# Patient Record
Sex: FEMALE | Race: WHITE | Employment: OTHER | URBAN - METROPOLITAN AREA
[De-identification: names, ages, dates, MRNs, and addresses within clinical notes are randomized per-mention and may not be internally consistent; named-entity substitution may affect disease eponyms.]

---

## 2017-09-30 PROBLEM — A41.9 SEPSIS (HCC): Status: ACTIVE | Noted: 2017-09-30

## 2018-05-04 ENCOUNTER — APPOINTMENT (OUTPATIENT)
Dept: GENERAL RADIOLOGY | Age: 68
End: 2018-05-04
Payer: MEDICARE

## 2018-05-04 ENCOUNTER — HOSPITAL ENCOUNTER (EMERGENCY)
Age: 68
Discharge: HOME | End: 2018-05-04
Attending: EMERGENCY MEDICINE
Payer: MEDICARE

## 2018-05-04 VITALS
OXYGEN SATURATION: 96 % | HEIGHT: 65 IN | BODY MASS INDEX: 24.99 KG/M2 | SYSTOLIC BLOOD PRESSURE: 133 MMHG | WEIGHT: 150 LBS | RESPIRATION RATE: 18 BRPM | HEART RATE: 70 BPM | TEMPERATURE: 99.5 F | DIASTOLIC BLOOD PRESSURE: 67 MMHG

## 2018-05-04 DIAGNOSIS — R40.4 TRANSIENT ALTERATION OF AWARENESS: ICD-10-CM

## 2018-05-04 DIAGNOSIS — N30.00 ACUTE CYSTITIS WITHOUT HEMATURIA: Primary | ICD-10-CM

## 2018-05-04 LAB
ALBUMIN SERPL-MCNC: 4.1 G/DL (ref 3.5–5.2)
ALP BLD-CCNC: 91 U/L (ref 35–104)
ALT SERPL-CCNC: 18 U/L (ref 0–32)
ANION GAP SERPL CALCULATED.3IONS-SCNC: 14 MMOL/L (ref 7–16)
AST SERPL-CCNC: 28 U/L (ref 0–31)
BACTERIA: ABNORMAL /HPF
BASOPHILS ABSOLUTE: 0.01 E9/L (ref 0–0.2)
BASOPHILS RELATIVE PERCENT: 0.2 % (ref 0–2)
BILIRUB SERPL-MCNC: 0.7 MG/DL (ref 0–1.2)
BILIRUBIN URINE: NEGATIVE
BLOOD, URINE: ABNORMAL
BUN BLDV-MCNC: 21 MG/DL (ref 8–23)
CALCIUM SERPL-MCNC: 9.3 MG/DL (ref 8.6–10.2)
CHLORIDE BLD-SCNC: 99 MMOL/L (ref 98–107)
CLARITY: CLEAR
CO2: 24 MMOL/L (ref 22–29)
COLOR: YELLOW
CREAT SERPL-MCNC: 0.9 MG/DL (ref 0.5–1)
EKG ATRIAL RATE: 87 BPM
EKG P AXIS: 33 DEGREES
EKG P-R INTERVAL: 132 MS
EKG Q-T INTERVAL: 368 MS
EKG QRS DURATION: 96 MS
EKG QTC CALCULATION (BAZETT): 442 MS
EKG R AXIS: 88 DEGREES
EKG T AXIS: -5 DEGREES
EKG VENTRICULAR RATE: 87 BPM
EOSINOPHILS ABSOLUTE: 0.01 E9/L (ref 0.05–0.5)
EOSINOPHILS RELATIVE PERCENT: 0.2 % (ref 0–6)
EPITHELIAL CELLS, UA: ABNORMAL /HPF
GFR AFRICAN AMERICAN: >60
GFR NON-AFRICAN AMERICAN: >60 ML/MIN/1.73
GLUCOSE BLD-MCNC: 144 MG/DL (ref 74–109)
GLUCOSE BLD-MCNC: 147 MG/DL
GLUCOSE URINE: NEGATIVE MG/DL
HCT VFR BLD CALC: 34.7 % (ref 34–48)
HEMOGLOBIN: 11.6 G/DL (ref 11.5–15.5)
IMMATURE GRANULOCYTES #: 0.01 E9/L
IMMATURE GRANULOCYTES %: 0.2 % (ref 0–5)
KETONES, URINE: 15 MG/DL
LACTIC ACID: 1.2 MMOL/L (ref 0.5–2.2)
LEUKOCYTE ESTERASE, URINE: ABNORMAL
LYMPHOCYTES ABSOLUTE: 0.86 E9/L (ref 1.5–4)
LYMPHOCYTES RELATIVE PERCENT: 13.8 % (ref 20–42)
MCH RBC QN AUTO: 29.4 PG (ref 26–35)
MCHC RBC AUTO-ENTMCNC: 33.4 % (ref 32–34.5)
MCV RBC AUTO: 88.1 FL (ref 80–99.9)
METER GLUCOSE: 147 MG/DL (ref 70–110)
MONOCYTES ABSOLUTE: 0.67 E9/L (ref 0.1–0.95)
MONOCYTES RELATIVE PERCENT: 10.8 % (ref 2–12)
NEUTROPHILS ABSOLUTE: 4.66 E9/L (ref 1.8–7.3)
NEUTROPHILS RELATIVE PERCENT: 74.8 % (ref 43–80)
NITRITE, URINE: POSITIVE
PDW BLD-RTO: 12.5 FL (ref 11.5–15)
PH UA: 5.5 (ref 5–9)
PLATELET # BLD: 155 E9/L (ref 130–450)
PMV BLD AUTO: 10.5 FL (ref 7–12)
POTASSIUM SERPL-SCNC: 4.2 MMOL/L (ref 3.5–5)
PROTEIN UA: 30 MG/DL
RBC # BLD: 3.94 E12/L (ref 3.5–5.5)
RBC UA: ABNORMAL /HPF (ref 0–2)
SODIUM BLD-SCNC: 137 MMOL/L (ref 132–146)
SPECIFIC GRAVITY UA: 1.02 (ref 1–1.03)
TOTAL PROTEIN: 7.2 G/DL (ref 6.4–8.3)
TROPONIN: <0.01 NG/ML (ref 0–0.03)
UROBILINOGEN, URINE: 0.2 E.U./DL
WBC # BLD: 6.2 E9/L (ref 4.5–11.5)
WBC UA: >20 /HPF (ref 0–5)

## 2018-05-04 PROCEDURE — 87186 SC STD MICRODIL/AGAR DIL: CPT

## 2018-05-04 PROCEDURE — 80053 COMPREHEN METABOLIC PANEL: CPT

## 2018-05-04 PROCEDURE — 82962 GLUCOSE BLOOD TEST: CPT

## 2018-05-04 PROCEDURE — 99284 EMERGENCY DEPT VISIT MOD MDM: CPT

## 2018-05-04 PROCEDURE — 71045 X-RAY EXAM CHEST 1 VIEW: CPT

## 2018-05-04 PROCEDURE — 87040 BLOOD CULTURE FOR BACTERIA: CPT

## 2018-05-04 PROCEDURE — 36415 COLL VENOUS BLD VENIPUNCTURE: CPT

## 2018-05-04 PROCEDURE — 93005 ELECTROCARDIOGRAM TRACING: CPT | Performed by: PHYSICIAN ASSISTANT

## 2018-05-04 PROCEDURE — 2580000003 HC RX 258: Performed by: PHYSICIAN ASSISTANT

## 2018-05-04 PROCEDURE — 84484 ASSAY OF TROPONIN QUANT: CPT

## 2018-05-04 PROCEDURE — 94760 N-INVAS EAR/PLS OXIMETRY 1: CPT

## 2018-05-04 PROCEDURE — 85025 COMPLETE CBC W/AUTO DIFF WBC: CPT

## 2018-05-04 PROCEDURE — 87088 URINE BACTERIA CULTURE: CPT

## 2018-05-04 PROCEDURE — 83605 ASSAY OF LACTIC ACID: CPT

## 2018-05-04 PROCEDURE — 81001 URINALYSIS AUTO W/SCOPE: CPT

## 2018-05-04 PROCEDURE — 6370000000 HC RX 637 (ALT 250 FOR IP): Performed by: PHYSICIAN ASSISTANT

## 2018-05-04 RX ORDER — OMEPRAZOLE 20 MG/1
20 CAPSULE, DELAYED RELEASE ORAL DAILY
COMMUNITY
End: 2018-11-07

## 2018-05-04 RX ORDER — DONEPEZIL HYDROCHLORIDE 10 MG/1
10 TABLET, FILM COATED ORAL NIGHTLY
COMMUNITY
End: 2018-11-07

## 2018-05-04 RX ORDER — ACETAMINOPHEN 500 MG
1000 TABLET ORAL ONCE
Status: COMPLETED | OUTPATIENT
Start: 2018-05-04 | End: 2018-05-04

## 2018-05-04 RX ORDER — MEMANTINE HYDROCHLORIDE 10 MG/1
10 TABLET ORAL 2 TIMES DAILY
COMMUNITY
End: 2018-11-07

## 2018-05-04 RX ORDER — LAMOTRIGINE 200 MG/1
200 TABLET ORAL DAILY
COMMUNITY
End: 2018-11-07

## 2018-05-04 RX ORDER — PAROXETINE HYDROCHLORIDE 40 MG/1
40 TABLET, FILM COATED ORAL EVERY MORNING
COMMUNITY
End: 2018-11-07

## 2018-05-04 RX ORDER — CEFDINIR 300 MG/1
300 CAPSULE ORAL 2 TIMES DAILY
Qty: 20 CAPSULE | Refills: 0 | Status: SHIPPED | OUTPATIENT
Start: 2018-05-04 | End: 2018-05-14

## 2018-05-04 RX ORDER — 0.9 % SODIUM CHLORIDE 0.9 %
1000 INTRAVENOUS SOLUTION INTRAVENOUS ONCE
Status: COMPLETED | OUTPATIENT
Start: 2018-05-04 | End: 2018-05-04

## 2018-05-04 RX ADMIN — ACETAMINOPHEN 1000 MG: 500 TABLET ORAL at 15:40

## 2018-05-04 RX ADMIN — SODIUM CHLORIDE 1000 ML: 9 INJECTION, SOLUTION INTRAVENOUS at 15:40

## 2018-05-04 ASSESSMENT — PAIN DESCRIPTION - PAIN TYPE: TYPE: ACUTE PAIN

## 2018-05-04 ASSESSMENT — PAIN DESCRIPTION - FREQUENCY: FREQUENCY: CONTINUOUS

## 2018-05-04 ASSESSMENT — PAIN DESCRIPTION - LOCATION: LOCATION: ABDOMEN

## 2018-05-06 LAB
ORGANISM: ABNORMAL
URINE CULTURE, ROUTINE: ABNORMAL
URINE CULTURE, ROUTINE: ABNORMAL

## 2018-05-09 LAB
BLOOD CULTURE, ROUTINE: NORMAL
CULTURE, BLOOD 2: NORMAL

## 2018-09-14 ENCOUNTER — APPOINTMENT (OUTPATIENT)
Dept: GENERAL RADIOLOGY | Age: 68
End: 2018-09-14
Payer: MEDICARE

## 2018-09-14 ENCOUNTER — HOSPITAL ENCOUNTER (EMERGENCY)
Age: 68
Discharge: HOME | End: 2018-09-14
Attending: EMERGENCY MEDICINE
Payer: MEDICARE

## 2018-09-14 VITALS
SYSTOLIC BLOOD PRESSURE: 224 MMHG | DIASTOLIC BLOOD PRESSURE: 88 MMHG | WEIGHT: 140 LBS | RESPIRATION RATE: 18 BRPM | BODY MASS INDEX: 23.32 KG/M2 | HEART RATE: 61 BPM | TEMPERATURE: 98.6 F | HEIGHT: 65 IN | OXYGEN SATURATION: 98 %

## 2018-09-14 DIAGNOSIS — M79.641 HAND PAIN, RIGHT: Primary | ICD-10-CM

## 2018-09-14 PROCEDURE — 73130 X-RAY EXAM OF HAND: CPT

## 2018-09-14 PROCEDURE — 99283 EMERGENCY DEPT VISIT LOW MDM: CPT

## 2018-09-14 ASSESSMENT — ENCOUNTER SYMPTOMS
SHORTNESS OF BREATH: 0
DIARRHEA: 0
BACK PAIN: 0
SINUS PRESSURE: 0
VOMITING: 0
COUGH: 0
NAUSEA: 0
ABDOMINAL DISTENTION: 0
EYE DISCHARGE: 0
EYE REDNESS: 0
EYE PAIN: 0
WHEEZING: 0
SORE THROAT: 0

## 2018-09-14 ASSESSMENT — PAIN DESCRIPTION - LOCATION: LOCATION: HAND

## 2018-09-14 ASSESSMENT — PAIN SCALES - GENERAL: PAINLEVEL_OUTOF10: 8

## 2018-09-14 ASSESSMENT — PAIN DESCRIPTION - ORIENTATION: ORIENTATION: RIGHT

## 2018-09-14 ASSESSMENT — PAIN DESCRIPTION - PAIN TYPE: TYPE: ACUTE PAIN

## 2018-09-14 ASSESSMENT — PAIN DESCRIPTION - FREQUENCY: FREQUENCY: CONTINUOUS

## 2018-09-14 ASSESSMENT — PAIN DESCRIPTION - DESCRIPTORS: DESCRIPTORS: STABBING

## 2018-09-15 NOTE — ED PROVIDER NOTES
She is alert and oriented to person, place, and time. No cranial nerve deficit. Coordination normal.   Skin: Skin is warm and dry. Nursing note and vitals reviewed. Procedures    MDM    Patient presents to the ED c/o hand pain. Physical exam extremely unimpressive. Xrays unremarkable except for arthritis. Will discharge patient home. Explained to patient that she needs to f/u with orthopedic surgery and that they may want to repeat xrays in 5-7 days if her symptoms do not improve.           --------------------------------------------- PAST HISTORY ---------------------------------------------  Past Medical History:  has a past medical history of Cancer ([facility] [address] Diabetes mellitus ([facility] Hypertension; Radiation cystitis; Urinary incontinence; and Urinary retention. Past Surgical History:  has a past surgical history that includes fracture surgery and Hysterectomy (1985). Social History:  reports that she has quit smoking. She has never used smokeless tobacco. She reports that she drinks alcohol. She reports that she does not use drugs. Family History: family history includes Heart Disease in her mother; No Known Problems in her father. The patients home medications have been reviewed. Allergies: Tetracyclines & related    -------------------------------------------------- RESULTS -------------------------------------------------  Labs:  No results found for this visit on 09/14/18. Radiology:  XR HAND RIGHT (MIN 3 VIEWS)   Final Result   Mild osteoarthritis DIP and PIP joints.          ------------------------- NURSING NOTES AND VITALS REVIEWED ---------------------------  Date / Time Roomed:  9/14/2018  8:13 PM  ED Bed Assignment:  [id number]    The nursing notes within the ED encounter and vital signs as below have been reviewed.    BP (!) 224/88   Pulse 61   Temp 98.6 °F (37 °C) (Oral)   Resp 18   Ht 5' 5\" (1.651 m)   Wt 140 lb (63.5 kg)   SpO2 98%   BMI 23.30 kg/m²   Oxygen Saturation Interpretation: Normal      ------------------------------------------ PROGRESS NOTES ------------------------------------------  9:30 PM  I have spoken with the patient and discussed todays results, in addition to providing specific details for the plan of care and counseling regarding the diagnosis and prognosis. Their questions are answered at this time and they are agreeable with the plan. I discussed at length with them reasons for immediate return here for re evaluation. They will followup with their primary care physician and orthopedic physician by calling their office on Monday.      --------------------------------- ADDITIONAL PROVIDER NOTES ---------------------------------  At this time the patient is without objective evidence of an acute process requiring hospitalization or inpatient management. They have remained hemodynamically stable throughout their entire ED visit and are stable for discharge with outpatient follow-up. The plan has been discussed in detail and they are aware of the specific conditions for emergent return, as well as the importance of follow-up. New Prescriptions    No medications on file       Diagnosis:  1. Hand pain, right        Disposition:  Patient's disposition: Discharge to home  Patient's condition is stable.        Genna Benitez DO  Resident  09/14/18 4437

## 2018-09-15 NOTE — ED NOTES
An ace wrap was applied to the right hand and an ice pack was given to ponce Fernández RN  09/14/18 [id number]

## 2018-10-05 ENCOUNTER — APPOINTMENT (OUTPATIENT)
Dept: ULTRASOUND IMAGING | Age: 68
End: 2018-10-05
Payer: MEDICARE

## 2018-10-05 ENCOUNTER — HOSPITAL ENCOUNTER (OUTPATIENT)
Dept: ULTRASOUND IMAGING | Age: 68
Discharge: HOME | End: 2018-10-05
Payer: MEDICARE

## 2018-10-05 DIAGNOSIS — N30.90 CYSTITIS: ICD-10-CM

## 2018-10-05 PROCEDURE — 76775 US EXAM ABDO BACK WALL LIM: CPT

## 2018-11-06 RX ORDER — SODIUM CHLORIDE 9 MG/ML
INJECTION, SOLUTION INTRAVENOUS CONTINUOUS
Status: CANCELLED | OUTPATIENT
Start: 2018-11-09

## 2018-11-07 ENCOUNTER — HOSPITAL ENCOUNTER (OUTPATIENT)
Dept: PREADMISSION TESTING | Age: 68
Discharge: HOME | End: 2018-11-07
Payer: MEDICARE

## 2018-11-07 VITALS
SYSTOLIC BLOOD PRESSURE: 130 MMHG | DIASTOLIC BLOOD PRESSURE: 90 MMHG | TEMPERATURE: 97.6 F | HEART RATE: 60 BPM | BODY MASS INDEX: 24.28 KG/M2 | WEIGHT: 142.2 LBS | HEIGHT: 64 IN | RESPIRATION RATE: 16 BRPM | OXYGEN SATURATION: 98 %

## 2018-11-07 DIAGNOSIS — R32 URINARY INCONTINENCE, UNSPECIFIED TYPE: Primary | ICD-10-CM

## 2018-11-07 LAB
ANION GAP SERPL CALCULATED.3IONS-SCNC: 12 MMOL/L (ref 7–16)
BUN BLDV-MCNC: 25 MG/DL (ref 8–23)
CALCIUM SERPL-MCNC: 9.9 MG/DL (ref 8.6–10.2)
CHLORIDE BLD-SCNC: 104 MMOL/L (ref 98–107)
CO2: 24 MMOL/L (ref 22–29)
CREAT SERPL-MCNC: 0.8 MG/DL (ref 0.5–1)
EKG ATRIAL RATE: 55 BPM
EKG P AXIS: 8 DEGREES
EKG P-R INTERVAL: 138 MS
EKG Q-T INTERVAL: 490 MS
EKG QRS DURATION: 102 MS
EKG QTC CALCULATION (BAZETT): 468 MS
EKG R AXIS: 64 DEGREES
EKG T AXIS: -16 DEGREES
EKG VENTRICULAR RATE: 55 BPM
GFR AFRICAN AMERICAN: >60
GFR NON-AFRICAN AMERICAN: >60 ML/MIN/1.73
GLUCOSE BLD-MCNC: 129 MG/DL (ref 74–99)
HCT VFR BLD CALC: 40.1 % (ref 34–48)
HEMOGLOBIN: 13 G/DL (ref 11.5–15.5)
MCH RBC QN AUTO: 29 PG (ref 26–35)
MCHC RBC AUTO-ENTMCNC: 32.4 % (ref 32–34.5)
MCV RBC AUTO: 89.3 FL (ref 80–99.9)
PDW BLD-RTO: 12.7 FL (ref 11.5–15)
PLATELET # BLD: 174 E9/L (ref 130–450)
PMV BLD AUTO: 10.7 FL (ref 7–12)
POTASSIUM SERPL-SCNC: 5.4 MMOL/L (ref 3.5–5)
RBC # BLD: 4.49 E12/L (ref 3.5–5.5)
SODIUM BLD-SCNC: 140 MMOL/L (ref 132–146)
WBC # BLD: 6.1 E9/L (ref 4.5–11.5)

## 2018-11-07 PROCEDURE — 93010 ELECTROCARDIOGRAM REPORT: CPT | Performed by: INTERNAL MEDICINE

## 2018-11-07 PROCEDURE — 36415 COLL VENOUS BLD VENIPUNCTURE: CPT

## 2018-11-07 PROCEDURE — 85027 COMPLETE CBC AUTOMATED: CPT

## 2018-11-07 PROCEDURE — 93005 ELECTROCARDIOGRAM TRACING: CPT | Performed by: ANESTHESIOLOGY

## 2018-11-07 PROCEDURE — 80048 BASIC METABOLIC PNL TOTAL CA: CPT

## 2018-11-07 RX ORDER — ATORVASTATIN CALCIUM 10 MG/1
10 TABLET, FILM COATED ORAL DAILY
COMMUNITY
End: 2019-12-02 | Stop reason: SDUPTHER

## 2018-11-07 RX ORDER — RAMIPRIL 10 MG/1
10 CAPSULE ORAL DAILY
COMMUNITY
End: 2019-07-15 | Stop reason: ALTCHOICE

## 2018-11-07 RX ORDER — LISINOPRIL 10 MG/1
30 TABLET ORAL DAILY
COMMUNITY
End: 2019-06-07 | Stop reason: DRUGHIGH

## 2018-11-09 ENCOUNTER — HOSPITAL ENCOUNTER (OUTPATIENT)
Age: 68
Setting detail: OUTPATIENT SURGERY
Discharge: HOME | End: 2018-11-09
Attending: UROLOGY | Admitting: UROLOGY
Payer: MEDICARE

## 2018-11-09 ENCOUNTER — ANESTHESIA EVENT (OUTPATIENT)
Dept: OPERATING ROOM | Age: 68
End: 2018-11-09
Payer: MEDICARE

## 2018-11-09 ENCOUNTER — ANESTHESIA (OUTPATIENT)
Dept: OPERATING ROOM | Age: 68
End: 2018-11-09
Payer: MEDICARE

## 2018-11-09 VITALS
OXYGEN SATURATION: 100 % | RESPIRATION RATE: 9 BRPM | DIASTOLIC BLOOD PRESSURE: 80 MMHG | SYSTOLIC BLOOD PRESSURE: 188 MMHG

## 2018-11-09 VITALS
OXYGEN SATURATION: 96 % | DIASTOLIC BLOOD PRESSURE: 80 MMHG | WEIGHT: 130 LBS | TEMPERATURE: 97 F | BODY MASS INDEX: 22.2 KG/M2 | SYSTOLIC BLOOD PRESSURE: 170 MMHG | HEART RATE: 70 BPM | HEIGHT: 64 IN | RESPIRATION RATE: 20 BRPM

## 2018-11-09 DIAGNOSIS — G89.18 POST-OPERATIVE PAIN: Primary | ICD-10-CM

## 2018-11-09 LAB — METER GLUCOSE: 143 MG/DL (ref 74–99)

## 2018-11-09 PROCEDURE — 82962 GLUCOSE BLOOD TEST: CPT

## 2018-11-09 PROCEDURE — 2580000003 HC RX 258: Performed by: NURSE ANESTHETIST, CERTIFIED REGISTERED

## 2018-11-09 PROCEDURE — 3700000001 HC ADD 15 MINUTES (ANESTHESIA): Performed by: UROLOGY

## 2018-11-09 PROCEDURE — 6360000002 HC RX W HCPCS: Performed by: UROLOGY

## 2018-11-09 PROCEDURE — 3600000002 HC SURGERY LEVEL 2 BASE: Performed by: UROLOGY

## 2018-11-09 PROCEDURE — 3600000012 HC SURGERY LEVEL 2 ADDTL 15MIN: Performed by: UROLOGY

## 2018-11-09 PROCEDURE — 2709999900 HC NON-CHARGEABLE SUPPLY: Performed by: UROLOGY

## 2018-11-09 PROCEDURE — 2580000003 HC RX 258: Performed by: UROLOGY

## 2018-11-09 PROCEDURE — 7100000010 HC PHASE II RECOVERY - FIRST 15 MIN: Performed by: UROLOGY

## 2018-11-09 PROCEDURE — 6370000000 HC RX 637 (ALT 250 FOR IP): Performed by: ANESTHESIOLOGY

## 2018-11-09 PROCEDURE — 3700000000 HC ANESTHESIA ATTENDED CARE: Performed by: UROLOGY

## 2018-11-09 PROCEDURE — 6360000002 HC RX W HCPCS: Performed by: NURSE ANESTHETIST, CERTIFIED REGISTERED

## 2018-11-09 PROCEDURE — 2500000003 HC RX 250 WO HCPCS: Performed by: NURSE ANESTHETIST, CERTIFIED REGISTERED

## 2018-11-09 RX ORDER — PROPOFOL 10 MG/ML
INJECTION, EMULSION INTRAVENOUS PRN
Status: DISCONTINUED | OUTPATIENT
Start: 2018-11-09 | End: 2018-11-09 | Stop reason: SDUPTHER

## 2018-11-09 RX ORDER — SODIUM CHLORIDE 9 MG/ML
INJECTION, SOLUTION INTRAVENOUS CONTINUOUS
Status: DISCONTINUED | OUTPATIENT
Start: 2018-11-09 | End: 2018-11-09 | Stop reason: HOSPADM

## 2018-11-09 RX ORDER — TRAMADOL HYDROCHLORIDE 50 MG/1
50 TABLET ORAL ONCE
Status: COMPLETED | OUTPATIENT
Start: 2018-11-09 | End: 2018-11-09

## 2018-11-09 RX ORDER — TRAMADOL HYDROCHLORIDE 50 MG/1
50 TABLET ORAL EVERY 6 HOURS PRN
Qty: 20 TABLET | Refills: 0 | Status: SHIPPED | OUTPATIENT
Start: 2018-11-09 | End: 2018-11-14

## 2018-11-09 RX ORDER — PROPOFOL 10 MG/ML
INJECTION, EMULSION INTRAVENOUS CONTINUOUS PRN
Status: DISCONTINUED | OUTPATIENT
Start: 2018-11-09 | End: 2018-11-09 | Stop reason: SDUPTHER

## 2018-11-09 RX ORDER — CEFDINIR 300 MG/1
300 CAPSULE ORAL 2 TIMES DAILY
Qty: 14 CAPSULE | Refills: 0 | Status: SHIPPED | OUTPATIENT
Start: 2018-11-09 | End: 2018-11-16

## 2018-11-09 RX ORDER — SODIUM CHLORIDE 0.9 % (FLUSH) 0.9 %
10 SYRINGE (ML) INJECTION PRN
Status: DISCONTINUED | OUTPATIENT
Start: 2018-11-09 | End: 2018-11-09 | Stop reason: HOSPADM

## 2018-11-09 RX ORDER — SODIUM CHLORIDE 0.9 % (FLUSH) 0.9 %
10 SYRINGE (ML) INJECTION EVERY 12 HOURS SCHEDULED
Status: DISCONTINUED | OUTPATIENT
Start: 2018-11-09 | End: 2018-11-09 | Stop reason: HOSPADM

## 2018-11-09 RX ORDER — SODIUM CHLORIDE 9 MG/ML
INJECTION, SOLUTION INTRAVENOUS CONTINUOUS
Status: DISCONTINUED | OUTPATIENT
Start: 2018-11-09 | End: 2018-11-09 | Stop reason: SDUPTHER

## 2018-11-09 RX ORDER — FENTANYL CITRATE 50 UG/ML
INJECTION, SOLUTION INTRAMUSCULAR; INTRAVENOUS PRN
Status: DISCONTINUED | OUTPATIENT
Start: 2018-11-09 | End: 2018-11-09 | Stop reason: SDUPTHER

## 2018-11-09 RX ORDER — MIDAZOLAM HYDROCHLORIDE 1 MG/ML
INJECTION INTRAMUSCULAR; INTRAVENOUS PRN
Status: DISCONTINUED | OUTPATIENT
Start: 2018-11-09 | End: 2018-11-09 | Stop reason: SDUPTHER

## 2018-11-09 RX ORDER — MEPERIDINE HYDROCHLORIDE 25 MG/ML
12.5 INJECTION INTRAMUSCULAR; INTRAVENOUS; SUBCUTANEOUS EVERY 5 MIN PRN
Status: DISCONTINUED | OUTPATIENT
Start: 2018-11-09 | End: 2018-11-09 | Stop reason: HOSPADM

## 2018-11-09 RX ORDER — SODIUM CHLORIDE 9 MG/ML
INJECTION, SOLUTION INTRAVENOUS CONTINUOUS PRN
Status: DISCONTINUED | OUTPATIENT
Start: 2018-11-09 | End: 2018-11-09 | Stop reason: SDUPTHER

## 2018-11-09 RX ORDER — LABETALOL HYDROCHLORIDE 5 MG/ML
INJECTION, SOLUTION INTRAVENOUS PRN
Status: DISCONTINUED | OUTPATIENT
Start: 2018-11-09 | End: 2018-11-09 | Stop reason: SDUPTHER

## 2018-11-09 RX ADMIN — SODIUM CHLORIDE: 9 INJECTION, SOLUTION INTRAVENOUS at 10:17

## 2018-11-09 RX ADMIN — FENTANYL CITRATE 50 MCG: 50 INJECTION, SOLUTION INTRAMUSCULAR; INTRAVENOUS at 10:22

## 2018-11-09 RX ADMIN — MIDAZOLAM 2 MG: 1 INJECTION INTRAMUSCULAR; INTRAVENOUS at 10:17

## 2018-11-09 RX ADMIN — LABETALOL 20 MG/4 ML (5 MG/ML) INTRAVENOUS SYRINGE 5 MG: at 10:42

## 2018-11-09 RX ADMIN — MEROPENEM 1 G: 1 INJECTION, POWDER, FOR SOLUTION INTRAVENOUS at 10:17

## 2018-11-09 RX ADMIN — TRAMADOL HYDROCHLORIDE 50 MG: 50 TABLET, FILM COATED ORAL at 11:24

## 2018-11-09 RX ADMIN — SODIUM CHLORIDE: 9 INJECTION, SOLUTION INTRAVENOUS at 09:16

## 2018-11-09 RX ADMIN — PROPOFOL 60 MG: 10 INJECTION, EMULSION INTRAVENOUS at 10:22

## 2018-11-09 RX ADMIN — PROPOFOL 100 MCG/KG/MIN: 10 INJECTION, EMULSION INTRAVENOUS at 10:22

## 2018-11-09 ASSESSMENT — PULMONARY FUNCTION TESTS
PIF_VALUE: 1
PIF_VALUE: 0
PIF_VALUE: 1
PIF_VALUE: 0
PIF_VALUE: 1
PIF_VALUE: 0
PIF_VALUE: 1
PIF_VALUE: 0
PIF_VALUE: 0

## 2018-11-09 ASSESSMENT — PAIN - FUNCTIONAL ASSESSMENT: PAIN_FUNCTIONAL_ASSESSMENT: 0-10

## 2018-11-09 ASSESSMENT — PAIN DESCRIPTION - LOCATION: LOCATION: VAGINA

## 2018-11-09 ASSESSMENT — PAIN SCALES - GENERAL
PAINLEVEL_OUTOF10: 8
PAINLEVEL_OUTOF10: 0
PAINLEVEL_OUTOF10: 7

## 2018-11-09 ASSESSMENT — PAIN DESCRIPTION - DESCRIPTORS: DESCRIPTORS: ACHING;BURNING

## 2018-11-09 ASSESSMENT — PAIN DESCRIPTION - FREQUENCY: FREQUENCY: INTERMITTENT

## 2018-11-09 ASSESSMENT — PAIN DESCRIPTION - ONSET: ONSET: AWAKENED FROM SLEEP

## 2018-11-09 ASSESSMENT — PAIN DESCRIPTION - PAIN TYPE: TYPE: ACUTE PAIN

## 2018-11-09 ASSESSMENT — PAIN DESCRIPTION - PROGRESSION: CLINICAL_PROGRESSION: NOT CHANGED

## 2018-11-09 NOTE — PROGRESS NOTES
CLINICAL PHARMACY NOTE: MEDS TO [address] [place] [address] Drive Select Patient?: No  Total # of Prescriptions Filled: 1   The following medications were delivered to the patient:  · Tramadol 50mg  Total # of Interventions Completed: 3  Time Spent (min): 30    Additional Documentation:

## 2018-11-09 NOTE — ANESTHESIA PRE PROCEDURE
Department of Anesthesiology  Preprocedure Note       Name:  Liz Diaz   Age:  70 y.o.  :  1950                                          MRN:  [id number]         Date:  2018      Surgeon: Yolanda Louise):  Elijah Almodoavr MD    Procedure: CYSTOSCOPY  BOTOX INJECTION (200 UNITS) (N/A )    Medications prior to admission:   Prior to Admission medications    Medication Sig Start Date End Date Taking? Authorizing Provider   ramipril (ALTACE) 10 MG capsule Take 10 mg by mouth daily    Historical Provider, MD   lisinopril (PRINIVIL;ZESTRIL) 10 MG tablet Take 30 mg by mouth daily    Historical Provider, MD   atorvastatin (LIPITOR) 10 MG tablet Take 10 mg by mouth daily    Historical Provider, MD   METFORMIN HCL PO Take by mouth    Historical Provider, MD   metFORMIN (GLUCOPHAGE) 1000 MG tablet Take 1,000 mg by mouth 2 times daily (with meals)    Historical Provider, MD       Current medications:    Current Facility-Administered Medications   Medication Dose Route Frequency Provider Last Rate Last Dose    0.9 % sodium chloride infusion   Intravenous Continuous Elijah Almodovar MD        sodium chloride flush 0.9 % injection 10 mL  10 mL Intravenous 2 times per day Elijah Almodovar MD        sodium chloride flush 0.9 % injection 10 mL  10 mL Intravenous PRN Elijah Almodovar MD        meropenem [facility]) 1 g in sodium chloride 0.9 % 100 mL IVPB (mini-bag)  1 g Intravenous Once Elijah Almodovar MD           Allergies:     Allergies   Allergen Reactions    Tetracyclines & Related        Problem List:    Patient Active Problem List   Diagnosis Code    Sepsis secondary to UTI ([facility] [address] A41.9       Past Medical History:        Diagnosis Date    Arthritis     right hand    Cancer ([address]     uterus and cervix had radiation and implant    Diabetes mellitus ([address]     Hyperlipidemia     Hypertension     Radiation cystitis     Urinary incontinence     complication due to uterine cancer    Urinary retention     caused Arnulfo Oliver MD   11/9/2018

## 2018-11-09 NOTE — OP NOTE
[address]                                OPERATIVE REPORT    PATIENT NAME: Gayla Britt                       :        1950  MED REC NO:   [id number]                            ROOM:  ACCOUNT NO:   [de-identified]                           ADMIT DATE: 2018  PROVIDER:     Pawan Saab MD    DATE OF PROCEDURE:  2018    PREOPERATIVE DIAGNOSES:  1. Refractory urge urinary incontinence. 2.  History of radiation cystitis. POSTOPERATIVE DIAGNOSES:  1. Refractory urge urinary incontinence. 2.  History of radiation cystitis. 3.  Several small bladder calculi. PROCEDURE PERFORMED:  Cystoscopy with evacuation of bladder stones and  Botox 200 units. SURGEON:  Pawan Saab MD    ANESTHESIA:  LMAC. ESTIMATED BLOOD LOSS:  Less than 5 mL. INDICATIONS FOR PROCEDURE:  The patient is a 79-year-old female with a  history of previous pelvic radiation. She subsequently developed  refractory urge urinary incontinence. The patient was seen, options  were discussed, and she wished to proceed with attempted Botox 200  units. She understood the risks of infection along with the risk of  potential non-efficacy. Risks and benefits were again reviewed with the  patient and her  in the preoperative holding area and they wish  to proceed. PROCEDURES AND FINDINGS:  After informed consent was obtained and  preoperative antibiotics were given, the patient was taken to the  operative suite. Time-out was performed to identify correct patient,  procedure, and surgical site. She had IV sedation induced without  complication. She was placed into the dorsal lithotomy position where  her perineum was prepped and draped in sterile fashion. I passed a  22-[place]est[place] rigid cystoscope per urethra. Upon entering the bladder, she  was noted to have a verified _____ appearing bladder.   In the dependent  portion

## 2018-12-29 ENCOUNTER — HOSPITAL ENCOUNTER (OUTPATIENT)
Age: 68
Discharge: HOME | End: 2018-12-29
Payer: MEDICARE

## 2018-12-29 LAB
ALBUMIN SERPL-MCNC: 4.5 G/DL (ref 3.5–5.2)
ALP BLD-CCNC: 78 U/L (ref 35–104)
ALT SERPL-CCNC: 17 U/L (ref 0–32)
ANION GAP SERPL CALCULATED.3IONS-SCNC: 12 MMOL/L (ref 7–16)
AST SERPL-CCNC: 20 U/L (ref 0–31)
BASOPHILS ABSOLUTE: 0.03 E9/L (ref 0–0.2)
BASOPHILS RELATIVE PERCENT: 0.6 % (ref 0–2)
BILIRUB SERPL-MCNC: 0.5 MG/DL (ref 0–1.2)
BUN BLDV-MCNC: 19 MG/DL (ref 8–23)
CALCIUM SERPL-MCNC: 9.9 MG/DL (ref 8.6–10.2)
CHLORIDE BLD-SCNC: 107 MMOL/L (ref 98–107)
CHOLESTEROL, FASTING: 240 MG/DL (ref 0–199)
CO2: 24 MMOL/L (ref 22–29)
CREAT SERPL-MCNC: 0.8 MG/DL (ref 0.5–1)
EOSINOPHILS ABSOLUTE: 0.08 E9/L (ref 0.05–0.5)
EOSINOPHILS RELATIVE PERCENT: 1.6 % (ref 0–6)
GFR AFRICAN AMERICAN: >60
GFR NON-AFRICAN AMERICAN: >60 ML/MIN/1.73
GLUCOSE FASTING: 94 MG/DL (ref 74–99)
HBA1C MFR BLD: 7.7 % (ref 4–5.6)
HCT VFR BLD CALC: 40.7 % (ref 34–48)
HDLC SERPL-MCNC: 59 MG/DL
HEMOGLOBIN: 13.2 G/DL (ref 11.5–15.5)
IMMATURE GRANULOCYTES #: 0.01 E9/L
IMMATURE GRANULOCYTES %: 0.2 % (ref 0–5)
LDL CHOLESTEROL CALCULATED: 151 MG/DL (ref 0–99)
LYMPHOCYTES ABSOLUTE: 1.6 E9/L (ref 1.5–4)
LYMPHOCYTES RELATIVE PERCENT: 33 % (ref 20–42)
MCH RBC QN AUTO: 29.1 PG (ref 26–35)
MCHC RBC AUTO-ENTMCNC: 32.4 % (ref 32–34.5)
MCV RBC AUTO: 89.8 FL (ref 80–99.9)
MONOCYTES ABSOLUTE: 0.46 E9/L (ref 0.1–0.95)
MONOCYTES RELATIVE PERCENT: 9.5 % (ref 2–12)
NEUTROPHILS ABSOLUTE: 2.67 E9/L (ref 1.8–7.3)
NEUTROPHILS RELATIVE PERCENT: 55.1 % (ref 43–80)
PDW BLD-RTO: 12.8 FL (ref 11.5–15)
PLATELET # BLD: 177 E9/L (ref 130–450)
PMV BLD AUTO: 10.1 FL (ref 7–12)
POTASSIUM SERPL-SCNC: 5 MMOL/L (ref 3.5–5)
RBC # BLD: 4.53 E12/L (ref 3.5–5.5)
SODIUM BLD-SCNC: 143 MMOL/L (ref 132–146)
TOTAL PROTEIN: 7.2 G/DL (ref 6.4–8.3)
TRIGLYCERIDE, FASTING: 148 MG/DL (ref 0–149)
TSH SERPL DL<=0.05 MIU/L-ACNC: 1.14 UIU/ML (ref 0.27–4.2)
VLDLC SERPL CALC-MCNC: 30 MG/DL
WBC # BLD: 4.9 E9/L (ref 4.5–11.5)

## 2018-12-29 PROCEDURE — 80053 COMPREHEN METABOLIC PANEL: CPT

## 2018-12-29 PROCEDURE — 36415 COLL VENOUS BLD VENIPUNCTURE: CPT

## 2018-12-29 PROCEDURE — 80061 LIPID PANEL: CPT

## 2018-12-29 PROCEDURE — 85025 COMPLETE CBC W/AUTO DIFF WBC: CPT

## 2018-12-29 PROCEDURE — 83036 HEMOGLOBIN GLYCOSYLATED A1C: CPT

## 2018-12-29 PROCEDURE — 84443 ASSAY THYROID STIM HORMONE: CPT

## 2019-04-24 ENCOUNTER — HOSPITAL ENCOUNTER (OUTPATIENT)
Age: 69
Discharge: HOME | End: 2019-04-26
Payer: MEDICARE

## 2019-04-24 PROCEDURE — 87077 CULTURE AEROBIC IDENTIFY: CPT

## 2019-04-24 PROCEDURE — 87186 SC STD MICRODIL/AGAR DIL: CPT

## 2019-04-24 PROCEDURE — 87088 URINE BACTERIA CULTURE: CPT

## 2019-04-26 LAB
ORGANISM: ABNORMAL
URINE CULTURE, ROUTINE: ABNORMAL
URINE CULTURE, ROUTINE: ABNORMAL

## 2019-05-04 ENCOUNTER — HOSPITAL ENCOUNTER (EMERGENCY)
Age: 69
Discharge: HOME | End: 2019-05-05
Payer: MEDICARE

## 2019-05-04 DIAGNOSIS — R33.9 URINARY RETENTION: ICD-10-CM

## 2019-05-04 DIAGNOSIS — R31.9 URINARY TRACT INFECTION WITH HEMATURIA, SITE UNSPECIFIED: Primary | ICD-10-CM

## 2019-05-04 DIAGNOSIS — N39.0 URINARY TRACT INFECTION WITH HEMATURIA, SITE UNSPECIFIED: Primary | ICD-10-CM

## 2019-05-04 LAB
AMORPHOUS: NORMAL
ANION GAP SERPL CALCULATED.3IONS-SCNC: 16 MMOL/L (ref 7–16)
BACTERIA: NORMAL /HPF
BASOPHILS ABSOLUTE: 0.04 E9/L (ref 0–0.2)
BASOPHILS RELATIVE PERCENT: 0.5 % (ref 0–2)
BILIRUBIN URINE: ABNORMAL
BLOOD, URINE: ABNORMAL
BUN BLDV-MCNC: 24 MG/DL (ref 8–23)
CALCIUM SERPL-MCNC: 9.9 MG/DL (ref 8.6–10.2)
CHLORIDE BLD-SCNC: 98 MMOL/L (ref 98–107)
CLARITY: ABNORMAL
CO2: 21 MMOL/L (ref 22–29)
COLOR: ABNORMAL
CREAT SERPL-MCNC: 0.8 MG/DL (ref 0.5–1)
EOSINOPHILS ABSOLUTE: 0.12 E9/L (ref 0.05–0.5)
EOSINOPHILS RELATIVE PERCENT: 1.5 % (ref 0–6)
GFR AFRICAN AMERICAN: >60
GFR NON-AFRICAN AMERICAN: >60 ML/MIN/1.73
GLUCOSE BLD-MCNC: 248 MG/DL (ref 74–99)
GLUCOSE URINE: 250 MG/DL
HCT VFR BLD CALC: 43.7 % (ref 34–48)
HEMOGLOBIN: 14.4 G/DL (ref 11.5–15.5)
IMMATURE GRANULOCYTES #: 0.03 E9/L
IMMATURE GRANULOCYTES %: 0.4 % (ref 0–5)
KETONES, URINE: NEGATIVE MG/DL
LACTIC ACID: 2.4 MMOL/L (ref 0.5–2.2)
LEUKOCYTE ESTERASE, URINE: ABNORMAL
LYMPHOCYTES ABSOLUTE: 2.25 E9/L (ref 1.5–4)
LYMPHOCYTES RELATIVE PERCENT: 28.2 % (ref 20–42)
MCH RBC QN AUTO: 29.6 PG (ref 26–35)
MCHC RBC AUTO-ENTMCNC: 33 % (ref 32–34.5)
MCV RBC AUTO: 89.9 FL (ref 80–99.9)
MONOCYTES ABSOLUTE: 0.6 E9/L (ref 0.1–0.95)
MONOCYTES RELATIVE PERCENT: 7.5 % (ref 2–12)
NEUTROPHILS ABSOLUTE: 4.95 E9/L (ref 1.8–7.3)
NEUTROPHILS RELATIVE PERCENT: 61.9 % (ref 43–80)
NITRITE, URINE: NEGATIVE
PDW BLD-RTO: 12.6 FL (ref 11.5–15)
PH UA: 6 (ref 5–9)
PLATELET # BLD: 228 E9/L (ref 130–450)
PMV BLD AUTO: 10.6 FL (ref 7–12)
POTASSIUM SERPL-SCNC: 5.1 MMOL/L (ref 3.5–5)
PROTEIN UA: 30 MG/DL
RBC # BLD: 4.86 E12/L (ref 3.5–5.5)
RBC UA: >20 /HPF (ref 0–2)
SODIUM BLD-SCNC: 135 MMOL/L (ref 132–146)
SPECIFIC GRAVITY UA: 1.01 (ref 1–1.03)
UROBILINOGEN, URINE: 0.2 E.U./DL
WBC # BLD: 8 E9/L (ref 4.5–11.5)
WBC UA: NORMAL /HPF (ref 0–5)

## 2019-05-04 PROCEDURE — 80048 BASIC METABOLIC PNL TOTAL CA: CPT

## 2019-05-04 PROCEDURE — 2580000003 HC RX 258: Performed by: PHYSICIAN ASSISTANT

## 2019-05-04 PROCEDURE — 85025 COMPLETE CBC W/AUTO DIFF WBC: CPT

## 2019-05-04 PROCEDURE — 51702 INSERT TEMP BLADDER CATH: CPT

## 2019-05-04 PROCEDURE — 96376 TX/PRO/DX INJ SAME DRUG ADON: CPT

## 2019-05-04 PROCEDURE — 81001 URINALYSIS AUTO W/SCOPE: CPT

## 2019-05-04 PROCEDURE — 6360000002 HC RX W HCPCS: Performed by: PHYSICIAN ASSISTANT

## 2019-05-04 PROCEDURE — 51798 US URINE CAPACITY MEASURE: CPT

## 2019-05-04 PROCEDURE — 96374 THER/PROPH/DIAG INJ IV PUSH: CPT

## 2019-05-04 PROCEDURE — 83605 ASSAY OF LACTIC ACID: CPT

## 2019-05-04 PROCEDURE — 6370000000 HC RX 637 (ALT 250 FOR IP)

## 2019-05-04 PROCEDURE — 36415 COLL VENOUS BLD VENIPUNCTURE: CPT

## 2019-05-04 PROCEDURE — 87088 URINE BACTERIA CULTURE: CPT

## 2019-05-04 PROCEDURE — 6370000000 HC RX 637 (ALT 250 FOR IP): Performed by: PHYSICIAN ASSISTANT

## 2019-05-04 PROCEDURE — 99284 EMERGENCY DEPT VISIT MOD MDM: CPT

## 2019-05-04 RX ORDER — FENTANYL CITRATE 50 UG/ML
25 INJECTION, SOLUTION INTRAMUSCULAR; INTRAVENOUS ONCE
Status: COMPLETED | OUTPATIENT
Start: 2019-05-04 | End: 2019-05-04

## 2019-05-04 RX ORDER — 0.9 % SODIUM CHLORIDE 0.9 %
500 INTRAVENOUS SOLUTION INTRAVENOUS ONCE
Status: COMPLETED | OUTPATIENT
Start: 2019-05-04 | End: 2019-05-04

## 2019-05-04 RX ORDER — TRAMADOL HYDROCHLORIDE 50 MG/1
50 TABLET ORAL EVERY 6 HOURS PRN
Qty: 10 TABLET | Refills: 0 | Status: SHIPPED | OUTPATIENT
Start: 2019-05-04 | End: 2019-05-09

## 2019-05-04 RX ORDER — CLONIDINE HYDROCHLORIDE 0.2 MG/1
0.2 TABLET ORAL ONCE
Status: COMPLETED | OUTPATIENT
Start: 2019-05-04 | End: 2019-05-05

## 2019-05-04 RX ORDER — CEFDINIR 300 MG/1
300 CAPSULE ORAL ONCE
Status: COMPLETED | OUTPATIENT
Start: 2019-05-04 | End: 2019-05-04

## 2019-05-04 RX ORDER — CEFDINIR 300 MG/1
300 CAPSULE ORAL 2 TIMES DAILY
Qty: 20 CAPSULE | Refills: 0 | Status: SHIPPED | OUTPATIENT
Start: 2019-05-04 | End: 2019-05-14

## 2019-05-04 RX ORDER — CLONIDINE HYDROCHLORIDE 0.2 MG/1
TABLET ORAL
Status: COMPLETED
Start: 2019-05-04 | End: 2019-05-04

## 2019-05-04 RX ORDER — LISINOPRIL 10 MG/1
30 TABLET ORAL ONCE
Status: COMPLETED | OUTPATIENT
Start: 2019-05-04 | End: 2019-05-04

## 2019-05-04 RX ORDER — SODIUM CHLORIDE 9 MG/ML
INJECTION, SOLUTION INTRAVENOUS
Status: DISCONTINUED
Start: 2019-05-04 | End: 2019-05-05 | Stop reason: HOSPADM

## 2019-05-04 RX ORDER — RAMIPRIL 5 MG/1
10 CAPSULE ORAL DAILY
Status: DISCONTINUED | OUTPATIENT
Start: 2019-05-04 | End: 2019-05-04

## 2019-05-04 RX ADMIN — CLONIDINE HYDROCHLORIDE 0.2 MG: 0.2 TABLET ORAL at 21:54

## 2019-05-04 RX ADMIN — SODIUM CHLORIDE 500 ML: 9 INJECTION, SOLUTION INTRAVENOUS at 18:05

## 2019-05-04 RX ADMIN — CEFDINIR 300 MG: 300 CAPSULE ORAL at 21:32

## 2019-05-04 RX ADMIN — LISINOPRIL 10 MG: 10 TABLET ORAL at 20:50

## 2019-05-04 RX ADMIN — FENTANYL CITRATE 25 MCG: 50 INJECTION INTRAMUSCULAR; INTRAVENOUS at 20:17

## 2019-05-04 RX ADMIN — FENTANYL CITRATE 25 MCG: 50 INJECTION INTRAMUSCULAR; INTRAVENOUS at 19:08

## 2019-05-04 ASSESSMENT — PAIN DESCRIPTION - ORIENTATION: ORIENTATION: LOWER

## 2019-05-04 ASSESSMENT — PAIN DESCRIPTION - PAIN TYPE: TYPE: ACUTE PAIN

## 2019-05-04 ASSESSMENT — PAIN DESCRIPTION - LOCATION: LOCATION: ABDOMEN

## 2019-05-04 ASSESSMENT — PAIN SCALES - GENERAL
PAINLEVEL_OUTOF10: 9
PAINLEVEL_OUTOF10: 5

## 2019-05-04 NOTE — ED NOTES
Hassan draining only about 50ml, hassan flushed with about 50ml ns causing pt pain/crying and bloody urine returned     Carol Fitzgerald RN  05/04/19 [id number]

## 2019-05-05 VITALS
RESPIRATION RATE: 22 BRPM | SYSTOLIC BLOOD PRESSURE: 130 MMHG | WEIGHT: 150 LBS | HEART RATE: 68 BPM | HEIGHT: 64 IN | DIASTOLIC BLOOD PRESSURE: 68 MMHG | OXYGEN SATURATION: 96 % | TEMPERATURE: 98 F | BODY MASS INDEX: 25.61 KG/M2

## 2019-05-05 PROCEDURE — 6370000000 HC RX 637 (ALT 250 FOR IP): Performed by: PHYSICIAN ASSISTANT

## 2019-05-05 RX ADMIN — CLONIDINE HYDROCHLORIDE 0.2 MG: 0.2 TABLET ORAL at 01:00

## 2019-05-05 NOTE — ED NOTES
Pa/reece in to update pt/, to wait another hour and then recheck blood pressure prior to discharge     Peg Parikh RN  05/04/19 0305

## 2019-05-05 NOTE — ED NOTES
Leg bag given to patient. Patient instructed on how to use leg bag. Patient voiced understanding and demonstrates use.      Manjeet Faustin RN  05/05/19 2646

## 2019-05-05 NOTE — ED NOTES
Discharge on hold for now, pt medicated with additional blood pressure med     Tami Doll, RIDGE  05/04/19 8102

## 2019-05-07 ENCOUNTER — HOSPITAL ENCOUNTER (EMERGENCY)
Age: 69
Discharge: HOME | End: 2019-05-07
Attending: EMERGENCY MEDICINE
Payer: MEDICARE

## 2019-05-07 VITALS
BODY MASS INDEX: 23.9 KG/M2 | WEIGHT: 140 LBS | HEIGHT: 64 IN | SYSTOLIC BLOOD PRESSURE: 119 MMHG | OXYGEN SATURATION: 98 % | TEMPERATURE: 98.5 F | RESPIRATION RATE: 16 BRPM | HEART RATE: 65 BPM | DIASTOLIC BLOOD PRESSURE: 53 MMHG

## 2019-05-07 DIAGNOSIS — Z97.8 FOLEY CATHETER IN PLACE: Primary | ICD-10-CM

## 2019-05-07 LAB — URINE CULTURE, ROUTINE: NORMAL

## 2019-05-07 PROCEDURE — 99283 EMERGENCY DEPT VISIT LOW MDM: CPT

## 2019-05-07 ASSESSMENT — ENCOUNTER SYMPTOMS
COUGH: 0
NAUSEA: 0
CONSTIPATION: 0
WHEEZING: 0
VOMITING: 0
ABDOMINAL PAIN: 0
COLOR CHANGE: 0
SHORTNESS OF BREATH: 0
DIARRHEA: 0

## 2019-05-07 NOTE — ED PROVIDER NOTES
Patient is a 43-year-old female with history of chronic UTIs, who presents to the ED for Kaplan check. Patient states that she was recently here on the 1th, was diagnosed with a urinary tract infection, and had a Kaplan in place at that time. She was placed on Omnicef, and discharged. She was instructed to follow-up with Dr. Earl Jarvis in a couple of days. She states that she tried to make a phone call to Dr. Earl Jarvis at that time, however he did not answer the phone. They state that they continue to use the antibiotics, but she continues to have some mild burning and lower abdominal pain. She denies any other symptoms at this time, no fevers or chills, no nausea or vomiting. The history is provided by the patient. No  was used. Review of Systems   Constitutional: Negative for chills and fever. Respiratory: Negative for cough, shortness of breath and wheezing. Cardiovascular: Negative for chest pain and palpitations. Gastrointestinal: Negative for abdominal pain, constipation, diarrhea, nausea and vomiting. Genitourinary: Positive for difficulty urinating. Negative for dysuria and hematuria. Musculoskeletal: Negative for neck pain and neck stiffness. Skin: Negative for color change, pallor, rash and wound. Neurological: Negative for dizziness, syncope, light-headedness, numbness and headaches. Psychiatric/Behavioral: Negative for confusion and decreased concentration. The patient is not nervous/anxious. Physical Exam   Constitutional: She is oriented to person, place, and time. She appears well-developed and well-nourished. No distress. HENT:   Head: Normocephalic and atraumatic. Right Ear: External ear normal.   Left Ear: External ear normal.   Mouth/Throat: No oropharyngeal exudate. Eyes: Pupils are equal, round, and reactive to light. EOM are normal.   Neck: Normal range of motion.    Cardiovascular: Normal rate, regular rhythm, normal heart sounds and intact distal pulses. Exam reveals no gallop and no friction rub. No murmur heard. Pulmonary/Chest: Effort normal and breath sounds normal. No stridor. No respiratory distress. She has no wheezes. She has no rales. She exhibits no tenderness. Abdominal: Soft. Bowel sounds are normal. She exhibits no distension and no mass. There is no tenderness. There is no rebound and no guarding. No hernia. Musculoskeletal: Normal range of motion. She exhibits no edema, tenderness or deformity. Lymphadenopathy:     She has no cervical adenopathy. Neurological: She is alert and oriented to person, place, and time. No cranial nerve deficit. Skin: Skin is warm and dry. Capillary refill takes less than 2 seconds. No rash noted. She is not diaphoretic. No erythema. No pallor. Psychiatric: She has a normal mood and affect. Her behavior is normal. Judgment and thought content normal.   Nursing note and vitals reviewed. Procedures  --------------------------------------------- PAST HISTORY ---------------------------------------------  Past Medical History:  has a past medical history of Arthritis, Cancer ([facility] [address] Diabetes mellitus ([facility] Hyperlipidemia, Hypertension, Radiation cystitis, Urinary incontinence, and Urinary retention. Past Surgical History:  has a past surgical history that includes fracture surgery; Hysterectomy (1985); and pr cystourethroscopy inj chemodenervation bladder (N/A, 11/9/2018). Social History:  reports that she has quit smoking. She has never used smokeless tobacco. She reports that she drinks alcohol. She reports that she does not use drugs. Family History: family history includes Heart Disease in her mother; No Known Problems in her father; Other in her brother, sister, and another family member. The patients home medications have been reviewed.     Allergies: Tetracyclines & related    -------------------------------------------------- RESULTS -------------------------------------------------  Labs:  No results found for this visit on 05/07/19. Radiology:  No orders to display       ------------------------- NURSING NOTES AND VITALS REVIEWED ---------------------------  Date / Time Roomed:  5/7/2019  2:15 PM  ED Bed Assignment:  14/14    The nursing notes within the ED encounter and vital signs as below have been reviewed. BP (!) 119/53   Pulse 65   Temp 98.5 °F (36.9 °C)   Resp 16   Ht 5' 4\" (1.626 m)   Wt 140 lb (63.5 kg)   SpO2 98%   BMI 24.03 kg/m²   Oxygen Saturation Interpretation: Normal      ------------------------------------------ PROGRESS NOTES ------------------------------------------  ED Course as of May 07 1649   Tue May 07, 2019   1504 Discussed with Dr. Rebeka Farmer who states that the patient will be schedule and called for a follow up appointment. [KS]      ED Course User Index  [KS] Dmitri Hylton DO         4:49 PM  I have spoken with the patient and discussed todays results, in addition to providing specific details for the plan of care and counseling regarding the diagnosis and prognosis. Their questions are answered at this time and they are agreeable with the plan. I discussed at length with them reasons for immediate return here for re evaluation. They will followup with their primary care physician by calling their office on Monday.      --------------------------------- ADDITIONAL PROVIDER NOTES ---------------------------------  At this time the patient is without objective evidence of an acute process requiring hospitalization or inpatient management. They have remained hemodynamically stable throughout their entire ED visit and are stable for discharge with outpatient follow-up. The plan has been discussed in detail and they are aware of the specific conditions for emergent return, as well as the importance of follow-up. Discharge Medication List as of 5/7/2019  3:05 PM          Diagnosis:  1.  Kaplan

## 2019-06-07 ENCOUNTER — OFFICE VISIT (OUTPATIENT)
Dept: FAMILY MEDICINE CLINIC | Age: 69
End: 2019-06-07
Payer: MEDICARE

## 2019-06-07 VITALS
HEIGHT: 64 IN | DIASTOLIC BLOOD PRESSURE: 72 MMHG | OXYGEN SATURATION: 98 % | BODY MASS INDEX: 25.61 KG/M2 | SYSTOLIC BLOOD PRESSURE: 138 MMHG | HEART RATE: 51 BPM | WEIGHT: 150 LBS

## 2019-06-07 DIAGNOSIS — Z12.31 SCREENING MAMMOGRAM, ENCOUNTER FOR: ICD-10-CM

## 2019-06-07 DIAGNOSIS — R32 URINARY INCONTINENCE, UNSPECIFIED TYPE: ICD-10-CM

## 2019-06-07 DIAGNOSIS — E78.49 OTHER HYPERLIPIDEMIA: ICD-10-CM

## 2019-06-07 DIAGNOSIS — Z12.11 SCREEN FOR COLON CANCER: ICD-10-CM

## 2019-06-07 DIAGNOSIS — E11.9 TYPE 2 DIABETES MELLITUS WITHOUT COMPLICATION, WITHOUT LONG-TERM CURRENT USE OF INSULIN (HCC): Primary | ICD-10-CM

## 2019-06-07 DIAGNOSIS — R53.83 FATIGUE, UNSPECIFIED TYPE: ICD-10-CM

## 2019-06-07 DIAGNOSIS — I10 ESSENTIAL HYPERTENSION: ICD-10-CM

## 2019-06-07 DIAGNOSIS — Z90.710 H/O TOTAL HYSTERECTOMY: ICD-10-CM

## 2019-06-07 PROCEDURE — G8427 DOCREV CUR MEDS BY ELIG CLIN: HCPCS | Performed by: FAMILY MEDICINE

## 2019-06-07 PROCEDURE — 2022F DILAT RTA XM EVC RTNOPTHY: CPT | Performed by: FAMILY MEDICINE

## 2019-06-07 PROCEDURE — 3017F COLORECTAL CA SCREEN DOC REV: CPT | Performed by: FAMILY MEDICINE

## 2019-06-07 PROCEDURE — 1090F PRES/ABSN URINE INCON ASSESS: CPT | Performed by: FAMILY MEDICINE

## 2019-06-07 PROCEDURE — 83036 HEMOGLOBIN GLYCOSYLATED A1C: CPT | Performed by: FAMILY MEDICINE

## 2019-06-07 PROCEDURE — 1123F ACP DISCUSS/DSCN MKR DOCD: CPT | Performed by: FAMILY MEDICINE

## 2019-06-07 PROCEDURE — 1036F TOBACCO NON-USER: CPT | Performed by: FAMILY MEDICINE

## 2019-06-07 PROCEDURE — G8419 CALC BMI OUT NRM PARAM NOF/U: HCPCS | Performed by: FAMILY MEDICINE

## 2019-06-07 PROCEDURE — 99204 OFFICE O/P NEW MOD 45 MIN: CPT | Performed by: FAMILY MEDICINE

## 2019-06-07 PROCEDURE — 0509F URINE INCON PLAN DOCD: CPT | Performed by: FAMILY MEDICINE

## 2019-06-07 PROCEDURE — 4040F PNEUMOC VAC/ADMIN/RCVD: CPT | Performed by: FAMILY MEDICINE

## 2019-06-07 PROCEDURE — 3046F HEMOGLOBIN A1C LEVEL >9.0%: CPT | Performed by: FAMILY MEDICINE

## 2019-06-07 PROCEDURE — G8400 PT W/DXA NO RESULTS DOC: HCPCS | Performed by: FAMILY MEDICINE

## 2019-06-07 RX ORDER — LIDOCAINE 50 MG/G
OINTMENT TOPICAL
Refills: 3 | COMMUNITY
Start: 2019-05-02 | End: 2021-03-05

## 2019-06-07 RX ORDER — OXYBUTYNIN CHLORIDE 5 MG/1
TABLET, EXTENDED RELEASE ORAL
Qty: 90 TABLET | Refills: 1 | Status: SHIPPED | OUTPATIENT
Start: 2019-06-07 | End: 2019-12-02 | Stop reason: SDUPTHER

## 2019-06-07 RX ORDER — OXYBUTYNIN CHLORIDE 5 MG/1
TABLET, EXTENDED RELEASE ORAL
Refills: 0 | COMMUNITY
Start: 2019-05-08 | End: 2019-06-07 | Stop reason: SDUPTHER

## 2019-06-07 ASSESSMENT — ENCOUNTER SYMPTOMS
VISUAL CHANGE: 0
EYE DISCHARGE: 0
BLURRED VISION: 0
NAUSEA: 0
TROUBLE SWALLOWING: 0
SINUS PRESSURE: 0
CHOKING: 0
EYE REDNESS: 0
ANAL BLEEDING: 0
ALLERGIC/IMMUNOLOGIC NEGATIVE: 1
EYE PAIN: 0
RESPIRATORY NEGATIVE: 1
APNEA: 0
FACIAL SWELLING: 0
EYE ITCHING: 0
COLOR CHANGE: 0
RHINORRHEA: 0
ABDOMINAL PAIN: 0
SORE THROAT: 0
SHORTNESS OF BREATH: 0
ABDOMINAL DISTENTION: 0
CHEST TIGHTNESS: 0
COUGH: 0
SINUS PAIN: 0
ORTHOPNEA: 0
VOMITING: 0
WHEEZING: 0
BLOOD IN STOOL: 0
VOICE CHANGE: 0
STRIDOR: 0
RECTAL PAIN: 0
BACK PAIN: 0
DIARRHEA: 0
GASTROINTESTINAL NEGATIVE: 1
CONSTIPATION: 0
PHOTOPHOBIA: 0

## 2019-06-07 ASSESSMENT — PATIENT HEALTH QUESTIONNAIRE - PHQ9
SUM OF ALL RESPONSES TO PHQ QUESTIONS 1-9: 0
SUM OF ALL RESPONSES TO PHQ QUESTIONS 1-9: 0
2. FEELING DOWN, DEPRESSED OR HOPELESS: 0
SUM OF ALL RESPONSES TO PHQ9 QUESTIONS 1 & 2: 0
1. LITTLE INTEREST OR PLEASURE IN DOING THINGS: 0

## 2019-06-07 NOTE — PATIENT INSTRUCTIONS
Patient Education        Learning About Diabetes Food Guidelines  Your Care Instructions    Meal planning is important to manage diabetes. It helps keep your blood sugar at a target level (which you set with your doctor). You don't have to eat special foods. You can eat what your family eats, including sweets once in a while. But you do have to pay attention to how often you eat and how much you eat of certain foods. You may want to work with a dietitian or a certified diabetes educator (CDE) to help you plan meals and snacks. A dietitian or CDE can also help you lose weight if that is one of your goals. What should you know about eating carbs? Managing the amount of carbohydrate (carbs) you eat is an important part of healthy meals when you have diabetes. Carbohydrate is found in many foods. · Learn which foods have carbs. And learn the amounts of carbs in different foods. ? Bread, cereal, pasta, and rice have about 15 grams of carbs in a serving. A serving is 1 slice of bread (1 ounce), ½ cup of cooked cereal, or 1/3 cup of cooked pasta or rice. ? Fruits have 15 grams of carbs in a serving. A serving is 1 small fresh fruit, such as an apple or orange; ½ of a banana; ½ cup of cooked or canned fruit; ½ cup of fruit juice; 1 cup of melon or raspberries; or 2 tablespoons of dried fruit. ? Milk and no-sugar-added yogurt have 15 grams of carbs in a serving. A serving is 1 cup of milk or 2/3 cup of no-sugar-added yogurt. ? Starchy vegetables have 15 grams of carbs in a serving. A serving is ½ cup of mashed potatoes or sweet potato; 1 cup winter squash; ½ of a small baked potato; ½ cup of cooked beans; or ½ cup cooked corn or green peas. · Learn how much carbs to eat each day and at each meal. A dietitian or CDE can teach you how to keep track of the amount of carbs you eat. This is called carbohydrate counting. · If you are not sure how to count carbohydrate grams, use the Plate Method to plan meals.  It is a blood sugar levels. A registered dietitian or diabetes educator can help you plan how much carbohydrate to include in each meal and snack. A guideline for your daily amount of carbohydrate is:  · 45 to 60 grams at each meal. That's about the same as 3 to 4 carbohydrate servings. · 15 to 20 grams at each snack. That's about the same as 1 carbohydrate serving. The Nutrition Facts label on packaged foods tells you how much carbohydrate is in a serving of the food. First, look at the serving size on the food label. Is that the amount you eat in a serving? All of the nutrition information on a food label is based on that serving size. So if you eat more or less than that, you'll need to adjust the other numbers. Total carbohydrate is the next thing you need to look for on the label. If you count carbohydrate servings, one serving of carbohydrate is 15 grams. For foods that don't come with labels, such as fresh fruits and vegetables, you'll need a guide that lists carbohydrate in these foods. Ask your doctor, dietitian, or diabetes educator about books or other nutrition guides you can use. If you take insulin, you need to know how many grams of carbohydrate are in a meal. This lets you know how much rapid-acting insulin to take before you eat. If you use an insulin pump, you get a constant rate of insulin during the day. So the pump must be programmed at meals to give you extra insulin to cover the rise in blood sugar after meals. When you know how much carbohydrate you will eat, you can take the right amount of insulin. Or, if you always use the same amount of insulin, you need to make sure that you eat the same amount of carbohydrate at meals. If you need more help to understand carbohydrate counting and food labels, ask your doctor, dietitian, or diabetes educator. How do you get started with meal planning? Here are some tips to get started:  · Plan your meals a week at a time.  Don't forget to include snacks too.  · Use cookbooks or online recipes to plan several main meals. Plan some quick meals for busy nights. You also can double some recipes that freeze well. Then you can save half for other busy nights when you don't have time to cook. · Make sure you have the ingredients you need for your recipes. If you're running low on basic items, put these items on your shopping list too. · List foods that you use to make breakfasts, lunches, and snacks. List plenty of fruits and vegetables. · Post this list on the refrigerator. Add to it as you think of more things you need. · Take the list to the store to do your weekly shopping. Follow-up care is a key part of your treatment and safety. Be sure to make and go to all appointments, and call your doctor if you are having problems. It's also a good idea to know your test results and keep a list of the medicines you take. Where can you learn more? Go to https://[employer]. org and sign in to your [employer] account. Enter T667 in the [employer] box to learn more about \"Learning About Meal Planning for Diabetes. \"     If you do not have an account, please click on the \"Sign Up Now\" link. Current as of: July 25, 2018  Content Version: 12.0  © 8465-5959 [employer]. Care instructions adapted under license by [facility] ([facility]). If you have questions about a medical condition or this instruction, always ask your healthcare professional. [place] [address] any warranty or liability for your use of this information.

## 2019-06-07 NOTE — PROGRESS NOTES
Shirley Delgado is a 78 y.o. female. HPI/Chief C/O:  Chief Complaint   Patient presents with    New Patient     patient here to establish care; patient sees Dr. Britni Vernon for incontinence from previous uterine cancer     Allergies   Allergen Reactions    Tetracyclines & Related    She is a new patient here to establish care in our office  The patient is here for a medication list and treatment planning review  We will go over our care planning goals as well as take care of all refills  We will set up labs as well     Hypertension   This is a chronic problem. The current episode started more than 1 year ago. The problem is controlled. Associated symptoms include malaise/fatigue. Pertinent negatives include no anxiety, blurred vision, chest pain, headaches, neck pain, orthopnea, palpitations, peripheral edema, PND, shortness of breath or sweats. Risk factors for coronary artery disease include post-menopausal state, diabetes mellitus and dyslipidemia. Past treatments include ACE inhibitors and lifestyle changes. The current treatment provides significant improvement. Compliance problems include exercise and diet. There is no history of angina, kidney disease, CAD/MI, CVA, heart failure, left ventricular hypertrophy, PVD or retinopathy. There is no history of chronic renal disease, coarctation of the aorta, hyperaldosteronism, hypercortisolism, hyperparathyroidism, a hypertension causing med, pheochromocytoma, renovascular disease, sleep apnea or a thyroid problem. Diabetes   She presents for her follow-up diabetic visit. She has type 2 diabetes mellitus. Pertinent negatives for hypoglycemia include no confusion, dizziness, headaches, nervousness/anxiousness, pallor, seizures, speech difficulty, sweats or tremors.  Pertinent negatives for diabetes include no blurred vision, no chest pain, no fatigue, no foot paresthesias, no foot ulcerations, no polydipsia, no polyphagia, no polyuria, no visual change, no weakness and no weight loss. There are no hypoglycemic complications. Pertinent negatives for diabetic complications include no autonomic neuropathy, CVA, heart disease, nephropathy, peripheral neuropathy, PVD or retinopathy. Risk factors for coronary artery disease include hypertension, dyslipidemia, diabetes mellitus and post-menopausal. Current diabetic treatment includes diet and oral agent (monotherapy). She is following a generally unhealthy diet. An ACE inhibitor/angiotensin II receptor blocker is being taken. ROS:  Review of Systems   Constitutional: Positive for malaise/fatigue. Negative for activity change, appetite change, chills, diaphoresis, fatigue, fever, unexpected weight change and weight loss. HENT: Negative. Negative for congestion, dental problem, drooling, ear discharge, ear pain, facial swelling, hearing loss, mouth sores, nosebleeds, postnasal drip, rhinorrhea, sinus pressure, sinus pain, sneezing, sore throat, tinnitus, trouble swallowing and voice change. Eyes: Negative for blurred vision, photophobia, pain, discharge, redness, itching and visual disturbance. Respiratory: Negative. Negative for apnea, cough, choking, chest tightness, shortness of breath, wheezing and stridor. Cardiovascular: Negative. Negative for chest pain, palpitations, orthopnea, leg swelling and PND. Gastrointestinal: Negative. Negative for abdominal distention, abdominal pain, anal bleeding, blood in stool, constipation, diarrhea, nausea, rectal pain and vomiting. Endocrine: Negative. Negative for cold intolerance, heat intolerance, polydipsia, polyphagia and polyuria. Genitourinary: Negative. Negative for decreased urine volume, difficulty urinating, dysuria, enuresis, flank pain, frequency, genital sores, hematuria and urgency. Musculoskeletal: Negative. Negative for arthralgias, back pain, gait problem, joint swelling, myalgias, neck pain and neck stiffness. Skin: Negative. Negative for color change, pallor, rash and wound. Allergic/Immunologic: Negative. Negative for environmental allergies, food allergies and immunocompromised state. Neurological: Negative. Negative for dizziness, tremors, seizures, syncope, facial asymmetry, speech difficulty, weakness, light-headedness, numbness and headaches. Hematological: Negative. Negative for adenopathy. Does not bruise/bleed easily. Psychiatric/Behavioral: Negative. Negative for agitation, behavioral problems, confusion, decreased concentration, dysphoric mood, hallucinations, self-injury, sleep disturbance and suicidal ideas. The patient is not nervous/anxious and is not hyperactive. Past Medical/Surgical Hx;  Reviewed with patient      Diagnosis Date    Arthritis     right hand    Cancer ([facility] [address] 1985    uterus and cervix had radiation and implant    Diabetes mellitus ([facility] [address]     Hyperlipidemia     Hypertension     Radiation cystitis     Urinary incontinence     complication due to uterine cancer    Urinary retention     caused by complications from uterine cancer     Past Surgical History:   Procedure Laterality Date    FRACTURE SURGERY      tailbone removed    HYSTERECTOMY  1985    [place] CYSTOURETHROSCOPY INJ CHEMODENERVATION BLADDER N/A 11/9/2018    CYSTOSCOPY  BOTOX INJECTION (200 UNITS) performed by Sunny Dove MD at [address]       Past Family Hx:  Reviewed with patient      Problem Relation Age of Onset    Heart Disease Mother     No Known Problems Father     Other Sister     Other Brother     Other Other        Social Hx:  Reviewed with patient  Social History     Tobacco Use    Smoking status: Former Smoker    Smokeless tobacco: Never Used   Substance Use Topics    Alcohol use: Yes     Comment: seldom       OBJECTIVE  /72   Pulse 51   Ht 5' 4\" (1.626 m)   Wt 150 lb (68 kg)   SpO2 98%   Breastfeeding?  No   BMI 25.75 kg/m²     Problem List:  Rashard Ng does not have any pertinent problems on file.    PHYS EX:  Physical Exam   Constitutional: She is oriented to person, place, and time. She appears well-developed and well-nourished. No distress. HENT:   Head: Normocephalic and atraumatic. Right Ear: External ear normal.   Left Ear: External ear normal.   Nose: Nose normal.   Mouth/Throat: Oropharynx is clear and moist. No oropharyngeal exudate. Eyes: Pupils are equal, round, and reactive to light. Conjunctivae and EOM are normal. Right eye exhibits no discharge. Left eye exhibits no discharge. No scleral icterus. Neck: Normal range of motion. Neck supple. No JVD present. No tracheal deviation present. No thyromegaly present. Cardiovascular: Normal rate and regular rhythm. No extrasystoles are present. PMI is not displaced. Exam reveals no gallop, no S3, no S4, no distant heart sounds and no friction rub. Murmur heard. Systolic murmur is present with a grade of 1/6. No diastolic murmur is present. Pulmonary/Chest: Effort normal and breath sounds normal. No stridor. No respiratory distress. She has no wheezes. She has no rales. She exhibits no tenderness. Abdominal: Soft. Bowel sounds are normal. She exhibits no distension and no mass. There is no tenderness. There is no rebound and no guarding. No hernia. Musculoskeletal: Normal range of motion. She exhibits no edema, tenderness or deformity. Lymphadenopathy:     She has no cervical adenopathy. Neurological: She is alert and oriented to person, place, and time. She has normal reflexes. She displays normal reflexes. No cranial nerve deficit or sensory deficit. She exhibits normal muscle tone. Coordination normal.   Skin: Skin is warm. No rash noted. She is not diaphoretic. No erythema. No pallor. Psychiatric: She has a normal mood and affect. Her behavior is normal. Judgment and thought content normal.   Nursing note and vitals reviewed. ASSESSMENT/PLAN  Harper Cadena was seen today for new patient.     Diagnoses and all orders for this visit:    Type 2 diabetes mellitus without complication, without long-term current use of insulin (HCC)  -     Comprehensive Metabolic Panel; Future  -     CBC Auto Differential; Future  -     Microalbumin, Ur; Future  -     POCT glycosylated hemoglobin (Hb A1C)  ---VASCULAR PANEL  A) asa, plavix, aggrenox  B) coumadin, pletal, tzd, STATIN  C) ACE, hctz, folic, ccb  D) cannikinumab, fish oils       H/O total hysterectomy  -     Comprehensive Metabolic Panel; Future  -     CBC Auto Differential; Future    Essential hypertension ---controlled   --patient is instructed on low to moderate sodium ( 2 to 2.5 grams ), daily    Also to increase potassium in the diet to about 3.5 grams daily    Literature is provided   ---CARDIAC---asa, ACE, beta, STATIN, hctz, ( ccb )  -     Comprehensive Metabolic Panel; Future  -     CBC Auto Differential; Future    Other hyperlipidemia  -     Comprehensive Metabolic Panel; Future  -     Lipid Panel; Future  -     CBC Auto Differential; Future  --diabetic diet     Urinary incontinence, unspecified type  -     oxybutynin (DITROPAN-XL) 5 MG extended release tablet; TAKE 1 TABLET BY MOUTH EVERY DAY  -     Comprehensive Metabolic Panel; Future  -     CBC Auto Differential; Future    Screen for colon cancer  -     POCT Fecal Immunochemical Test (FIT); Future  -     Comprehensive Metabolic Panel; Future  -     CBC Auto Differential; Future    Screening mammogram, encounter for  -     MAGUI DIGITAL SCREEN W CAD BILATERAL; Future    Fatigue, unspecified type  -     TSH without Reflex;  Future        Outpatient Encounter Medications as of 6/7/2019   Medication Sig Dispense Refill    lidocaine (XYLOCAINE) 5 % ointment APPLY 1 GRAM TO AFFECTED AREA(S) 3 TIMES DAILY  3    CLONIDINE HCL ER PO Take by mouth      oxybutynin (DITROPAN-XL) 5 MG extended release tablet TAKE 1 TABLET BY MOUTH EVERY DAY 90 tablet 1    ramipril (ALTACE) 10 MG capsule Take 10 mg by mouth daily      atorvastatin (LIPITOR) 10 MG tablet Take 10 mg by mouth daily      metFORMIN (GLUCOPHAGE) 1000 MG tablet Take 1,000 mg by mouth 2 times daily (with meals)      [DISCONTINUED] oxybutynin (DITROPAN-XL) 5 MG extended release tablet TAKE 1 TABLET BY MOUTH EVERY DAY  0    [DISCONTINUED] lisinopril (PRINIVIL;ZESTRIL) 10 MG tablet Take 30 mg by mouth daily      [DISCONTINUED] METFORMIN HCL PO Take by mouth       No facility-administered encounter medications on file as of 6/7/2019. Return in about 1 month (around 7/5/2019).         Reviewed recent labs related to Rashmi's current problems      Discussed importance of regular Health Maintenance follow up  Health Maintenance   Topic    Hepatitis C screen     Diabetic foot exam     Diabetic retinal exam     DTaP/Tdap/Td vaccine (1 - Tdap)    Shingles Vaccine (1 of 2)    Colon cancer screen colonoscopy     Diabetic microalbuminuria test     DEXA (modify frequency per FRAX score)     Pneumococcal 65+ years Vaccine (2 of 2 - PPSV23)    Breast cancer screen     Flu vaccine (Season Ended)    A1C test (Diabetic or Prediabetic)     Lipid screen     Potassium monitoring     Creatinine monitoring

## 2019-07-15 RX ORDER — CEPHALEXIN 500 MG/1
500 CAPSULE ORAL 2 TIMES DAILY
COMMUNITY
Start: 2019-07-15 | End: 2019-07-17

## 2019-07-15 NOTE — PROGRESS NOTES
[facility] PRE-ADMISSION TESTING GENERAL INSTRUCTIONS- [facility]-phone number:[phone]    GENERAL INSTRUCTIONS  [x] Antibacterial Soap shower Night before and/or AM of Surgery  [] See wipe instruction sheet and wipes given. [x] Nothing by mouth after midnight, including gum, candy, mints, or water. [x] You may brush your teeth, gargle, but do NOT swallow water. []Hibiclens shower  the night before and the morning of surgery. Do not use             Hibiclens on your face or head. []No smoking, chewing tobacco, illegal drugs, or alcohol within 24 hours of your surgery. [x] Jewelry, valuables or body piercing's should not be brought to the hospital. All body and/or tongue piercing's must be removed prior to arriving to hospital.  ALL hair pins must be removed. [x] Do not wear makeup, lotions, powders, deodorant. Nail polish as directed by the nurse. [x] Arrange transportation with a responsible adult  to and from the hospital. If you do not have a responsible adult  to transport you, you will need to make arrangements with a medical transportation company (i.e. [employer]. A Uber/taxi/bus is not appropriate unless you are accompanied by a responsible adult ). Arrange for someone to be with you for the remainder of the day and for 24 hours after your procedure due to having had anesthesia. Who will be your  for transportation?__KENNET ________________   Who will be staying with you for 24 hrs after your procedure?__KENNET________________  [x] Bring insurance card and photo ID.  [] Transfusion Bracelet: Please bring with you to hospital, day of surgery  [] Bring urine specimen day of surgery. Any small container is acceptable. [] Use inhalers the morning of surgery and bring with you to hospital.  [] Bring copy of living will or healthcare power of  papers to be placed in your electronic record.   [] CPAP/BI-PAP: Please bring your machine

## 2019-07-17 ENCOUNTER — ANESTHESIA EVENT (OUTPATIENT)
Dept: OPERATING ROOM | Age: 69
End: 2019-07-17
Payer: MEDICARE

## 2019-07-17 ENCOUNTER — HOSPITAL ENCOUNTER (OUTPATIENT)
Age: 69
Setting detail: OUTPATIENT SURGERY
Discharge: HOME | End: 2019-07-17
Attending: UROLOGY | Admitting: UROLOGY
Payer: MEDICARE

## 2019-07-17 ENCOUNTER — ANESTHESIA (OUTPATIENT)
Dept: OPERATING ROOM | Age: 69
End: 2019-07-17
Payer: MEDICARE

## 2019-07-17 VITALS
HEIGHT: 64 IN | WEIGHT: 150 LBS | OXYGEN SATURATION: 100 % | TEMPERATURE: 97 F | DIASTOLIC BLOOD PRESSURE: 66 MMHG | BODY MASS INDEX: 25.61 KG/M2 | HEART RATE: 72 BPM | SYSTOLIC BLOOD PRESSURE: 145 MMHG | RESPIRATION RATE: 18 BRPM

## 2019-07-17 VITALS — OXYGEN SATURATION: 99 % | DIASTOLIC BLOOD PRESSURE: 74 MMHG | SYSTOLIC BLOOD PRESSURE: 109 MMHG

## 2019-07-17 DIAGNOSIS — Z01.812 PRE-OPERATIVE LABORATORY EXAMINATION: Primary | ICD-10-CM

## 2019-07-17 LAB — METER GLUCOSE: 197 MG/DL (ref 74–99)

## 2019-07-17 PROCEDURE — 2580000003 HC RX 258: Performed by: REGISTERED NURSE

## 2019-07-17 PROCEDURE — 6360000002 HC RX W HCPCS: Performed by: UROLOGY

## 2019-07-17 PROCEDURE — 3700000000 HC ANESTHESIA ATTENDED CARE: Performed by: UROLOGY

## 2019-07-17 PROCEDURE — 7100000010 HC PHASE II RECOVERY - FIRST 15 MIN: Performed by: UROLOGY

## 2019-07-17 PROCEDURE — 7100000011 HC PHASE II RECOVERY - ADDTL 15 MIN: Performed by: UROLOGY

## 2019-07-17 PROCEDURE — 3600000002 HC SURGERY LEVEL 2 BASE: Performed by: UROLOGY

## 2019-07-17 PROCEDURE — 3600000012 HC SURGERY LEVEL 2 ADDTL 15MIN: Performed by: UROLOGY

## 2019-07-17 PROCEDURE — 2709999900 HC NON-CHARGEABLE SUPPLY: Performed by: UROLOGY

## 2019-07-17 PROCEDURE — 6360000002 HC RX W HCPCS: Performed by: REGISTERED NURSE

## 2019-07-17 PROCEDURE — 82962 GLUCOSE BLOOD TEST: CPT

## 2019-07-17 PROCEDURE — 3700000001 HC ADD 15 MINUTES (ANESTHESIA): Performed by: UROLOGY

## 2019-07-17 RX ORDER — CIPROFLOXACIN 500 MG/1
500 TABLET, FILM COATED ORAL 2 TIMES DAILY
Qty: 10 TABLET | Refills: 0 | Status: SHIPPED | OUTPATIENT
Start: 2019-07-17 | End: 2019-07-22

## 2019-07-17 RX ORDER — SODIUM CHLORIDE 0.9 % (FLUSH) 0.9 %
10 SYRINGE (ML) INJECTION EVERY 12 HOURS SCHEDULED
Status: DISCONTINUED | OUTPATIENT
Start: 2019-07-17 | End: 2019-07-17 | Stop reason: HOSPADM

## 2019-07-17 RX ORDER — SODIUM CHLORIDE 0.9 % (FLUSH) 0.9 %
10 SYRINGE (ML) INJECTION PRN
Status: DISCONTINUED | OUTPATIENT
Start: 2019-07-17 | End: 2019-07-17 | Stop reason: HOSPADM

## 2019-07-17 RX ORDER — SODIUM CHLORIDE 9 MG/ML
INJECTION, SOLUTION INTRAVENOUS CONTINUOUS PRN
Status: DISCONTINUED | OUTPATIENT
Start: 2019-07-17 | End: 2019-07-17 | Stop reason: SDUPTHER

## 2019-07-17 RX ORDER — LIDOCAINE HYDROCHLORIDE 20 MG/ML
INJECTION, SOLUTION INTRAVENOUS PRN
Status: DISCONTINUED | OUTPATIENT
Start: 2019-07-17 | End: 2019-07-17 | Stop reason: SDUPTHER

## 2019-07-17 RX ORDER — PROPOFOL 10 MG/ML
INJECTION, EMULSION INTRAVENOUS CONTINUOUS PRN
Status: DISCONTINUED | OUTPATIENT
Start: 2019-07-17 | End: 2019-07-17 | Stop reason: SDUPTHER

## 2019-07-17 RX ORDER — FENTANYL CITRATE 50 UG/ML
INJECTION, SOLUTION INTRAMUSCULAR; INTRAVENOUS PRN
Status: DISCONTINUED | OUTPATIENT
Start: 2019-07-17 | End: 2019-07-17 | Stop reason: SDUPTHER

## 2019-07-17 RX ORDER — CIPROFLOXACIN 2 MG/ML
400 INJECTION, SOLUTION INTRAVENOUS
Status: COMPLETED | OUTPATIENT
Start: 2019-07-17 | End: 2019-07-17

## 2019-07-17 RX ORDER — MEPERIDINE HYDROCHLORIDE 50 MG/ML
12.5 INJECTION INTRAMUSCULAR; INTRAVENOUS; SUBCUTANEOUS EVERY 5 MIN PRN
Status: DISCONTINUED | OUTPATIENT
Start: 2019-07-17 | End: 2019-07-17 | Stop reason: HOSPADM

## 2019-07-17 RX ADMIN — SODIUM CHLORIDE: 9 INJECTION, SOLUTION INTRAVENOUS at 09:22

## 2019-07-17 RX ADMIN — FENTANYL CITRATE 50 MCG: 50 INJECTION, SOLUTION INTRAMUSCULAR; INTRAVENOUS at 09:26

## 2019-07-17 RX ADMIN — CIPROFLOXACIN 400 MG: 2 INJECTION, SOLUTION INTRAVENOUS at 08:36

## 2019-07-17 RX ADMIN — CIPROFLOXACIN 400 MG: 2 INJECTION, SOLUTION INTRAVENOUS at 09:22

## 2019-07-17 RX ADMIN — LIDOCAINE HYDROCHLORIDE 40 MG: 20 INJECTION, SOLUTION INTRAVENOUS at 09:26

## 2019-07-17 RX ADMIN — PROPOFOL 75 MCG/KG/MIN: 10 INJECTION, EMULSION INTRAVENOUS at 09:27

## 2019-07-17 ASSESSMENT — PULMONARY FUNCTION TESTS
PIF_VALUE: 0
PIF_VALUE: 1

## 2019-07-17 ASSESSMENT — PAIN SCALES - GENERAL
PAINLEVEL_OUTOF10: 0

## 2019-07-17 ASSESSMENT — PAIN - FUNCTIONAL ASSESSMENT: PAIN_FUNCTIONAL_ASSESSMENT: 0-10

## 2019-07-25 ENCOUNTER — HOSPITAL ENCOUNTER (OUTPATIENT)
Age: 69
Discharge: HOME | End: 2019-07-27
Payer: MEDICARE

## 2019-07-25 PROCEDURE — 87088 URINE BACTERIA CULTURE: CPT

## 2019-07-25 PROCEDURE — 87186 SC STD MICRODIL/AGAR DIL: CPT

## 2019-07-27 LAB
ORGANISM: ABNORMAL
URINE CULTURE, ROUTINE: ABNORMAL
URINE CULTURE, ROUTINE: ABNORMAL

## 2019-08-02 ENCOUNTER — HOSPITAL ENCOUNTER (OUTPATIENT)
Age: 69
Discharge: HOME | End: 2019-08-04
Payer: MEDICARE

## 2019-08-02 PROCEDURE — 87088 URINE BACTERIA CULTURE: CPT

## 2019-08-02 PROCEDURE — 87186 SC STD MICRODIL/AGAR DIL: CPT

## 2019-08-02 PROCEDURE — 87077 CULTURE AEROBIC IDENTIFY: CPT

## 2019-08-03 ENCOUNTER — HOSPITAL ENCOUNTER (EMERGENCY)
Age: 69
Discharge: HOME | End: 2019-08-03
Payer: MEDICARE

## 2019-08-03 VITALS
HEIGHT: 64 IN | BODY MASS INDEX: 25.61 KG/M2 | SYSTOLIC BLOOD PRESSURE: 198 MMHG | WEIGHT: 150 LBS | OXYGEN SATURATION: 94 % | DIASTOLIC BLOOD PRESSURE: 98 MMHG | TEMPERATURE: 98 F | HEART RATE: 74 BPM | RESPIRATION RATE: 16 BRPM

## 2019-08-03 DIAGNOSIS — R03.0 ELEVATED BLOOD PRESSURE READING: ICD-10-CM

## 2019-08-03 DIAGNOSIS — N30.00 ACUTE CYSTITIS WITHOUT HEMATURIA: Primary | ICD-10-CM

## 2019-08-03 PROCEDURE — 6370000000 HC RX 637 (ALT 250 FOR IP): Performed by: NURSE PRACTITIONER

## 2019-08-03 PROCEDURE — 96372 THER/PROPH/DIAG INJ SC/IM: CPT

## 2019-08-03 PROCEDURE — 6360000002 HC RX W HCPCS: Performed by: NURSE PRACTITIONER

## 2019-08-03 PROCEDURE — 99283 EMERGENCY DEPT VISIT LOW MDM: CPT

## 2019-08-03 RX ORDER — SULFAMETHOXAZOLE AND TRIMETHOPRIM 800; 160 MG/1; MG/1
1 TABLET ORAL ONCE
Status: COMPLETED | OUTPATIENT
Start: 2019-08-03 | End: 2019-08-03

## 2019-08-03 RX ORDER — MORPHINE SULFATE 2 MG/ML
2 INJECTION, SOLUTION INTRAMUSCULAR; INTRAVENOUS ONCE
Status: COMPLETED | OUTPATIENT
Start: 2019-08-03 | End: 2019-08-03

## 2019-08-03 RX ORDER — SULFAMETHOXAZOLE AND TRIMETHOPRIM 800; 160 MG/1; MG/1
1 TABLET ORAL 2 TIMES DAILY
Qty: 20 TABLET | Refills: 0 | Status: SHIPPED | OUTPATIENT
Start: 2019-08-03 | End: 2019-08-13

## 2019-08-03 RX ADMIN — MORPHINE SULFATE 2 MG: 2 INJECTION, SOLUTION INTRAMUSCULAR; INTRAVENOUS at 13:06

## 2019-08-03 RX ADMIN — SULFAMETHOXAZOLE AND TRIMETHOPRIM 1 TABLET: 800; 160 TABLET ORAL at 14:22

## 2019-08-03 ASSESSMENT — PAIN DESCRIPTION - DESCRIPTORS
DESCRIPTORS: PRESSURE
DESCRIPTORS: PRESSURE

## 2019-08-03 ASSESSMENT — PAIN SCALES - GENERAL
PAINLEVEL_OUTOF10: 10
PAINLEVEL_OUTOF10: 7

## 2019-08-03 ASSESSMENT — PAIN DESCRIPTION - FREQUENCY
FREQUENCY: CONTINUOUS
FREQUENCY: CONTINUOUS

## 2019-08-03 ASSESSMENT — PAIN DESCRIPTION - PROGRESSION
CLINICAL_PROGRESSION: GRADUALLY IMPROVING
CLINICAL_PROGRESSION: GRADUALLY WORSENING

## 2019-08-03 ASSESSMENT — PAIN DESCRIPTION - ORIENTATION
ORIENTATION: RIGHT;LEFT;LOWER
ORIENTATION: RIGHT;LEFT

## 2019-08-03 ASSESSMENT — PAIN DESCRIPTION - PAIN TYPE
TYPE: ACUTE PAIN
TYPE: ACUTE PAIN

## 2019-08-03 ASSESSMENT — PAIN DESCRIPTION - LOCATION
LOCATION: ABDOMEN
LOCATION: ABDOMEN

## 2019-08-03 NOTE — ED PROVIDER NOTES
[facility]    HPI:  8/3/19,   Time: 12:39 PM         Theo Zarate is a 78 y.o. female presenting to the ED from home with family and complains of bladder pressure and scant amount of urinary output in the leg bag. She states it was placed by Dr Jeremiah Melton in [place] yesterday. The complaint has been persistent, mild in severity, and worsened by nothing. She states they did not start her on an antibiotic because they wanted to see the sensitivity. ROS:   Pertinent positives and negatives are stated within HPI, all other systems reviewed and are negative.  --------------------------------------------- PAST HISTORY ---------------------------------------------  Past Medical History:  has a past medical history of Arthritis, Cancer ([facility] [address] Diabetes mellitus ([facility] [address] Hyperlipidemia, Hypertension, Radiation cystitis, Urinary incontinence, and Urinary retention. Past Surgical History:  has a past surgical history that includes fracture surgery; Hysterectomy (1985); pr cystourethroscopy inj chemodenervation bladder (N/A, 11/9/2018); and Cystoscopy (N/A, 7/17/2019). Social History:  reports that she has quit smoking. She has never used smokeless tobacco. She reports that she drinks alcohol. She reports that she does not use drugs. Family History: family history includes Heart Disease in her mother; No Known Problems in her father; Other in her brother, sister, and another family member. The patients home medications have been reviewed. Allergies: Tetracyclines & related    -------------------------------------------------- RESULTS -------------------------------------------------  All laboratory and radiology results have been personally reviewed by myself   LABS:  No results found for this visit on 08/03/19.     RADIOLOGY:  Interpreted by Radiologist.  No orders to display       ------------------------- NURSING NOTES AND VITALS REVIEWED ---------------------------   The nursing notes within the ED ---------------------------------    IMPRESSION  1. Acute cystitis without hematuria    2.  Elevated blood pressure reading        DISPOSITION  Disposition: Discharge to home  Patient condition is good               ELIO Carlson - ALCIDES  08/03/19 [id number]

## 2019-08-03 NOTE — ED NOTES
Pt states she had a hassan catheter placed yesterday at her urologists office for urinary leakage. States they sent out a urine culture, states she \"has a bad infection and I believe there is a blockage in my catheter from all the particles in my urine. \"  Pt c/o lower ABD pressure, states she feels like she needs to urinate but states she always feels this way. Pt presents with a leg bag with approximately 25 cc's of cloudy light yellow urine. Pt states she emptied her leg bag a total of two times yesterday after having the catheter placed, states the last time she emptied it today was around 0900. Pt's catheter removed and 16 fr [address] way placed via sterile procedure. Manual irrigation initiated, able to irrigate and draw back sterile fluids but no obvious clots/sedimate removed.        Guerda Wood RN  08/03/19 [id number]

## 2019-08-04 LAB
ORGANISM: ABNORMAL
URINE CULTURE, ROUTINE: ABNORMAL
URINE CULTURE, ROUTINE: ABNORMAL

## 2019-12-02 ENCOUNTER — HOSPITAL ENCOUNTER (OUTPATIENT)
Age: 69
Discharge: HOME | End: 2019-12-04
Payer: MEDICARE

## 2019-12-02 ENCOUNTER — OFFICE VISIT (OUTPATIENT)
Dept: FAMILY MEDICINE CLINIC | Age: 69
End: 2019-12-02
Payer: MEDICARE

## 2019-12-02 VITALS
BODY MASS INDEX: 23.05 KG/M2 | OXYGEN SATURATION: 97 % | DIASTOLIC BLOOD PRESSURE: 70 MMHG | HEART RATE: 69 BPM | SYSTOLIC BLOOD PRESSURE: 132 MMHG | WEIGHT: 135 LBS | TEMPERATURE: 97.4 F | HEIGHT: 64 IN | RESPIRATION RATE: 18 BRPM

## 2019-12-02 DIAGNOSIS — E11.9 TYPE 2 DIABETES MELLITUS WITHOUT COMPLICATION, WITHOUT LONG-TERM CURRENT USE OF INSULIN (HCC): Primary | ICD-10-CM

## 2019-12-02 DIAGNOSIS — Z87.891 PERSONAL HISTORY OF TOBACCO USE: ICD-10-CM

## 2019-12-02 DIAGNOSIS — Z12.11 SCREEN FOR COLON CANCER: ICD-10-CM

## 2019-12-02 DIAGNOSIS — E78.49 OTHER HYPERLIPIDEMIA: ICD-10-CM

## 2019-12-02 DIAGNOSIS — E11.9 TYPE 2 DIABETES MELLITUS WITHOUT COMPLICATION, WITHOUT LONG-TERM CURRENT USE OF INSULIN (HCC): ICD-10-CM

## 2019-12-02 DIAGNOSIS — R32 URINARY INCONTINENCE, UNSPECIFIED TYPE: ICD-10-CM

## 2019-12-02 DIAGNOSIS — I10 ESSENTIAL HYPERTENSION: ICD-10-CM

## 2019-12-02 DIAGNOSIS — Z90.710 H/O TOTAL HYSTERECTOMY: ICD-10-CM

## 2019-12-02 DIAGNOSIS — E78.5 DYSLIPIDEMIA: ICD-10-CM

## 2019-12-02 DIAGNOSIS — R53.83 FATIGUE, UNSPECIFIED TYPE: ICD-10-CM

## 2019-12-02 DIAGNOSIS — Z23 IMMUNIZATION DUE: ICD-10-CM

## 2019-12-02 LAB
ALBUMIN SERPL-MCNC: 4.1 G/DL (ref 3.5–5.2)
ALP BLD-CCNC: 84 U/L (ref 35–104)
ALT SERPL-CCNC: 22 U/L (ref 0–32)
ANION GAP SERPL CALCULATED.3IONS-SCNC: 18 MMOL/L (ref 7–16)
AST SERPL-CCNC: 21 U/L (ref 0–31)
BASOPHILS ABSOLUTE: 0.03 E9/L (ref 0–0.2)
BASOPHILS RELATIVE PERCENT: 0.6 % (ref 0–2)
BILIRUB SERPL-MCNC: 0.6 MG/DL (ref 0–1.2)
BUN BLDV-MCNC: 19 MG/DL (ref 8–23)
CALCIUM SERPL-MCNC: 9.5 MG/DL (ref 8.6–10.2)
CHLORIDE BLD-SCNC: 103 MMOL/L (ref 98–107)
CHOLESTEROL, TOTAL: 241 MG/DL (ref 0–199)
CO2: 21 MMOL/L (ref 22–29)
CREAT SERPL-MCNC: 0.9 MG/DL (ref 0.5–1)
EOSINOPHILS ABSOLUTE: 0.11 E9/L (ref 0.05–0.5)
EOSINOPHILS RELATIVE PERCENT: 2.3 % (ref 0–6)
GFR AFRICAN AMERICAN: >60
GFR NON-AFRICAN AMERICAN: >60 ML/MIN/1.73
GLUCOSE BLD-MCNC: 223 MG/DL (ref 74–99)
HBA1C MFR BLD: 8.6 %
HCT VFR BLD CALC: 42.4 % (ref 34–48)
HDLC SERPL-MCNC: 51 MG/DL
HEMOGLOBIN: 13.4 G/DL (ref 11.5–15.5)
IMMATURE GRANULOCYTES #: 0.01 E9/L
IMMATURE GRANULOCYTES %: 0.2 % (ref 0–5)
LDL CHOLESTEROL CALCULATED: 158 MG/DL (ref 0–99)
LYMPHOCYTES ABSOLUTE: 1.5 E9/L (ref 1.5–4)
LYMPHOCYTES RELATIVE PERCENT: 31.6 % (ref 20–42)
MCH RBC QN AUTO: 28.5 PG (ref 26–35)
MCHC RBC AUTO-ENTMCNC: 31.6 % (ref 32–34.5)
MCV RBC AUTO: 90 FL (ref 80–99.9)
MONOCYTES ABSOLUTE: 0.4 E9/L (ref 0.1–0.95)
MONOCYTES RELATIVE PERCENT: 8.4 % (ref 2–12)
NEUTROPHILS ABSOLUTE: 2.7 E9/L (ref 1.8–7.3)
NEUTROPHILS RELATIVE PERCENT: 56.9 % (ref 43–80)
PDW BLD-RTO: 12.8 FL (ref 11.5–15)
PLATELET # BLD: 174 E9/L (ref 130–450)
PMV BLD AUTO: 11.7 FL (ref 7–12)
POTASSIUM SERPL-SCNC: 4.9 MMOL/L (ref 3.5–5)
RBC # BLD: 4.71 E12/L (ref 3.5–5.5)
SODIUM BLD-SCNC: 142 MMOL/L (ref 132–146)
TOTAL PROTEIN: 6.9 G/DL (ref 6.4–8.3)
TRIGL SERPL-MCNC: 160 MG/DL (ref 0–149)
TSH SERPL DL<=0.05 MIU/L-ACNC: 1.5 UIU/ML (ref 0.27–4.2)
VLDLC SERPL CALC-MCNC: 32 MG/DL
WBC # BLD: 4.8 E9/L (ref 4.5–11.5)

## 2019-12-02 PROCEDURE — 90732 PPSV23 VACC 2 YRS+ SUBQ/IM: CPT | Performed by: FAMILY MEDICINE

## 2019-12-02 PROCEDURE — 1036F TOBACCO NON-USER: CPT | Performed by: FAMILY MEDICINE

## 2019-12-02 PROCEDURE — 83036 HEMOGLOBIN GLYCOSYLATED A1C: CPT | Performed by: FAMILY MEDICINE

## 2019-12-02 PROCEDURE — 85025 COMPLETE CBC W/AUTO DIFF WBC: CPT

## 2019-12-02 PROCEDURE — G0296 VISIT TO DETERM LDCT ELIG: HCPCS | Performed by: FAMILY MEDICINE

## 2019-12-02 PROCEDURE — 2022F DILAT RTA XM EVC RTNOPTHY: CPT | Performed by: FAMILY MEDICINE

## 2019-12-02 PROCEDURE — G8400 PT W/DXA NO RESULTS DOC: HCPCS | Performed by: FAMILY MEDICINE

## 2019-12-02 PROCEDURE — 99214 OFFICE O/P EST MOD 30 MIN: CPT | Performed by: FAMILY MEDICINE

## 2019-12-02 PROCEDURE — G8420 CALC BMI NORM PARAMETERS: HCPCS | Performed by: FAMILY MEDICINE

## 2019-12-02 PROCEDURE — 0509F URINE INCON PLAN DOCD: CPT | Performed by: FAMILY MEDICINE

## 2019-12-02 PROCEDURE — 80061 LIPID PANEL: CPT

## 2019-12-02 PROCEDURE — 4040F PNEUMOC VAC/ADMIN/RCVD: CPT | Performed by: FAMILY MEDICINE

## 2019-12-02 PROCEDURE — 1090F PRES/ABSN URINE INCON ASSESS: CPT | Performed by: FAMILY MEDICINE

## 2019-12-02 PROCEDURE — G8484 FLU IMMUNIZE NO ADMIN: HCPCS | Performed by: FAMILY MEDICINE

## 2019-12-02 PROCEDURE — G8427 DOCREV CUR MEDS BY ELIG CLIN: HCPCS | Performed by: FAMILY MEDICINE

## 2019-12-02 PROCEDURE — 80053 COMPREHEN METABOLIC PANEL: CPT

## 2019-12-02 PROCEDURE — 1123F ACP DISCUSS/DSCN MKR DOCD: CPT | Performed by: FAMILY MEDICINE

## 2019-12-02 PROCEDURE — 3017F COLORECTAL CA SCREEN DOC REV: CPT | Performed by: FAMILY MEDICINE

## 2019-12-02 PROCEDURE — 84443 ASSAY THYROID STIM HORMONE: CPT

## 2019-12-02 PROCEDURE — 90471 IMMUNIZATION ADMIN: CPT | Performed by: FAMILY MEDICINE

## 2019-12-02 PROCEDURE — G0009 ADMIN PNEUMOCOCCAL VACCINE: HCPCS | Performed by: FAMILY MEDICINE

## 2019-12-02 RX ORDER — ATORVASTATIN CALCIUM 10 MG/1
10 TABLET, FILM COATED ORAL DAILY
Qty: 90 TABLET | Refills: 1 | Status: SHIPPED | OUTPATIENT
Start: 2019-12-02 | End: 2019-12-03 | Stop reason: SDUPTHER

## 2019-12-02 RX ORDER — OXYBUTYNIN CHLORIDE 5 MG/1
TABLET, EXTENDED RELEASE ORAL
Qty: 90 TABLET | Refills: 1 | Status: SHIPPED
Start: 2019-12-02 | End: 2020-03-23 | Stop reason: SDUPTHER

## 2019-12-02 ASSESSMENT — ENCOUNTER SYMPTOMS
EYE PAIN: 0
SORE THROAT: 0
APNEA: 0
SINUS PRESSURE: 0
CONSTIPATION: 0
SHORTNESS OF BREATH: 0
VISUAL CHANGE: 0
EYE REDNESS: 0
PHOTOPHOBIA: 0
DIARRHEA: 0
GASTROINTESTINAL NEGATIVE: 1
CHEST TIGHTNESS: 0
ALLERGIC/IMMUNOLOGIC NEGATIVE: 1
COLOR CHANGE: 0
SINUS PAIN: 0
ORTHOPNEA: 0
TROUBLE SWALLOWING: 0
EYE DISCHARGE: 0
VOICE CHANGE: 0
BACK PAIN: 0
RESPIRATORY NEGATIVE: 1
ABDOMINAL DISTENTION: 0
ANAL BLEEDING: 0
CHOKING: 0
EYE ITCHING: 0
VOMITING: 0
COUGH: 0
RHINORRHEA: 0
RECTAL PAIN: 0
NAUSEA: 0
BLURRED VISION: 0
ABDOMINAL PAIN: 0
FACIAL SWELLING: 0
BLOOD IN STOOL: 0
WHEEZING: 0
STRIDOR: 0

## 2019-12-03 DIAGNOSIS — E78.5 DYSLIPIDEMIA: ICD-10-CM

## 2019-12-03 RX ORDER — ATORVASTATIN CALCIUM 20 MG/1
20 TABLET, FILM COATED ORAL DAILY
Qty: 30 TABLET | Refills: 0
Start: 2019-12-03 | End: 2020-03-23 | Stop reason: SDUPTHER

## 2020-01-06 ENCOUNTER — HOSPITAL ENCOUNTER (OUTPATIENT)
Age: 70
Discharge: HOME | End: 2020-01-08
Payer: MEDICARE

## 2020-01-06 PROCEDURE — 87186 SC STD MICRODIL/AGAR DIL: CPT

## 2020-01-06 PROCEDURE — 87077 CULTURE AEROBIC IDENTIFY: CPT

## 2020-01-06 PROCEDURE — 87088 URINE BACTERIA CULTURE: CPT

## 2020-01-08 LAB
ORGANISM: ABNORMAL
URINE CULTURE, ROUTINE: ABNORMAL

## 2020-01-28 ENCOUNTER — HOSPITAL ENCOUNTER (OUTPATIENT)
Age: 70
Discharge: HOME | End: 2020-01-30
Payer: COMMERCIAL

## 2020-01-28 PROCEDURE — 87186 SC STD MICRODIL/AGAR DIL: CPT

## 2020-01-28 PROCEDURE — 87088 URINE BACTERIA CULTURE: CPT

## 2020-01-30 LAB
ORGANISM: ABNORMAL
URINE CULTURE, ROUTINE: ABNORMAL

## 2020-03-23 ENCOUNTER — OFFICE VISIT (OUTPATIENT)
Dept: FAMILY MEDICINE CLINIC | Age: 70
End: 2020-03-23
Payer: MEDICARE

## 2020-03-23 VITALS
HEART RATE: 52 BPM | TEMPERATURE: 97.8 F | SYSTOLIC BLOOD PRESSURE: 130 MMHG | RESPIRATION RATE: 18 BRPM | DIASTOLIC BLOOD PRESSURE: 78 MMHG | WEIGHT: 132.6 LBS | BODY MASS INDEX: 22.64 KG/M2 | HEIGHT: 64 IN | OXYGEN SATURATION: 99 %

## 2020-03-23 LAB — HBA1C MFR BLD: 10.9 %

## 2020-03-23 PROCEDURE — 1090F PRES/ABSN URINE INCON ASSESS: CPT | Performed by: FAMILY MEDICINE

## 2020-03-23 PROCEDURE — G8484 FLU IMMUNIZE NO ADMIN: HCPCS | Performed by: FAMILY MEDICINE

## 2020-03-23 PROCEDURE — G8420 CALC BMI NORM PARAMETERS: HCPCS | Performed by: FAMILY MEDICINE

## 2020-03-23 PROCEDURE — G8400 PT W/DXA NO RESULTS DOC: HCPCS | Performed by: FAMILY MEDICINE

## 2020-03-23 PROCEDURE — 4040F PNEUMOC VAC/ADMIN/RCVD: CPT | Performed by: FAMILY MEDICINE

## 2020-03-23 PROCEDURE — 1123F ACP DISCUSS/DSCN MKR DOCD: CPT | Performed by: FAMILY MEDICINE

## 2020-03-23 PROCEDURE — 2022F DILAT RTA XM EVC RTNOPTHY: CPT | Performed by: FAMILY MEDICINE

## 2020-03-23 PROCEDURE — 99214 OFFICE O/P EST MOD 30 MIN: CPT | Performed by: FAMILY MEDICINE

## 2020-03-23 PROCEDURE — 1036F TOBACCO NON-USER: CPT | Performed by: FAMILY MEDICINE

## 2020-03-23 PROCEDURE — 0509F URINE INCON PLAN DOCD: CPT | Performed by: FAMILY MEDICINE

## 2020-03-23 PROCEDURE — 83036 HEMOGLOBIN GLYCOSYLATED A1C: CPT | Performed by: FAMILY MEDICINE

## 2020-03-23 PROCEDURE — 3046F HEMOGLOBIN A1C LEVEL >9.0%: CPT | Performed by: FAMILY MEDICINE

## 2020-03-23 PROCEDURE — 3017F COLORECTAL CA SCREEN DOC REV: CPT | Performed by: FAMILY MEDICINE

## 2020-03-23 PROCEDURE — G8427 DOCREV CUR MEDS BY ELIG CLIN: HCPCS | Performed by: FAMILY MEDICINE

## 2020-03-23 RX ORDER — FESOTERODINE FUMARATE 4 MG/1
TABLET, FILM COATED, EXTENDED RELEASE ORAL
Status: ON HOLD | COMMUNITY
Start: 2020-01-30 | End: 2021-08-05 | Stop reason: HOSPADM

## 2020-03-23 RX ORDER — OXYBUTYNIN CHLORIDE 5 MG/1
TABLET, EXTENDED RELEASE ORAL
Qty: 90 TABLET | Refills: 1 | Status: SHIPPED
Start: 2020-03-23 | End: 2020-09-16

## 2020-03-23 RX ORDER — ATORVASTATIN CALCIUM 20 MG/1
20 TABLET, FILM COATED ORAL DAILY
Qty: 30 TABLET | Refills: 0 | Status: SHIPPED
Start: 2020-03-23 | End: 2020-04-14

## 2020-03-23 RX ORDER — EMPAGLIFLOZIN 25 MG/1
25 TABLET, FILM COATED ORAL DAILY
Qty: 90 TABLET | Refills: 1 | Status: SHIPPED
Start: 2020-03-23 | End: 2020-12-18

## 2020-03-23 ASSESSMENT — PATIENT HEALTH QUESTIONNAIRE - PHQ9
SUM OF ALL RESPONSES TO PHQ QUESTIONS 1-9: 0
SUM OF ALL RESPONSES TO PHQ QUESTIONS 1-9: 0
1. LITTLE INTEREST OR PLEASURE IN DOING THINGS: 0
2. FEELING DOWN, DEPRESSED OR HOPELESS: 0
SUM OF ALL RESPONSES TO PHQ9 QUESTIONS 1 & 2: 0

## 2020-03-23 ASSESSMENT — ENCOUNTER SYMPTOMS
TROUBLE SWALLOWING: 0
BLOOD IN STOOL: 0
EYE PAIN: 0
COLOR CHANGE: 0
EYE REDNESS: 0
RHINORRHEA: 0
DIARRHEA: 0
RESPIRATORY NEGATIVE: 1
COUGH: 0
SINUS PAIN: 0
ALLERGIC/IMMUNOLOGIC NEGATIVE: 1
FACIAL SWELLING: 0
ORTHOPNEA: 0
VOMITING: 0
CONSTIPATION: 0
ANAL BLEEDING: 0
EYE ITCHING: 0
VOICE CHANGE: 0
STRIDOR: 0
SHORTNESS OF BREATH: 0
GASTROINTESTINAL NEGATIVE: 1
SINUS PRESSURE: 0
WHEEZING: 0
CHEST TIGHTNESS: 0
ABDOMINAL PAIN: 0
BACK PAIN: 0
RECTAL PAIN: 0
VISUAL CHANGE: 0
EYE DISCHARGE: 0
SORE THROAT: 0
BLURRED VISION: 0
NAUSEA: 0
CHOKING: 0
APNEA: 0
ABDOMINAL DISTENTION: 0
PHOTOPHOBIA: 0

## 2020-03-23 NOTE — PATIENT INSTRUCTIONS
Patient Education        Learning About Diabetes Food Guidelines  Your Care Instructions    Meal planning is important to manage diabetes. It helps keep your blood sugar at a target level (which you set with your doctor). You don't have to eat special foods. You can eat what your family eats, including sweets once in a while. But you do have to pay attention to how often you eat and how much you eat of certain foods. You may want to work with a dietitian or a certified diabetes educator (CDE) to help you plan meals and snacks. A dietitian or CDE can also help you lose weight if that is one of your goals. What should you know about eating carbs? Managing the amount of carbohydrate (carbs) you eat is an important part of healthy meals when you have diabetes. Carbohydrate is found in many foods. · Learn which foods have carbs. And learn the amounts of carbs in different foods. ? Bread, cereal, pasta, and rice have about 15 grams of carbs in a serving. A serving is 1 slice of bread (1 ounce), ½ cup of cooked cereal, or 1/3 cup of cooked pasta or rice. ? Fruits have 15 grams of carbs in a serving. A serving is 1 small fresh fruit, such as an apple or orange; ½ of a banana; ½ cup of cooked or canned fruit; ½ cup of fruit juice; 1 cup of melon or raspberries; or 2 tablespoons of dried fruit. ? Milk and no-sugar-added yogurt have 15 grams of carbs in a serving. A serving is 1 cup of milk or 2/3 cup of no-sugar-added yogurt. ? Starchy vegetables have 15 grams of carbs in a serving. A serving is ½ cup of mashed potatoes or sweet potato; 1 cup winter squash; ½ of a small baked potato; ½ cup of cooked beans; or ½ cup cooked corn or green peas. · Learn how much carbs to eat each day and at each meal. A dietitian or CDE can teach you how to keep track of the amount of carbs you eat. This is called carbohydrate counting. · If you are not sure how to count carbohydrate grams, use the Plate Method to plan meals.  It is a good, quick way to make sure that you have a balanced meal. It also helps you spread carbs throughout the day. ? Divide your plate by types of foods. Put non-starchy vegetables on half the plate, meat or other protein food on one-quarter of the plate, and a grain or starchy vegetable in the final quarter of the plate. To this you can add a small piece of fruit and 1 cup of milk or yogurt, depending on how many carbs you are supposed to eat at a meal.  · Try to eat about the same amount of carbs at each meal. Do not \"save up\" your daily allowance of carbs to eat at one meal.  · Proteins have very little or no carbs per serving. Examples of proteins are beef, chicken, turkey, fish, eggs, tofu, cheese, cottage cheese, and peanut butter. A serving size of meat is 3 ounces, which is about the size of a deck of cards. Examples of meat substitute serving sizes (equal to 1 ounce of meat) are 1/4 cup of cottage cheese, 1 egg, 1 tablespoon of peanut butter, and ½ cup of tofu. How can you eat out and still eat healthy? · Learn to estimate the serving sizes of foods that have carbohydrate. If you measure food at home, it will be easier to estimate the amount in a serving of restaurant food. · If the meal you order has too much carbohydrate (such as potatoes, corn, or baked beans), ask to have a low-carbohydrate food instead. Ask for a salad or green vegetables. · If you use insulin, check your blood sugar before and after eating out to help you plan how much to eat in the future. · If you eat more carbohydrate at a meal than you had planned, take a walk or do other exercise. This will help lower your blood sugar. What else should you know? · Limit saturated fat, such as the fat from meat and dairy products. This is a healthy choice because people who have diabetes are at higher risk of heart disease. So choose lean cuts of meat and nonfat or low-fat dairy products.  Use olive or canola oil instead of butter or shortening when cooking. · Don't skip meals. Your blood sugar may drop too low if you skip meals and take insulin or certain medicines for diabetes. · Check with your doctor before you drink alcohol. Alcohol can cause your blood sugar to drop too low. Alcohol can also cause a bad reaction if you take certain diabetes medicines. Follow-up care is a key part of your treatment and safety. Be sure to make and go to all appointments, and call your doctor if you are having problems. It's also a good idea to know your test results and keep a list of the medicines you take. Where can you learn more? Go to https://[employer]. org and sign in to your [employer] account. Enter J109 in the [employer] box to learn more about \"Learning About Diabetes Food Guidelines. \"     If you do not have an account, please click on the \"Sign Up Now\" link. Current as of: December 19, 2019Content Version: 12.4  © 4510-1078 [employer], [employer]. Care instructions adapted under license by [facility] ([facility]). If you have questions about a medical condition or this instruction, always ask your healthcare professional. [place] [address] any warranty or liability for your use of this information.

## 2020-03-23 NOTE — PROGRESS NOTES
are no hypoglycemic complications. Pertinent negatives for diabetic complications include no autonomic neuropathy, CVA, heart disease, nephropathy, peripheral neuropathy, PVD or retinopathy. Risk factors for coronary artery disease include hypertension, dyslipidemia, diabetes mellitus and post-menopausal. Current diabetic treatment includes diet and oral agent (monotherapy). She is compliant with treatment some of the time. She is following a generally unhealthy diet. An ACE inhibitor/angiotensin II receptor blocker is not being taken. ROS:  Review of Systems   Constitutional: Positive for fatigue and malaise/fatigue. Negative for activity change, appetite change, chills, diaphoresis, fever, unexpected weight change and weight loss. HENT: Negative. Negative for congestion, dental problem, drooling, ear discharge, ear pain, facial swelling, hearing loss, mouth sores, nosebleeds, postnasal drip, rhinorrhea, sinus pressure, sinus pain, sneezing, sore throat, tinnitus, trouble swallowing and voice change. Eyes: Negative for blurred vision, photophobia, pain, discharge, redness, itching and visual disturbance. Respiratory: Negative. Negative for apnea, cough, choking, chest tightness, shortness of breath, wheezing and stridor. Cardiovascular: Negative. Negative for chest pain, palpitations, orthopnea, leg swelling and PND. Gastrointestinal: Negative. Negative for abdominal distention, abdominal pain, anal bleeding, blood in stool, constipation, diarrhea, nausea, rectal pain and vomiting. Endocrine: Negative. Negative for cold intolerance, heat intolerance, polydipsia, polyphagia and polyuria. Genitourinary: Negative. Negative for decreased urine volume, difficulty urinating, dysuria, enuresis, flank pain, frequency, genital sores, hematuria and urgency. Musculoskeletal: Negative. Negative for arthralgias, back pain, gait problem, joint swelling, myalgias, neck pain and neck stiffness. Skin: Negative. Negative for color change, pallor, rash and wound. Allergic/Immunologic: Negative. Negative for environmental allergies, food allergies and immunocompromised state. Neurological: Positive for weakness. Negative for dizziness, tremors, seizures, syncope, facial asymmetry, speech difficulty, light-headedness, numbness and headaches. Hematological: Negative. Negative for adenopathy. Does not bruise/bleed easily. Psychiatric/Behavioral: Negative. Negative for agitation, behavioral problems, confusion, decreased concentration, dysphoric mood, hallucinations, self-injury, sleep disturbance and suicidal ideas. The patient is not nervous/anxious and is not hyperactive.          Past Medical/Surgical Hx;  Reviewed with patient      Diagnosis Date    Arthritis     right hand    Cancer ([facility] [address] 1985    uterus and cervix had radiation and implant    Diabetes mellitus ([facility] [address]     Hyperlipidemia     Hypertension     Radiation cystitis     Urinary incontinence     complication due to uterine cancer    Urinary retention     caused by complications from uterine cancer     Past Surgical History:   Procedure Laterality Date    CYSTOSCOPY N/A 7/17/2019    CYSTOSCOPY BOTOX  INJECTION 200 UNITS performed by Sajan CALLOWAY Memo, DO at [address] [place] [address] CYSTOURETHROSCOPY INJ CHEMODENERVATION BLADDER N/A 11/9/2018    CYSTOSCOPY  BOTOX INJECTION (200 UNITS) performed by Araceli Tomlinson MD at [address]       Past Family Hx:  Reviewed with patient      Problem Relation Age of Onset    Heart Disease Mother     No Known Problems Father     Other Sister     Other Brother     Other Other        Social Hx:  Reviewed with patient  Social History     Tobacco Use    Smoking status: Former Smoker     Packs/day: 1.00     Years: 44.00     Pack years: 44.00     Types: Cigarettes     Start date: 1/1/1965     Last attempt to quit: 12/2/2009     Years since quitting: 10.3    Smokeless tobacco: Never Used   Substance Use Topics    Alcohol use: Yes     Comment: seldom       OBJECTIVE  /78   Pulse 52   Temp 97.8 °F (36.6 °C)   Resp 18   Ht 5' 4.02\" (1.626 m)   Wt 132 lb 9.6 oz (60.1 kg)   SpO2 99%   Breastfeeding No   BMI 22.75 kg/m²     Problem List:  Patria Echevarria does not have any pertinent problems on file. PHYS EX:  Physical Exam  Vitals signs and nursing note reviewed. Constitutional:       General: She is not in acute distress. Appearance: Normal appearance. She is well-developed and normal weight. She is not ill-appearing, toxic-appearing or diaphoretic. HENT:      Head: Normocephalic and atraumatic. Right Ear: Tympanic membrane, ear canal and external ear normal. There is no impacted cerumen. Left Ear: Tympanic membrane, ear canal and external ear normal. There is no impacted cerumen. Nose: Nose normal. No congestion or rhinorrhea. Mouth/Throat:      Mouth: Mucous membranes are moist.      Pharynx: No oropharyngeal exudate or posterior oropharyngeal erythema. Eyes:      General: No scleral icterus. Right eye: No discharge. Left eye: No discharge. Extraocular Movements: Extraocular movements intact. Conjunctiva/sclera: Conjunctivae normal.      Pupils: Pupils are equal, round, and reactive to light. Neck:      Musculoskeletal: Normal range of motion and neck supple. No neck rigidity or muscular tenderness. Thyroid: No thyromegaly. Vascular: No carotid bruit or JVD. Trachea: No tracheal deviation. Cardiovascular:      Rate and Rhythm: Normal rate and regular rhythm. No extrasystoles are present. Chest Wall: PMI is not displaced. Pulses: Normal pulses. Heart sounds: Heart sounds not distant. Murmur present. Systolic murmur present with a grade of 1/6. No diastolic murmur. No friction rub. No gallop. No S3 or S4 sounds.     Pulmonary:      Effort: Pulmonary effort is normal. No respiratory distress. Breath sounds: Normal breath sounds. No stridor. No wheezing, rhonchi or rales. Chest:      Chest wall: No tenderness. Abdominal:      General: Bowel sounds are normal. There is no distension. Palpations: Abdomen is soft. There is no mass. Tenderness: There is no abdominal tenderness. There is no right CVA tenderness, left CVA tenderness, guarding or rebound. Hernia: No hernia is present. Genitourinary:     Vagina: Normal.   Musculoskeletal: Normal range of motion. General: Tenderness (multiple joints ) present. No swelling, deformity or signs of injury. Right lower leg: No edema. Left lower leg: No edema. Lymphadenopathy:      Cervical: No cervical adenopathy. Skin:     General: Skin is warm. Coloration: Skin is not jaundiced or pale. Findings: No bruising, erythema, lesion or rash. Neurological:      General: No focal deficit present. Mental Status: She is alert and oriented to person, place, and time. Cranial Nerves: No cranial nerve deficit. Sensory: No sensory deficit. Motor: No weakness or abnormal muscle tone. Coordination: Coordination normal.      Gait: Gait abnormal.      Deep Tendon Reflexes: Reflexes are normal and symmetric. Reflexes normal.         ASSESSMENT/PLAN  Raquel Howe was seen today for medication refill and sore. Diagnoses and all orders for this visit:    Type 2 diabetes mellitus without complication, without long-term current use of insulin (HCC)  -     metFORMIN (GLUCOPHAGE) 1000 MG tablet; Take 1 tablet by mouth 2 times daily (with meals)  -     POCT glycosylated hemoglobin (Hb A1C)  -     Comprehensive Metabolic Panel;  Future  -     CBC Auto Differential; Future  -     Hemoglobin A1C; Future  -     Microalbumin, Ur; Future    ---VASCULAR PANEL  A) asa, plavix, aggrenox  B) coumadin, pletal, tzd, STATIN  C) ace, hctz, folic, ccb  D) cannikinumab, fish oils     ---CARDIAC---asa, ace, beta, STATIN, hctz, ( ccb )    Dyslipidemia  -     atorvastatin (LIPITOR) 20 MG tablet; Take 1 tablet by mouth daily  -     Comprehensive Metabolic Panel; Future  -     Lipid Panel; Future  -     CBC Auto Differential; Future  --diabetic diet     Urinary incontinence, unspecified type  -     oxybutynin (DITROPAN-XL) 5 MG extended release tablet; TAKE 1 TABLET BY MOUTH EVERY DAY  -     Comprehensive Metabolic Panel; Future  -     CBC Auto Differential; Future    Essential hypertension  -     Comprehensive Metabolic Panel; Future  -     CBC Auto Differential; Future    Fatigue, unspecified type  -     TSH without Reflex; Future  -     Comprehensive Metabolic Panel; Future  -     CBC Auto Differential; Future    Screen for colon cancer  -     Cologuard (For External Results Only); Future  -     Comprehensive Metabolic Panel;  Future  -     CBC Auto Differential; Future        Outpatient Encounter Medications as of 3/23/2020   Medication Sig Dispense Refill    TOVIAZ 4 MG TB24 ER tablet       atorvastatin (LIPITOR) 20 MG tablet Take 1 tablet by mouth daily 30 tablet 0    oxybutynin (DITROPAN-XL) 5 MG extended release tablet TAKE 1 TABLET BY MOUTH EVERY DAY 90 tablet 1    metFORMIN (GLUCOPHAGE) 1000 MG tablet Take 1 tablet by mouth 2 times daily (with meals) 180 tablet 1    lidocaine (XYLOCAINE) 5 % ointment APPLY 1 GRAM TO AFFECTED AREA(S) 3 TIMES DAILY  3    CLONIDINE HCL ER PO Take 0.1 mg by mouth daily as needed TAKE IF SBP>160      [DISCONTINUED] atorvastatin (LIPITOR) 20 MG tablet Take 1 tablet by mouth daily 30 tablet 0    [DISCONTINUED] empagliflozin (JARDIANCE) 10 MG tablet Take 1 tablet by mouth daily 90 tablet 1    [DISCONTINUED] oxybutynin (DITROPAN-XL) 5 MG extended release tablet TAKE 1 TABLET BY MOUTH EVERY DAY 90 tablet 1    [DISCONTINUED] metFORMIN (GLUCOPHAGE) 1000 MG tablet Take 1 tablet by mouth 2 times daily (with meals) 180 tablet 1     No facility-administered encounter medications on file

## 2020-06-02 ENCOUNTER — HOSPITAL ENCOUNTER (OUTPATIENT)
Age: 70
Discharge: HOME | End: 2020-06-04
Payer: MEDICARE

## 2020-06-02 ENCOUNTER — OFFICE VISIT (OUTPATIENT)
Dept: FAMILY MEDICINE CLINIC | Age: 70
End: 2020-06-02
Payer: COMMERCIAL

## 2020-06-02 VITALS
RESPIRATION RATE: 18 BRPM | HEART RATE: 84 BPM | TEMPERATURE: 98 F | WEIGHT: 129 LBS | DIASTOLIC BLOOD PRESSURE: 82 MMHG | HEIGHT: 64 IN | SYSTOLIC BLOOD PRESSURE: 124 MMHG | OXYGEN SATURATION: 99 % | BODY MASS INDEX: 22.02 KG/M2

## 2020-06-02 PROBLEM — E11.65 TYPE 2 DIABETES MELLITUS WITH HYPERGLYCEMIA, WITHOUT LONG-TERM CURRENT USE OF INSULIN (HCC): Status: ACTIVE | Noted: 2019-06-07

## 2020-06-02 PROCEDURE — 83036 HEMOGLOBIN GLYCOSYLATED A1C: CPT

## 2020-06-02 PROCEDURE — 80061 LIPID PANEL: CPT

## 2020-06-02 PROCEDURE — 84443 ASSAY THYROID STIM HORMONE: CPT

## 2020-06-02 PROCEDURE — 99214 OFFICE O/P EST MOD 30 MIN: CPT | Performed by: FAMILY MEDICINE

## 2020-06-02 PROCEDURE — 85025 COMPLETE CBC W/AUTO DIFF WBC: CPT

## 2020-06-02 PROCEDURE — 80053 COMPREHEN METABOLIC PANEL: CPT

## 2020-06-02 RX ORDER — LIDOCAINE 50 MG/G
OINTMENT TOPICAL
Qty: 50 G | Refills: 3 | Status: CANCELLED | OUTPATIENT
Start: 2020-06-02

## 2020-06-02 ASSESSMENT — ENCOUNTER SYMPTOMS
COUGH: 0
FACIAL SWELLING: 0
BLOOD IN STOOL: 0
NAUSEA: 0
WHEEZING: 0
GASTROINTESTINAL NEGATIVE: 1
SINUS PRESSURE: 0
SHORTNESS OF BREATH: 0
TROUBLE SWALLOWING: 0
SORE THROAT: 0
RECTAL PAIN: 0
ANAL BLEEDING: 0
ABDOMINAL PAIN: 0
ABDOMINAL DISTENTION: 0
ALLERGIC/IMMUNOLOGIC NEGATIVE: 1
BLURRED VISION: 0
CONSTIPATION: 0
ORTHOPNEA: 0
PHOTOPHOBIA: 0
EYE REDNESS: 0
APNEA: 0
EYE DISCHARGE: 0
COLOR CHANGE: 0
SINUS PAIN: 0
VISUAL CHANGE: 0
EYE PAIN: 0
VOICE CHANGE: 0
CHOKING: 0
VOMITING: 0
EYE ITCHING: 0
CHEST TIGHTNESS: 0
RHINORRHEA: 0
RESPIRATORY NEGATIVE: 1
STRIDOR: 0
BACK PAIN: 0
DIARRHEA: 0

## 2020-06-02 NOTE — PATIENT INSTRUCTIONS
Patient Education        Learning About Diabetes Food Guidelines  Your Care Instructions     Meal planning is important to manage diabetes. It helps keep your blood sugar at a target level (which you set with your doctor). You don't have to eat special foods. You can eat what your family eats, including sweets once in a while. But you do have to pay attention to how often you eat and how much you eat of certain foods. You may want to work with a dietitian or a certified diabetes educator (CDE) to help you plan meals and snacks. A dietitian or CDE can also help you lose weight if that is one of your goals. What should you know about eating carbs? Managing the amount of carbohydrate (carbs) you eat is an important part of healthy meals when you have diabetes. Carbohydrate is found in many foods. · Learn which foods have carbs. And learn the amounts of carbs in different foods. ? Bread, cereal, pasta, and rice have about 15 grams of carbs in a serving. A serving is 1 slice of bread (1 ounce), ½ cup of cooked cereal, or 1/3 cup of cooked pasta or rice. ? Fruits have 15 grams of carbs in a serving. A serving is 1 small fresh fruit, such as an apple or orange; ½ of a banana; ½ cup of cooked or canned fruit; ½ cup of fruit juice; 1 cup of melon or raspberries; or 2 tablespoons of dried fruit. ? Milk and no-sugar-added yogurt have 15 grams of carbs in a serving. A serving is 1 cup of milk or 2/3 cup of no-sugar-added yogurt. ? Starchy vegetables have 15 grams of carbs in a serving. A serving is ½ cup of mashed potatoes or sweet potato; 1 cup winter squash; ½ of a small baked potato; ½ cup of cooked beans; or ½ cup cooked corn or green peas. · Learn how much carbs to eat each day and at each meal. A dietitian or CDE can teach you how to keep track of the amount of carbs you eat. This is called carbohydrate counting. · If you are not sure how to count carbohydrate grams, use the Plate Method to plan meals.  It is a good, quick way to make sure that you have a balanced meal. It also helps you spread carbs throughout the day. ? Divide your plate by types of foods. Put non-starchy vegetables on half the plate, meat or other protein food on one-quarter of the plate, and a grain or starchy vegetable in the final quarter of the plate. To this you can add a small piece of fruit and 1 cup of milk or yogurt, depending on how many carbs you are supposed to eat at a meal.  · Try to eat about the same amount of carbs at each meal. Do not \"save up\" your daily allowance of carbs to eat at one meal.  · Proteins have very little or no carbs per serving. Examples of proteins are beef, chicken, turkey, fish, eggs, tofu, cheese, cottage cheese, and peanut butter. A serving size of meat is 3 ounces, which is about the size of a deck of cards. Examples of meat substitute serving sizes (equal to 1 ounce of meat) are 1/4 cup of cottage cheese, 1 egg, 1 tablespoon of peanut butter, and ½ cup of tofu. How can you eat out and still eat healthy? · Learn to estimate the serving sizes of foods that have carbohydrate. If you measure food at home, it will be easier to estimate the amount in a serving of restaurant food. · If the meal you order has too much carbohydrate (such as potatoes, corn, or baked beans), ask to have a low-carbohydrate food instead. Ask for a salad or green vegetables. · If you use insulin, check your blood sugar before and after eating out to help you plan how much to eat in the future. · If you eat more carbohydrate at a meal than you had planned, take a walk or do other exercise. This will help lower your blood sugar. What else should you know? · Limit saturated fat, such as the fat from meat and dairy products. This is a healthy choice because people who have diabetes are at higher risk of heart disease. So choose lean cuts of meat and nonfat or low-fat dairy products.  Use olive or canola oil instead of butter or shortening

## 2020-06-02 NOTE — PROGRESS NOTES
screen colonoscopy     DEXA (modify frequency per FRAX score)     Low dose CT lung screening     Diabetic microalbuminuria test     Breast cancer screen     A1C test (Diabetic or Prediabetic)     Lipid screen     Potassium monitoring     Creatinine monitoring     Flu vaccine     Pneumococcal 65+ years Vaccine     Hepatitis A vaccine     Hib vaccine     Meningococcal (ACWY) vaccine

## 2020-06-03 LAB
ALBUMIN SERPL-MCNC: 4.3 G/DL (ref 3.5–5.2)
ALP BLD-CCNC: 134 U/L (ref 35–104)
ALT SERPL-CCNC: 33 U/L (ref 0–32)
ANION GAP SERPL CALCULATED.3IONS-SCNC: 16 MMOL/L (ref 7–16)
AST SERPL-CCNC: 34 U/L (ref 0–31)
BASOPHILS ABSOLUTE: 0.04 E9/L (ref 0–0.2)
BASOPHILS RELATIVE PERCENT: 0.7 % (ref 0–2)
BILIRUB SERPL-MCNC: 0.6 MG/DL (ref 0–1.2)
BUN BLDV-MCNC: 21 MG/DL (ref 8–23)
CALCIUM SERPL-MCNC: 10 MG/DL (ref 8.6–10.2)
CHLORIDE BLD-SCNC: 100 MMOL/L (ref 98–107)
CHOLESTEROL, TOTAL: 241 MG/DL (ref 0–199)
CO2: 23 MMOL/L (ref 22–29)
CREAT SERPL-MCNC: 0.9 MG/DL (ref 0.5–1)
EOSINOPHILS ABSOLUTE: 0.12 E9/L (ref 0.05–0.5)
EOSINOPHILS RELATIVE PERCENT: 2 % (ref 0–6)
GFR AFRICAN AMERICAN: >60
GFR NON-AFRICAN AMERICAN: >60 ML/MIN/1.73
GLUCOSE BLD-MCNC: 380 MG/DL (ref 74–99)
HBA1C MFR BLD: 9.7 % (ref 4–5.6)
HCT VFR BLD CALC: 46.3 % (ref 34–48)
HDLC SERPL-MCNC: 51 MG/DL
HEMOGLOBIN: 14.3 G/DL (ref 11.5–15.5)
IMMATURE GRANULOCYTES #: 0.02 E9/L
IMMATURE GRANULOCYTES %: 0.3 % (ref 0–5)
LDL CHOLESTEROL CALCULATED: 155 MG/DL (ref 0–99)
LYMPHOCYTES ABSOLUTE: 1.32 E9/L (ref 1.5–4)
LYMPHOCYTES RELATIVE PERCENT: 22.1 % (ref 20–42)
MCH RBC QN AUTO: 28.8 PG (ref 26–35)
MCHC RBC AUTO-ENTMCNC: 30.9 % (ref 32–34.5)
MCV RBC AUTO: 93.3 FL (ref 80–99.9)
MONOCYTES ABSOLUTE: 0.6 E9/L (ref 0.1–0.95)
MONOCYTES RELATIVE PERCENT: 10.1 % (ref 2–12)
NEUTROPHILS ABSOLUTE: 3.86 E9/L (ref 1.8–7.3)
NEUTROPHILS RELATIVE PERCENT: 64.8 % (ref 43–80)
PDW BLD-RTO: 13.2 FL (ref 11.5–15)
PLATELET # BLD: 182 E9/L (ref 130–450)
PMV BLD AUTO: 11.8 FL (ref 7–12)
POTASSIUM SERPL-SCNC: 5.3 MMOL/L (ref 3.5–5)
RBC # BLD: 4.96 E12/L (ref 3.5–5.5)
SODIUM BLD-SCNC: 139 MMOL/L (ref 132–146)
TOTAL PROTEIN: 7.6 G/DL (ref 6.4–8.3)
TRIGL SERPL-MCNC: 177 MG/DL (ref 0–149)
TSH SERPL DL<=0.05 MIU/L-ACNC: 1.51 UIU/ML (ref 0.27–4.2)
VLDLC SERPL CALC-MCNC: 35 MG/DL
WBC # BLD: 6 E9/L (ref 4.5–11.5)

## 2020-06-03 RX ORDER — ATORVASTATIN CALCIUM 20 MG/1
TABLET, FILM COATED ORAL
Qty: 90 TABLET | Refills: 1
Start: 2020-06-03 | End: 2020-11-05

## 2020-06-04 RX ORDER — SEMAGLUTIDE 1.34 MG/ML
0.5 INJECTION, SOLUTION SUBCUTANEOUS WEEKLY
Qty: 3 PEN | Refills: 0 | Status: SHIPPED
Start: 2020-06-04 | End: 2020-08-19

## 2020-06-24 ENCOUNTER — HOSPITAL ENCOUNTER (OUTPATIENT)
Age: 70
Discharge: HOME | End: 2020-06-26
Payer: MEDICARE

## 2020-07-14 ENCOUNTER — HOSPITAL ENCOUNTER (OUTPATIENT)
Age: 70
Discharge: HOME | End: 2020-07-16
Payer: MEDICARE

## 2020-07-14 PROCEDURE — 88112 CYTOPATH CELL ENHANCE TECH: CPT

## 2020-07-14 PROCEDURE — 87088 URINE BACTERIA CULTURE: CPT

## 2020-07-14 PROCEDURE — 87186 SC STD MICRODIL/AGAR DIL: CPT

## 2020-07-16 LAB
ORGANISM: ABNORMAL
URINE CULTURE, ROUTINE: ABNORMAL
URINE CULTURE, ROUTINE: ABNORMAL

## 2020-08-19 RX ORDER — SEMAGLUTIDE 1.34 MG/ML
0.5 INJECTION, SOLUTION SUBCUTANEOUS WEEKLY
Qty: 4 PEN | Refills: 1 | Status: SHIPPED
Start: 2020-08-19 | End: 2021-03-05 | Stop reason: SDUPTHER

## 2020-09-16 RX ORDER — OXYBUTYNIN CHLORIDE 5 MG/1
TABLET, EXTENDED RELEASE ORAL
Qty: 90 TABLET | Refills: 1 | Status: ON HOLD
Start: 2020-09-16 | End: 2021-08-05 | Stop reason: HOSPADM

## 2020-11-17 ENCOUNTER — TELEPHONE (OUTPATIENT)
Dept: PRIMARY CARE CLINIC | Age: 70
End: 2020-11-17

## 2020-12-18 RX ORDER — EMPAGLIFLOZIN 25 MG/1
TABLET, FILM COATED ORAL
Qty: 30 TABLET | Refills: 0 | Status: SHIPPED
Start: 2020-12-18 | End: 2021-06-25

## 2021-01-18 ENCOUNTER — OFFICE VISIT (OUTPATIENT)
Dept: FAMILY MEDICINE CLINIC | Age: 71
End: 2021-01-18
Payer: MEDICARE

## 2021-01-18 ENCOUNTER — TELEPHONE (OUTPATIENT)
Dept: FAMILY MEDICINE CLINIC | Age: 71
End: 2021-01-18

## 2021-01-18 VITALS
SYSTOLIC BLOOD PRESSURE: 112 MMHG | TEMPERATURE: 98 F | RESPIRATION RATE: 18 BRPM | DIASTOLIC BLOOD PRESSURE: 70 MMHG | OXYGEN SATURATION: 93 % | HEIGHT: 64 IN | HEART RATE: 82 BPM | BODY MASS INDEX: 16.63 KG/M2 | WEIGHT: 97.4 LBS

## 2021-01-18 DIAGNOSIS — E11.65 TYPE 2 DIABETES MELLITUS WITH HYPERGLYCEMIA, WITHOUT LONG-TERM CURRENT USE OF INSULIN (HCC): ICD-10-CM

## 2021-01-18 DIAGNOSIS — Z00.00 ROUTINE GENERAL MEDICAL EXAMINATION AT A HEALTH CARE FACILITY: ICD-10-CM

## 2021-01-18 DIAGNOSIS — Z12.11 SCREEN FOR COLON CANCER: ICD-10-CM

## 2021-01-18 DIAGNOSIS — R26.2 CANNOT WALK: ICD-10-CM

## 2021-01-18 DIAGNOSIS — Z12.31 SCREENING MAMMOGRAM, ENCOUNTER FOR: ICD-10-CM

## 2021-01-18 DIAGNOSIS — Z00.00 ENCOUNTER FOR MEDICARE ANNUAL WELLNESS EXAM: ICD-10-CM

## 2021-01-18 DIAGNOSIS — G30.1 LATE ONSET ALZHEIMER'S DISEASE WITH BEHAVIORAL DISTURBANCE (HCC): Primary | ICD-10-CM

## 2021-01-18 DIAGNOSIS — L89.309 PRESSURE INJURY OF SKIN OF BUTTOCK, UNSPECIFIED INJURY STAGE, UNSPECIFIED LATERALITY: ICD-10-CM

## 2021-01-18 DIAGNOSIS — F02.818 LATE ONSET ALZHEIMER'S DISEASE WITH BEHAVIORAL DISTURBANCE (HCC): Primary | ICD-10-CM

## 2021-01-18 DIAGNOSIS — G30.1 LATE ONSET ALZHEIMER'S DISEASE WITHOUT BEHAVIORAL DISTURBANCE (HCC): ICD-10-CM

## 2021-01-18 DIAGNOSIS — Z87.891 HISTORY OF SMOKING 30 OR MORE PACK YEARS: ICD-10-CM

## 2021-01-18 DIAGNOSIS — E78.5 DYSLIPIDEMIA: ICD-10-CM

## 2021-01-18 DIAGNOSIS — R62.7 FAILURE TO THRIVE IN ADULT: ICD-10-CM

## 2021-01-18 DIAGNOSIS — R53.83 FATIGUE, UNSPECIFIED TYPE: ICD-10-CM

## 2021-01-18 DIAGNOSIS — Z00.00 ENCOUNTER FOR MEDICARE ANNUAL WELLNESS EXAM: Primary | ICD-10-CM

## 2021-01-18 DIAGNOSIS — F02.80 LATE ONSET ALZHEIMER'S DISEASE WITHOUT BEHAVIORAL DISTURBANCE (HCC): ICD-10-CM

## 2021-01-18 DIAGNOSIS — R63.0 ANOREXIA: ICD-10-CM

## 2021-01-18 LAB
ALBUMIN SERPL-MCNC: 3.8 G/DL (ref 3.5–5.2)
ALP BLD-CCNC: 170 U/L (ref 35–104)
ALT SERPL-CCNC: 41 U/L (ref 0–32)
ANION GAP SERPL CALCULATED.3IONS-SCNC: 14 MMOL/L (ref 7–16)
AST SERPL-CCNC: 43 U/L (ref 0–31)
BASOPHILS ABSOLUTE: 0.03 E9/L (ref 0–0.2)
BASOPHILS RELATIVE PERCENT: 0.5 % (ref 0–2)
BILIRUB SERPL-MCNC: 0.5 MG/DL (ref 0–1.2)
BUN BLDV-MCNC: 31 MG/DL (ref 8–23)
CALCIUM SERPL-MCNC: 10 MG/DL (ref 8.6–10.2)
CHLORIDE BLD-SCNC: 103 MMOL/L (ref 98–107)
CHOLESTEROL, TOTAL: 116 MG/DL (ref 0–199)
CO2: 24 MMOL/L (ref 22–29)
CREAT SERPL-MCNC: 1 MG/DL (ref 0.5–1)
EOSINOPHILS ABSOLUTE: 0.02 E9/L (ref 0.05–0.5)
EOSINOPHILS RELATIVE PERCENT: 0.3 % (ref 0–6)
GFR AFRICAN AMERICAN: >60
GFR NON-AFRICAN AMERICAN: 55 ML/MIN/1.73
GLUCOSE BLD-MCNC: 203 MG/DL (ref 74–99)
HBA1C MFR BLD: 6.6 %
HCT VFR BLD CALC: 38.2 % (ref 34–48)
HDLC SERPL-MCNC: 47 MG/DL
HEMOGLOBIN: 11.7 G/DL (ref 11.5–15.5)
IMMATURE GRANULOCYTES #: 0.02 E9/L
IMMATURE GRANULOCYTES %: 0.3 % (ref 0–5)
LDL CHOLESTEROL CALCULATED: 49 MG/DL (ref 0–99)
LYMPHOCYTES ABSOLUTE: 1.19 E9/L (ref 1.5–4)
LYMPHOCYTES RELATIVE PERCENT: 20.6 % (ref 20–42)
MCH RBC QN AUTO: 28.3 PG (ref 26–35)
MCHC RBC AUTO-ENTMCNC: 30.6 % (ref 32–34.5)
MCV RBC AUTO: 92.3 FL (ref 80–99.9)
MONOCYTES ABSOLUTE: 0.44 E9/L (ref 0.1–0.95)
MONOCYTES RELATIVE PERCENT: 7.6 % (ref 2–12)
NEUTROPHILS ABSOLUTE: 4.09 E9/L (ref 1.8–7.3)
NEUTROPHILS RELATIVE PERCENT: 70.7 % (ref 43–80)
PDW BLD-RTO: 15 FL (ref 11.5–15)
PLATELET # BLD: 262 E9/L (ref 130–450)
PMV BLD AUTO: 11 FL (ref 7–12)
POTASSIUM SERPL-SCNC: 4.7 MMOL/L (ref 3.5–5)
RBC # BLD: 4.14 E12/L (ref 3.5–5.5)
SODIUM BLD-SCNC: 141 MMOL/L (ref 132–146)
TOTAL PROTEIN: 7.3 G/DL (ref 6.4–8.3)
TRIGL SERPL-MCNC: 102 MG/DL (ref 0–149)
TSH SERPL DL<=0.05 MIU/L-ACNC: 1.29 UIU/ML (ref 0.27–4.2)
URIC ACID, SERUM: 4.7 MG/DL (ref 2.4–5.7)
VLDLC SERPL CALC-MCNC: 20 MG/DL
WBC # BLD: 5.8 E9/L (ref 4.5–11.5)

## 2021-01-18 PROCEDURE — G0438 PPPS, INITIAL VISIT: HCPCS | Performed by: FAMILY MEDICINE

## 2021-01-18 PROCEDURE — 83036 HEMOGLOBIN GLYCOSYLATED A1C: CPT | Performed by: FAMILY MEDICINE

## 2021-01-18 RX ORDER — MEMANTINE HYDROCHLORIDE AND DONEPEZIL HYDROCHLORIDE 7; 10 MG/1; MG/1
7 CAPSULE ORAL DAILY
Qty: 90 CAPSULE | Refills: 1 | Status: SHIPPED
Start: 2021-01-18 | End: 2021-03-05

## 2021-01-18 ASSESSMENT — PATIENT HEALTH QUESTIONNAIRE - PHQ9
SUM OF ALL RESPONSES TO PHQ9 QUESTIONS 1 & 2: 0
SUM OF ALL RESPONSES TO PHQ QUESTIONS 1-9: 0
1. LITTLE INTEREST OR PLEASURE IN DOING THINGS: 0
DEPRESSION UNABLE TO ASSESS: FUNCTIONAL CAPACITY MOTIVATION LIMITS ACCURACY
SUM OF ALL RESPONSES TO PHQ QUESTIONS 1-9: 0

## 2021-01-18 NOTE — TELEPHONE ENCOUNTER
----- Message from Sneha Graves DO sent at 1/18/2021 11:04 AM EST -----  Set up [facility] at home

## 2021-01-18 NOTE — PROGRESS NOTES
Medicare Annual Wellness Visit  Name: Conor Cardozo Date: 2021   MRN: <[id number]> Sex: Female   Age: 66 y.o. Ethnicity: Non-/Non    : 1950 Race: Chinyere Phillip is here for Gait Problem (Pt has been experiencing sudden gait disturbance, three days ago pt has become fully wheelchair dependent), Weight Loss (Pt lost about 32 lbs in 7 months, pt's granddaughter reports that she is not eating well, has been using supplement shakes), Altered Mental Status (Pt has been having increased confusion, recently became combative with family), Skin Ulcer (Pt also has pressure ulcers on her lower back and bottom from sitting so much ), Medication Problem (Pt has been refusing to take medications), and Urinary Tract Infection (Pt's granddaughter concerned of UTI, pt had blood in urine recently)    Screenings for behavioral, psychosocial and functional/safety risks, and cognitive dysfunction are all negative except as indicated below. These results, as well as other patient data from the [address] form, are documented in Flowsheets linked to this Encounter. Allergies   Allergen Reactions    Tetracyclines & Related          Prior to Visit Medications    Medication Sig Taking? Authorizing Provider   Memantine HCl-Donepezil HCl (NAMZARIC) 7-10 MG CP24 Take 7 mg by mouth daily Yes Ravi Griggs DO   empagliflozin (JARDIANCE) 25 MG tablet Take 1 tablet by mouth daily. Further refill requires appointment.   Patient not taking: Reported on 2021  Micaela Griggs DO   atorvastatin (LIPITOR) 20 MG tablet TAKE 1 TABLET BY MOUTH EVERY DAY  Patient not taking: Reported on 2021  Micaela Griggs DO   metFORMIN (GLUCOPHAGE) 1000 MG tablet TAKE 1 TABLET BY MOUTH TWICE A DAY WITH MEALS  Patient not taking: Reported on 2021  Micaela Griggs DO   oxybutynin (DITROPAN-XL) 5 MG extended release tablet TAKE 1 TABLET BY MOUTH EVERY DAY  Patient not taking: Reported on 1/18/2021  Amirah Bateslin DO   Semaglutide,0.25 or 0.5MG/DOS, (OZEMPIC, 0.25 OR 0.5 MG/DOSE,) 2 MG/1.5ML SOPN Inject 0.5 mLs into the skin once a week  Patient not taking: Reported on 1/18/2021  Amirah Bateslin DO   TOVIAZ 4 MG TB24 ER tablet   Historical Provider, MD   lidocaine (XYLOCAINE) 5 % ointment APPLY 1 GRAM TO AFFECTED AREA(S) 3 TIMES DAILY  Historical Provider, MD   CLONIDINE HCL ER PO Take 0.1 mg by mouth daily as needed TAKE IF SBP>160  Historical Provider, MD         Past Medical History:   Diagnosis Date    Arthritis     right hand    Cancer ([facility] [address] 1985    uterus and cervix had radiation and implant    Diabetes mellitus ([facility] [address]     Hyperlipidemia     Hypertension     Radiation cystitis     Urinary incontinence     complication due to uterine cancer    Urinary retention     caused by complications from uterine cancer       Past Surgical History:   Procedure Laterality Date    CYSTOSCOPY N/A 7/17/2019    CYSTOSCOPY BOTOX  INJECTION 200 UNITS performed by Knute Holter A Memo, DO at [address] [place] [address] CYSTOURETHROSCOPY INJ CHEMODENERVATION BLADDER N/A 11/9/2018    CYSTOSCOPY  BOTOX INJECTION (200 UNITS) performed by Edison Peters MD at [place] [address] History   Problem Relation Age of Onset    Heart Disease Mother     No Known Problems Father     Other Sister     Other Brother     Other Other        CareTeam (Including outside providers/suppliers regularly involved in providing care):   Patient Care Team:  Gilson Tello DO as PCP - General (Family Medicine)  Gilson Tello DO as PCP - [facility] Empaneled Provider    Wt Readings from Last 3 Encounters:   01/18/21 97 lb 6.4 oz (44.2 kg)   06/02/20 129 lb (58.5 kg)   03/23/20 132 lb 9.6 oz (60.1 kg)     Vitals:    01/18/21 1010   BP: 112/70   Pulse: 82   Resp: 18   Temp: 98 °F (36.7 °C)   TempSrc: Temporal   SpO2: 93%   Weight: 97 lb 6.4 oz (44.2 depression, 5-9 = mild depression, 10-14 = moderate depression, 15-19 = moderately severe depression, 20-27 = severe depression       Health Habits/Nutrition:  Health Habits/Nutrition  Do you exercise for at least 20 minutes 2-3 times per week?: (!) No  Have you lost any weight without trying in the past 3 months?: (S) (!) Yes  Do you eat fewer than 2 meals per day?: (S) (!) Yes  Have you seen a dentist within the past year?: (!) No  Body mass index: (!) 16.72  Health Habits/Nutrition Interventions:  · no acute issues    Hearing/Vision:  No exam data present  Hearing/Vision  Do you or your family notice any trouble with your hearing?: No  Do you have difficulty driving, watching TV, or doing any of your daily activities because of your eyesight?: No  Have you had an eye exam within the past year?: (!) No  Hearing/Vision Interventions:  · no acute issues     ADL:  ADLs  In the past 7 days, did you need help from others to perform any of the following everyday activities? Eating, dressing, grooming, bathing, toileting, or walking/balance?: (!) Walking/Balance, Eating, Grooming, Dressing, Bathing, Toileting  In the past 7 days, did you need help from others to take care of any of the following?  Laundry, housekeeping, banking/finances, shopping, telephone use, food preparation, transportation, or taking medications?: (!) Laundry, Housekeeping, Banking/Finances, Shopping, Telephone Use, Food Preparation, Transportation, Taking Medications  ADL Interventions:  · Patient declines any further evaluation/treatment for this issue    Personalized Preventive Plan   Current Health Maintenance Status  Immunization History   Administered Date(s) Administered    Influenza A ([id number]) Vaccine PF IM 12/03/2009    Influenza, High Dose (Fluzone 65 yrs and older) 10/14/2015, 11/07/2016, 10/31/2017, 09/23/2018, 10/11/2019, 11/05/2020    Pneumococcal Conjugate 13-valent ([id number]) 11/07/2016, 06/07/2017    Pneumococcal Polysaccharide ([id number]) 12/02/2019        Health Maintenance   Topic Date Due    Hepatitis C screen  1950    Diabetic foot exam  01/16/1959    Diabetic retinal exam  01/16/1959    DTaP/Tdap/Td vaccine (1 - Tdap) 01/16/1968    Shingles Vaccine (1 of 2) 01/16/1999    Colon cancer screen colonoscopy  01/16/1999    DEXA (modify frequency per FRAX score)  01/16/2004    Low dose CT lung screening  01/16/2004    Diabetic microalbuminuria test  09/01/2013    Breast cancer screen  01/19/2019    Annual Wellness Visit (AWV)  06/26/2020    A1C test (Diabetic or Prediabetic)  09/02/2020    Lipid screen  06/02/2021    Potassium monitoring  06/02/2021    Creatinine monitoring  06/02/2021    Flu vaccine  Completed    Pneumococcal 65+ years Vaccine  Completed    Hepatitis A vaccine  Aged Out    Hib vaccine  Aged Out    Meningococcal (ACWY) vaccine  Aged Out     Recommendations for [employer] Due: see orders and patient instructions/AVS.  . Recommended screening schedule for the next 5-10 years is provided to the patient in written form: see Patient Instructions/AVS.    Roxy Che was seen today for gait problem, weight loss, altered mental status, skin ulcer, medication problem and urinary tract infection. Diagnoses and all orders for this visit:    Encounter for Medicare annual wellness exam  -     Comprehensive Metabolic Panel; Future  -     CBC Auto Differential; Future    ---VASCULAR PANEL  A) asa, plavix, aggrenox  B) coumadin, pletal, tzd, STATIN  C) ace, hctz, folic, CCB  D) cannikinumab, fish oils     ---CARDIAC---asa, ace, beta, STATIN, hctz, ( CCB )    Type 2 diabetes mellitus with hyperglycemia, without long-term current use of insulin (HCC)  -     POCT glycosylated hemoglobin (Hb A1C)  -     Comprehensive Metabolic Panel; Future  -     CBC Auto Differential; Future  -     Microalbumin, Ur; Future    Screening mammogram, encounter for  -     Comprehensive Metabolic Panel;  Future  -     CBC Auto Differential; Future  -     MAGUI DIGITAL SCREEN W OR WO CAD BILATERAL; Future    Dyslipidemia  -     Comprehensive Metabolic Panel; Future  -     Lipid Panel; Future  -     CBC Auto Differential; Future    Fatigue, unspecified type  -     TSH without Reflex; Future  -     Uric Acid; Future  -     Comprehensive Metabolic Panel; Future  -     CBC Auto Differential; Future    Screen for colon cancer  -     Cologuard (For External Results Only); Future    Late onset Alzheimer's disease without behavioral disturbance (HCC)  -     Memantine HCl-Donepezil HCl (NAMZARIC) 7-10 MG CP24;  Take 7 mg by mouth daily  --I will start Namzaric

## 2021-01-18 NOTE — PATIENT INSTRUCTIONS
Patient Education        Well Visit, Over 72: Care Instructions  Your Care Instructions     Physical exams can help you stay healthy. Your doctor has checked your overall health and may have suggested ways to take good care of yourself. He or she also may have recommended tests. At home, you can help prevent illness with healthy eating, regular exercise, and other steps. Follow-up care is a key part of your treatment and safety. Be sure to make and go to all appointments, and call your doctor if you are having problems. It's also a good idea to know your test results and keep a list of the medicines you take. How can you care for yourself at home? · Reach and stay at a healthy weight. This will lower your risk for many problems, such as obesity, diabetes, heart disease, and high blood pressure. · Get at least 30 minutes of exercise on most days of the week. Walking is a good choice. You also may want to do other activities, such as running, swimming, cycling, or playing tennis or team sports. · Do not smoke. Smoking can make health problems worse. If you need help quitting, talk to your doctor about stop-smoking programs and medicines. These can increase your chances of quitting for good. · Protect your skin from too much sun. When you're outdoors from 10 a.m. to 4 p.m., stay in the shade or cover up with clothing and a hat with a wide brim. Wear sunglasses that block UV rays. Even when it's cloudy, put broad-spectrum sunscreen (SPF 30 or higher) on any exposed skin. · See a dentist one or two times a year for checkups and to have your teeth cleaned. · Wear a seat belt in the car. Follow your doctor's advice about when to have certain tests. These tests can spot problems early. For men and women  · Cholesterol. Your doctor will tell you how often to have this done based on your overall health and other things that can increase your risk for heart attack and stroke. · Blood pressure. Have your blood pressure checked during a routine doctor visit. Your doctor will tell you how often to check your blood pressure based on your age, your blood pressure results, and other factors. · Diabetes. Ask your doctor whether you should have tests for diabetes. · Vision. Experts recommend that you have yearly exams for glaucoma and other age-related eye problems. · Hearing. Tell your doctor if you notice any change in your hearing. You can have tests to find out how well you hear. · Colon cancer tests. Keep having colon cancer tests as your doctor recommends. You can have one of several types of tests. · Heart attack and stroke risk. At least every 4 to 6 years, you should have your risk for heart attack and stroke assessed. Your doctor uses factors such as your age, blood pressure, cholesterol, and whether you smoke or have diabetes to show what your risk for a heart attack or stroke is over the next 10 years. · Osteoporosis. Talk to your doctor about whether you should have a bone density test to find out whether you have thinning bones. Ask your doctor if you need to take a calcium plus vitamin D supplement. You may be able to get enough calcium and vitamin D through your diet. For women  · Pap test and pelvic exam. You may no longer need a Pap test. Talk with your doctor about whether to stop or continue to have Pap tests. · Breast exam and mammogram. Ask how often you should have a mammogram, which is an X-ray of your breasts. A mammogram can spot breast cancer before it can be felt and when it is easiest to treat. · Thyroid disease. Talk to your doctor about whether to have your thyroid checked as part of a regular physical exam. Women have an increased chance of a thyroid problem.   For men · Prostate exam. Talk to your doctor about whether you should have a blood test (called a PSA test) for prostate cancer. Experts recommend that you discuss the benefits and risks of the test with your doctor before you decide whether to have this test. Some experts say that men ages 79 and older no longer need testing. · Abdominal aortic aneurysm. Ask your doctor whether you should have a test to check for an aneurysm. You may need a test if you ever smoked or if your parent, brother, sister, or child has had an aneurysm. When should you call for help? Watch closely for changes in your health, and be sure to contact your doctor if you have any problems or symptoms that concern you. Where can you learn more? Go to [email or web address] org and sign in to your GOSO account. Enter Y900 in the [employer] box to learn more about \"Well Visit, Over 65: Care Instructions. \"     If you do not have an account, please click on the \"Sign Up Now\" link. Current as of: May 27, 2020               Content Version: 12.6  © 2669-0517 [employer]. Care instructions adapted under license by [facility] ([facility]). If you have questions about a medical condition or this instruction, always ask your healthcare professional. [address] any warranty or liability for your use of this information. Personalized Preventive Plan for Felton Boas - 1/18/2021  Medicare offers a range of preventive health benefits. Some of the tests and screenings are paid in full while other may be subject to a deductible, co-insurance, and/or copay. Some of these benefits include a comprehensive review of your medical history including lifestyle, illnesses that may run in your family, and various assessments and screenings as appropriate. After reviewing your medical record and screening and assessments performed today your provider may have ordered immunizations, labs, imaging, and/or referrals for you. A list of these orders (if applicable) as well as your Preventive Care list are included within your After Visit Summary for your review. Other Preventive Recommendations:    · A preventive eye exam performed by an eye specialist is recommended every 1-2 years to screen for glaucoma; cataracts, macular degeneration, and other eye disorders. · A preventive dental visit is recommended every 6 months. · Try to get at least 150 minutes of exercise per week or 10,000 steps per day on a pedometer . · Order or download the FREE \"Exercise & Physical Activity: Your Everyday Guide\" from [employer]. Call 5-631.703.4274 or search [employer] online. · You need 0255-2298 mg of calcium and 6031-3905 IU of vitamin D per day. It is possible to meet your calcium requirement with diet alone, but a vitamin D supplement is usually necessary to meet this goal.  · When exposed to the sun, use a sunscreen that protects against both UVA and UVB radiation with an SPF of 30 or greater. Reapply every 2 to 3 hours or after sweating, drying off with a towel, or swimming. · Always wear a seat belt when traveling in a car. Always wear a helmet when riding a bicycle or motorcycle.

## 2021-01-26 ENCOUNTER — TELEPHONE (OUTPATIENT)
Dept: CASE MANAGEMENT | Age: 71
End: 2021-01-26

## 2021-01-26 NOTE — TELEPHONE ENCOUNTER
I called the patient and left a message reminding her of the CT lung screening at [facility] on 1/27/2021 at 9:00 am with an 8:30 am arrival.  If unable to keep this appt call the office at [phone] to get rescheduled.             Electronically signed by Curtis Blanton on 1/26/21 at 10:32 AM EST

## 2021-02-02 ENCOUNTER — TELEPHONE (OUTPATIENT)
Dept: FAMILY MEDICINE CLINIC | Age: 71
End: 2021-02-02

## 2021-02-02 NOTE — PROGRESS NOTES
Overdue results letter mailed to patient regarding cologuard order.   Electronically signed by Liudmila Vaca on 2/2/2021 at 3:19 PM

## 2021-02-03 RX ORDER — FLUCONAZOLE 150 MG/1
150 TABLET ORAL ONCE
Qty: 1 TABLET | Refills: 0 | Status: SHIPPED | OUTPATIENT
Start: 2021-02-03 | End: 2021-02-03

## 2021-02-03 NOTE — TELEPHONE ENCOUNTER
This MA attempted to return call to pt's . Phone rang to voicemail that has not been set up. Rx sent to pharmacy on file.     Electronically signed by Chuy Shea MA on 2/3/21 at 8:54 AM EST

## 2021-02-05 DIAGNOSIS — E78.5 DYSLIPIDEMIA: ICD-10-CM

## 2021-02-05 RX ORDER — ATORVASTATIN CALCIUM 20 MG/1
TABLET, FILM COATED ORAL
Qty: 90 TABLET | Refills: 0 | Status: SHIPPED
Start: 2021-02-05 | End: 2021-08-19

## 2021-03-05 ENCOUNTER — OFFICE VISIT (OUTPATIENT)
Dept: FAMILY MEDICINE CLINIC | Age: 71
End: 2021-03-05
Payer: MEDICARE

## 2021-03-05 VITALS
DIASTOLIC BLOOD PRESSURE: 60 MMHG | TEMPERATURE: 97.7 F | RESPIRATION RATE: 16 BRPM | SYSTOLIC BLOOD PRESSURE: 120 MMHG | WEIGHT: 96.7 LBS | HEIGHT: 64 IN | HEART RATE: 70 BPM | BODY MASS INDEX: 16.51 KG/M2

## 2021-03-05 DIAGNOSIS — I10 ESSENTIAL HYPERTENSION: ICD-10-CM

## 2021-03-05 DIAGNOSIS — Z12.11 SCREEN FOR COLON CANCER: ICD-10-CM

## 2021-03-05 DIAGNOSIS — E11.65 TYPE 2 DIABETES MELLITUS WITH HYPERGLYCEMIA, WITHOUT LONG-TERM CURRENT USE OF INSULIN (HCC): ICD-10-CM

## 2021-03-05 DIAGNOSIS — R53.83 FATIGUE, UNSPECIFIED TYPE: ICD-10-CM

## 2021-03-05 DIAGNOSIS — R79.89 ELEVATED LFTS: ICD-10-CM

## 2021-03-05 DIAGNOSIS — R63.4 WEIGHT LOSS: ICD-10-CM

## 2021-03-05 DIAGNOSIS — E78.5 DYSLIPIDEMIA: ICD-10-CM

## 2021-03-05 DIAGNOSIS — R26.9 GAIT DISTURBANCE: ICD-10-CM

## 2021-03-05 DIAGNOSIS — E11.65 TYPE 2 DIABETES MELLITUS WITH HYPERGLYCEMIA, WITHOUT LONG-TERM CURRENT USE OF INSULIN (HCC): Primary | ICD-10-CM

## 2021-03-05 DIAGNOSIS — R26.89 IMBALANCE: ICD-10-CM

## 2021-03-05 LAB
ANION GAP SERPL CALCULATED.3IONS-SCNC: 9 MMOL/L (ref 7–16)
BASOPHILS ABSOLUTE: 0.04 E9/L (ref 0–0.2)
BASOPHILS RELATIVE PERCENT: 0.6 % (ref 0–2)
BUN BLDV-MCNC: 19 MG/DL (ref 8–23)
CALCIUM SERPL-MCNC: 10.2 MG/DL (ref 8.6–10.2)
CHLORIDE BLD-SCNC: 105 MMOL/L (ref 98–107)
CHOLESTEROL, TOTAL: 134 MG/DL (ref 0–199)
CO2: 30 MMOL/L (ref 22–29)
CREAT SERPL-MCNC: 0.8 MG/DL (ref 0.5–1)
EOSINOPHILS ABSOLUTE: 0.05 E9/L (ref 0.05–0.5)
EOSINOPHILS RELATIVE PERCENT: 0.8 % (ref 0–6)
GFR AFRICAN AMERICAN: >60
GFR NON-AFRICAN AMERICAN: >60 ML/MIN/1.73
GLUCOSE BLD-MCNC: 97 MG/DL (ref 74–99)
HBA1C MFR BLD: 5.8 % (ref 4–5.6)
HCT VFR BLD CALC: 39.4 % (ref 34–48)
HDLC SERPL-MCNC: 49 MG/DL
HEMOGLOBIN: 12.1 G/DL (ref 11.5–15.5)
IMMATURE GRANULOCYTES #: 0.02 E9/L
IMMATURE GRANULOCYTES %: 0.3 % (ref 0–5)
LDL CHOLESTEROL CALCULATED: 63 MG/DL (ref 0–99)
LYMPHOCYTES ABSOLUTE: 1.74 E9/L (ref 1.5–4)
LYMPHOCYTES RELATIVE PERCENT: 27.3 % (ref 20–42)
MCH RBC QN AUTO: 28.8 PG (ref 26–35)
MCHC RBC AUTO-ENTMCNC: 30.7 % (ref 32–34.5)
MCV RBC AUTO: 93.8 FL (ref 80–99.9)
MONOCYTES ABSOLUTE: 0.5 E9/L (ref 0.1–0.95)
MONOCYTES RELATIVE PERCENT: 7.8 % (ref 2–12)
NEUTROPHILS ABSOLUTE: 4.02 E9/L (ref 1.8–7.3)
NEUTROPHILS RELATIVE PERCENT: 63.2 % (ref 43–80)
PDW BLD-RTO: 13.9 FL (ref 11.5–15)
PLATELET # BLD: 211 E9/L (ref 130–450)
PMV BLD AUTO: 10.7 FL (ref 7–12)
POTASSIUM SERPL-SCNC: 5.4 MMOL/L (ref 3.5–5)
RBC # BLD: 4.2 E12/L (ref 3.5–5.5)
SODIUM BLD-SCNC: 144 MMOL/L (ref 132–146)
TRIGL SERPL-MCNC: 109 MG/DL (ref 0–149)
TSH SERPL DL<=0.05 MIU/L-ACNC: 0.64 UIU/ML (ref 0.27–4.2)
URIC ACID, SERUM: 5.1 MG/DL (ref 2.4–5.7)
VLDLC SERPL CALC-MCNC: 22 MG/DL
WBC # BLD: 6.4 E9/L (ref 4.5–11.5)

## 2021-03-05 PROCEDURE — 1123F ACP DISCUSS/DSCN MKR DOCD: CPT | Performed by: FAMILY MEDICINE

## 2021-03-05 PROCEDURE — 4040F PNEUMOC VAC/ADMIN/RCVD: CPT | Performed by: FAMILY MEDICINE

## 2021-03-05 PROCEDURE — 3017F COLORECTAL CA SCREEN DOC REV: CPT | Performed by: FAMILY MEDICINE

## 2021-03-05 PROCEDURE — G8427 DOCREV CUR MEDS BY ELIG CLIN: HCPCS | Performed by: FAMILY MEDICINE

## 2021-03-05 PROCEDURE — G8482 FLU IMMUNIZE ORDER/ADMIN: HCPCS | Performed by: FAMILY MEDICINE

## 2021-03-05 PROCEDURE — G8400 PT W/DXA NO RESULTS DOC: HCPCS | Performed by: FAMILY MEDICINE

## 2021-03-05 PROCEDURE — G8419 CALC BMI OUT NRM PARAM NOF/U: HCPCS | Performed by: FAMILY MEDICINE

## 2021-03-05 PROCEDURE — 99214 OFFICE O/P EST MOD 30 MIN: CPT | Performed by: FAMILY MEDICINE

## 2021-03-05 PROCEDURE — 1036F TOBACCO NON-USER: CPT | Performed by: FAMILY MEDICINE

## 2021-03-05 PROCEDURE — 2022F DILAT RTA XM EVC RTNOPTHY: CPT | Performed by: FAMILY MEDICINE

## 2021-03-05 PROCEDURE — 3044F HG A1C LEVEL LT 7.0%: CPT | Performed by: FAMILY MEDICINE

## 2021-03-05 PROCEDURE — 1090F PRES/ABSN URINE INCON ASSESS: CPT | Performed by: FAMILY MEDICINE

## 2021-03-05 RX ORDER — SEMAGLUTIDE 1.34 MG/ML
0.5 INJECTION, SOLUTION SUBCUTANEOUS WEEKLY
Qty: 4 PEN | Refills: 1
Start: 2021-03-05 | End: 2021-05-21

## 2021-03-05 RX ORDER — MEMANTINE HYDROCHLORIDE AND DONEPEZIL HYDROCHLORIDE 14; 10 MG/1; MG/1
14 CAPSULE ORAL
Qty: 30 CAPSULE | Refills: 0
Start: 2021-03-05 | End: 2021-09-07 | Stop reason: SDUPTHER

## 2021-03-05 ASSESSMENT — ENCOUNTER SYMPTOMS
GASTROINTESTINAL NEGATIVE: 1
ALLERGIC/IMMUNOLOGIC NEGATIVE: 1
FACIAL SWELLING: 0
COUGH: 0
BLURRED VISION: 0
WHEEZING: 0
BLOOD IN STOOL: 0
APNEA: 0
EYE DISCHARGE: 0
ORTHOPNEA: 0
TROUBLE SWALLOWING: 0
EYE REDNESS: 0
STRIDOR: 0
EYE ITCHING: 0
DIARRHEA: 0
SORE THROAT: 0
ABDOMINAL PAIN: 0
SHORTNESS OF BREATH: 0
VOMITING: 0
RESPIRATORY NEGATIVE: 1
ANAL BLEEDING: 0
CHEST TIGHTNESS: 0
RECTAL PAIN: 0
CONSTIPATION: 0
VISUAL CHANGE: 0
SINUS PRESSURE: 0
CHOKING: 0
SINUS PAIN: 0
EYE PAIN: 0
PHOTOPHOBIA: 0
BACK PAIN: 0
VOICE CHANGE: 0
RHINORRHEA: 0
COLOR CHANGE: 0
NAUSEA: 0
ABDOMINAL DISTENTION: 0

## 2021-03-05 NOTE — PATIENT INSTRUCTIONS

## 2021-03-05 NOTE — PROGRESS NOTES
Maximiliano Garcia is a 66 y.o. female. HPI/Chief C/O:  Chief Complaint   Patient presents with    Gait Problem     It is hard for patient to walk ; started about 1 month ago.  Anorexia     Her  is concerned that she barely eats at all. Allergies   Allergen Reactions    Tetracyclines & Related    The patient is here for a medication list and treatment planning review  We will go over our care planning goals as well as take care of all refills  We will set up labs as well   She is losing weight and not eating  C/O imbalance with walking    Hypertension  This is a chronic problem. The current episode started more than 1 year ago. The problem is controlled. Associated symptoms include malaise/fatigue. Pertinent negatives include no anxiety, blurred vision, chest pain, headaches, neck pain, orthopnea, palpitations, peripheral edema, PND, shortness of breath or sweats. Risk factors for coronary artery disease include post-menopausal state, diabetes mellitus and dyslipidemia. Past treatments include lifestyle changes and central alpha agonists. The current treatment provides significant improvement. Compliance problems include exercise and diet. There is no history of angina, kidney disease, CAD/MI, CVA, heart failure, left ventricular hypertrophy, PVD or retinopathy. There is no history of chronic renal disease, coarctation of the aorta, hyperaldosteronism, hypercortisolism, hyperparathyroidism, a hypertension causing med, pheochromocytoma, renovascular disease, sleep apnea or a thyroid problem. Diabetes  She presents for her follow-up diabetic visit. She has type 2 diabetes mellitus. Pertinent negatives for hypoglycemia include no confusion, dizziness, headaches, nervousness/anxiousness, pallor, seizures, speech difficulty, sweats or tremors. Associated symptoms include fatigue and weakness.  Pertinent negatives for diabetes include no blurred vision, no chest pain, no foot paresthesias, no foot ulcerations, no polydipsia, no polyphagia, no polyuria, no visual change and no weight loss. There are no hypoglycemic complications. Pertinent negatives for diabetic complications include no autonomic neuropathy, CVA, heart disease, nephropathy, peripheral neuropathy, PVD or retinopathy. Risk factors for coronary artery disease include hypertension, dyslipidemia, diabetes mellitus and post-menopausal. Current diabetic treatment includes diet and oral agent (dual therapy) (Ozempic). She is compliant with treatment some of the time. She is following a generally unhealthy diet. An ACE inhibitor/angiotensin II receptor blocker is not being taken. ROS:  Review of Systems   Constitutional: Positive for fatigue, malaise/fatigue and unexpected weight change. Negative for activity change, appetite change, chills, diaphoresis, fever and weight loss. HENT: Negative. Negative for congestion, dental problem, drooling, ear discharge, ear pain, facial swelling, hearing loss, mouth sores, nosebleeds, postnasal drip, rhinorrhea, sinus pressure, sinus pain, sneezing, sore throat, tinnitus, trouble swallowing and voice change. Eyes: Negative for blurred vision, photophobia, pain, discharge, redness, itching and visual disturbance. Respiratory: Negative. Negative for apnea, cough, choking, chest tightness, shortness of breath, wheezing and stridor. Cardiovascular: Negative. Negative for chest pain, palpitations, orthopnea, leg swelling and PND. Gastrointestinal: Negative. Negative for abdominal distention, abdominal pain, anal bleeding, blood in stool, constipation, diarrhea, nausea, rectal pain and vomiting. Endocrine: Negative. Negative for cold intolerance, heat intolerance, polydipsia, polyphagia and polyuria. Genitourinary: Negative. Negative for decreased urine volume, difficulty urinating, dysuria, enuresis, flank pain, frequency, genital sores, hematuria and urgency.    Musculoskeletal: Negative. Negative for arthralgias, back pain, gait problem, joint swelling, myalgias, neck pain and neck stiffness. Skin: Negative. Negative for color change, pallor, rash and wound. Allergic/Immunologic: Negative. Negative for environmental allergies, food allergies and immunocompromised state. Neurological: Positive for weakness. Negative for dizziness, tremors, seizures, syncope, facial asymmetry, speech difficulty, light-headedness, numbness and headaches. Hematological: Negative. Negative for adenopathy. Does not bruise/bleed easily. Psychiatric/Behavioral: Positive for agitation and dysphoric mood. Negative for behavioral problems, confusion, decreased concentration, hallucinations, self-injury, sleep disturbance and suicidal ideas. The patient is not nervous/anxious and is not hyperactive.          Past Medical/Surgical Hx;  Reviewed with patient      Diagnosis Date    Arthritis     right hand    Cancer ([facility] [address] 1985    uterus and cervix had radiation and implant    Diabetes mellitus ([facility] [address]     Hyperlipidemia     Hypertension     Radiation cystitis     Urinary incontinence     complication due to uterine cancer    Urinary retention     caused by complications from uterine cancer     Past Surgical History:   Procedure Laterality Date    CYSTOSCOPY N/A 7/17/2019    CYSTOSCOPY BOTOX  INJECTION 200 UNITS performed by Knute Holter A Drew, DO at [address] [address] CYSTOURETHROSCOPY INJ CHEMODENERVATION BLADDER N/A 11/9/2018    CYSTOSCOPY  BOTOX INJECTION (200 UNITS) performed by Edison Peters MD at [id number] [place] [address] [address]       Past Family Hx:  Reviewed with patient      Problem Relation Age of Onset    Heart Disease Mother     No Known Problems Father     Other Sister     Other Brother     Other Other        Social Hx:  Reviewed with patient  Social History     Tobacco Use    Smoking status: Former Smoker     Packs/day: 1.00     Years: 44.00 Pack years: 44.00     Types: Cigarettes     Start date: 1965     Quit date: 2009     Years since quittin.2    Smokeless tobacco: Never Used   Substance Use Topics    Alcohol use: Yes     Comment: seldom       OBJECTIVE  /60   Pulse 70   Temp 97.7 °F (36.5 °C)   Resp 16   Ht 5' 4\" (1.626 m)   Wt 97 lb (44 kg)   LMP  (LMP Unknown)   BMI 16.65 kg/m²     Problem List:  Tierra Lee does not have any pertinent problems on file. PHYS EX:  Physical Exam  Vitals signs and nursing note reviewed. Constitutional:       General: She is not in acute distress. Appearance: Normal appearance. She is well-developed. She is not ill-appearing, toxic-appearing or diaphoretic. HENT:      Head: Normocephalic and atraumatic. Right Ear: External ear normal. There is no impacted cerumen. Left Ear: External ear normal. There is no impacted cerumen. Nose: Nose normal. No congestion or rhinorrhea. Mouth/Throat:      Mouth: Mucous membranes are moist.      Pharynx: No oropharyngeal exudate or posterior oropharyngeal erythema. Eyes:      General: No scleral icterus. Right eye: No discharge. Left eye: No discharge. Extraocular Movements: Extraocular movements intact. Conjunctiva/sclera: Conjunctivae normal.      Pupils: Pupils are equal, round, and reactive to light. Neck:      Musculoskeletal: Normal range of motion and neck supple. No neck rigidity or muscular tenderness. Thyroid: No thyromegaly. Vascular: No carotid bruit or JVD. Trachea: No tracheal deviation. Cardiovascular:      Rate and Rhythm: Normal rate and regular rhythm. No extrasystoles are present. Chest Wall: PMI is not displaced. Pulses: Normal pulses. Heart sounds: Heart sounds not distant. Murmur present. Systolic murmur present with a grade of 1/6. No diastolic murmur. No friction rub. No gallop. No S3 or S4 sounds.     Pulmonary:      Effort: Pulmonary effort is normal. No respiratory distress. Breath sounds: Normal breath sounds. No stridor. No wheezing, rhonchi or rales. Chest:      Chest wall: No tenderness. Abdominal:      General: Bowel sounds are normal. There is no distension. Palpations: Abdomen is soft. There is no mass. Tenderness: There is no abdominal tenderness. There is no right CVA tenderness, left CVA tenderness, guarding or rebound. Hernia: No hernia is present. Musculoskeletal: Normal range of motion. General: Tenderness (multiple joints ) present. No swelling, deformity or signs of injury. Right lower leg: No edema. Left lower leg: No edema. Lymphadenopathy:      Cervical: No cervical adenopathy. Skin:     General: Skin is warm. Coloration: Skin is not jaundiced or pale. Findings: No bruising, erythema, lesion or rash. Neurological:      General: No focal deficit present. Mental Status: She is alert. Cranial Nerves: No cranial nerve deficit. Sensory: Sensory deficit present. Motor: Weakness present. No abnormal muscle tone. Coordination: Coordination abnormal.      Gait: Gait abnormal.      Deep Tendon Reflexes: Reflexes abnormal.      Comments: falling         ASSESSMENT/PLAN  Tierra Lee was seen today for gait problem and anorexia. Diagnoses and all orders for this visit:    Type 2 diabetes mellitus with hyperglycemia, without long-term current use of insulin ([facility] [address]  -     Basic Metabolic Panel;  Future  -     CBC Auto Differential; Future  -     Hemoglobin A1C; Future  -     Microalbumin, Ur; Future  Long talk on treatment and prevention  Literature is given     ---VASCULAR PANEL  A) asa, plavix, aggrenox  B) coumadin, pletal, tzd, STATIN  C) ace, hctz, folic, ccb  D) cannikinumab, fish oils     ---CARDIAC---asa, ace, beta, STATIN, hctz, ( ccb )      Essential hypertension ---controlled   --patient is instructed on low to moderate sodium ( 2 to 2.5 grams ), daily    Also to increase potassium in the diet to about 3.5 grams daily    Literature is provided     -     Basic Metabolic Panel; Future  -     CBC Auto Differential; Future    Fatigue, unspecified type  -     TSH without Reflex; Future  -     Uric Acid; Future  -     Basic Metabolic Panel; Future  -     CBC Auto Differential; Future    Dyslipidemia  -     Basic Metabolic Panel; Future  -     Lipid Panel; Future  -     CBC Auto Differential; Future  --Mediterranean diet, exercise, weight loss, vitamins    We have a long talk on cholesterol and importance of lowering it       Imbalance  -     Basic Metabolic Panel; Future  -     CBC Auto Differential; Future  --R/O Parkinson's, proprioceptive imbalance due to diabetes     Elevated LFTs  -     Basic Metabolic Panel; Future  -     CBC Auto Differential; Future  -     Hepatitis Panel, Acute; Future    Screen for colon cancer  -     Cologuard (For External Results Only); Future  -     Basic Metabolic Panel; Future  -     CBC Auto Differential; Future    Other orders  -     Semaglutide,0.25 or 0.5MG/DOS, (OZEMPIC, 0.25 OR 0.5 MG/DOSE,) 2 MG/1.5ML SOPN; Inject 0.5 mLs into the skin once a week  -     Memantine HCl-Donepezil HCl (NAMZARIC) 14-10 MG CP24; Take 14 mg by mouth every morning (before breakfast)        Outpatient Encounter Medications as of 3/5/2021   Medication Sig Dispense Refill    Semaglutide,0.25 or 0.5MG/DOS, (OZEMPIC, 0.25 OR 0.5 MG/DOSE,) 2 MG/1.5ML SOPN Inject 0.5 mLs into the skin once a week 4 pen 1    Memantine HCl-Donepezil HCl (NAMZARIC) 14-10 MG CP24 Take 14 mg by mouth every morning (before breakfast) 30 capsule 0    empagliflozin (JARDIANCE) 25 MG tablet Take 1 tablet by mouth daily. Further refill requires appointment.  30 tablet 0    metFORMIN (GLUCOPHAGE) 1000 MG tablet TAKE 1 TABLET BY MOUTH TWICE A DAY WITH MEALS 180 tablet 1    oxybutynin (DITROPAN-XL) 5 MG extended release tablet TAKE 1 TABLET BY MOUTH EVERY DAY 90 tablet 1    TOVIAZ 4 MG TB24 ER tablet       atorvastatin (LIPITOR) 20 MG tablet TAKE 1 TABLET BY MOUTH EVERY DAY 90 tablet 0    [DISCONTINUED] Memantine HCl-Donepezil HCl (NAMZARIC) 7-10 MG CP24 Take 7 mg by mouth daily 90 capsule 1    CLONIDINE HCL ER PO Take 0.1 mg by mouth daily as needed TAKE IF SBP>160      [DISCONTINUED] lidocaine (XYLOCAINE) 5 % ointment APPLY 1 GRAM TO AFFECTED AREA(S) 3 TIMES DAILY  3     No facility-administered encounter medications on file as of 3/5/2021. No follow-ups on file.         Reviewed recent labs related to Rashmi's current problems      Discussed importance of regular Health Maintenance follow up  Health Maintenance   Topic    Hepatitis C screen     Diabetic foot exam     Diabetic retinal exam     COVID-19 Vaccine (1 of 2)    DTaP/Tdap/Td vaccine (1 - Tdap)    Shingles Vaccine (1 of 2)    Colon cancer screen colonoscopy     DEXA (modify frequency per FRAX score)     Low dose CT lung screening     Diabetic microalbuminuria test     Breast cancer screen     A1C test (Diabetic or Prediabetic)     Lipid screen     Potassium monitoring     Creatinine monitoring     Annual Wellness Visit (AWV)     Flu vaccine     Pneumococcal 65+ years Vaccine     Hepatitis A vaccine     Hib vaccine     Meningococcal (ACWY) vaccine

## 2021-03-08 LAB
HAV IGM SER IA-ACNC: NORMAL
HEPATITIS B CORE IGM ANTIBODY: NORMAL
HEPATITIS B SURFACE ANTIGEN INTERPRETATION: NORMAL
HEPATITIS C ANTIBODY INTERPRETATION: NORMAL

## 2021-03-19 NOTE — PROGRESS NOTES
Overdue results letter mailed to patient regarding cologuard order.   Electronically signed by Mio Wynn on 3/19/2021 at 9:02 AM

## 2021-03-22 ENCOUNTER — HOSPITAL ENCOUNTER (EMERGENCY)
Age: 71
Discharge: HOME | End: 2021-03-22
Payer: MEDICARE

## 2021-03-22 ENCOUNTER — APPOINTMENT (OUTPATIENT)
Dept: GENERAL RADIOLOGY | Age: 71
End: 2021-03-22
Payer: MEDICARE

## 2021-03-22 ENCOUNTER — APPOINTMENT (OUTPATIENT)
Dept: CT IMAGING | Age: 71
End: 2021-03-22
Payer: MEDICARE

## 2021-03-22 VITALS
RESPIRATION RATE: 20 BRPM | DIASTOLIC BLOOD PRESSURE: 72 MMHG | TEMPERATURE: 97.5 F | HEART RATE: 94 BPM | SYSTOLIC BLOOD PRESSURE: 146 MMHG | OXYGEN SATURATION: 100 %

## 2021-03-22 DIAGNOSIS — S42.291A CLOSED FRACTURE OF HEAD OF RIGHT HUMERUS, INITIAL ENCOUNTER: Primary | ICD-10-CM

## 2021-03-22 PROCEDURE — 70450 CT HEAD/BRAIN W/O DYE: CPT

## 2021-03-22 PROCEDURE — 99284 EMERGENCY DEPT VISIT MOD MDM: CPT

## 2021-03-22 PROCEDURE — 72125 CT NECK SPINE W/O DYE: CPT

## 2021-03-22 PROCEDURE — 73060 X-RAY EXAM OF HUMERUS: CPT

## 2021-03-22 PROCEDURE — 73110 X-RAY EXAM OF WRIST: CPT

## 2021-03-22 PROCEDURE — 73030 X-RAY EXAM OF SHOULDER: CPT

## 2021-03-22 PROCEDURE — 29125 APPL SHORT ARM SPLINT STATIC: CPT

## 2021-03-22 PROCEDURE — 6370000000 HC RX 637 (ALT 250 FOR IP): Performed by: PHYSICIAN ASSISTANT

## 2021-03-22 PROCEDURE — 73070 X-RAY EXAM OF ELBOW: CPT

## 2021-03-22 PROCEDURE — 99283 EMERGENCY DEPT VISIT LOW MDM: CPT

## 2021-03-22 RX ORDER — ACETAMINOPHEN 500 MG
1000 TABLET ORAL ONCE
Status: COMPLETED | OUTPATIENT
Start: 2021-03-22 | End: 2021-03-22

## 2021-03-22 RX ADMIN — ACETAMINOPHEN 1000 MG: 500 TABLET ORAL at 16:23

## 2021-03-22 ASSESSMENT — ENCOUNTER SYMPTOMS
COLOR CHANGE: 0
FACIAL SWELLING: 0
CHEST TIGHTNESS: 0
CONSTIPATION: 0
DIARRHEA: 0
NAUSEA: 0
SINUS PAIN: 0
SORE THROAT: 0
SINUS PRESSURE: 0
BACK PAIN: 0
ABDOMINAL PAIN: 0
COUGH: 0
TROUBLE SWALLOWING: 0
SHORTNESS OF BREATH: 0
VOMITING: 0

## 2021-03-22 ASSESSMENT — PAIN SCALES - GENERAL
PAINLEVEL_OUTOF10: 5
PAINLEVEL_OUTOF10: 0

## 2021-03-22 NOTE — ED PROVIDER NOTES
light-headedness and headaches. Psychiatric/Behavioral: Negative for agitation, behavioral problems and confusion. Pertinent positives and negatives are stated within HPI, all other systems reviewed and are negative.      --------------------------------------------- PAST HISTORY ---------------------------------------------  Past Medical History:  has a past medical history of Arthritis, Cancer ([facility] [address] Diabetes mellitus ([facility] [address] Hyperlipidemia, Hypertension, Radiation cystitis, Urinary incontinence, and Urinary retention. Past Surgical History:  has a past surgical history that includes fracture surgery; Hysterectomy (1985); pr cystourethroscopy inj chemodenervation bladder (N/A, 11/9/2018); and Cystoscopy (N/A, 7/17/2019). Social History:  reports that she quit smoking about 11 years ago. Her smoking use included cigarettes. She started smoking about 56 years ago. She has a 44.00 pack-year smoking history. She has never used smokeless tobacco. She reports current alcohol use. She reports that she does not use drugs. Family History: family history includes Heart Disease in her mother; No Known Problems in her father; Other in her brother, sister, and another family member. The patients home medications have been reviewed. Allergies: Tetracyclines & related    -------------------------------------------------- RESULTS -------------------------------------------------  All laboratory and radiology results have been personally reviewed by myself   LABS:  No results found for this visit on 03/22/21. RADIOLOGY:  Interpreted by Radiologist.  XR ELBOW RIGHT (2 VIEWS)   Final Result   Although subtle, findings are suspicious for nondisplaced fracture of the   right humeral head      Slight widening of the scapholunate joint may reflect underlying ligament   pathology.          XR WRIST RIGHT (MIN 3 VIEWS)   Final Result   Although subtle, findings are suspicious for nondisplaced fracture of the right humeral head      Slight widening of the scapholunate joint may reflect underlying ligament   pathology. XR HUMERUS RIGHT (MIN 2 VIEWS)   Final Result   Although subtle, findings are suspicious for nondisplaced fracture of the   right humeral head      Slight widening of the scapholunate joint may reflect underlying ligament   pathology. XR SHOULDER RIGHT (MIN 2 VIEWS)   Final Result   Although subtle, findings are suspicious for nondisplaced fracture of the   right humeral head      Slight widening of the scapholunate joint may reflect underlying ligament   pathology. CT HEAD WO CONTRAST   Final Result   No intracranial / cerebral injury. Moderate, unchanged cerebral volume loss and nonspecific white disease. 66         CT CERVICAL SPINE WO CONTRAST   Final Result   No acute abnormality of the cervical spine.             ------------------------- NURSING NOTES AND VITALS REVIEWED ---------------------------   The nursing notes within the ED encounter and vital signs as below have been reviewed. BP (!) 147/68   Pulse 99   Temp 97.5 °F (36.4 °C) (Temporal)   Resp 20   LMP  (LMP Unknown)   SpO2 100%   Oxygen Saturation Interpretation: Normal      ---------------------------------------------------PHYSICAL EXAM--------------------------------------    Physical Exam  Vitals signs and nursing note reviewed. Constitutional:       General: She is not in acute distress. Appearance: Normal appearance. She is well-developed. She is not ill-appearing. HENT:      Head: Normocephalic and atraumatic. Mouth/Throat:      Mouth: Mucous membranes are moist.      Pharynx: Oropharynx is clear. No oropharyngeal exudate. Neck:      Musculoskeletal: Normal range of motion and neck supple. Vascular: No JVD. Trachea: No tracheal deviation. Comments: No midline cervical tenderness. Cardiovascular:      Rate and Rhythm: Normal rate and regular rhythm.       Heart sounds: Normal heart sounds. No murmur. Pulmonary:      Effort: Pulmonary effort is normal. No respiratory distress. Breath sounds: Normal breath sounds. Musculoskeletal: Normal range of motion. General: No deformity. Comments: Tenderness to the lateral and anterior right shoulder, proximal humerus, mid arm and elbow. Tenderness over the radial aspect of the wrist.  There is diffuse ecchymosis from the shoulder down to the elbow. No significant edema. Restricted range of motion of the elbow and shoulder secondary to pain. FROM of the wrist.  Distal sensation intact. 2+ radial pulse. No snuffbox tenderness. Skin:     General: Skin is warm and dry. Capillary Refill: Capillary refill takes less than 2 seconds. Coloration: Skin is not pale. Findings: No erythema. Neurological:      Mental Status: She is alert and oriented to person, place, and time. Cranial Nerves: No cranial nerve deficit. Psychiatric:         Mood and Affect: Mood normal.         Behavior: Behavior normal.         Thought Content: Thought content normal.            ------------------------------ ED COURSE/MEDICAL DECISION MAKING----------------------  Medications   acetaminophen (TYLENOL) tablet 1,000 mg (1,000 mg Oral Given 3/22/21 1623)         ED COURSE:      XR ELBOW RIGHT (2 VIEWS)   Final Result   Although subtle, findings are suspicious for nondisplaced fracture of the   right humeral head      Slight widening of the scapholunate joint may reflect underlying ligament   pathology. XR WRIST RIGHT (MIN 3 VIEWS)   Final Result   Although subtle, findings are suspicious for nondisplaced fracture of the   right humeral head      Slight widening of the scapholunate joint may reflect underlying ligament   pathology.          XR HUMERUS RIGHT (MIN 2 VIEWS)   Final Result   Although subtle, findings are suspicious for nondisplaced fracture of the   right humeral head      Slight widening of the scapholunate joint may reflect underlying ligament   pathology. XR SHOULDER RIGHT (MIN 2 VIEWS)   Final Result   Although subtle, findings are suspicious for nondisplaced fracture of the   right humeral head      Slight widening of the scapholunate joint may reflect underlying ligament   pathology. CT HEAD WO CONTRAST   Final Result   No intracranial / cerebral injury. Moderate, unchanged cerebral volume loss and nonspecific white disease. 66         CT CERVICAL SPINE WO CONTRAST   Final Result   No acute abnormality of the cervical spine. Procedures:  Procedures     Medical Decision Making:   MDM   27-year-old female presenting the emergency department after mechanical fall yesterday. She has pain throughout the right shoulder and elbow and cannot move her arm due to the pain. X-ray shows a questionable very subtle nondisplaced fracture of the right humeral head. This does correlate with physical exam findings. Patient be placed in a sling. There is also questionable widening of the scapholunate joint suggestive of ligament injury. She has no snuffbox tenderness. Patient be placed in a Velcro thumb spica. She states she is doing fine with taking Tylenol at home and is encouraged to continue this. She is also to ice the arm. She is given orthopedic follow-up and encouraged follow-up soon as possible return with any new or worsening symptoms. Counseling: The emergency provider has spoken with the patient and family member patient and daughter and discussed todays results, in addition to providing specific details for the plan of care and counseling regarding the diagnosis and prognosis. Questions are answered at this time and they are agreeable with the plan.      --------------------------------- IMPRESSION AND DISPOSITION ---------------------------------    IMPRESSION  1.  Closed fracture of head of right humerus, initial encounter DISPOSITION  Disposition: Discharge to home  Patient condition is good      Electronically signed by Ortega Roman PA-C   DD: 3/22/21  **This report was transcribed using voice recognition software. Every effort was made to ensure accuracy; however, inadvertent computerized transcription errors may be present.   END OF ED PROVIDER NOTE          Ortega Roman PA-C  03/22/21 2100 Chiki Holbrook MD  03/23/21 5941

## 2021-04-03 DIAGNOSIS — E11.9 TYPE 2 DIABETES MELLITUS WITHOUT COMPLICATION, WITHOUT LONG-TERM CURRENT USE OF INSULIN (HCC): ICD-10-CM

## 2021-05-21 RX ORDER — SEMAGLUTIDE 1.34 MG/ML
0.5 INJECTION, SOLUTION SUBCUTANEOUS WEEKLY
Qty: 12 PEN | Refills: 0 | Status: SHIPPED
Start: 2021-05-21 | End: 2021-09-07 | Stop reason: SDUPTHER

## 2021-06-25 DIAGNOSIS — E11.9 TYPE 2 DIABETES MELLITUS WITHOUT COMPLICATION, WITHOUT LONG-TERM CURRENT USE OF INSULIN (HCC): ICD-10-CM

## 2021-06-25 RX ORDER — EMPAGLIFLOZIN 25 MG/1
TABLET, FILM COATED ORAL
Qty: 90 TABLET | Refills: 0 | Status: ON HOLD
Start: 2021-06-25 | End: 2021-08-05 | Stop reason: HOSPADM

## 2021-07-22 NOTE — ED PROVIDER NOTES
BP low at 1400. Notified MD Sigifredo Vera. MD stated that it was ok since patient was asymptomatic. Requested a BP be taken prior to next Coreg. BP ok for Coreg but patient refused. MD notified.    personally reviewed by myself   LABS:  Results for orders placed or performed during the hospital encounter of 05/04/19   Lactic Acid, Plasma   Result Value Ref Range    Lactic Acid 2.4 (H) 0.5 - 2.2 mmol/L   Basic Metabolic Panel   Result Value Ref Range    Sodium 135 132 - 146 mmol/L    Potassium 5.1 (H) 3.5 - 5.0 mmol/L    Chloride 98 98 - 107 mmol/L    CO2 21 (L) 22 - 29 mmol/L    Anion Gap 16 7 - 16 mmol/L    Glucose 248 (H) 74 - 99 mg/dL    BUN 24 (H) 8 - 23 mg/dL    CREATININE 0.8 0.5 - 1.0 mg/dL    GFR Non-African American >60 >=60 mL/min/1.73    GFR African American >60     Calcium 9.9 8.6 - 10.2 mg/dL   CBC Auto Differential   Result Value Ref Range    WBC 8.0 4.5 - 11.5 E9/L    RBC 4.86 3.50 - 5.50 E12/L    Hemoglobin 14.4 11.5 - 15.5 g/dL    Hematocrit 43.7 34.0 - 48.0 %    MCV 89.9 80.0 - 99.9 fL    MCH 29.6 26.0 - 35.0 pg    MCHC 33.0 32.0 - 34.5 %    RDW 12.6 11.5 - 15.0 fL    Platelets 523 908 - 289 E9/L    MPV 10.6 7.0 - 12.0 fL    Neutrophils % 61.9 43.0 - 80.0 %    Immature Granulocytes % 0.4 0.0 - 5.0 %    Lymphocytes % 28.2 20.0 - 42.0 %    Monocytes % 7.5 2.0 - 12.0 %    Eosinophils % 1.5 0.0 - 6.0 %    Basophils % 0.5 0.0 - 2.0 %    Neutrophils # 4.95 1.80 - 7.30 E9/L    Immature Granulocytes # 0.03 E9/L    Lymphocytes # 2.25 1.50 - 4.00 E9/L    Monocytes # 0.60 0.10 - 0.95 E9/L    Eosinophils # 0.12 0.05 - 0.50 E9/L    Basophils # 0.04 0.00 - 0.20 E9/L   Urinalysis   Result Value Ref Range    Color, UA RED (A) Straw/Yellow    Clarity, UA SLCLOUDY Clear    Glucose, Ur 250 (A) Negative mg/dL    Bilirubin Urine MODERATE (A) Negative    Ketones, Urine Negative Negative mg/dL    Specific Gravity, UA 1.015 1.005 - 1.030    Blood, Urine LARGE (A) Negative    pH, UA 6.0 5.0 - 9.0    Protein, UA 30 (A) Negative mg/dL    Urobilinogen, Urine 0.2 <2.0 E.U./dL    Nitrite, Urine Negative Negative    Leukocyte Esterase, Urine MODERATE (A) Negative   Microscopic Urinalysis   Result Value Ref Range    WBC, UA 5-10 0 - 5 /HPF    RBC, UA >20 0 - 2 /HPF    Bacteria, UA NONE /HPF    Amorphous, UA RARE        RADIOLOGY:  Interpreted by Radiologist.  No orders to display       ------------------------- NURSING NOTES AND VITALS REVIEWED ---------------------------   The nursing notes within the ED encounter and vital signs as below have been reviewed. /68   Pulse 68   Temp 98 °F (36.7 °C) (Oral)   Resp 22   Ht 5' 4\" (1.626 m)   Wt 150 lb (68 kg)   SpO2 96%   BMI 25.75 kg/m²   Oxygen Saturation Interpretation: Normal      ---------------------------------------------------PHYSICAL EXAM--------------------------------------      Constitutional/General: Alert and oriented x3, well appearing, non toxic in NAD  Head: Normocephalic and atraumatic  Eyes: PERRL, EOMI  Mouth: Oropharynx clear, handling secretions, no trismus  Neck: Supple, full ROM,   Pulmonary: Lungs clear to auscultation bilaterally, no wheezes, rales, or rhonchi. Not in respiratory distress  Cardiovascular:  Regular rate and rhythm, no murmurs, gallops, or rubs. 2+ distal pulses  Abdomen: Soft, tender suprapubic non distended, no CVA tenderness  Extremities: Moves all extremities x 4.  Warm and well perfused  Skin: warm and dry without rash  Neurologic: GCS 15,  Psych: Normal Affect      ------------------------------ ED COURSE/MEDICAL DECISION MAKING----------------------  Medications   sodium chloride 0.9 % infusion (has no administration in time range)   0.9 % sodium chloride bolus (0 mLs Intravenous Stopped 5/4/19 2027)   fentaNYL (SUBLIMAZE) injection 25 mcg (25 mcg Intravenous Given 5/4/19 1908)   fentaNYL (SUBLIMAZE) injection 25 mcg (25 mcg Intravenous Given 5/4/19 2017)   lisinopril (PRINIVIL;ZESTRIL) tablet 30 mg (10 mg Oral Given 5/4/19 2050)   cefdinir (OMNICEF) capsule 300 mg (300 mg Oral Given 5/4/19 2132)   cloNIDine (CATAPRES) 0.2 MG tablet (0.2 mg  Given 5/4/19 2154)   cloNIDine (CATAPRES) tablet 0.2 mg (0.2 mg Oral Given by Other 5/5/19 0100)         ED COURSE:     1940 patients states she continues to have some intermittent aches and pains and states she does not need any further pain medication at this time. 2140patient updated on any of UTI plan for discharge with Kaplan for urinary retention. 2200 blood pressure on discharge 236/140 patient was asymptomatic denied any headache lightheaded dizziness no chest pain. Blood pressure improved she is discharged to home to follow up with her primary care regarding hypertension  Medical Decision Making:    Patient came in with complaint of urinary discomfort/retention. Kaplan catheter was placed she was hydrated with IV fluids placed on Omnicef. Upon discharge patient's blood pressure was elevated and she was given her home medication of lisinopril with no improvement given clonidine 0.2 monitored on discharge to home with instructions to follow up with her primary care and keep a log of her blood pressure over the next few days    Counseling: The emergency provider has spoken with the patient and discussed todays results, in addition to providing specific details for the plan of care and counseling regarding the diagnosis and prognosis. Questions are answered at this time and they are agreeable with the plan.      --------------------------------- IMPRESSION AND DISPOSITION ---------------------------------    IMPRESSION  1. Urinary tract infection with hematuria, site unspecified    2. Urinary retention        DISPOSITION  Disposition: Discharge to home  Patient condition is good      NOTE: This report was transcribed using voice recognition software.  Every effort was made to ensure accuracy; however, inadvertent computerized transcription errors may be present     Yazmin Ramos  05/05/19 0984

## 2021-08-02 ENCOUNTER — APPOINTMENT (OUTPATIENT)
Dept: CT IMAGING | Age: 71
DRG: 592 | End: 2021-08-02
Payer: MEDICARE

## 2021-08-02 ENCOUNTER — TELEPHONE (OUTPATIENT)
Dept: FAMILY MEDICINE CLINIC | Age: 71
End: 2021-08-02

## 2021-08-02 ENCOUNTER — HOSPITAL ENCOUNTER (INPATIENT)
Age: 71
LOS: 3 days | Discharge: NURSING, REHAB OR LONG-TERM CARE | DRG: 592 | End: 2021-08-05
Attending: EMERGENCY MEDICINE | Admitting: INTERNAL MEDICINE
Payer: MEDICARE

## 2021-08-02 DIAGNOSIS — S31.000A WOUND OF SACRAL REGION, INITIAL ENCOUNTER: Primary | ICD-10-CM

## 2021-08-02 LAB
ALBUMIN SERPL-MCNC: 3.8 G/DL (ref 3.5–5.2)
ALP BLD-CCNC: 187 U/L (ref 35–104)
ALT SERPL-CCNC: 40 U/L (ref 0–32)
ANION GAP SERPL CALCULATED.3IONS-SCNC: 15 MMOL/L (ref 7–16)
AST SERPL-CCNC: 38 U/L (ref 0–31)
BACTERIA: ABNORMAL /HPF
BASOPHILS ABSOLUTE: 0.04 E9/L (ref 0–0.2)
BASOPHILS RELATIVE PERCENT: 0.4 % (ref 0–2)
BILIRUB SERPL-MCNC: 0.4 MG/DL (ref 0–1.2)
BILIRUBIN URINE: NEGATIVE
BLOOD, URINE: ABNORMAL
BUN BLDV-MCNC: 28 MG/DL (ref 6–23)
CALCIUM SERPL-MCNC: 9.5 MG/DL (ref 8.6–10.2)
CHLORIDE BLD-SCNC: 94 MMOL/L (ref 98–107)
CLARITY: ABNORMAL
CO2: 24 MMOL/L (ref 22–29)
COLOR: YELLOW
CREAT SERPL-MCNC: 0.9 MG/DL (ref 0.5–1)
EOSINOPHILS ABSOLUTE: 0.06 E9/L (ref 0.05–0.5)
EOSINOPHILS RELATIVE PERCENT: 0.6 % (ref 0–6)
EPITHELIAL CELLS, UA: ABNORMAL /HPF
GFR AFRICAN AMERICAN: >60
GFR NON-AFRICAN AMERICAN: >60 ML/MIN/1.73
GLUCOSE BLD-MCNC: 231 MG/DL (ref 74–99)
GLUCOSE URINE: >=1000 MG/DL
HCT VFR BLD CALC: 40 % (ref 34–48)
HEMOGLOBIN: 13.1 G/DL (ref 11.5–15.5)
IMMATURE GRANULOCYTES #: 0.04 E9/L
IMMATURE GRANULOCYTES %: 0.4 % (ref 0–5)
KETONES, URINE: NEGATIVE MG/DL
LACTIC ACID: 1.9 MMOL/L (ref 0.5–2.2)
LACTIC ACID: 2.7 MMOL/L (ref 0.5–2.2)
LEUKOCYTE ESTERASE, URINE: ABNORMAL
LYMPHOCYTES ABSOLUTE: 2.43 E9/L (ref 1.5–4)
LYMPHOCYTES RELATIVE PERCENT: 23.6 % (ref 20–42)
MCH RBC QN AUTO: 29.3 PG (ref 26–35)
MCHC RBC AUTO-ENTMCNC: 32.8 % (ref 32–34.5)
MCV RBC AUTO: 89.5 FL (ref 80–99.9)
MONOCYTES ABSOLUTE: 0.79 E9/L (ref 0.1–0.95)
MONOCYTES RELATIVE PERCENT: 7.7 % (ref 2–12)
NEUTROPHILS ABSOLUTE: 6.95 E9/L (ref 1.8–7.3)
NEUTROPHILS RELATIVE PERCENT: 67.3 % (ref 43–80)
NITRITE, URINE: NEGATIVE
PDW BLD-RTO: 12.7 FL (ref 11.5–15)
PH UA: 5.5 (ref 5–9)
PLATELET # BLD: 230 E9/L (ref 130–450)
PMV BLD AUTO: 10.8 FL (ref 7–12)
POTASSIUM REFLEX MAGNESIUM: 4.9 MMOL/L (ref 3.5–5)
PROTEIN UA: ABNORMAL MG/DL
RBC # BLD: 4.47 E12/L (ref 3.5–5.5)
RBC UA: ABNORMAL /HPF (ref 0–2)
SODIUM BLD-SCNC: 133 MMOL/L (ref 132–146)
SPECIFIC GRAVITY UA: 1.01 (ref 1–1.03)
TOTAL PROTEIN: 7.1 G/DL (ref 6.4–8.3)
TROPONIN, HIGH SENSITIVITY: 54 NG/L (ref 0–9)
TROPONIN, HIGH SENSITIVITY: 55 NG/L (ref 0–9)
UROBILINOGEN, URINE: 0.2 E.U./DL
WBC # BLD: 10.3 E9/L (ref 4.5–11.5)
WBC UA: ABNORMAL /HPF (ref 0–5)
YEAST: PRESENT /HPF

## 2021-08-02 PROCEDURE — 99284 EMERGENCY DEPT VISIT MOD MDM: CPT

## 2021-08-02 PROCEDURE — 84484 ASSAY OF TROPONIN QUANT: CPT

## 2021-08-02 PROCEDURE — 87077 CULTURE AEROBIC IDENTIFY: CPT

## 2021-08-02 PROCEDURE — 80053 COMPREHEN METABOLIC PANEL: CPT

## 2021-08-02 PROCEDURE — 93005 ELECTROCARDIOGRAM TRACING: CPT | Performed by: STUDENT IN AN ORGANIZED HEALTH CARE EDUCATION/TRAINING PROGRAM

## 2021-08-02 PROCEDURE — 83605 ASSAY OF LACTIC ACID: CPT

## 2021-08-02 PROCEDURE — 96374 THER/PROPH/DIAG INJ IV PUSH: CPT

## 2021-08-02 PROCEDURE — 87186 SC STD MICRODIL/AGAR DIL: CPT

## 2021-08-02 PROCEDURE — 74177 CT ABD & PELVIS W/CONTRAST: CPT

## 2021-08-02 PROCEDURE — 87088 URINE BACTERIA CULTURE: CPT

## 2021-08-02 PROCEDURE — 87147 CULTURE TYPE IMMUNOLOGIC: CPT

## 2021-08-02 PROCEDURE — 85025 COMPLETE CBC W/AUTO DIFF WBC: CPT

## 2021-08-02 PROCEDURE — 81001 URINALYSIS AUTO W/SCOPE: CPT

## 2021-08-02 PROCEDURE — 87040 BLOOD CULTURE FOR BACTERIA: CPT

## 2021-08-02 PROCEDURE — 2580000003 HC RX 258: Performed by: STUDENT IN AN ORGANIZED HEALTH CARE EDUCATION/TRAINING PROGRAM

## 2021-08-02 PROCEDURE — 6360000004 HC RX CONTRAST MEDICATION: Performed by: RADIOLOGY

## 2021-08-02 PROCEDURE — 96375 TX/PRO/DX INJ NEW DRUG ADDON: CPT

## 2021-08-02 PROCEDURE — 1200000000 HC SEMI PRIVATE

## 2021-08-02 PROCEDURE — 6360000002 HC RX W HCPCS: Performed by: STUDENT IN AN ORGANIZED HEALTH CARE EDUCATION/TRAINING PROGRAM

## 2021-08-02 PROCEDURE — 87070 CULTURE OTHR SPECIMN AEROBIC: CPT

## 2021-08-02 RX ORDER — ONDANSETRON 2 MG/ML
4 INJECTION INTRAMUSCULAR; INTRAVENOUS ONCE
Status: COMPLETED | OUTPATIENT
Start: 2021-08-02 | End: 2021-08-02

## 2021-08-02 RX ORDER — FENTANYL CITRATE 50 UG/ML
50 INJECTION, SOLUTION INTRAMUSCULAR; INTRAVENOUS ONCE
Status: COMPLETED | OUTPATIENT
Start: 2021-08-02 | End: 2021-08-02

## 2021-08-02 RX ORDER — 0.9 % SODIUM CHLORIDE 0.9 %
1000 INTRAVENOUS SOLUTION INTRAVENOUS ONCE
Status: COMPLETED | OUTPATIENT
Start: 2021-08-02 | End: 2021-08-02

## 2021-08-02 RX ADMIN — CEFTRIAXONE SODIUM 1000 MG: 1 INJECTION, POWDER, FOR SOLUTION INTRAMUSCULAR; INTRAVENOUS at 22:04

## 2021-08-02 RX ADMIN — FENTANYL CITRATE 50 MCG: 50 INJECTION, SOLUTION INTRAMUSCULAR; INTRAVENOUS at 19:44

## 2021-08-02 RX ADMIN — ONDANSETRON 4 MG: 2 INJECTION INTRAMUSCULAR; INTRAVENOUS at 19:45

## 2021-08-02 RX ADMIN — IOPAMIDOL 75 ML: 755 INJECTION, SOLUTION INTRAVENOUS at 22:46

## 2021-08-02 RX ADMIN — SODIUM CHLORIDE 1000 ML: 9 INJECTION, SOLUTION INTRAVENOUS at 19:44

## 2021-08-02 ASSESSMENT — PAIN SCALES - GENERAL
PAINLEVEL_OUTOF10: 8
PAINLEVEL_OUTOF10: 2

## 2021-08-02 ASSESSMENT — PAIN DESCRIPTION - PAIN TYPE: TYPE: ACUTE PAIN

## 2021-08-02 ASSESSMENT — PAIN DESCRIPTION - LOCATION: LOCATION: BUTTOCKS

## 2021-08-02 NOTE — ED PROVIDER NOTES
Department of Emergency Medicine   ED  Provider Note  Admit Date/RoomTime: 8/2/2021  6:37 PM  ED Room: 0429/0429-02          History of Present Illness:  8/2/21, Time: 6:56 PM EDT  Chief Complaint   Patient presents with    Wound Check     coccyx wound few days,           Emi Rodriguez is a 66 y.o. female presenting to the ED for wound check, beginning several days ago. The complaint has been constant, moderate in severity, and worsened by nothing. The patient is a 69-year-old female with history of diabetes, hypertension, and hyperlipidemia who presents to the emergency department for a wound check. The patient's daughter is at the bedside to help provide history. She states that the patient lives with the patient's  at home, but they have noticed a decline over the past several months. Apparently the patient has had a wound that has worsened over the past few days. Patient symptoms have been gradual in onset, constant, moderate in severity, and nothing makes it better or worse. Apparently the wound on her sacrum has worsened over the past several days, but has been present for several months. Daughter also reports that the patient is supposed to walk with a walker at home but does not know how to use it and really does not ambulate much on her own at all and is only able to ambulate with a lot of assistance from the daughter. Daughter also is concerned she has been forgetful over the past several days as well. Daughter states that she also wears diapers at home and does have a history of urinary incontinence in the past which is chronic for her. Denies any fever, chills, fall, trauma, lightheadedness, dizziness, syncope, chest pain, shortness of breath, nausea, vomiting, abdominal pain, dysuria, hematuria, unilateral weakness, numbness, tingling, headache, recent hospitalization, recent illness, or other acute symptoms or concerns.     Review of Systems:   Pertinent positives and negatives are stated within HPI, all other systems reviewed and are negative.        --------------------------------------------- PAST HISTORY ---------------------------------------------  Past Medical History:  has a past medical history of Arthritis, Cancer ([facility] [address] [facility] Hyperlipidemia, Hypertension, Radiation cystitis, Urinary incontinence, and Urinary retention. Past Surgical History:  has a past surgical history that includes fracture surgery; Hysterectomy (1985); pr cystourethroscopy inj chemodenervation bladder (N/A, 11/9/2018); and Cystoscopy (N/A, 7/17/2019). Social History:  reports that she quit smoking about 11 years ago. Her smoking use included cigarettes. She started smoking about 56 years ago. She has a 44.00 pack-year smoking history. She has never used smokeless tobacco. She reports current alcohol use. She reports that she does not use drugs. Family History: family history includes Heart Disease in her mother; No Known Problems in her father; Other in her brother, sister, and another family member. . Unless otherwise noted, family history is non contributory    The patients home medications have been reviewed. Allergies: Tetracyclines & related    I have reviewed the past medical history, past surgical history, social history, and family history    ---------------------------------------------------PHYSICAL EXAM--------------------------------------    Constitutional/General: Alert and oriented x3  Head: Normocephalic and atraumatic  Eyes:  EOMI, sclera non icteric  Mouth: Oropharynx clear, handling secretions, no trismus, no asymmetry of the posterior oropharynx or uvular edema  Neck: Supple, full ROM, no stridor, no meningeal signs  Respiratory: Lungs clear to auscultation bilaterally, no wheezes, rales, or rhonchi. Not in respiratory distress  Cardiovascular:  Regular rate. Regular rhythm. No murmurs, no aortic murmurs, no gallops, or rubs.    Chest: No chest wall tenderness  Gastrointestinal:  Abdomen Soft, Non tender, Non distended. No rebound, guarding, or rigidity. No pulsatile masses. Musculoskeletal: Moves all extremities x 4. Warm and well perfused, no clubbing, no cyanosis, no edema. Capillary refill <3 seconds  Skin: skin warm and dry. No rashes. Erythematous region over the sacrum with excoriated dry skin diffusely over the buttocks. There is no induration, no fluctuance, no active drainage or discharge. Neurologic: GCS 15, no focal deficits, symmetric strength 5/5 in the upper and lower extremities bilaterally  Psychiatric: Normal Affect    -------------------------------------------------- RESULTS -------------------------------------------------  I have personally reviewed all laboratory and imaging results for this patient. Results are listed below.      LABS: (Lab results interpreted by me)  Results for orders placed or performed during the hospital encounter of 08/02/21   CBC auto differential   Result Value Ref Range    WBC 10.3 4.5 - 11.5 E9/L    RBC 4.47 3.50 - 5.50 E12/L    Hemoglobin 13.1 11.5 - 15.5 g/dL    Hematocrit 40.0 34.0 - 48.0 %    MCV 89.5 80.0 - 99.9 fL    MCH 29.3 26.0 - 35.0 pg    MCHC 32.8 32.0 - 34.5 %    RDW 12.7 11.5 - 15.0 fL    Platelets 047 516 - 312 E9/L    MPV 10.8 7.0 - 12.0 fL    Neutrophils % 67.3 43.0 - 80.0 %    Immature Granulocytes % 0.4 0.0 - 5.0 %    Lymphocytes % 23.6 20.0 - 42.0 %    Monocytes % 7.7 2.0 - 12.0 %    Eosinophils % 0.6 0.0 - 6.0 %    Basophils % 0.4 0.0 - 2.0 %    Neutrophils Absolute 6.95 1.80 - 7.30 E9/L    Immature Granulocytes # 0.04 E9/L    Lymphocytes Absolute 2.43 1.50 - 4.00 E9/L    Monocytes Absolute 0.79 0.10 - 0.95 E9/L    Eosinophils Absolute 0.06 0.05 - 0.50 E9/L    Basophils Absolute 0.04 0.00 - 0.20 E9/L   Comprehensive Metabolic Panel w/ Reflex to MG   Result Value Ref Range    Sodium 133 132 - 146 mmol/L    Potassium reflex Magnesium 4.9 3.5 - 5.0 mmol/L    Chloride 94 (L) 98 - 107 mmol/L CO2 24 22 - 29 mmol/L    Anion Gap 15 7 - 16 mmol/L    Glucose 231 (H) 74 - 99 mg/dL    BUN 28 (H) 6 - 23 mg/dL    CREATININE 0.9 0.5 - 1.0 mg/dL    GFR Non-African American >60 >=60 mL/min/1.73    GFR African American >60     Calcium 9.5 8.6 - 10.2 mg/dL    Total Protein 7.1 6.4 - 8.3 g/dL    Albumin 3.8 3.5 - 5.2 g/dL    Total Bilirubin 0.4 0.0 - 1.2 mg/dL    Alkaline Phosphatase 187 (H) 35 - 104 U/L    ALT 40 (H) 0 - 32 U/L    AST 38 (H) 0 - 31 U/L   Lactic Acid, Plasma   Result Value Ref Range    Lactic Acid 2.7 (H) 0.5 - 2.2 mmol/L   Urinalysis with Microscopic   Result Value Ref Range    Color, UA Yellow Straw/Yellow    Clarity, UA CLOUDY (A) Clear    Glucose, Ur >=1000 (A) Negative mg/dL    Bilirubin Urine Negative Negative    Ketones, Urine Negative Negative mg/dL    Specific Gravity, UA 1.015 1.005 - 1.030    Blood, Urine LARGE (A) Negative    pH, UA 5.5 5.0 - 9.0    Protein, UA TRACE Negative mg/dL    Urobilinogen, Urine 0.2 <2.0 E.U./dL    Nitrite, Urine Negative Negative    Leukocyte Esterase, Urine SMALL (A) Negative    WBC, UA 10-20 (A) 0 - 5 /HPF    RBC, UA 10-20 (A) 0 - 2 /HPF    Epithelial Cells, UA FEW /HPF    Bacteria, UA MANY (A) None Seen /HPF    Yeast, UA Present (A) None Seen /HPF   Troponin   Result Value Ref Range    Troponin, High Sensitivity 55 (H) 0 - 9 ng/L   Lactic Acid, Plasma   Result Value Ref Range    Lactic Acid 1.9 0.5 - 2.2 mmol/L   Troponin   Result Value Ref Range    Troponin, High Sensitivity 54 (H) 0 - 9 ng/L   POCT Glucose   Result Value Ref Range    Meter Glucose 218 (H) 74 - 99 mg/dL   EKG 12 Lead   Result Value Ref Range    Ventricular Rate 86 BPM    Atrial Rate 86 BPM    P-R Interval 130 ms    QRS Duration 98 ms    Q-T Interval 394 ms    QTc Calculation (Bazett) 471 ms    P Axis 54 degrees    R Axis 51 degrees    T Axis 43 degrees   ,       RADIOLOGY:  Interpreted by Radiologist unless otherwise specified  CT ABDOMEN PELVIS W IV CONTRAST Additional Contrast? None encounters were reviewed.         ------------------------------ ED COURSE/MEDICAL DECISION MAKING----------------------  Medications   cefepime (MAXIPIME) 1000 mg IVPB minibag (has no administration in time range)   atorvastatin (LIPITOR) tablet 20 mg (has no administration in time range)   oxybutynin (DITROPAN-XL) extended release tablet 5 mg (has no administration in time range)   trospium (SANCTURA) tablet 20 mg (has no administration in time range)   glucose (GLUTOSE) 40 % oral gel 15 g (has no administration in time range)   dextrose 50 % IV solution (has no administration in time range)   glucagon (rDNA) injection 1 mg (has no administration in time range)   dextrose 5 % solution (has no administration in time range)   insulin lispro (HUMALOG) injection vial 0-6 Units (has no administration in time range)   insulin lispro (HUMALOG) injection vial 0-3 Units (has no administration in time range)   morphine (PF) injection 2 mg (has no administration in time range)   HYDROcodone-acetaminophen (NORCO) 5-325 MG per tablet 1 tablet (has no administration in time range)   albuterol (PROVENTIL) nebulizer solution 2.5 mg (has no administration in time range)   ondansetron (ZOFRAN) injection 4 mg (has no administration in time range)   enoxaparin (LOVENOX) injection 40 mg (has no administration in time range)   0.9 % sodium chloride infusion (has no administration in time range)   clindamycin (CLEOCIN) 600 mg in dextrose 5 % 50 mL IVPB (has no administration in time range)   0.9 % sodium chloride infusion (has no administration in time range)   memantine (NAMENDA) tablet 5 mg (has no administration in time range)     And   donepezil (ARICEPT) tablet 10 mg (has no administration in time range)   vancomycin (VANCOCIN) 750 mg in dextrose 5 % 250 mL IVPB (has no administration in time range)   0.9 % sodium chloride bolus (0 mLs Intravenous Stopped 8/2/21 2005)   fentaNYL (SUBLIMAZE) injection 50 mcg (50 mcg Intravenous Given plan with patient and she verbalized understanding agreement to treatment plan admission. Re-Evaluations:       Patient is resting comfortably and in no acute distress. She is nontoxic in appearance. This patient's ED course included: a personal history and physicial examination, re-evaluation prior to disposition, multiple bedside re-evaluations, IV medications, cardiac monitoring, continuous pulse oximetry and complex medical decision making and emergency management    This patient has remained hemodynamically stable during their ED course. Consultations:    Spoke with Dr. Patty Palmer (Medicine). Discussed case. They will admit this patient. Counseling: The emergency provider has spoken with the patient and discussed todays results, in addition to providing specific details for the plan of care and counseling regarding the diagnosis and prognosis. Questions are answered at this time and they are agreeable with the plan.     --------------------------------- IMPRESSION AND DISPOSITION ---------------------------------    IMPRESSION  1. Sacral Wound  2. Cellulitis  3. Acute cystitis with hematuria     DISPOSITION  Disposition: Admit to med/surg floor  Patient condition is stable        NOTE: This report was transcribed using voice recognition software.  Every effort was made to ensure accuracy; however, inadvertent computerized transcription errors may be present       Jojo Escalera,   Resident  08/03/21 [id number]

## 2021-08-02 NOTE — ED TRIAGE NOTES
keeps coming to pivot window, c/o wife being in pain. Asked several times about getting in a room. Triage nurse and charge nurse aware.

## 2021-08-02 NOTE — ED PROVIDER NOTES
FIRST PROVIDER CONTACT ASSESSMENT NOTE                                                                                                Department of Emergency Medicine                                                      First Provider Note  21  5:13 PM EDT  NAME: Paulina Cabrera  : 1950  MRN: [id number]    Chief Complaint: Wound Check (coccyx wound few days, )      History of Present Illness:   Paulina Cabrera is a 66 y.o. female who presents to the ED for open wound on coccyx. States she just noticed it. Denies drainage, fever or vomiting. This was found and reported today by physical therapist.            Focused Physical Exam:  VS:    ED Triage Vitals   BP Temp Temp Source Pulse Resp SpO2 Height Weight   -- 21 1631 21 1631 21 1631 21 1631 21 1631 -- 21 1710    96.7 °F (35.9 °C) Temporal 100 16 97 %  96 lb (43.5 kg)        General: Alert and in no apparent distress. Medical History:  has a past medical history of Arthritis, Cancer ([address] Diabetes mellitus ([address] Hyperlipidemia, Hypertension, Radiation cystitis, Urinary incontinence, and Urinary retention. Surgical History:  has a past surgical history that includes fracture surgery; Hysterectomy (); pr cystourethroscopy inj chemodenervation bladder (N/A, 2018); and Cystoscopy (N/A, 2019). Social History:  reports that she quit smoking about 11 years ago. Her smoking use included cigarettes. She started smoking about 56 years ago. She has a 44.00 pack-year smoking history. She has never used smokeless tobacco. She reports current alcohol use. She reports that she does not use drugs. Family History: family history includes Heart Disease in her mother; No Known Problems in her father; Other in her brother, sister, and another family member.     Allergies: Tetracyclines & related     Initial Plan of Care:  Initiate Treatment-Testing, Proceed toTreatment Area When Bed Available for ED Attending/MLP to Continue Care    -------------------------------------------------END OF FIRST PROVIDER CONTACT ASSESSMENT NOTE--------------------------------------------------------  Electronically signed by Sirena Munroe PA-C   DD: 8/2/21       Sirena Munroe PA-C  08/02/21 1715

## 2021-08-02 NOTE — TELEPHONE ENCOUNTER
This MA returned phone call to pt's  Yisel Jalloh regarding message from Nirmal Santillan at [address] [place] [address]. This MA spoke with Yisel Jalloh who was acting as if he was unaware of pt's wound, and states that he has no record of speaking with an Nirmal Santillan or anyone at a physical therapy facility. Ysiel Jalloh then states that his niece is present during this call and states that pt has a \"kiara sized wound\" but it is not 8\" in diameter. This MA also inquired about pt's incontinence and confusion. Yisel Jalloh states that she is urinary incontinent and is \"always\" confused. This MA advised Yisel Jalloh that Dr. Jae Syed is recommending to take pt to the ER for evaluation. Yisel Jalloh was not understanding why pt would need to go to the ER, this MA reiterated the concern of pt's \"wound\", incontinence, and confusion. Yisel Jalloh continues to be confused. This MA then inquired if someone else possibly took pt to her PT appointment. Yisel Jalloh states that pt did not go to PT. This MA then contacted Nirmal Santillan at [address] [place] [address]. This MA spoke with Nirmal Santillan in regards to the patient, her , and conversation that took place between this MA and pt's  Yisel Jalloh. Nirmal Santillan states that pt and her  presented to their office today for a physical therapy appointment. Pt was initially brought to the wrong location in the building by her  even though they have been there multiple times. Nirmal Santillan then states that upon his initial contact with pt and Yisel Jalloh it was thought that they were at a medical doctors office and were requesting to have her \"bottom\" looked at due to a \"wound\". Nirmal Santillan states that he explained to pt and Yisel Jalloh that they were at the Physical Therapy office and were there to work on pt's gait problem. Yisel Jalloh was displeased with this information and requested to speak with a medical doctor.  Nirmal Santillan states that he then had the Physical Therapist come speak with pt and Yisel Jalloh and during that conversation Yisel Jalloh expressed concerns of pt's bowel incontinence, increased confusion, and the wound on her \"bottom\". Mckayla Julian states that the Physical Therapist reiterated that she should be evaluated by her primary care physician to which Chencho Cortez told them that he would contact our office for an appointment. Chencho Cortez then insisted that Mckayla Julian and the Physical therapist examined the \"wound\". Mckayla Julian states that pt has a large pressure ulcer, about 8\" in diameter, and is concerned that it could get worse very quickly if pt is experiencing incontinence issues. Mckayla Julian questioned Chencho Cortez how often pt was having her incontinence supplies changed to which Chencho Cortez states that pt is able to ambulate to the bathroom on her own when at home. Mckayla Julian states that this information was shocking to him due to pt's poor exam. This MA advised Mckayla Julian that no appointments were scheduled for pt at this time, and pt was advised to go to the ER but pt's  was extremely confused by the advisement. Mckayla Julian then states that he was shocked that pt and her  were even capable of leaving their home independently, transporting themselves, and Chencho Urbanoyousif being able to ambulate pt into their building as both of them are very weak, frail, and have horrible gait disturbance. Mckayla Julian states that once the evaluation for pt's baseline mobility started he immediately decided that physical therapy was not appropriate for her at this time due to weakness and fall risk. Mckayla Julian states that he had to physically pick pt up from a seated position due to her not being able to stand up on her own. Mckayla Julian then reiterated to Chencho Cortez and pt that she needs to be evaluated in person ASAP. Mckayla Julian thanked this MA for the follow up phone call regarding pt's status. This MA advised Mckayla Julian that office would monitor if pt does present to the ED, and will follow up if no encounters are seen. Mckayla Julian verbalized he understood.     Electronically signed by Alexis Varela MA on 8/2/21 at 4:15 PM EDT

## 2021-08-02 NOTE — TELEPHONE ENCOUNTER
Danielle Montejo called in from [address]. He saw the patient today and states she isn't appropriate for physical therapy right now she isn't ambulatory enough. Danielle Montejo was concerned about a wound she had on her bottom he said it was 8 inches in diameter and red. He spoke with her  who said she is incontinent and she is confused. The  told Daniellecordelia Montejo he would call us for a appointment, Danielle Montejo states that she may need admitted in the hospital for wound care or home health.

## 2021-08-03 ENCOUNTER — APPOINTMENT (OUTPATIENT)
Dept: ULTRASOUND IMAGING | Age: 71
DRG: 592 | End: 2021-08-03
Payer: MEDICARE

## 2021-08-03 PROBLEM — N30.01 ACUTE CYSTITIS WITH HEMATURIA: Status: ACTIVE | Noted: 2021-08-03

## 2021-08-03 LAB
ANION GAP SERPL CALCULATED.3IONS-SCNC: 13 MMOL/L (ref 7–16)
BASOPHILS ABSOLUTE: 0.03 E9/L (ref 0–0.2)
BASOPHILS RELATIVE PERCENT: 0.3 % (ref 0–2)
BUN BLDV-MCNC: 18 MG/DL (ref 6–23)
C-REACTIVE PROTEIN: 0.5 MG/DL (ref 0–0.4)
CALCIUM SERPL-MCNC: 8.7 MG/DL (ref 8.6–10.2)
CHLORIDE BLD-SCNC: 100 MMOL/L (ref 98–107)
CHOLESTEROL, TOTAL: 114 MG/DL (ref 0–199)
CO2: 22 MMOL/L (ref 22–29)
CREAT SERPL-MCNC: 0.9 MG/DL (ref 0.5–1)
EKG ATRIAL RATE: 86 BPM
EKG P AXIS: 54 DEGREES
EKG P-R INTERVAL: 130 MS
EKG Q-T INTERVAL: 394 MS
EKG QRS DURATION: 98 MS
EKG QTC CALCULATION (BAZETT): 471 MS
EKG R AXIS: 51 DEGREES
EKG T AXIS: 43 DEGREES
EKG VENTRICULAR RATE: 86 BPM
EOSINOPHILS ABSOLUTE: 0.1 E9/L (ref 0.05–0.5)
EOSINOPHILS RELATIVE PERCENT: 1.1 % (ref 0–6)
GFR AFRICAN AMERICAN: >60
GFR NON-AFRICAN AMERICAN: >60 ML/MIN/1.73
GLUCOSE BLD-MCNC: 158 MG/DL (ref 74–99)
HBA1C MFR BLD: 6.6 % (ref 4–5.6)
HCT VFR BLD CALC: 36.4 % (ref 34–48)
HDLC SERPL-MCNC: 42 MG/DL
HEMOGLOBIN: 11.9 G/DL (ref 11.5–15.5)
IMMATURE GRANULOCYTES #: 0.03 E9/L
IMMATURE GRANULOCYTES %: 0.3 % (ref 0–5)
LDL CHOLESTEROL CALCULATED: 53 MG/DL (ref 0–99)
LYMPHOCYTES ABSOLUTE: 2.28 E9/L (ref 1.5–4)
LYMPHOCYTES RELATIVE PERCENT: 26 % (ref 20–42)
MAGNESIUM: 1.8 MG/DL (ref 1.6–2.6)
MCH RBC QN AUTO: 29.5 PG (ref 26–35)
MCHC RBC AUTO-ENTMCNC: 32.7 % (ref 32–34.5)
MCV RBC AUTO: 90.1 FL (ref 80–99.9)
METER GLUCOSE: 151 MG/DL (ref 74–99)
METER GLUCOSE: 204 MG/DL (ref 74–99)
METER GLUCOSE: 214 MG/DL (ref 74–99)
METER GLUCOSE: 218 MG/DL (ref 74–99)
METER GLUCOSE: 231 MG/DL (ref 74–99)
MONOCYTES ABSOLUTE: 0.63 E9/L (ref 0.1–0.95)
MONOCYTES RELATIVE PERCENT: 7.2 % (ref 2–12)
NEUTROPHILS ABSOLUTE: 5.71 E9/L (ref 1.8–7.3)
NEUTROPHILS RELATIVE PERCENT: 65.1 % (ref 43–80)
PDW BLD-RTO: 12.7 FL (ref 11.5–15)
PHOSPHORUS: 2.9 MG/DL (ref 2.5–4.5)
PLATELET # BLD: 228 E9/L (ref 130–450)
PMV BLD AUTO: 11 FL (ref 7–12)
POTASSIUM SERPL-SCNC: 4.1 MMOL/L (ref 3.5–5)
RBC # BLD: 4.04 E12/L (ref 3.5–5.5)
SEDIMENTATION RATE, ERYTHROCYTE: 46 MM/HR (ref 0–20)
SODIUM BLD-SCNC: 135 MMOL/L (ref 132–146)
TRIGL SERPL-MCNC: 93 MG/DL (ref 0–149)
TSH SERPL DL<=0.05 MIU/L-ACNC: 0.75 UIU/ML (ref 0.27–4.2)
VLDLC SERPL CALC-MCNC: 19 MG/DL
WBC # BLD: 8.8 E9/L (ref 4.5–11.5)

## 2021-08-03 PROCEDURE — 6370000000 HC RX 637 (ALT 250 FOR IP): Performed by: SPECIALIST

## 2021-08-03 PROCEDURE — 6370000000 HC RX 637 (ALT 250 FOR IP): Performed by: INTERNAL MEDICINE

## 2021-08-03 PROCEDURE — 36415 COLL VENOUS BLD VENIPUNCTURE: CPT

## 2021-08-03 PROCEDURE — 1200000000 HC SEMI PRIVATE

## 2021-08-03 PROCEDURE — 82962 GLUCOSE BLOOD TEST: CPT

## 2021-08-03 PROCEDURE — 97530 THERAPEUTIC ACTIVITIES: CPT

## 2021-08-03 PROCEDURE — 6360000002 HC RX W HCPCS: Performed by: SPECIALIST

## 2021-08-03 PROCEDURE — 2580000003 HC RX 258: Performed by: INTERNAL MEDICINE

## 2021-08-03 PROCEDURE — 85025 COMPLETE CBC W/AUTO DIFF WBC: CPT

## 2021-08-03 PROCEDURE — 86140 C-REACTIVE PROTEIN: CPT

## 2021-08-03 PROCEDURE — 84100 ASSAY OF PHOSPHORUS: CPT

## 2021-08-03 PROCEDURE — 80061 LIPID PANEL: CPT

## 2021-08-03 PROCEDURE — 80048 BASIC METABOLIC PNL TOTAL CA: CPT

## 2021-08-03 PROCEDURE — 97161 PT EVAL LOW COMPLEX 20 MIN: CPT | Performed by: PHYSICAL THERAPIST

## 2021-08-03 PROCEDURE — 83036 HEMOGLOBIN GLYCOSYLATED A1C: CPT

## 2021-08-03 PROCEDURE — 6360000002 HC RX W HCPCS: Performed by: INTERNAL MEDICINE

## 2021-08-03 PROCEDURE — 97165 OT EVAL LOW COMPLEX 30 MIN: CPT

## 2021-08-03 PROCEDURE — 83735 ASSAY OF MAGNESIUM: CPT

## 2021-08-03 PROCEDURE — 85651 RBC SED RATE NONAUTOMATED: CPT

## 2021-08-03 PROCEDURE — 2500000003 HC RX 250 WO HCPCS: Performed by: STUDENT IN AN ORGANIZED HEALTH CARE EDUCATION/TRAINING PROGRAM

## 2021-08-03 PROCEDURE — 84443 ASSAY THYROID STIM HORMONE: CPT

## 2021-08-03 PROCEDURE — 76770 US EXAM ABDO BACK WALL COMP: CPT

## 2021-08-03 RX ORDER — SODIUM CHLORIDE 9 MG/ML
INJECTION, SOLUTION INTRAVENOUS EVERY 12 HOURS
Status: DISCONTINUED | OUTPATIENT
Start: 2021-08-03 | End: 2021-08-05 | Stop reason: HOSPADM

## 2021-08-03 RX ORDER — TROSPIUM CHLORIDE 20 MG/1
20 TABLET, FILM COATED ORAL NIGHTLY
Status: DISCONTINUED | OUTPATIENT
Start: 2021-08-03 | End: 2021-08-03

## 2021-08-03 RX ORDER — DEXTROSE MONOHYDRATE 50 MG/ML
100 INJECTION, SOLUTION INTRAVENOUS PRN
Status: DISCONTINUED | OUTPATIENT
Start: 2021-08-03 | End: 2021-08-05 | Stop reason: HOSPADM

## 2021-08-03 RX ORDER — OXYBUTYNIN CHLORIDE 5 MG/1
5 TABLET, EXTENDED RELEASE ORAL DAILY
Status: DISCONTINUED | OUTPATIENT
Start: 2021-08-03 | End: 2021-08-03

## 2021-08-03 RX ORDER — FLUCONAZOLE 2 MG/ML
400 INJECTION, SOLUTION INTRAVENOUS EVERY 24 HOURS
Status: DISCONTINUED | OUTPATIENT
Start: 2021-08-03 | End: 2021-08-05

## 2021-08-03 RX ORDER — HYDROCODONE BITARTRATE AND ACETAMINOPHEN 5; 325 MG/1; MG/1
1 TABLET ORAL EVERY 6 HOURS PRN
Status: DISCONTINUED | OUTPATIENT
Start: 2021-08-03 | End: 2021-08-05 | Stop reason: HOSPADM

## 2021-08-03 RX ORDER — SODIUM CHLORIDE 9 MG/ML
INJECTION, SOLUTION INTRAVENOUS CONTINUOUS
Status: DISCONTINUED | OUTPATIENT
Start: 2021-08-03 | End: 2021-08-05 | Stop reason: HOSPADM

## 2021-08-03 RX ORDER — CLINDAMYCIN PHOSPHATE 600 MG/50ML
600 INJECTION INTRAVENOUS ONCE
Status: COMPLETED | OUTPATIENT
Start: 2021-08-03 | End: 2021-08-03

## 2021-08-03 RX ORDER — MORPHINE SULFATE 2 MG/ML
2 INJECTION, SOLUTION INTRAMUSCULAR; INTRAVENOUS EVERY 4 HOURS PRN
Status: DISCONTINUED | OUTPATIENT
Start: 2021-08-03 | End: 2021-08-05 | Stop reason: HOSPADM

## 2021-08-03 RX ORDER — NICOTINE POLACRILEX 4 MG
15 LOZENGE BUCCAL PRN
Status: DISCONTINUED | OUTPATIENT
Start: 2021-08-03 | End: 2021-08-05 | Stop reason: HOSPADM

## 2021-08-03 RX ORDER — DEXTROSE MONOHYDRATE 25 G/50ML
12.5 INJECTION, SOLUTION INTRAVENOUS PRN
Status: DISCONTINUED | OUTPATIENT
Start: 2021-08-03 | End: 2021-08-05 | Stop reason: HOSPADM

## 2021-08-03 RX ORDER — ALBUTEROL SULFATE 2.5 MG/3ML
2.5 SOLUTION RESPIRATORY (INHALATION) EVERY 6 HOURS PRN
Status: DISCONTINUED | OUTPATIENT
Start: 2021-08-03 | End: 2021-08-05 | Stop reason: HOSPADM

## 2021-08-03 RX ORDER — DONEPEZIL HYDROCHLORIDE 5 MG/1
10 TABLET, FILM COATED ORAL NIGHTLY
Status: DISCONTINUED | OUTPATIENT
Start: 2021-08-03 | End: 2021-08-05 | Stop reason: HOSPADM

## 2021-08-03 RX ORDER — MEMANTINE HYDROCHLORIDE 10 MG/1
5 TABLET ORAL 2 TIMES DAILY
Status: DISCONTINUED | OUTPATIENT
Start: 2021-08-03 | End: 2021-08-05 | Stop reason: HOSPADM

## 2021-08-03 RX ORDER — ATORVASTATIN CALCIUM 20 MG/1
20 TABLET, FILM COATED ORAL DAILY
Status: DISCONTINUED | OUTPATIENT
Start: 2021-08-03 | End: 2021-08-05 | Stop reason: HOSPADM

## 2021-08-03 RX ORDER — ONDANSETRON 2 MG/ML
4 INJECTION INTRAMUSCULAR; INTRAVENOUS EVERY 6 HOURS PRN
Status: DISCONTINUED | OUTPATIENT
Start: 2021-08-03 | End: 2021-08-05 | Stop reason: HOSPADM

## 2021-08-03 RX ADMIN — DONEPEZIL HYDROCHLORIDE 10 MG: 5 TABLET, FILM COATED ORAL at 20:48

## 2021-08-03 RX ADMIN — MICONAZOLE NITRATE: 2 OINTMENT TOPICAL at 23:32

## 2021-08-03 RX ADMIN — ATORVASTATIN CALCIUM 20 MG: 20 TABLET, FILM COATED ORAL at 08:54

## 2021-08-03 RX ADMIN — INSULIN LISPRO 1 UNITS: 100 INJECTION, SOLUTION INTRAVENOUS; SUBCUTANEOUS at 11:50

## 2021-08-03 RX ADMIN — MEMANTINE HYDROCHLORIDE 5 MG: 10 TABLET ORAL at 08:54

## 2021-08-03 RX ADMIN — INSULIN LISPRO 1 UNITS: 100 INJECTION, SOLUTION INTRAVENOUS; SUBCUTANEOUS at 21:00

## 2021-08-03 RX ADMIN — SODIUM CHLORIDE: 9 INJECTION, SOLUTION INTRAVENOUS at 09:11

## 2021-08-03 RX ADMIN — VANCOMYCIN HYDROCHLORIDE 750 MG: 10 INJECTION, POWDER, LYOPHILIZED, FOR SOLUTION INTRAVENOUS at 04:50

## 2021-08-03 RX ADMIN — CEFEPIME HYDROCHLORIDE 1000 MG: 1 INJECTION, POWDER, FOR SOLUTION INTRAMUSCULAR; INTRAVENOUS at 15:39

## 2021-08-03 RX ADMIN — SODIUM CHLORIDE: 9 INJECTION, SOLUTION INTRAVENOUS at 20:15

## 2021-08-03 RX ADMIN — SODIUM CHLORIDE: 9 INJECTION, SOLUTION INTRAVENOUS at 04:10

## 2021-08-03 RX ADMIN — ENOXAPARIN SODIUM 40 MG: 40 INJECTION SUBCUTANEOUS at 08:54

## 2021-08-03 RX ADMIN — INSULIN LISPRO 2 UNITS: 100 INJECTION, SOLUTION INTRAVENOUS; SUBCUTANEOUS at 08:56

## 2021-08-03 RX ADMIN — MEMANTINE HYDROCHLORIDE 5 MG: 10 TABLET ORAL at 20:54

## 2021-08-03 RX ADMIN — FLUCONAZOLE 400 MG: 2 INJECTION, SOLUTION INTRAVENOUS at 13:18

## 2021-08-03 RX ADMIN — HYDROCODONE BITARTRATE AND ACETAMINOPHEN 1 TABLET: 5; 325 TABLET ORAL at 11:56

## 2021-08-03 RX ADMIN — INSULIN LISPRO 2 UNITS: 100 INJECTION, SOLUTION INTRAVENOUS; SUBCUTANEOUS at 16:49

## 2021-08-03 RX ADMIN — OXYBUTYNIN CHLORIDE 5 MG: 5 TABLET, EXTENDED RELEASE ORAL at 08:54

## 2021-08-03 RX ADMIN — CEFEPIME HYDROCHLORIDE 1000 MG: 1 INJECTION, POWDER, FOR SOLUTION INTRAMUSCULAR; INTRAVENOUS at 04:50

## 2021-08-03 RX ADMIN — MICONAZOLE NITRATE: 2 OINTMENT TOPICAL at 14:34

## 2021-08-03 RX ADMIN — VANCOMYCIN HYDROCHLORIDE 750 MG: 10 INJECTION, POWDER, LYOPHILIZED, FOR SOLUTION INTRAVENOUS at 22:00

## 2021-08-03 RX ADMIN — CLINDAMYCIN IN 5 PERCENT DEXTROSE 600 MG: 12 INJECTION, SOLUTION INTRAVENOUS at 04:10

## 2021-08-03 ASSESSMENT — PAIN SCALES - GENERAL: PAINLEVEL_OUTOF10: 9

## 2021-08-03 NOTE — PROGRESS NOTES
[address]  [facility] [place]. [place]        Date:8/3/2021                                                  Patient Name: Lisa Rodriguez    MRN: [id number]    : 1950    Room: [address] [place] [address]      Evaluating OT: Timi Youssef OTR/L; [id number]     Referring Provider and Specific Provider Orders/Date:      21  OT eval and treat Start: 21, End: 21, ONE TIME, Standing Count: 1 Occurrences, R      Katie Paris DO     Placement Recommendation: Subacute        Diagnosis:   No diagnosis found.      Surgery: no sx       Pertinent Medical History:       Past Medical History:   Diagnosis Date    Arthritis     right hand    Cancer ([facility] 1985    uterus and cervix had radiation and implant    Diabetes mellitus ([facility]     Hyperlipidemia     Hypertension     Radiation cystitis     Urinary incontinence     complication due to uterine cancer    Urinary retention     caused by complications from uterine cancer         Past Surgical History:   Procedure Laterality Date    CYSTOSCOPY N/A 2019    CYSTOSCOPY BOTOX  INJECTION 200 UNITS performed by Robert Russell DO at [address] [place] [address] CYSTOURETHROSCOPY INJ CHEMODENERVATION BLADDER N/A 2018    CYSTOSCOPY  BOTOX INJECTION (200 UNITS) performed by Brigette Mendoza MD at [facility] OR      Precautions:  Fall Risk, sacral wound, catheter, alarm, skin integrity: buttocks/pericare area       Assessment of current deficits:     [x] Functional mobility  [x]ADLs  [x] Strength               [x]Cognition    [x] Functional transfers   [x] IADLs         [x] Safety Awareness   [x]Endurance    [] Fine Coordination              [x] Balance      [] Vision/perception   []Sensation     []Gross Motor Coordination  [] ROM  [] Delirium                   [] Motor Control     OT PLAN OF CARE   OT POC based on physician orders, patient diagnosis and results of clinical assessment    Frequency/Duration 1-3 days/wk for 2 weeks PRN     Specific OT Treatment Interventions to include:   * Instruction/training on adapted ADL techniques and AE recommendations to increase functional independence within precautions       * Training on energy conservation strategies, correct breathing pattern and techniques to improve independence/tolerance for self-care routine  * Functional transfer/mobility training/DME recommendations for increased independence, safety, and fall prevention  * Patient/Family education to increase follow through with safety techniques and functional independence  * Recommendation of environmental modifications for increased safety with functional transfers/mobility and ADLs  * Therapeutic exercise to improve motor endurance, ROM, and functional strength for ADLs/functional transfers  * Therapeutic activities to facilitate/challenge dynamic balance, stand tolerance for increased safety and independence with ADLs  * Positioning to improve skin integrity, interaction with environment and functional independence    Recommended Adaptive Equipment: TBD at rehab     Home Living: with family : spouse of 50+ years; single family home, 2 story, bedroom and bathroom on 2nd floor, one flight to second floor, tub shower. Equipment owned: wheeled walker, wheelchair, shower chair    Prior Level of Function: needs assistance with ADLs , needs assistance with IADLs; ambulated wheeled walker or uses the wheelchair, pt states her spouse assists with everything including transfers    Driving: no    Pain Level: stomach and buttocks \"hurts a lot\"; Nursing notified.       Cognition: A&O: 2/4; Follows 1 step directions   Memory: poor   Sequencing: poor   Problem solving: poor   Judgement/safety: poor    [facility]   AM-PAC Daily Activity Inpatient   How much help for putting on and taking off regular lower body clothing?: Total  How much help for Bathing?: A Lot  How much help for Toileting?: A Lot  How much help for putting on and taking off regular upper body clothing?: A Lot  How much help for taking care of personal grooming?: A Little  How much help for eating meals?: A Little  AM-PAC Inpatient Daily Activity Raw Score: 13  AM-PAC Inpatient ADL T-Scale Score : 32.03  ADL Inpatient CMS 0-100% Score: 63.03  ADL Inpatient CMS G-Code Modifier : CL     Functional Assessment:    Initial Eval Status  Date: 8/3/21 Treatment Status  Date: STGs = LTGs  Time frame: 10-14 days   Feeding Supervision with set up   Independent    Grooming Minimal Assist   Supervision    UB Dressing Moderate Assist   Supervision    LB Dressing Dependent   Minimal Assist    Bathing Maximal Assist  Minimal Assist    Toileting Maximal Assist   Minimal Assist    Bed Mobility  Supine to sit: Moderate Assist   Sit to supine: N/T    Supine to sit: Supervision   Sit to supine: Supervision    Functional Transfers Maximal Assist from EOB to wheeled walker. Difficulty weight bearing into L LE. Transfer training with verbal cues for hand placement throughout session to improve safety. Minimal Assist    Functional Mobility Maximal assist with wheeled walker from edge of bed to bedside chair to improve balance, posterior lean, maximal verbal cues for walker sequence and safety. Assistance for walker negotiation. Minimal Assist    Balance Sitting:     Static: fair minus at EOB with posterior lean and minimal assist to maintain balance    Dynamic: fair minus  Standing: fair minus/poor with wheeled walker     Activity Tolerance Fair minus  good    Visual/  Perceptual Glasses: yes.  Readers                 Hand Dominance: right      AROM (PROM) Strength Additional Info:    RUE  WFL 3/5 good  and wfl FMC/dexterity noted during ADL tasks     LUE WFL 3/5 good  and wfl FMC/dexterity noted during ADL tasks       Hearing: [facility]   Sensation:  No c/o numbness or tingling  Tone: [facility] Edema: no     Comments: Upon arrival the patient was supine. At end of session, patient was up in chair with call light and phone within reach, all lines and tubes intact. Overall patient demonstrated decreased independence and safety during completion of ADL/functional transfer/mobility tasks. Pt would benefit from continued skilled OT to increase safety and independence with completion of ADL/IADL tasks for functional independence and quality of life. Treatment: OT treatment provided this date includes:    Instruction/training on safety and adapted techniques for completion of ADLs    Instruction/training on safe functional mobility/transfer techniques    Instruction/training on energy conservation/work simplification for completion of ADLs    Proper Positioning/Alignment    Rehab Potential: Good for established goals. Patient / Family Goal: no goals stated       Patient and/or family were instructed on functional diagnosis, prognosis/goals and OT plan of care. Demonstrated good understanding. Eval Complexity: Low    Time In: 8:40am - 9:06pm and 11:15-11:32am   Total Treatment Time: 23      Min Units   OT Eval Low 97165  X  1    OT Eval Medium 92752      OT Eval High 60668      OT Re-Eval [id number]            ADL/Self Care 12835     Therapeutic Activities 57183  23  2    Therapeutic Ex 42505       Orthotic Management 68166       Manual [id number]     Neuro Re-Ed 63881       Non-Billable Time        Evaluation Time additionally includes thorough review of current medical information, gathering information on past medical history/social history and prior level of function, interpretation of standardized testing/informal observation of tasks, assessment of data and development of plan of care and goals.         Evaluating OT: Silver Roller OTR/L; [id number]

## 2021-08-03 NOTE — TELEPHONE ENCOUNTER
Upon review of pt's chart this AM pt currently in ED pending admission.     Electronically signed by Levon Dorman MA on 8/3/21 at 7:57 AM EDT

## 2021-08-03 NOTE — ED NOTES
ON RAD notified in regards to CT not being read after 2.5 hours. Momo apologized stating that somehow her scan was 'locked' somehow. Tech states scan will be unlocked and radiologist will read it.      Dom Park RN  08/03/21 4544

## 2021-08-03 NOTE — H&P
Department of Internal Medicine  History and Physical    PCP: Dr. Jamal Lee   Admitting Physician: Dr. Keesha Arciniega  Consultants: Urology/ID      CHIEF COMPLAINT:  Sacral wound    HISTORY OF PRESENT ILLNESS:    Audrey Conn is a polite and pleasantly confused 39-year-old female who presented to the emergency department last evening for the evaluation of a progressive sacral wound. No family members were present during my examination but in my discussion with the emergency department physician, the patient has developed increased immobility and is essentially bedbound. This is resulted in a sacral wound that has progressed. Work-up in the emergency department also revealed a urinary tract infection which is chronic in nature in the setting of radiation cystitis with previous history of cervical/uterine cancer. She denies any overt pain or discomfort during my examination. She appears chronically debilitated.     PAST MEDICAL Hx:  Past Medical History:   Diagnosis Date    Arthritis     right hand    Cancer ([address] 1985    uterus and cervix had radiation and implant    Diabetes mellitus ([address]     Hyperlipidemia     Hypertension     Radiation cystitis     Urinary incontinence     complication due to uterine cancer    Urinary retention     caused by complications from uterine cancer       PAST SURGICAL Hx:   Past Surgical History:   Procedure Laterality Date    CYSTOSCOPY N/A 7/17/2019    CYSTOSCOPY BOTOX  INJECTION 200 UNITS performed by Bethany CALLOWAY Memo, DO at [address] [address] CYSTOURETHROSCOPY INJ CHEMODENERVATION BLADDER N/A 11/9/2018    CYSTOSCOPY  BOTOX INJECTION (200 UNITS) performed by Flower James MD at [address] Hx:  Family History   Problem Relation Age of Onset    Heart Disease Mother     No Known Problems Father     Other Sister     Other Brother     Other Other        HOME MEDICATIONS:  Prior to Admission medications Medication Sig Start Date End Date Taking? Authorizing Provider   empagliflozin (JARDIANCE) 25 MG tablet Take 1 tablet by mouth daily.  21   Ravi Griggs, DO   Semaglutide,0.25 or 0.5MG/DOS, (OZEMPIC, 0.25 OR 0.5 MG/DOSE,) 2 MG/1.5ML SOPN Inject 0.5 mLs into the skin once a week 21  Anton Griggs,    metFORMIN (GLUCOPHAGE) 1000 MG tablet TAKE 1 TABLET BY MOUTH TWICE A DAY WITH MEALS 21   Ravi Griggs, DO   Memantine HCl-Donepezil HCl \[facility] [place] [facility]\"") 14-10 MG CP24 Take 14 mg by mouth every morning (before breakfast) 3/5/21   Ravi Griggs DO   atorvastatin (LIPITOR) 20 MG tablet TAKE 1 TABLET BY MOUTH EVERY DAY 21   Ravi Miller DO   oxybutynin (DITROPAN-XL) 5 MG extended release tablet TAKE 1 TABLET BY MOUTH EVERY DAY 20   Ravi Griggs DO   TOVIAZ 4 MG TB24 ER tablet  20   Historical Provider, MD   CLONIDINE HCL ER PO Take 0.1 mg by mouth daily as needed TAKE IF SBP>160    Historical Provider, MD       ALLERGIES:  Tetracyclines & related    SOCIAL Hx:  Social History     Socioeconomic History    Marital status:      Spouse name: Not on file    Number of children: Not on file    Years of education: Not on file    Highest education level: Not on file   Occupational History    Not on file   Tobacco Use    Smoking status: Former Smoker     Packs/day: 1.00     Years: 44.00     Pack years: 44.00     Types: Cigarettes     Start date: 1965     Quit date: 2009     Years since quittin.6    Smokeless tobacco: Never Used   Vaping Use    Vaping Use: Never used   Substance and Sexual Activity    Alcohol use: Yes     Comment: seldom    Drug use: No    Sexual activity: Not on file   Other Topics Concern    Not on file   Social History Narrative    Not on file     Social Determinants of Health     Financial Resource Strain:     Difficulty of Paying Living Expenses:    Food Insecurity:     Worried About Running Out of Food in the Last Year:    [address] in the Last Year:    Transportation Needs:     Lack of Transportation (Medical):  Lack of Transportation (Non-Medical):    Physical Activity:     Days of Exercise per Week:     Minutes of Exercise per Session:    Stress:     Feeling of Stress :    Social Connections:     Frequency of Communication with Friends and Family:     Frequency of Social Gatherings with Friends and Family:     Attends Jewish Services:     Active Member of Clubs or Organizations:     Attends Club or Organization Meetings:     Marital Status:    Intimate Partner Violence:     Fear of Current or Ex-Partner:     Emotionally Abused:     Physically Abused:     Sexually Abused:        ROS:  General:   Admits to progressive weakness and deconditioning. Psychological:   Denies anxiety, disorientation or hallucinations    ENT:    Denies epistaxis, headaches, vertigo or visual changes    Cardiovascular:   Denies any chest pain, irregular heartbeats, or palpitations. No paroxysmal nocturnal dyspnea. Respiratory:   Denies shortness of breath, coughing, sputum production, hemoptysis, or wheezing. No orthopnea. Gastrointestinal:   Admits to decreased appetite and therefore decreased oral intake. Genito-Urinary:    Admits to frequency and urgency. Musculoskeletal:   Admits to diffuse muscle weakness. Neurology:    Denies any headache or focal neurological deficits. Derm:    Admits to the sacral wound which she cannot personally visualized. Extremities:   Denies any lower extremity swelling or edema. PHYSICAL EXAM:  VITALS:  Vitals:    08/03/21 0730   BP: 132/60   Pulse: 72   Resp: 20   Temp: 98.5 °F (36.9 °C)   SpO2: 100%         CONSTITUTIONAL:    Awake and alert. Pleasantly confused compatible with early dementia. EYES:    PERRL, EOMI, sclera clear, conjunctiva normal    ENT:    Normocephalic, atraumatic, sinuses nontender on palpation. External ears without lesions. Oral pharynx with moist mucus membranes. Tonsils without erythema or exudates. NECK:    Supple, symmetrical, trachea midline, no adenopathy, thyroid symmetric, not enlarged and no tenderness, skin normal, no bruits, no JVD    HEMATOLOGIC/LYMPHATICS:    No cervical lymphadenopathy and no supraclavicular lymphadenopathy    LUNGS:    Symmetric. No increased work of breathing, good air exchange, clear to auscultation bilaterally, no wheezes, rhonchi, or rales,     CARDIOVASCULAR:    Normal apical impulse, regular rate and rhythm, normal S1 and S2, no S3 or S4, and systolic murmur    ABDOMEN:    No scars, normal bowel sounds, soft, non-distended, non-tender, no masses palpated, no hepatosplenomegaly, no rebound or guarding elicited on palpation     MUSCULOSKELETAL:    There is no redness, warmth, or swelling of the joints. Full range of motion noted. Motor strength is 5 out of 5 all extremities bilaterally. Tone is normal.    NEUROLOGIC:    Obvious signs of dementia. She is oriented to person and place. SKIN:    Sacral wound is appropriately dressed. EXTREMITIES:    Peripheral pulses present. No edema, cyanosis, or swelling. OSTEOPATHIC:    Examined in seated and supine positions. Normal thoracic kyphosis and lumbar lordosis. No acute somatic dysfunction.     LABORATORY DATA:  CBC with Differential:    Lab Results   Component Value Date    WBC 10.3 08/02/2021    RBC 4.47 08/02/2021    HGB 13.1 08/02/2021    HCT 40.0 08/02/2021     08/02/2021    MCV 89.5 08/02/2021    MCH 29.3 08/02/2021    MCHC 32.8 08/02/2021    RDW 12.7 08/02/2021    SEGSPCT 61 09/01/2012    LYMPHOPCT 23.6 08/02/2021    MONOPCT 7.7 08/02/2021    BASOPCT 0.4 08/02/2021    MONOSABS 0.79 08/02/2021    LYMPHSABS 2.43 08/02/2021    EOSABS 0.06 08/02/2021    BASOSABS 0.04 08/02/2021     CMP:    Lab Results   Component Value Date     08/02/2021    K 4.9 08/02/2021    CL 94 08/02/2021    CO2 24 08/02/2021    BUN 28 08/02/2021    CREATININE 0.9 08/02/2021    GFRAA >60 08/02/2021    LABGLOM >60 08/02/2021    GLUCOSE 231 08/02/2021    PROT 7.1 08/02/2021    LABALBU 3.8 08/02/2021    CALCIUM 9.5 08/02/2021    BILITOT 0.4 08/02/2021    ALKPHOS 187 08/02/2021    AST 38 08/02/2021    ALT 40 08/02/2021       ASSESSMENT:  1. Large sacral wound with infection in the setting of bedbound status and immobility  2. Recurrent urinary tract infection in the setting of radiation cystitis complicated by Jardiance usage as an outpatient  3. Radiographic evidence of bilateral hydronephrosis but without renal dysfunction  4. Severe protein calorie malnutrition  5. Non-insulin-dependent diabetes mellitus type 2  6. Essential hypertension  7. History of uterine and cervical cancer with history of radiation    PLAN:  Antibiotic therapy will be employed for coverage of both the sacral wound as well as the recurrent urinary tract infection. Infectious disease and wound care teams will provide consultation. In the setting of bilateral hydronephrosis with known history of complicated urinary tract infections and radiation cystitis, we will also ask the urology team to provide consultation. Renal ultrasound will be obtained. Gentle IV fluid resuscitation is being administered. No family members were present during my examination but I anticipate the need for temporary skilled nursing facility placement upon discharge and have discussed this with the discharge planning team.  We will monitor her chronic comorbidities accordingly.     Sheron Linares DO, D.O., [facility]  9:20 AM  8/3/2021    Electronically signed by Sheron Linares DO on 8/3/21 at 9:20 AM EDT

## 2021-08-03 NOTE — CARE COORDINATION
Ss note:8/3/2021.3:12 PM No covid testing. Follow up visit to pts room, spouse & niece Nubia Piper present. Pt accepted at [facility] submitting for [address]. Family aware they will need to bring Ozempic from home to facility at discharge. SW to complete a Hens, will need signed HERLINDA, will await insurance [address], charge nurse updated.  DALI Quinones

## 2021-08-03 NOTE — PLAN OF CARE
Problem: Falls - Risk of:  Goal: Will remain free from falls  Description: Will remain free from falls  8/3/2021 1040 by Maryan Sandifer, RN  Outcome: Met This Shift  8/3/2021 1032 by Maryan Sandifer, RN  Outcome: Met This Shift  Goal: Absence of physical injury  Description: Absence of physical injury  8/3/2021 1040 by Maryan Sandifer, RN  Outcome: Met This Shift  8/3/2021 1032 by Maryan Sandifer, RN  Outcome: Met This Shift     Problem: Skin Integrity:  Goal: Will show no infection signs and symptoms  Description: Will show no infection signs and symptoms  8/3/2021 1040 by Maryan Sandifer, RN  Outcome: Met This Shift  8/3/2021 1032 by Maryan Sandifer, RN  Outcome: Met This Shift  Goal: Absence of new skin breakdown  Description: Absence of new skin breakdown  8/3/2021 1040 by Maryan Sandifer, RN  Outcome: Met This Shift  8/3/2021 1032 by Maryan Sandifer, RN  Outcome: Met This Shift

## 2021-08-03 NOTE — PROGRESS NOTES
Pharmacy Consultation Note  (Antibiotic Dosing and Monitoring)    Initial consult date: 2021  Consulting physician: Black  Drug: Vancomycin  Indication: Skin and Soft Tissue Infection     Age/  Gender Height Weight IBW Dosing weight  Allergy Information   71 y.o./female 5' 4\" (162.6 cm) 96 lb (43.5 kg)     Female patients must weigh at least 45.5 kg to calculate ideal body weight  43.5  Tetracyclines & related      Temp (24hrs), Av °F (36.7 °C), Min:96.7 °F (35.9 °C), Max:98.7 °F (37.1 °C)          Date  WBC BUN SCr CrCl  (mL/min) Drug/Dose Time   Given Level(s)   (Time) Comments   2021 10.3 28 0.9  38.5   Vancomycin 750 mg IV  (0400)                                          No intake or output data in the 24 hours ending 21 0308    Historical Cultures:  Organism   Date Value Ref Range Status   2020 Escherichia coli (A)  Final     No results for input(s): BC in the last 72 hours. Cultures:  available culture and sensitivity results were reviewed in [facility]    Assessment:  · 66 y.o. female has been initiated Vancomycin.   · Estimated CrCl = 38.5 mL/min  · Goal trough level = 15-20 mcg/mL    Plan:  · Will initiate vancomycin at a dose of 750 mg Q18h  · Monitor renal function   · Clinical pharmacy to follow      ANGELLA MANNING [facility], [facility] 8/3/2021 3:08 AM

## 2021-08-03 NOTE — CONSULTS
[facility]  Consult Note    [address] [address]  [place], [address]  Phone [phone]   [phone]      Admit Date: 2021  6:37 PM  Pt Name: Jose Angel Cedeño  MRN: [id number]  : 1950  Reason for Consult:    Chief Complaint   Patient presents with    Wound Check     coccyx wound few days,      Requesting Physician:  Rasta Keen DO  PCP: Jim Wilson DO  History Obtained From:  patient, chart   ID consulted for Sacral wound, initial encounter [S31.000A]  on hospital day  59       Chief Complaint   Patient presents with    Wound Check     coccyx wound few days,      HISTORYOF PRESENT ILLNESS   Jose Angel Cedeño is a 66 y.o. female who presents with significant past medical history of  has a past medical history of Arthritis, Cancer ([facility] [address] Diabetes mellitus ([facility] [address] Hyperlipidemia, Hypertension, Radiation cystitis, Urinary incontinence, and Urinary retention.    ED TRIAGEVITALS  BP: 132/60, Temp: 98.5 °F (36.9 °C), Pulse: 72, Resp: 20, SpO2: 100 %  HPI  PT PRESENTED WITH OPEN WOUND ON HER COCCYX  WBC10.3 CR0.9 UA YEAST   CT A/P B MOD HYDRO/THICKED URIANRY BLADDER  POSS CELLULITIS   present   Both are poor historian on duration of issue  She is working with ot  And currently has discomfort sitting in a chair  She has a hassan shows hematuria  Her robby area/sacral  is raw mind/c      REVIEW OF SYSTEMS     CONSTITUTIONAL:   No fever, chills, weight loss  ALLERGIES:    No urticaria, hay fever,    EYES:     No blurry vision, loss of vision,eye pain  ENT:      No hearing loss, sore throat  CARDIOVASCULAR:  No chest pain or palpitations  RESPIRATORY:   No cough, sob  ENDOCRINE:    No increase thirst, urination   HEME-LYMPH:   No easy bruising or bleeding  GI:     No nausea, vomiting or diarrhea  :       urinary complaints  NEURO:    No seizures, stroke, HA  MUSCULOSKELETAL:  No muscle aches or pain, no joint pain  SKIN:     No rash or itch  PSYCH:    No depression or anxiety    Medications Prior to Admission: empagliflozin (JARDIANCE) 25 MG tablet, Take 1 tablet by mouth daily.   Semaglutide,0.25 or 0.5MG/DOS, (OZEMPIC, 0.25 OR 0.5 MG/DOSE,) 2 MG/1.5ML SOPN, Inject 0.5 mLs into the skin once a week  metFORMIN (GLUCOPHAGE) 1000 MG tablet, TAKE 1 TABLET BY MOUTH TWICE A DAY WITH MEALS  Memantine HCl-Donepezil HCl (NAMZARIC) 14-10 MG CP24, Take 14 mg by mouth every morning (before breakfast)  atorvastatin (LIPITOR) 20 MG tablet, TAKE 1 TABLET BY MOUTH EVERY DAY  oxybutynin (DITROPAN-XL) 5 MG extended release tablet, TAKE 1 TABLET BY MOUTH EVERY DAY  TOVIAZ 4 MG TB24 ER tablet,   CLONIDINE HCL ER PO, Take 0.1 mg by mouth daily as needed TAKE IF SBP>160  CURRENT MEDICATIONS     Current Facility-Administered Medications:     cefepime (MAXIPIME) 1000 mg IVPB minibag, 1,000 mg, Intravenous, Q12H, Xochilt Bernabe DO, Last Rate: 12.5 mL/hr at 08/03/21 0450, 1,000 mg at 08/03/21 0450    atorvastatin (LIPITOR) tablet 20 mg, 20 mg, Oral, Daily, Xochilt Bernabe DO, 20 mg at 08/03/21 0854    oxybutynin (DITROPAN-XL) extended release tablet 5 mg, 5 mg, Oral, Daily, Xochilt Bernabe DO, 5 mg at 08/03/21 0854    trospium (SANCTURA) tablet 20 mg, 20 mg, Oral, Nightly, Xochilt Bernabe, DO    glucose (GLUTOSE) 40 % oral gel 15 g, 15 g, Oral, PRN, Xochilt Yips, DO    dextrose 50 % IV solution, 12.5 g, Intravenous, PRN, Xochilt Bernabe, DO    glucagon (rDNA) injection 1 mg, 1 mg, Intramuscular, PRN, Xcohilt Bernabe, DO    dextrose 5 % solution, 100 mL/hr, Intravenous, PRN, Xochilt Yips, DO    insulin lispro (HUMALOG) injection vial 0-6 Units, 0-6 Units, Subcutaneous, TID WC, Xochilt Bernabe DO, 2 Units at 08/03/21 0856    insulin lispro (HUMALOG) injection vial 0-3 Units, 0-3 Units, Subcutaneous, Nightly, Xochilt Bernabe DO    morphine (PF) injection 2 mg, 2 mg, Intravenous, Q4H PRN, Xochilt Bernabe DO    HYDROcodone-acetaminophen (NORCO) 5-325 MG per tablet 1 tablet, 1 tablet, Oral, Q6H PRN, Chiara Murilloe, DO    albuterol (PROVENTIL) nebulizer solution 2.5 mg, 2.5 mg, Nebulization, Q6H PRN, Chiara Hightower, DO    ondansetron [facility]) injection 4 mg, 4 mg, Intravenous, Q6H PRN, Chiara Murilloe, DO    enoxaparin (LOVENOX) injection 40 mg, 40 mg, Subcutaneous, Daily, Chiara Hightower DO, 40 mg at 08/03/21 0854    0.9 % sodium chloride infusion, , Intravenous, Continuous, Chiara Hightower DO, Last Rate: 60 mL/hr at 08/03/21 0410, New Bag at 08/03/21 0410    0.9 % sodium chloride infusion, , Intravenous, Q12H, Chiara Hightower DO    memantine [facility]) tablet 5 mg, 5 mg, Oral, BID, 5 mg at 08/03/21 0854 **AND** donepezil (ARICEPT) tablet 10 mg, 10 mg, Oral, Nightly, Deepak Carbajal MD    vancomycin (VANCOCIN) 750 mg in dextrose 5 % 250 mL IVPB, 750 mg, Intravenous, Q18H, Chiara Hightower DO, Stopped at 08/03/21 1856  ALLERGIES     Tetracyclines & related  Immunization History   Administered Date(s) Administered    Influenza A (W2U1-20) Vaccine PF IM 12/03/2009    Influenza, High Dose (Fluzone 65 yrs and older) 10/14/2015, 11/07/2016, 10/31/2017, 09/23/2018, 10/11/2019, 11/05/2020    Pneumococcal Conjugate 13-valent ([place] He[facility]) 11/07/2016, 06/07/2017    Pneumococcal Polysaccharide ([id number]) 12/02/2019     PAST MEDICAL HISTORY     Past Medical History:   Diagnosis Date    Arthritis     right hand    Cancer ([address] 1985    uterus and cervix had radiation and implant    Diabetes mellitus ([address]     Hyperlipidemia     Hypertension     Radiation cystitis     Urinary incontinence     complication due to uterine cancer    Urinary retention     caused by complications from uterine cancer     SURGICAL HISTORY       Past Surgical History:   Procedure Laterality Date    CYSTOSCOPY N/A 7/17/2019    CYSTOSCOPY BOTOX  INJECTION 200 UNITS performed by [facility] A Drew, DO at [address] [place] [address] removed    HYSTERECTOMY  1985    GA CYSTOURETHROSCOPY INJ CHEMODENERVATION BLADDER N/A 11/9/2018    CYSTOSCOPY Weight   08/02/21 1710 08/02/21 1631 08/02/21 1631 08/02/21 1631 08/02/21 1631 08/02/21 1631 08/03/21 0245 08/02/21 1710   119/61 96.7 °F (35.9 °C) Temporal 100 16 97 % 5' 4\" (1.626 m) 96 lb (43.5 kg)     Vitals:    Vitals:    08/02/21 2206 08/03/21 0224 08/03/21 0245 08/03/21 0730   BP: 116/60 125/62  132/60   Pulse: 79 79  72   Resp: 18 20  20   Temp: 98.5 °F (36.9 °C) 98.1 °F (36.7 °C)  98.5 °F (36.9 °C)   TempSrc: Oral Oral  Oral   SpO2: 99% 97%  100%   Weight:       Height:   5' 4\" (1.626 m)      Physical Exam   Constitutional/General: Alert and oriented, NAD  Head: NC/AT  Eyes: PERRL, EOMI  Mouth: Normal mucosa, no thrush   Neck: Supple, full ROM,    Pulmonary: Lungs clear to auscultation bilaterally. Not in respiratory distress  Cardiovascular:  Regular rate and rhythm, no murmurs, gallops, or rubs. Abdomen: Soft, + BS. No distension. Nontender. Extremities: Moves all extremities x 4. Warm and well perfused  Pulses:  Distal pulses intact  Skin: Warm and dry without rash  Neurologic:    No focal deficits  Psych: Normal Affect  Kaplan hematuris  :  Judy area red sacral area 1cm long white plaque? No open areas has dermatitis/irritation     DIAGNOSTIC RESULTS   RADIOLOGY:   CT ABDOMEN PELVIS W IV CONTRAST Additional Contrast? None    Result Date: 8/3/2021  EXAMINATION: CT OF THE ABDOMEN AND PELVIS WITH CONTRAST 8/2/2021 9:31 pm TECHNIQUE: CT of the abdomen and pelvis was performed with the administration of intravenous contrast. Multiplanar reformatted images are provided for review. Dose modulation, iterative reconstruction, and/or weight based adjustment of the mA/kV was utilized to reduce the radiation dose to as low as reasonably achievable.  COMPARISON: September 2017 HISTORY: ORDERING SYSTEM PROVIDED HISTORY: sacral wound, increased pain TECHNOLOGIST PROVIDED HISTORY: Reason for exam:->sacral wound, increased pain Additional Contrast?->None Decision Support Exception - unselect if not a of the renal collecting systems and ureters. Mild dilatation of the proximal right ureter and no gross distal hydroureter. Multiple urinary bladder stones, not present on the prior examination. Thickening of the walls of the urinary bladder which may represent cystitis. Urothelial mass cannot be excluded. Air within the urinary bladder, which may be secondary to prior instrumentation. Fluid and air also seen in the vagina. Vesico vaginal fistula cannot be excluded. Clinical correlation recommended. Mild loss of the subcutaneous fat dorsal to the lower sacrum and coccyx is nonspecific and which could represent mild cellulitis. No disruption of the retro sacral soft tissue planes or ulceration.      LABS  Recent Labs     08/02/21 1937   WBC 10.3   HGB 13.1   HCT 40.0   MCV 89.5        Recent Labs     08/02/21 1937      K 4.9   CL 94*   CO2 24   BUN 28*   CREATININE 0.9   GFRAA >60   LABGLOM >60   GLUCOSE 231*   PROT 7.1   LABALBU 3.8   CALCIUM 9.5   BILITOT 0.4   ALKPHOS 187*   AST 38*   ALT 40*      COVID-19/IRMA-COV2 LABS  Recent Labs     08/02/21 1937   AST 38*   ALT 40*     Lab Results   Component Value Date    CHOL 134 03/05/2021    TRIG 109 03/05/2021    HDL 49 03/05/2021    LDLCALC 63 03/05/2021    LABVLDL 22 03/05/2021         Lab Results   Component Value Date    HEPAIGM Non-Reactive 03/05/2021    HEPBIGM Non-Reactive 03/05/2021    HCVABI Non-Reactive 03/05/2021     Hep C Ab Interp   Date Value Ref Range Status   03/05/2021 Non-Reactive NON REACT Final     No results found for: HCVQNTNAAT  No results found for: HCVQNTNAATLG    MICROBIOLOGY:     Cultures :   Lab Results   Component Value Date    BC 5 Days- no growth 05/04/2018    BC 5 Days- no growth 09/29/2017    ORG Escherichia coli 07/14/2020    ORG Escherichia coli 01/28/2020    ORG Escherichia coli 01/06/2020     Lab Results   Component Value Date    BLOODCULT2 5 Days- no growth 05/04/2018    BLOODCULT2 5 Days- no growth 09/29/2017 ORG Escherichia coli 07/14/2020    ORG Escherichia coli 01/28/2020    ORG Escherichia coli 01/06/2020          Urine Culture, Routine   Date Value Ref Range Status   07/14/2020 <10,000 CFU/mL  Gram positive organism   (A)  Final   07/14/2020 >100,000 CFU/ml  Final   01/28/2020 >100,000 CFU/ml  Final        Patient is a 66 y.o. female who presented with   Chief Complaint   Patient presents with    Wound Check     coccyx wound few days,         FINAL IMPRESSION        SACRAL /robby are prob incontinent dermatitis with caniddia  CANDIDURIA with hydronephrosis  -ointment oral diflucan   -hassan  -check cx  H/o uterine/cervical cancer with h/o radiation     cefepime (MAXIPIME) 1000 mg IVPB minibag, Q12H     vancomycin (VANCOCIN) 750 mg in dextrose 5 % 250 mL IVPB, Q18H    Continue wound care  Encourage nutritional support  Continue to off load        Imaging and labs were reviewed per medical records and any ID pertinent labs were addressed with the patient. The patient/FAMILY  was educated about the diagnosis, prognosis, indications, risks and benefits of treatment. An opportunity to ask questions was given to the patient/FAMILY and questions were answered. Thank you for involving me in the care of Serene Alston. Please do not hesitate to call for any questions or concerns.          Electronically signed by Claudette Crazier, MD on 8/3/2021 at 9:11 AM

## 2021-08-03 NOTE — ED NOTES
Dr. Jose Ribeiro CT results be back prior to pt. Going to ready room.      David Stubbs RN  08/02/21 9629

## 2021-08-03 NOTE — CARE COORDINATION
Ss note:8/3/2021.1:38 PM No covid testing. Sw met with pt & spouse Roseanne Rawls present in room. Pt is pleasantly confused, presents with a sacral wound. PCP is Dr. Jimmye Crigler. Discussed skilled rehab placement, pt/ spouse in agreement, prefer [facility] does not accept pts [employer] insurance). Referral made to kimmie Kirkpatrick at [facility] for skilled rehab, will await acceptance. Requires PRECERT for placement, Hens and signed HERLINDA. Reyna Barber, LSW

## 2021-08-03 NOTE — PROGRESS NOTES
a two story home bedroom and bathroom 2nd floor full flight of steps   with ?  to enter    Tub shower     Equipment owned: Wheelchair, [address] and [employer] chair,       [facility]   How much difficulty turning over in bed?: A Lot  How much difficulty sitting down on / standing up from a chair with arms?: A Lot  How much difficulty moving from lying on back to sitting on side of bed?: A Lot  How much help from another person moving to and from a bed to a chair?: A Lot  How much help from another person needed to walk in hospital room?: Total  How much help from another person for climbing 3-5 steps with a railing?: Total  AM-PAC Inpatient Mobility Raw Score : 10  AM-PAC Inpatient T-Scale Score : 32.29  Mobility Inpatient CMS 0-100% Score: 76.75  Mobility Inpatient CMS G-Code Modifier : CL    Nursing cleared patient for PT evaluation. The admitting diagnosis and active problem list as listed above have been reviewed prior to the initiation of this evaluation. OBJECTIVE;   Initial Evaluation  Date: 8/3/2021 Treatment Date:     Short Term/ Long Term   Goals   Was pt agreeable to Eval/treatment? Yes    To be met in 4 days   Pain level   10/10  perineum     Bed Mobility    Rolling:  Moderate assist of 1    Supine to sit: Moderate assist of 1 side lying to avoid shear on buttocks d/t rash/ wounds  Sit to supine: Maximal assist of 1    Scooting: Maximal assist of 1    Rolling: Supervision     Supine to sit: Supervision     Sit to supine: Supervision     Scooting: Supervision      Transfers Sit to stand: Maximal assist of 1    Sit to stand: Minimal assist of 1     Ambulation    not assessed    10 feet using  wheeled walker with Minimal assist of 1    ROM Within functional limits with exception of bilateral knees and ankles     Increase range of motion 10% of affected joints    Strength BUE:  refer to OT eval  RLE:  3-/5  LLE:  3-/5   Increase strength in affected mm groups by 1/3 grade   Balance Sitting EOB:  poor    Dynamic Standing:  poor    Sitting EOB:  fair    Dynamic Standing: fair with wheeled walker      Patient is Alert & Oriented x person, place and situation and follows one step directions    Sensation:  Patient  denies numbness/tingling     Edema:  yes perineum    Endurance: poor         Patient education  Patient educated on role of Physical Therapy, risks of immobility, safety and plan of care      Patient response to education:   Pt verbalized understanding Pt demonstrated skill Pt requires further education in this area   Yes Partial Yes      Treatment:  Patient practiced and was instructed/facilitated in the following treatment: Patient   Sat edge of bed 5 minutes with Minimal assist of 1 to increase dynamic sitting balance and activity tolerance. Therapeutic Exercises:  not performed       At end of session, patient in bed with alarm call light and phone within reach,   all lines and tubes intact, nursing notified. Patient would benefit from continued skilled Physical Therapy to improve functional independence and quality of life. Patient's/ family goals   home        Time in  46  Time out  1006    Total Treatment Time  0 minutes    Evaluation time includes thorough review of current medical information, gathering information on past medical history/social history and prior level of function, completion of standardized testing/informal observation of tasks, assessment of data, and development of Plan of care and goals.      CPT codes:  Low Complexity PT evaluation (66765)  No treatment    Liat Quijano PT

## 2021-08-03 NOTE — CONSULTS
8/3/2021 1:56 PM  Service: Urology  Group: [facility] (Drew/Sourav/Noa)    Ariel Davidson  [id number]     Chief Complaint:    Declining health, wound check    Reason for consult: bilateral hydronephrosis, unclear etiology    History of Present Illness: The patient is a 66 y.o. female patient with a PMH of DM, hypertension, HLD who reportedly came to the ER for a sacral wound check. She and her  are very poor historians and unable to give a clear hx. Hx was obtained from the medical records. Pt has a urologic hx of OAB, low capacity bladder, bladder stones and radiation cystitis from previously tx uterine and cervical cancer. She has had Botox in the past. She has total incontinence and wears briefs. Her  states she always has a UTI. He also reports she is at baseline mental status (confused). She currently has a hassan catheter in place draining mostly yellow urine with a slight pink tinge. Past Medical History:   Diagnosis Date    Arthritis     right hand    Cancer ([address] 1985    uterus and cervix had radiation and implant    Diabetes mellitus ([address]     Hyperlipidemia     Hypertension     Radiation cystitis     Urinary incontinence     complication due to uterine cancer    Urinary retention     caused by complications from uterine cancer         Past Surgical History:   Procedure Laterality Date    CYSTOSCOPY N/A 7/17/2019    CYSTOSCOPY BOTOX  INJECTION 200 UNITS performed by Carie CALLOWAY Memo, DO at [address] [address] CYSTOURETHROSCOPY INJ CHEMODENERVATION BLADDER N/A 11/9/2018    CYSTOSCOPY  BOTOX INJECTION (200 UNITS) performed by Denia Guan MD at [address]       Medications Prior to Admission:    Medications Prior to Admission: empagliflozin (JARDIANCE) 25 MG tablet, Take 1 tablet by mouth daily.   Semaglutide,0.25 or 0.5MG/DOS, (OZEMPIC, 0.25 OR 0.5 MG/DOSE,) 2 MG/1.5ML SOPN, Inject 0.5 mLs into the skin once a week  metFORMIN (GLUCOPHAGE) 1000 MG tablet, TAKE 1 TABLET BY MOUTH TWICE A DAY WITH MEALS  Memantine HCl-Donepezil HCl (NAMZARIC) 14-10 MG CP24, Take 14 mg by mouth every morning (before breakfast)  atorvastatin (LIPITOR) 20 MG tablet, TAKE 1 TABLET BY MOUTH EVERY DAY  oxybutynin (DITROPAN-XL) 5 MG extended release tablet, TAKE 1 TABLET BY MOUTH EVERY DAY  TOVIAZ 4 MG TB24 ER tablet,   CLONIDINE HCL ER PO, Take 0.1 mg by mouth daily as needed TAKE IF SBP>160    Allergies:    Tetracyclines & related    Social History:    reports that she quit smoking about 11 years ago. Her smoking use included cigarettes. She started smoking about 56 years ago. She has a 44.00 pack-year smoking history. She has never used smokeless tobacco. She reports current alcohol use. She reports that she does not use drugs. Family History:   Non-contributory to this Urological problem  family history includes Heart Disease in her mother; No Known Problems in her father; Other in her brother, sister, and another family member. Review of Systems:  Unable to obtain d/t mental status  She does admit to constipation    Physical Exam:     Vitals:  /60   Pulse 72   Temp 98.5 °F (36.9 °C) (Oral)   Resp 20   Ht 5' 4\" (1.626 m)   Wt 96 lb (43.5 kg)   LMP  (LMP Unknown)   SpO2 100%   BMI 16.48 kg/m²     General:  Awake, alert, oriented X 1. Well developed, frail appearing female in no acute distress. HEENT:  Normocephalic, atraumatic. Pupils equal, round. No scleral icterus. No conjunctival injection. Normal lips, teeth, and gums. No nasal discharge. Neck:  Supple  Heart:  RRR  Lungs:  No audible wheezing. Respirations symmetric and non-labored. Abdomen:  soft, nontender,no peritoneal signs  Extremities:  No clubbing, cyanosis, or edema  Skin:  Warm and dry, sacral  Decubitus reported, I did not see myself   Neuro:  There are no motor or sensory deficits in the 4 quadrant extremities   Rectal: deferred  Genitalia: hassan catheter in place and draining light pink, mostly yellow urine. Yeast infection    Labs:     Recent Labs     08/02/21 1937 08/03/21  0924   WBC 10.3 8.8   RBC 4.47 4.04   HGB 13.1 11.9   HCT 40.0 36.4   MCV 89.5 90.1   MCH 29.3 29.5   MCHC 32.8 32.7   RDW 12.7 12.7    228   MPV 10.8 11.0         Recent Labs     08/02/21 1937 08/03/21  0924   CREATININE 0.9 0.9     Results for Claude Aguayo (MRN [id number]) as of 8/3/2021 14:15   Ref. Range 8/2/2021 19:37   Color, UA Latest Ref Range: Straw/Yellow  Yellow   Clarity, UA Latest Ref Range: Clear  CLOUDY (A)   Glucose, UA Latest Ref Range: Negative mg/dL >=1000 (A)   Bilirubin, Urine Latest Ref Range: Negative  Negative   Ketones, Urine Latest Ref Range: Negative mg/dL Negative   Specific Gravity, UA Latest Ref Range: 1.005 - 1.030  1.015   Blood, Urine Latest Ref Range: Negative  LARGE (A)   pH, UA Latest Ref Range: 5.0 - 9.0  5.5   Protein, UA Latest Ref Range: Negative mg/dL TRACE   Urobilinogen, Urine Latest Ref Range: <2.0 E.U./dL 0.2   Nitrite, Urine Latest Ref Range: Negative  Negative   Leukocyte Esterase, Urine Latest Ref Range: Negative  SMALL (A)   WBC, UA Latest Ref Range: 0 - 5 /HPF 10-20 (A)   RBC, UA Latest Ref Range: 0 - 2 /HPF 10-20 (A)   Epithelial Cells, UA Latest Units: /HPF FEW   Bacteria, UA Latest Ref Range: None Seen /HPF MANY (A)   Yeast, Urine Latest Ref Range: None Seen /HPF Present (A)       EXAMINATION:   CT OF THE ABDOMEN AND PELVIS WITH CONTRAST 8/2/2021 9:31 pm       TECHNIQUE:   CT of the abdomen and pelvis was performed with the administration of   intravenous contrast. Multiplanar reformatted images are provided for review.    Dose modulation, iterative reconstruction, and/or weight based adjustment of   the mA/kV was utilized to reduce the radiation dose to as low as reasonably   achievable.       COMPARISON:   September 2017       HISTORY:   ORDERING SYSTEM PROVIDED HISTORY: sacral wound, increased pain   TECHNOLOGIST PROVIDED HISTORY:   Reason for exam:->sacral wound, increased pain   Additional Contrast?->None   Decision Support Exception - unselect if not a suspected or confirmed   emergency medical condition->Emergency Medical Condition (MA)       FINDINGS:   Lower Chest: The lung bases are clear.       Organs: The liver, spleen, adrenals, pancreas and gallbladder are within   normal limits.       Parapelvic right renal cysts, also present on the prior examination.       Moderate hydronephrosis bilaterally, not present on the prior examination.    No evidence of renal or ureteral calculi.  Follow-up evaluation with   additional delayed images would be helpful in further evaluation of the renal   collecting systems and ureters.  Mild dilatation of the proximal right   ureter.  No gross hydroureter distally.       GI/Bowel: No bowel obstruction or free intraperitoneal air.       Large amount of stool in the colon which may reflect constipation.       No evidence of colitis or diverticulitis.       Pelvis: Multiple urinary bladder stones, not present on the prior   examination.  Thickening of the walls of the urinary bladder which may   represent cystitis.  Urothelial mass cannot be excluded.  The urinary bladder   is incompletely distended.       Patient is status post hysterectomy.       There is air within the urinary bladder which may be secondary to prior   instrumentation.  Fluid and air or so seen in the vagina.  Vaginal vesicle   fistula cannot be excluded in the right clinical setting.  Clinical   correlation recommended.       There is mild loss of the subcutaneous fat dorsal to the lower sacrum and   coccyx, this is nonspecific and could represent mild cellulitis.  There is no   disruption of the retro sacral soft tissue planes or ulceration.       Peritoneum/Retroperitoneum: Atherosclerotic calcification along the abdominal   aorta with no evidence of aneurysmal dilatation.  Nonspecific retroperitoneal   nodes.     Bones/Soft Tissues: No acute bony pathology.           Impression   Moderate hydronephrosis bilaterally, not present on the prior examination. No evidence of renal or ureteral calculi.  Follow-up evaluation with   additional delayed images would be helpful in further assessment of the renal   collecting systems and ureters.  Mild dilatation of the proximal right ureter   and no gross distal hydroureter.       Multiple urinary bladder stones, not present on the prior examination. Thickening of the walls of the urinary bladder which may represent cystitis. Urothelial mass cannot be excluded.       Air within the urinary bladder, which may be secondary to prior   instrumentation.  Fluid and air also seen in the vagina.  Vesico vaginal   fistula cannot be excluded.  Clinical correlation recommended.       Mild loss of the subcutaneous fat dorsal to the lower sacrum and coccyx is   nonspecific and which could represent mild cellulitis.  No disruption of the   retro sacral soft tissue planes or ulceration. EXAMINATION:   RETROPERITONEAL ULTRASOUND OF THE KIDNEYS AND URINARY BLADDER       8/3/2021       COMPARISON:   None       HISTORY:   ORDERING SYSTEM PROVIDED HISTORY: [place] on CT   TECHNOLOGIST PROVIDED HISTORY:       Reason for exam:->[place] on CT   What reading provider will be dictating this exam?->CRC       FINDINGS:       Kidneys:       The right kidney measures 8.0 cm in length and the left kidney measures 10.0   cm in length.       Kidneys demonstrate normal cortical echogenicity.  Bilateral moderate   hydronephrosis.  No calculi demonstrated. .           Bladder:       Kaplan catheter in a decompressed urinary bladder.           Impression   Bilateral moderate hydronephrosis.  No calculi.             Assessment/plan:     Bilateral hydronephrosis possibly related to constipation  -would stop oxybutynin and [place] as they are not working anyway.   -Aggressive bowel regimen, will defer to

## 2021-08-04 PROBLEM — E43 SEVERE PROTEIN-CALORIE MALNUTRITION (HCC): Status: ACTIVE | Noted: 2021-08-04

## 2021-08-04 LAB
ANION GAP SERPL CALCULATED.3IONS-SCNC: 8 MMOL/L (ref 7–16)
BASOPHILS ABSOLUTE: 0.01 E9/L (ref 0–0.2)
BASOPHILS RELATIVE PERCENT: 0.2 % (ref 0–2)
BUN BLDV-MCNC: 17 MG/DL (ref 6–23)
CALCIUM SERPL-MCNC: 9.1 MG/DL (ref 8.6–10.2)
CHLORIDE BLD-SCNC: 107 MMOL/L (ref 98–107)
CO2: 24 MMOL/L (ref 22–29)
CREAT SERPL-MCNC: 0.9 MG/DL (ref 0.5–1)
EOSINOPHILS ABSOLUTE: 0.09 E9/L (ref 0.05–0.5)
EOSINOPHILS RELATIVE PERCENT: 1.7 % (ref 0–6)
GFR AFRICAN AMERICAN: >60
GFR NON-AFRICAN AMERICAN: >60 ML/MIN/1.73
GLUCOSE BLD-MCNC: 114 MG/DL (ref 74–99)
HCT VFR BLD CALC: 34.4 % (ref 34–48)
HEMOGLOBIN: 10.8 G/DL (ref 11.5–15.5)
IMMATURE GRANULOCYTES #: 0.02 E9/L
IMMATURE GRANULOCYTES %: 0.4 % (ref 0–5)
LYMPHOCYTES ABSOLUTE: 1.73 E9/L (ref 1.5–4)
LYMPHOCYTES RELATIVE PERCENT: 31.9 % (ref 20–42)
MCH RBC QN AUTO: 28.6 PG (ref 26–35)
MCHC RBC AUTO-ENTMCNC: 31.4 % (ref 32–34.5)
MCV RBC AUTO: 91.2 FL (ref 80–99.9)
METER GLUCOSE: 108 MG/DL (ref 74–99)
METER GLUCOSE: 131 MG/DL (ref 74–99)
METER GLUCOSE: 167 MG/DL (ref 74–99)
METER GLUCOSE: 250 MG/DL (ref 74–99)
MONOCYTES ABSOLUTE: 0.48 E9/L (ref 0.1–0.95)
MONOCYTES RELATIVE PERCENT: 8.8 % (ref 2–12)
NEUTROPHILS ABSOLUTE: 3.1 E9/L (ref 1.8–7.3)
NEUTROPHILS RELATIVE PERCENT: 57 % (ref 43–80)
PDW BLD-RTO: 13 FL (ref 11.5–15)
PLATELET # BLD: 183 E9/L (ref 130–450)
PMV BLD AUTO: 10.6 FL (ref 7–12)
POTASSIUM SERPL-SCNC: 4.4 MMOL/L (ref 3.5–5)
RBC # BLD: 3.77 E12/L (ref 3.5–5.5)
SODIUM BLD-SCNC: 139 MMOL/L (ref 132–146)
VANCOMYCIN TROUGH: 9.6 MCG/ML (ref 5–16)
WBC # BLD: 5.4 E9/L (ref 4.5–11.5)

## 2021-08-04 PROCEDURE — 80202 ASSAY OF VANCOMYCIN: CPT

## 2021-08-04 PROCEDURE — 6360000002 HC RX W HCPCS: Performed by: SPECIALIST

## 2021-08-04 PROCEDURE — 97530 THERAPEUTIC ACTIVITIES: CPT

## 2021-08-04 PROCEDURE — 82962 GLUCOSE BLOOD TEST: CPT

## 2021-08-04 PROCEDURE — 6360000002 HC RX W HCPCS: Performed by: INTERNAL MEDICINE

## 2021-08-04 PROCEDURE — 80048 BASIC METABOLIC PNL TOTAL CA: CPT

## 2021-08-04 PROCEDURE — 1200000000 HC SEMI PRIVATE

## 2021-08-04 PROCEDURE — 2580000003 HC RX 258: Performed by: INTERNAL MEDICINE

## 2021-08-04 PROCEDURE — 85025 COMPLETE CBC W/AUTO DIFF WBC: CPT

## 2021-08-04 PROCEDURE — 6370000000 HC RX 637 (ALT 250 FOR IP): Performed by: NURSE PRACTITIONER

## 2021-08-04 PROCEDURE — 6370000000 HC RX 637 (ALT 250 FOR IP): Performed by: INTERNAL MEDICINE

## 2021-08-04 PROCEDURE — 36415 COLL VENOUS BLD VENIPUNCTURE: CPT

## 2021-08-04 RX ORDER — BISACODYL 10 MG
10 SUPPOSITORY, RECTAL RECTAL ONCE
Status: COMPLETED | OUTPATIENT
Start: 2021-08-04 | End: 2021-08-04

## 2021-08-04 RX ADMIN — DONEPEZIL HYDROCHLORIDE 10 MG: 5 TABLET, FILM COATED ORAL at 20:37

## 2021-08-04 RX ADMIN — SODIUM CHLORIDE: 9 INJECTION, SOLUTION INTRAVENOUS at 20:37

## 2021-08-04 RX ADMIN — MICONAZOLE NITRATE: 2 OINTMENT TOPICAL at 09:55

## 2021-08-04 RX ADMIN — CEFEPIME HYDROCHLORIDE 1000 MG: 1 INJECTION, POWDER, FOR SOLUTION INTRAMUSCULAR; INTRAVENOUS at 16:24

## 2021-08-04 RX ADMIN — MEMANTINE HYDROCHLORIDE 5 MG: 10 TABLET ORAL at 08:47

## 2021-08-04 RX ADMIN — MEMANTINE HYDROCHLORIDE 5 MG: 10 TABLET ORAL at 20:37

## 2021-08-04 RX ADMIN — FLUCONAZOLE 400 MG: 2 INJECTION, SOLUTION INTRAVENOUS at 13:00

## 2021-08-04 RX ADMIN — CEFEPIME HYDROCHLORIDE 1000 MG: 1 INJECTION, POWDER, FOR SOLUTION INTRAMUSCULAR; INTRAVENOUS at 03:33

## 2021-08-04 RX ADMIN — MICONAZOLE NITRATE: 2 OINTMENT TOPICAL at 20:53

## 2021-08-04 RX ADMIN — SODIUM CHLORIDE: 9 INJECTION, SOLUTION INTRAVENOUS at 02:30

## 2021-08-04 RX ADMIN — ATORVASTATIN CALCIUM 20 MG: 20 TABLET, FILM COATED ORAL at 08:48

## 2021-08-04 RX ADMIN — HYDROCODONE BITARTRATE AND ACETAMINOPHEN 1 TABLET: 5; 325 TABLET ORAL at 09:53

## 2021-08-04 RX ADMIN — ENOXAPARIN SODIUM 40 MG: 40 INJECTION SUBCUTANEOUS at 08:50

## 2021-08-04 RX ADMIN — INSULIN LISPRO 3 UNITS: 100 INJECTION, SOLUTION INTRAVENOUS; SUBCUTANEOUS at 12:18

## 2021-08-04 RX ADMIN — BISACODYL 10 MG: 10 SUPPOSITORY RECTAL at 19:51

## 2021-08-04 RX ADMIN — VANCOMYCIN HYDROCHLORIDE 750 MG: 10 INJECTION, POWDER, LYOPHILIZED, FOR SOLUTION INTRAVENOUS at 19:51

## 2021-08-04 ASSESSMENT — PAIN SCALES - GENERAL
PAINLEVEL_OUTOF10: 0
PAINLEVEL_OUTOF10: 8
PAINLEVEL_OUTOF10: 0

## 2021-08-04 NOTE — PROGRESS NOTES
Physical Therapy    Physical Therapy Treatment Note/Plan of Care    Room #:  1116/6316-21  Patient Name: Nubia Mcpherson  YOB: 1950  MRN: [id number]    Date of Service: 8/4/2021      Tentative placement recommendation: Subacute rehab    Equipment recommendation: To be determined      Evaluating Physical Therapist: Alexandro Gaucher, [address] [facility]  #[id number]        Specific Provider Orders/Date/Referring Provider :  08/03/21 0245   PT eval and treat Start: 08/03/21 0245, End: 08/03/21 0245, ONE TIME, Standing Count: 1 Occurrences, R    Charissa Knight DO    Admitting Diagnosis:   Sacral wound, initial encounter [S31.000A]             Patient Active Problem List   Diagnosis    Sepsis [facility]TI [facility]    Post-operative pain    H/O total hysterectomy    Essential hypertension    Type 2 diabetes mellitus with hyperglycemia, without long-term current use of insulin (HCC)    Other hyperlipidemia    Sacral wound    Sacral wound, initial encounter    Acute cystitis with hematuria    Severe protein-calorie malnutrition ([address]        ASSESSMENT of Current Deficits Patient exhibits decreased strength, balance, endurance, range of motion, coordination and pain in perineum and buttocks impairing functional mobility, transfers, gait , tolerance to activity and participation are barriers to d/c and require skilled intervention during hospital stay to attain pre hospital level of function. Patient needing moderate assist for trunk flexion for bed mobility and sit to stand transfers. Patient displays a narrow base of support, small step lengths, kyphotic posture, and confusion at times: all factors that increase her risk for falling. Pt needing extra time for all functional mobility due to her pain; especially with ambulation due to a very slow maru and needing extra time to process my directions in taking steps forwards and backwards.       PHYSICAL THERAPY  PLAN OF CARE       Physical therapy plan of care is established based on physician order,  patient diagnosis and clinical assessment    Current Treatment Recommendations:    -Bed Mobility: Lower extremity exercises , Upper extremity exercises  and Trunk control activities   -Sitting Balance: Incorporate reaching activities to activate trunk muscles  and Facilitate active trunk muscle engagement   -Standing Balance: Perform strengthening exercises in standing to promote motor control with or without upper extremity support  and Instruct patient on adequate base of support to maintain balance  -Transfers: Provide instruction on proper hand and foot position for adequate transfer of weight onto lower extremities and use of gait device, Cues for hand placement, technique and safety, Support transfer of weight on to lower extremities and Provide stabilization to prevent fall   -Gait: Gait training, Standing activities to improve: base of support, weight shift, weight bearing  and Exercises to improve hip and knee control   -Endurance: Utilize Supervised activities to increase level of endurance to allow for safe functional mobility including transfers and gait     PT long term treatment goals are located in below grid    Patient and or family understand(s) diagnosis, prognosis, and plan of care. Frequency of treatments: Patient will be seen  daily.          Prior Level of Function: Patient ambulated with wheeled walker and assist  Rehab Potential: fair    for baseline    Past medical history:   Past Medical History:   Diagnosis Date    Arthritis     right hand    Cancer ([facility] 1985    uterus and cervix had radiation and implant    Diabetes mellitus ([facility]     Hyperlipidemia     Hypertension     Radiation cystitis     Urinary incontinence     complication due to uterine cancer    Urinary retention     caused by complications from uterine cancer     Past Surgical History:   Procedure Laterality Date    CYSTOSCOPY N/A 7/17/2019    CYSTOSCOPY BOTOX  INJECTION 200 UNITS performed by Caesar Russell DO at [facility] removed    HYSTERECTOMY  1985    [place] CYSTOURETHROSCOPY INJ CHEMODENERVATION BLADDER N/A 11/9/2018    CYSTOSCOPY  BOTOX INJECTION (200 UNITS) performed by Sammi Spence MD at [facility]:    Precautions: Activity as tolerated, falls and alarm , sacral wound  Social history: Patient lives with spouse   in a two story home bedroom and bathroom 2nd floor full flight of steps   with ?  to enter    [facility]  owned: Wheelchair, [address] [employer] and [employer] chair,       [address] [facility]   How much difficulty turning over in bed?: A Little  How much difficulty sitting down on / standing up from a chair with arms?: A Lot  How much difficulty moving from lying on back to sitting on side of bed?: A Lot  How much help from another person moving to and from a bed to a chair?: A Little  How much help from another person needed to walk in hospital room?: A Lot  How much help from another person for climbing 3-5 steps with a railing?: Total  AM-PAC Inpatient Mobility Raw Score : 13  AM-PAC Inpatient T-Scale Score : 36.74  Mobility Inpatient CMS 0-100% Score: 64.91  Mobility Inpatient CMS G-Code Modifier : CL    Nursing cleared patient for PT treatment. .      OBJECTIVE;   Initial Evaluation  Date: 8/3/2021 Treatment Date:  8/4/2021       Short Term/ Long Term   Goals   Was pt agreeable to Eval/treatment? Yes   yes To be met in 4 days   Pain level   10/10  perineum 10/10  tailbone    Bed Mobility    Rolling:  Moderate assist of 1    Supine to sit: Moderate assist of 1 side lying to avoid shear on buttocks d/t rash/ wounds  Sit to supine: Maximal assist of 1    Scooting: Maximal assist of 1   Rolling: Minimal assist of 1   Supine to sit: Moderate assist of 1   Sit to supine: Not assessed    Scooting: Maximal assist of 1    Rolling: Supervision     Supine to sit: Supervision     Sit to supine: Supervision Scooting: Supervision      Transfers Sit to stand: Maximal assist of 1   Sit to stand: Moderate assist of 1 Cues for hand placement and safety       Sit to stand: Minimal assist of 1     Ambulation    not assessed  3 feet to the chair;  2 x 4 feet using  wheeled walker with Moderate assist of 1   cues for sequencing, upright posture, walker approximation, increased base of support, increased step length, safety and proper hand placement    10 feet using  wheeled walker with Minimal assist of 1    ROM Within functional limits with exception of bilateral knees and ankles     Increase range of motion 10% of affected joints    Strength BUE:  refer to OT eval  RLE:  3-/5  LLE:  3-/5   Increase strength in affected mm groups by 1/3 grade   Balance Sitting EOB:  poor    Dynamic Standing:  poor   Sitting EOB: fair   Dynamic Standing: fair wheeled walker   Sitting EOB:  fair    Dynamic Standing: fair with wheeled walker      Patient is Alert & Oriented x person, place and situation and follows one step directions    Sensation:  Patient  denies numbness/tingling     Edema:  yes perineum    Endurance: poor         Patient education  Patient educated on role of Physical Therapy, risks of immobility, safety and plan of care      Patient response to education:   Pt verbalized understanding Pt demonstrated skill Pt requires further education in this area   Yes Partial Yes      Treatment:  Patient practiced and was instructed/facilitated in the following treatment: Patient assisted to edge of bed. Pt   Sat edge of bed 10 minutes with Minimal assist of 1 to increase dynamic sitting balance and activity tolerance. Pt stood, took steps to the chair, rested for 8 minutes, stood, ambulated 4 feet forwards/backwards to the chair.        Therapeutic Exercises:  not performed       At end of session, patient in chair with with chair alarm on and her  present call light and phone within reach,   all lines and tubes intact, nursing notified. Patient would benefit from continued skilled Physical Therapy to improve functional independence and quality of life. Patient's/ family goals   home        Time in 11:28  Time out  11:56    Total Treatment Time  28 minutes        CPT codes:    Therapeutic activities (90742)   28 minutes  2 unit(s)    Haris Marcos  [facility]  LIC # [id number]

## 2021-08-04 NOTE — PROGRESS NOTES
This aide gave the nurse Mohan Suarez a hand helping this patient back into bed. Upon adjusting the patient in bed this aide noticed the patient was extremely red and excoriated in the robby area. Upon further assessment patient was found to be red and excoriated from  front to back of the robby area. This aide gently but effectively cleaned the area with wipes and the no rinse cleanser. This aide then put patients ointment on robby area. RN notified of findings as well as charge nurse was notified and patient was reassessed by charge nurse.

## 2021-08-04 NOTE — FLOWSHEET NOTE
Inpatient Wound Care    Admit Date: 8/2/2021  6:37 PM    Reason for consult:  Perineal area    Significant history:    Past Medical History:   Diagnosis Date    Arthritis     right hand    Cancer ([facility]    uterus and cervix had radiation and implant    Diabetes mellitus ([facility]     Hyperlipidemia     Hypertension     Radiation cystitis     Urinary incontinence     complication due to uterine cancer    Urinary retention     caused by complications from uterine cancer       Wound history:      Findings:       08/04/21 1025   Wound 08/03/21 Buttocks Inner; Lower;Right red, raw swollen unmeasurable area; skin dry flaking blanchable   Date First Assessed/Time First Assessed: 08/03/21 0215   Present on Hospital Admission: Yes  Primary Wound Type: (c) Other (comment)  Location: Buttocks  Wound Location Orientation: Inner; Lower;Right  Wound Description (Comments): red, raw swollen unm. .. Wound Assessment Other (Comment)  (redness)   Wound 08/03/21 Groin Inner;Left;Right red rash groin extending to sacral area, unmeasurable swollen   Date First Assessed/Time First Assessed: 08/03/21 0215   Present on Hospital Admission: Yes  Primary Wound Type: (c) Other (comment)  Location: Groin  Wound Location Orientation: Inner;Left;Right  Wound Description (Comments): red rash groin extending. ..    Wound Assessment Other (Comment)  (red)   Drainage Amount   (weepy)   Odor None   Wound 08/04/21 Coccyx   Date First Assessed/Time First Assessed: 08/04/21 1028   Present on Hospital Admission: Yes  Primary Wound Type: Pressure Injury  Location: Coccyx   Wound Image    Wound Cleansed Cleansed with saline   Wound Length (cm) 1.5 cm   Wound Width (cm) 1.5 cm   Wound Surface Area (cm^2) 2.25 cm^2   Drainage Amount Small   Odor None     **Informed Consent**    The patient has given verbal consent to have photos taken of coccyx and inserted into their chart as part of their permanent medical record for purposes of documentation, treatment management and/or medical review. All Images taken on 8/4/21 of patient name: Waldwick Shack were transmitted and stored on secured [employer] located within [facility] by a registered [employer] Mobile Application Device.        Impression:  Incontinence dermatitis   Area very moist and red rash    Interventions in place:  Using Miconazol ointment 2x per day  inst re cleansing skin    Plan:  Cont antifugal  Pt has a waffle cushion under the coccyx        Rut Luna RN 8/4/2021 10:32 AM

## 2021-08-04 NOTE — PLAN OF CARE
Problem: Falls - Risk of:  Goal: Will remain free from falls  Description: Will remain free from falls  Outcome: Met This Shift     Problem: Falls - Risk of:  Goal: Absence of physical injury  Description: Absence of physical injury  Outcome: Met This Shift     Problem: Skin Integrity:  Goal: Will show no infection signs and symptoms  Description: Will show no infection signs and symptoms  Outcome: Met This Shift     Problem: Skin Integrity:  Goal: Absence of new skin breakdown  Description: Absence of new skin breakdown  Outcome: Met This Shift     Problem: Malnutrition  (NI-5.2)  Goal: Food and/or Nutrient Delivery  Description: Individualized approach for food/nutrient provision.   8/4/2021 1102 by Georgina Malave RD, LD  Outcome: Met This Shift

## 2021-08-04 NOTE — PROGRESS NOTES
8/4/2021 3:34 PM  Service: Urology  Group: [facility] (Drew[facility]Sourav[facility]a)    Jose Angel Cedeño  [id number]    Subjective: Up in chair  No new complaints  No BM  Urine yellow    Review of Systems  Unable to assess d/t mental status    Scheduled Meds:   bisacodyl  10 mg Rectal Once    cefepime  1,000 mg Intravenous Q12H    atorvastatin  20 mg Oral Daily    insulin lispro  0-6 Units Subcutaneous TID WC    insulin lispro  0-3 Units Subcutaneous Nightly    enoxaparin  40 mg Subcutaneous Daily    memantine  5 mg Oral BID    And    donepezil  10 mg Oral Nightly    vancomycin  750 mg Intravenous Q18H    fluconazole  400 mg Intravenous Q24H    miconazole nitrate   Topical BID       Objective:  Vitals:    08/04/21 1130   BP: (!) 111/52   Pulse: 83   Resp: 20   Temp: 98.7 °F (37.1 °C)   SpO2: 96%         Allergies: Tetracyclines & related    General Appearance: alert, frail appearing, no acute distress  Skin: no rash or erythema, small sacral wound  Head: normocephalic and atraumatic  Pulmonary/Chest:  No audible wheezes, rales or rhonchi, normal air movement, no respiratory distress and no chest wall tenderness  Abdomen: soft, non-tender, non-distended,  Genitourinary: genitals mildly edematous, obvious yeast infection   Extremities: no cyanosis, clubbing or edema  Musculoskeletal: shoulder discomfort      Labs:     Recent Labs     08/04/21  0542      K 4.4      CO2 24   BUN 17   CREATININE 0.9   GLUCOSE 114*   CALCIUM 9.1       Lab Results   Component Value Date    HGB 10.8 08/04/2021    HCT 34.4 08/04/2021     Results for Edinson Hopkins (MRN [id number]) as of 8/4/2021 15:40   Ref.  Range 8/2/2021 19:37   Color, UA Latest Ref Range: Straw/Yellow  Yellow   Clarity, UA Latest Ref Range: Clear  CLOUDY (A)   Glucose, UA Latest Ref Range: Negative mg/dL >=1000 (A)   Bilirubin, Urine Latest Ref Range: Negative  Negative   Ketones, Urine Latest Ref Range: Negative mg/dL Negative   Specific Gravity, UA Latest Ref

## 2021-08-04 NOTE — PROGRESS NOTES
[address] [facility]  [facility]  Progress Note    NAME: Lisa Rodriguez  MR:  [id number]  :   1950  DATE OF SERVICE:21    This is a face to face encounter with Lisa Rodriguez 66 y.o. female on 21    CHIEF COMPLAINT     ID following for   Chief Complaint   Patient presents with    Wound Check     coccyx wound few days,      HISTORY OF PRESENT ILLNESS   Pt seen and examined  21    present  Pt in chair  Pt feels better    cefepime (MAXIPIME) 1000 mg IVPB minibag, Q12H  vancomycin (VANCOCIN) 750 mg in dextrose 5 % 250 mL IVPB, Q18H  fluconazole (DIFLUCAN) in 0.9 % sodium chloride IVPB 400 mg, Q24H  miconazole nitrate 2 % ointment, BID    Patient is tolerating medications. No reported adverse drug reactions. REVIEW OF SYSTEMS     As stated above in the chief complaint, otherwise negative. CURRENT MEDICATIONS      bisacodyl  10 mg Rectal Once    cefepime  1,000 mg Intravenous Q12H    atorvastatin  20 mg Oral Daily    insulin lispro  0-6 Units Subcutaneous TID WC    insulin lispro  0-3 Units Subcutaneous Nightly    enoxaparin  40 mg Subcutaneous Daily    memantine  5 mg Oral BID    And    donepezil  10 mg Oral Nightly    vancomycin  750 mg Intravenous Q18H    fluconazole  400 mg Intravenous Q24H    miconazole nitrate   Topical BID     Continuous Infusions:   dextrose      sodium chloride 60 mL/hr at 21 0230    sodium chloride Stopped (21 2215)     PRN Meds:glucose, dextrose, glucagon (rDNA), dextrose, morphine, HYDROcodone 5 mg - acetaminophen, albuterol, ondansetron    PHYSICAL EXAM     BP (!) 115/54   Pulse 71   Temp 98.2 °F (36.8 °C) (Oral)   Resp 20   Ht 5' 4\" (1.626 m)   Wt 100 lb (45.4 kg)   LMP  (LMP Unknown)   SpO2 100%   BMI 17.16 kg/m²   Temp  Av.3 °F (36.8 °C)  Min: 97.9 °F (36.6 °C)  Max: 98.7 °F (37.1 °C)  Constitutional:  The patient is awake, alert, and oriented. thin  Skin:    Warm and dry. No rashes were noted. HEENT:   Round and reactive pupils. AT/NC  Neck:    Supple to movements. Chest:   No use of accessory muscles to breathe. Symmetrical expansion. Cardiovascular:  S1 and S2 are rhythmic and regular. No murmurs appreciated. Abdomen:   Positive bowel sounds to auscultation. Benign to palpation. Extremities:   No clubbing, no cyanosis, no edema. CNS    AAxO   Lines: piv      DIAGNOSTIC RESULTS   Radiology:    Recent Labs     08/02/21 1937 08/03/21 0924 08/04/21  0542   WBC 10.3 8.8 5.4   RBC 4.47 4.04 3.77   HGB 13.1 11.9 10.8*   HCT 40.0 36.4 34.4   MCV 89.5 90.1 91.2   MCH 29.3 29.5 28.6   MCHC 32.8 32.7 31.4*   RDW 12.7 12.7 13.0    228 183   MPV 10.8 11.0 10.6     Recent Labs     08/02/21 1937 08/03/21 0924 08/04/21  0542    135 139   K 4.9 4.1 4.4   CL 94* 100 107   CO2 24 22 24   BUN 28* 18 17   CREATININE 0.9 0.9 0.9   GLUCOSE 231* 158* 114*   PROT 7.1  --   --    LABALBU 3.8  --   --    CALCIUM 9.5 8.7 9.1   BILITOT 0.4  --   --    ALKPHOS 187*  --   --    AST 38*  --   --    ALT 40*  --   --      Lab Results   Component Value Date    CRP 0.5 (H) 08/03/2021     Lab Results   Component Value Date    SEDRATE 46 (H) 08/03/2021     Recent Labs     08/02/21 1937 08/03/21 0924   CRP  --  0.5*   AST 38*  --    ALT 40*  --    TRIG  --  93     Lab Results   Component Value Date    CHOL 114 08/03/2021    TRIG 93 08/03/2021    HDL 42 08/03/2021    LDLCALC 53 08/03/2021    LABVLDL 19 08/03/2021     No results found for: VITD25    Microbiology:   No results for input(s): COVID19 in the last 72 hours.   Lab Results   Component Value Date    BC 24 Hours no growth 08/02/2021    BC 5 Days- no growth 05/04/2018    BC 5 Days- no growth 09/29/2017    BLOODCULT2 24 Hours no growth 08/02/2021    BLOODCULT2 5 Days- no growth 05/04/2018    BLOODCULT2 5 Days- no growth 09/29/2017    ORG Escherichia coli 07/14/2020    ORG Escherichia coli 01/28/2020    ORG Escherichia coli 01/06/2020     WOUND/ABSCESS   Date Value Ref Range Status   08/02/2021   Preliminary    Growth present, evaluating for:  Gram negative rods         Recent Labs     08/02/21  2200   WNDABS Growth present, evaluating for:  Gram negative rods       Recent Labs     08/02/21  1937 08/02/21  2200   LABURIN Growth present, evaluating for:  Mixed gram negative rods   Growth present, evaluating for:  Gram negative rods          FINAL IMPRESSION    perirearea dermatitis  prob UTI   Candiduria   B hydronephrosis  -cont atbx  -await final cx    cefepime (MAXIPIME) 1000 mg IVPB minibag, Q12H  vancomycin (VANCOCIN) 750 mg in dextrose 5 % 250 mL IVPB, Q18H  fluconazole (DIFLUCAN) in 0.9 % sodium chloride IVPB 400 mg, Q24H  miconazole nitrate 2 % ointment, BID          · Monitor labs    Imaging and labs were reviewed per medical records. The patient was educated about the diagnosis, prognosis, indications, risks and benefits of treatment. An opportunity to ask questions was given to the patient/FAMILY. Thank you for involving me in the care of Yael Patches I will continue to follow. Please do not hesitate to call for any questions or concerns.     Electronically signed by Phani Eagle MD on 8/4/2021 at 7:05 PM

## 2021-08-04 NOTE — PROGRESS NOTES
Pharmacy Consultation Note  (Antibiotic Dosing and Monitoring)    Initial consult date: 8/3/2021   Consulting physician: Dr. Andrea Carreon  Drug(s): Vancomycin  Indication: SSTI    Ht Readings from Last 1 Encounters:   08/04/21 5' 4\" (1.626 m)     Wt Readings from Last 1 Encounters:   08/04/21 100 lb (45.4 kg)     Age/  Gender IBW DW  Allergy Information   66 y.o.   female n/a 45.4 kg  Tetracyclines & related          Date  Tmax WBC BUN/CR UOP  (mL/kg/hr) Drug/Dose Time   Given Level(s)   (Time) Comments   8/3  (#1) afebrile 8.8 18/0.9 -- Vancomycin 750 mg IV q18hr 0450  2200     8/4  (#2) afebrile 5.4 17/0.9 -- Vancomycin 750 mg IV q18hr <1600>       (#3)      <1000> Trough @ <0930> =       (#4)             (#5)             (#6)             (#7)             CrCl cannot be calculated (Unknown ideal weight.). UOP over the past 24 hours:       Intake/Output Summary (Last 24 hours) at 8/4/2021 1426  Last data filed at 8/4/2021 1011  Gross per 24 hour   Intake 480 ml   Output 2300 ml   Net -1820 ml       Other anti-infectives: Anti-infective Dose Date Initiated Date Stopped   Ceftriaxone 1g IV x 1 8/2 8/2   Clindamycin 600 mg IV x 1 8/3 8/3   Cefepime 1g IV q12hr 8/3    Fluconazole 400 mg q24hr 8/3      Cultures:  available culture and sensitivity results were reviewed in EPIC  Cultures sent and are pending. Culture Date Result    Blood cx 1 8/2 NGTD   Blood cx 2 8/2 NGTD               Assessment:  · Consulted by Dr. Andrea Carreon to dose/monitor vancomycin  · Goal trough level:  15-20 mcg/mL, AUC/CESAR:  400-600  · Pt is a 66 yoF who presented from home with sacral wound and UTI. · Serum creatinine today: 0.9 mg/dL; CrCl ~ 40 mL/min; baseline Scr ~ 0.8-0.9 mg/dL    Plan:  · Vancomycin 750 mg IV q18hr  · Trough tomorrow @ 0930.  Hold dose for level > 20 mcg/mL  · Follow renal function  · Pharmacist will follow and monitor/adjust dosing as necessary      Thank you for the consult,    Ana Del Valle, NavD, BCPS 8/4/2021 2:26 PM   Ext: [phone]

## 2021-08-04 NOTE — PROGRESS NOTES
Internal Medicine Progress Note    [facility]    Diogenes Woods. Kena Gama, NOLA Estrella D.O., NOLA Lacy Asa, D.O. Peyman Chisholm, MSN, APRN, NP-C  Dionisio Jones. Cecilia Marin, MSN, APRN-CNP     Primary Care Physician: Tommie Jimenez DO   Admitting Physician:  Isidra Watts DO  Admission date and time: 8/2/2021  6:37 PM    Room:  [address] [place] [address]  Admitting diagnosis: Sacral wound, initial encounter [phone]    Patient Name: Guerline Toro  MRN: [id number]    Date of Service: 8/4/2021     Subjective:  Amos Becker is a 66 y.o. female who was seen and examined today,8/4/2021, at the bedside. Amos Becker is much more animated during my examination today and states that she is feeling much better. No family members were present during my examination. She is tolerating antibiotic therapy. Her appetite is improving with increased oral intake. We discussed discharge planning to the nursing home facility tomorrow. Review of System:   Constitutional:   Denies fever or chills, weight loss or gain, fatigue or malaise. HEENT:   Denies ear pain, sore throat, sinus or eye problems. Cardiovascular:   Denies any chest pain, irregular heartbeats, or palpitations. Respiratory:   Denies shortness of breath, coughing, sputum production, hemoptysis, or wheezing. Gastrointestinal:   Denies nausea, vomiting, diarrhea, or constipation. Denies any abdominal pain. Genitourinary:    Denies any urgency, frequency, hematuria. Voiding  without difficulty. Extremities:   Denies lower extremity swelling, edema or cyanosis. Neurology:    Denies any headache or focal neurological deficits, admits to profound weakness and deconditioning. Psch:   Denies being anxious or depressed. Musculoskeletal:    Denies  myalgias, joint complaints or back pain. Integumentary:   Admits to the sacral wound. Hematologic/Lymphatic:  Denies bruising or bleeding.     Physical Exam:  No intake/output data recorded. Intake/Output Summary (Last 24 hours) at 8/4/2021 0941  Last data filed at 8/4/2021 7419  Gross per 24 hour   Intake 245 ml   Output 2300 ml   Net -2055 ml   I/O last 3 completed shifts: In: 245 [P.O.:245]  Out: 2300 [Urine:2300]  Patient Vitals for the past 96 hrs (Last 3 readings):   Weight   08/02/21 1710 96 lb (43.5 kg)     Vital Signs:   Blood pressure (!) 144/65, pulse 79, temperature 97.9 °F (36.6 °C), temperature source Oral, resp. rate 18, height 5' 4\" (1.626 m), weight 96 lb (43.5 kg), SpO2 96 %, not currently breastfeeding. General appearance:  Alert, responsive, oriented to person, place, and time. Well preserved, alert, no distress. Head:  Normocephalic. No masses, lesions or tenderness. Eyes:  PERRLA. EOMI. Sclera clear. Buccal mucosa moist.  ENT:  Ears normal. Mucosa normal.  Neck:    Supple. Trachea midline. No thyromegaly. No JVD. No bruits. Heart:    Rhythm regular. Rate controlled. No murmurs. Lungs:    Symmetrical. Clear to auscultation bilaterally. No wheezes. No rhonchi. No rales. Abdomen:   Soft. Non-tender. Non-distended. Bowel sounds positive. No organomegaly or masses. No pain on palpation. Extremities:    Peripheral pulses present. No peripheral edema. No ulcers. No cyanosis. No clubbing. Neurologic:    Alert x 3. No focal deficit. Cranial nerves grossly intact. Extensive weakness  Psych:   Behavior is normal. Mood appears normal. Speech is not rapid and/or pressured. Musculoskeletal:   Spine ROM normal. Muscular strength intact. Gait not assessed. Integumentary:  Sacral wound is dressed  Genitalia/Breast:  Already with use of a catheter.     Medication:  Scheduled Meds:   cefepime  1,000 mg Intravenous Q12H    atorvastatin  20 mg Oral Daily    insulin lispro  0-6 Units Subcutaneous TID WC    insulin lispro  0-3 Units Subcutaneous Nightly    enoxaparin  40 mg Subcutaneous Daily    memantine  5 mg Oral BID    And    donepezil  10 mg Oral Nightly    vancomycin  750 mg Intravenous Q18H    fluconazole  400 mg Intravenous Q24H    miconazole nitrate   Topical BID     Continuous Infusions:   dextrose      sodium chloride 60 mL/hr at 08/04/21 0230    sodium chloride Stopped (08/03/21 2215)       Objective Data:  CBC with Differential:    Lab Results   Component Value Date    WBC 5.4 08/04/2021    RBC 3.77 08/04/2021    HGB 10.8 08/04/2021    HCT 34.4 08/04/2021     08/04/2021    MCV 91.2 08/04/2021    MCH 28.6 08/04/2021    MCHC 31.4 08/04/2021    RDW 13.0 08/04/2021    SEGSPCT 61 09/01/2012    LYMPHOPCT 31.9 08/04/2021    MONOPCT 8.8 08/04/2021    BASOPCT 0.2 08/04/2021    MONOSABS 0.48 08/04/2021    LYMPHSABS 1.73 08/04/2021    EOSABS 0.09 08/04/2021    BASOSABS 0.01 08/04/2021     BMP:    Lab Results   Component Value Date     08/04/2021    K 4.4 08/04/2021    K 4.9 08/02/2021     08/04/2021    CO2 24 08/04/2021    BUN 17 08/04/2021    LABALBU 3.8 08/02/2021    CREATININE 0.9 08/04/2021    CALCIUM 9.1 08/04/2021    GFRAA >60 08/04/2021    LABGLOM >60 08/04/2021    GLUCOSE 114 08/04/2021       Wound Documentation:   Wound 08/03/21 Buttocks Inner; Lower;Right red, raw swollen unmeasurable area; skin dry flaking blanchable (Active)   Wound Assessment Dry;Pink/red 08/03/21 0730   Drainage Amount None 08/03/21 0730   Odor None 08/03/21 0730   Number of days: 1       Wound 08/03/21 Groin Inner;Left;Right red rash groin extending to sacral area, unmeasurable swollen (Active)   Wound Assessment Pink/red 08/03/21 0730   Drainage Amount None 08/03/21 0730   Odor None 08/03/21 0730   Number of days: 1       Assessment:  1. Large sacral wound with infection in the setting of bedbound status and immobility  2. Recurrent urinary tract infection in the setting of radiation cystitis complicated by Jardiance usage as an outpatient  3. Radiographic evidence of bilateral hydronephrosis but without renal dysfunction  4.  Severe protein calorie malnutrition  5. Non-insulin-dependent diabetes mellitus type 2  6. Essential hypertension  7. History of uterine and cervical cancer with history of radiation    Plan:   Urologic recommendations have been reviewed with no plans for any intervention. Patient continues to receive antibiotics for coverage of the sacral wound as well as the urinary tract infection. I have discussed the case with the wound care team with recommendations for ongoing localized wound care. The patient's diet is being advanced. She is agreeable to skilled nursing facility placement and I anticipate discharging the patient to the nursing home facility tomorrow. Continue current therapy. See orders for further plan of care. More than 50% of my  time was spent at the bedside counseling/coordinating care with the patient and/or family with face to face contact. This time was spent reviewing notes and laboratory data as well as instructing and counseling the patient. Time I spent with the family or surrogate(s) is included only if the patient was incapable of providing the necessary information or participating in medical decisions. I also discussed the differential diagnosis and all of the proposed management plans with the patient and individuals accompanying the patient. Vandana Sharma requires this high level of physician care and nursing on the [facility]/Telemetry unit due the complexity of decision management and chance of rapid decline or death. Continued cardiac monitoring and higher level of nursing are required. I am readily available for any further decision-making and intervention.      Alana Arellano DO, F.A.C.O.I.  8/4/2021  9:41 AM

## 2021-08-04 NOTE — PROGRESS NOTES
Comprehensive Nutrition Assessment    Type and Reason for Visit:  Initial, Positive Nutrition Screen    Nutrition Recommendations/Plan: Continue with diet and start ONS BID Robert, Glucerna BID    Nutrition Assessment:  Pt admits for wound check: pt w/ Multiple wounds(Lg sacral wound infection) w/ PMH of HTN, HLD, DM, CA cervix/uterine. Pt is tolerating diet w/ good p.o. intakes however pt meets criteria for Severe Malnutrition 2/2 severe fat/muscle wasting. Will start ONS BID to promote wound healing, increase kcal/protein to meet optimal nutritional needs. Malnutrition Assessment:  Malnutrition Status:  Severe malnutrition    Context:  Chronic Illness     Findings of the 6 clinical characteristics of malnutrition:  Energy Intake:  Mild decrease in energy intake (Comment)  Weight Loss:  No significant weight loss     Body Fat Loss:  7 - Severe body fat loss Orbital, Triceps   Muscle Mass Loss:  7 - Severe muscle mass loss Temples (temporalis), Clavicles (pectoralis & deltoids), Thigh (quadraceps), Calf (gastrocnemius), Scapula (trapezius)  Fluid Accumulation:  No significant fluid accumulation     Strength:  Not Performed    Estimated Daily Nutrient Needs:  Energy (kcal):  1373-4853; Weight Used for Energy Requirements:  Current     Protein (g):  70-80 (x1.5-1.7gm/kg); Weight Used for Protein Requirements:  Current        Fluid (ml/day):  4248-0953; Method Used for Fluid Requirements:  1 ml/kcal      Nutrition Related Findings:  Pt is alert, Pleasantly confused, -I/O -1.5L, no edema, abd WDL, +BS      Wounds:  Multiple, Pressure Injury, Wound Consult Pending       Current Nutrition Therapies:    ADULT DIET; Regular; 4 carb choices (60 gm/meal)  Adult Oral Nutrition Supplement;  Wound Healing Oral Supplement  Adult Oral Nutrition Supplement; Diabetic Oral Supplement    Anthropometric Measures:  · Height: 5' 4\" (162.6 cm)  · Current Body Weight: 100 lb (45.4 kg) (8/4 bed scale)   · Admission Body Weight: 96

## 2021-08-04 NOTE — DISCHARGE INSTR - COC
Continuity of Care Form    Patient Name: Ladi Philippe   :  1950  MRN:  [id number]    Admit date:  2021  Discharge date:  21    Code Status Order: Full Code   Advance Directives:      Admitting Physician:  Shanita Martinez DO  PCP: Ga Hi DO    Discharging Nurse: [facility] Unit/Room#: 3308/0107-75  Discharging Unit Phone Number: [phone]    Emergency Contact:   Extended Emergency Contact Information  Primary Emergency Contact: Gibson Lombard  Address: [place] [address], [address] Phone: [phone]  Mobile Phone: [phone]  Relation: Spouse   needed? No  Secondary Emergency Contact: Jerome Hudson of [address] [place] [address] Phone: [phone]  Mobile Phone: [phone]  Relation: Child   needed?  No    Past Surgical History:  Past Surgical History:   Procedure Laterality Date    CYSTOSCOPY N/A 2019    CYSTOSCOPY BOTOX  INJECTION 200 UNITS performed by Pk Russell DO at [facility] removed    HYSTERECTOMY  1985    UT CYSTOURETHROSCOPY INJ CHEMODENERVATION BLADDER N/A 2018    CYSTOSCOPY  BOTOX INJECTION (200 UNITS) performed by Sabina Yan MD at [address]       Immunization History:   Immunization History   Administered Date(s) Administered    Influenza A (Q7Y4-34) Vaccine PF IM 2009    Influenza, High Dose (Fluzone 65 yrs and older) 10/14/2015, 2016, 10/31/2017, 2018, 10/11/2019, 2020    Pneumococcal Conjugate 13-valent Fátima Wilmet[place]) 2016, 2017    Pneumococcal Polysaccharide ([address]) 2019       Active Problems:  Patient Active Problem List   Diagnosis Code    Sepsis secondary to UTI ([address] A41.9    Post-operative pain G89.18    H/O total hysterectomy Z90.710    Essential hypertension I10    Type 2 diabetes mellitus with hyperglycemia, without long-term current use of insulin (HCC) E11.65 Other hyperlipidemia E78.49    Sacral wound S31.000A    Sacral wound, initial encounter S31.000A    Acute cystitis with hematuria N30.01    Severe protein-calorie malnutrition ([facility] [address] E43       Isolation/Infection:   Isolation            No Isolation          Patient Infection Status       Infection Onset Added Last Indicated Last Indicated By Review Planned Expiration Resolved Resolved By    None active    Resolved    ESBL (Extended Spectrum Beta Lactamase) 07/25/19 07/27/19 08/02/19 Urine Culture   01/08/20 Monica Cox RN    E Coli Urine 8/219    ESBL (Extended Spectrum Beta Lactamase) 04/24/19 04/26/19 04/24/19 Urine Culture   07/15/19 Monica Cox RN    Resolved- no growth-5/9/19  E Coli Urine 4/24/19            Nurse Assessment:  Last Vital Signs: BP (!) 111/52 Comment: RN notified  Pulse 83   Temp 98.7 °F (37.1 °C) (Oral)   Resp 20   Ht 5' 4\" (1.626 m)   Wt 100 lb (45.4 kg)   LMP  (LMP Unknown)   SpO2 96%   BMI 17.16 kg/m²     Last documented pain score (0-10 scale): Pain Level: 0  Last Weight:   Wt Readings from Last 1 Encounters:   08/04/21 100 lb (45.4 kg)     Mental Status:  disoriented, oriented, and alert    IV Access:  - None    Nursing Mobility/ADLs:  Walking   Assisted  Transfer  Assisted  Bathing  Assisted  Dressing  Assisted  Toileting  Dependent  Feeding  Assisted  Med Admin  Assisted  Med Delivery   whole    Wound Care Documentation and Therapy:  Wound 08/03/21 Buttocks Inner; Lower;Right red, raw swollen unmeasurable area; skin dry flaking blanchable (Active)   Dressing/Treatment Open to air; Pharmaceutical agent (see MAR) 08/04/21 0730   Wound Assessment Other (Comment) 08/04/21 1025   Drainage Amount None 08/04/21 0730   Odor None 08/03/21 0730   Number of days: 1       Wound 08/03/21 Groin Inner;Left;Right red rash groin extending to sacral area, unmeasurable swollen (Active)   Dressing/Treatment Open to air; Pharmaceutical agent (see MAR) 08/04/21 0730   Wound Assessment Other Assessment Score:  Readmission Risk              Risk of Unplanned Readmission:  13           Discharging to Facility/ Agency   Name:  [facility]   Address: [address] [place] [address] [place]  Phone: [phone]  Fax:  [phone]    Dialysis Facility (if applicable)   Name:  Address:  Dialysis Schedule:  Phone:  Fax:    / signature: Electronically signed by DALI Jung on 8/4/21 at 2:35 PM EDT    PHYSICIAN SECTION    Prognosis: Good    Condition at Discharge: Stable    Rehab Potential (if transferring to Rehab): Good    Recommended Labs or Other Treatments After Discharge: ***    Physician Certification: I certify the above information and transfer of Serene Alston  is necessary for the continuing treatment of the diagnosis listed and that she requires [facility] for less 30 days.      Update Admission H&P: {CHP DME Changes in FJTRY:[id number]}    PHYSICIAN SIGNATURE:  Electronically signed by Simba Rodriguez DO on 8/5/21 at 9:25 AM EDT

## 2021-08-04 NOTE — CARE COORDINATION
Ss note:8/4/2021.12:20 PM No covid testing. None required for [facility] Pt accepted and PRECERT is pending. Family to bring Ozempic from home. Hens completed, need signed HERLINDA. Anticipate discharge tomorrow per physician.  DALI Quinones

## 2021-08-04 NOTE — CARE COORDINATION
Ss note:8/4/2021.2:28 PM No covid testing, none required for [facility] Family relays pt has been vaccinated. Per liaison Tadeo Lacy has been obtained for SNF placement, charge nurse aware. SW update pt and spouse of possible discharge tomorrow per physician. Hens completed, will need signed HERLINDA.  DALI Benavidez

## 2021-08-05 VITALS
BODY MASS INDEX: 17.26 KG/M2 | TEMPERATURE: 98 F | DIASTOLIC BLOOD PRESSURE: 70 MMHG | WEIGHT: 101.1 LBS | HEIGHT: 64 IN | OXYGEN SATURATION: 96 % | HEART RATE: 80 BPM | RESPIRATION RATE: 18 BRPM | SYSTOLIC BLOOD PRESSURE: 174 MMHG

## 2021-08-05 LAB
ANION GAP SERPL CALCULATED.3IONS-SCNC: 7 MMOL/L (ref 7–16)
BASOPHILS ABSOLUTE: 0.03 E9/L (ref 0–0.2)
BASOPHILS RELATIVE PERCENT: 0.6 % (ref 0–2)
BUN BLDV-MCNC: 15 MG/DL (ref 6–23)
CALCIUM SERPL-MCNC: 8.7 MG/DL (ref 8.6–10.2)
CHLORIDE BLD-SCNC: 109 MMOL/L (ref 98–107)
CO2: 22 MMOL/L (ref 22–29)
CREAT SERPL-MCNC: 0.7 MG/DL (ref 0.5–1)
EOSINOPHILS ABSOLUTE: 0.1 E9/L (ref 0.05–0.5)
EOSINOPHILS RELATIVE PERCENT: 2 % (ref 0–6)
FOLATE: >20 NG/ML (ref 4.8–24.2)
GFR AFRICAN AMERICAN: >60
GFR NON-AFRICAN AMERICAN: >60 ML/MIN/1.73
GLUCOSE BLD-MCNC: 118 MG/DL (ref 74–99)
HCT VFR BLD CALC: 31.7 % (ref 34–48)
HEMOGLOBIN: 10.2 G/DL (ref 11.5–15.5)
IMMATURE GRANULOCYTES #: 0.01 E9/L
IMMATURE GRANULOCYTES %: 0.2 % (ref 0–5)
LYMPHOCYTES ABSOLUTE: 1.87 E9/L (ref 1.5–4)
LYMPHOCYTES RELATIVE PERCENT: 37.5 % (ref 20–42)
MCH RBC QN AUTO: 29.7 PG (ref 26–35)
MCHC RBC AUTO-ENTMCNC: 32.2 % (ref 32–34.5)
MCV RBC AUTO: 92.2 FL (ref 80–99.9)
METER GLUCOSE: 117 MG/DL (ref 74–99)
METER GLUCOSE: 331 MG/DL (ref 74–99)
MONOCYTES ABSOLUTE: 0.47 E9/L (ref 0.1–0.95)
MONOCYTES RELATIVE PERCENT: 9.4 % (ref 2–12)
NEUTROPHILS ABSOLUTE: 2.51 E9/L (ref 1.8–7.3)
NEUTROPHILS RELATIVE PERCENT: 50.3 % (ref 43–80)
PDW BLD-RTO: 13.2 FL (ref 11.5–15)
PLATELET # BLD: 151 E9/L (ref 130–450)
PMV BLD AUTO: 10.9 FL (ref 7–12)
POTASSIUM SERPL-SCNC: 4.3 MMOL/L (ref 3.5–5)
RBC # BLD: 3.44 E12/L (ref 3.5–5.5)
SODIUM BLD-SCNC: 138 MMOL/L (ref 132–146)
VITAMIN B-12: 483 PG/ML (ref 211–946)
WBC # BLD: 5 E9/L (ref 4.5–11.5)
WOUND/ABSCESS: NORMAL

## 2021-08-05 PROCEDURE — 36415 COLL VENOUS BLD VENIPUNCTURE: CPT

## 2021-08-05 PROCEDURE — 86592 SYPHILIS TEST NON-TREP QUAL: CPT

## 2021-08-05 PROCEDURE — 97530 THERAPEUTIC ACTIVITIES: CPT

## 2021-08-05 PROCEDURE — 2580000003 HC RX 258: Performed by: INTERNAL MEDICINE

## 2021-08-05 PROCEDURE — 6370000000 HC RX 637 (ALT 250 FOR IP): Performed by: INTERNAL MEDICINE

## 2021-08-05 PROCEDURE — 6360000002 HC RX W HCPCS: Performed by: INTERNAL MEDICINE

## 2021-08-05 PROCEDURE — 6370000000 HC RX 637 (ALT 250 FOR IP): Performed by: SPECIALIST

## 2021-08-05 PROCEDURE — 82962 GLUCOSE BLOOD TEST: CPT

## 2021-08-05 PROCEDURE — 82746 ASSAY OF FOLIC ACID SERUM: CPT

## 2021-08-05 PROCEDURE — 80048 BASIC METABOLIC PNL TOTAL CA: CPT

## 2021-08-05 PROCEDURE — 85025 COMPLETE CBC W/AUTO DIFF WBC: CPT

## 2021-08-05 PROCEDURE — 82607 VITAMIN B-12: CPT

## 2021-08-05 RX ORDER — HYDROCODONE BITARTRATE AND ACETAMINOPHEN 5; 325 MG/1; MG/1
1 TABLET ORAL EVERY 6 HOURS PRN
Qty: 10 TABLET | Refills: 0 | Status: SHIPPED | OUTPATIENT
Start: 2021-08-05 | End: 2021-08-08

## 2021-08-05 RX ORDER — FLUCONAZOLE 100 MG/1
200 TABLET ORAL DAILY
Status: DISCONTINUED | OUTPATIENT
Start: 2021-08-05 | End: 2021-08-05 | Stop reason: HOSPADM

## 2021-08-05 RX ORDER — CIPROFLOXACIN 500 MG/1
500 TABLET, FILM COATED ORAL EVERY 12 HOURS SCHEDULED
Qty: 14 TABLET | Refills: 0 | Status: SHIPPED | OUTPATIENT
Start: 2021-08-05 | End: 2021-08-12

## 2021-08-05 RX ORDER — CIPROFLOXACIN 500 MG/1
500 TABLET, FILM COATED ORAL EVERY 12 HOURS SCHEDULED
Status: DISCONTINUED | OUTPATIENT
Start: 2021-08-05 | End: 2021-08-05 | Stop reason: HOSPADM

## 2021-08-05 RX ORDER — FLUCONAZOLE 200 MG/1
200 TABLET ORAL DAILY
Qty: 14 TABLET | Refills: 0 | Status: SHIPPED | OUTPATIENT
Start: 2021-08-05 | End: 2021-08-19

## 2021-08-05 RX ADMIN — MEMANTINE HYDROCHLORIDE 5 MG: 10 TABLET ORAL at 09:24

## 2021-08-05 RX ADMIN — INSULIN LISPRO 4 UNITS: 100 INJECTION, SOLUTION INTRAVENOUS; SUBCUTANEOUS at 12:47

## 2021-08-05 RX ADMIN — ENOXAPARIN SODIUM 40 MG: 40 INJECTION SUBCUTANEOUS at 09:24

## 2021-08-05 RX ADMIN — CEFEPIME HYDROCHLORIDE 1000 MG: 1 INJECTION, POWDER, FOR SOLUTION INTRAMUSCULAR; INTRAVENOUS at 02:25

## 2021-08-05 RX ADMIN — ATORVASTATIN CALCIUM 20 MG: 20 TABLET, FILM COATED ORAL at 09:24

## 2021-08-05 RX ADMIN — FLUCONAZOLE 200 MG: 100 TABLET ORAL at 11:00

## 2021-08-05 RX ADMIN — CIPROFLOXACIN 500 MG: 500 TABLET, FILM COATED ORAL at 11:00

## 2021-08-05 RX ADMIN — VANCOMYCIN HYDROCHLORIDE 750 MG: 10 INJECTION, POWDER, LYOPHILIZED, FOR SOLUTION INTRAVENOUS at 09:26

## 2021-08-05 RX ADMIN — MICONAZOLE NITRATE: 2 OINTMENT TOPICAL at 09:25

## 2021-08-05 ASSESSMENT — PAIN SCALES - GENERAL: PAINLEVEL_OUTOF10: 0

## 2021-08-05 NOTE — CARE COORDINATION
Ss note:8/5/2021.10:13 AM Discharge order noted. Arranged Physician Ambulance transfer to [facility].. (precert obtained) today at 2:30 pm, pt has sacral wound and confusion. Facility liaison, nursing, spouse aware. Hens completed.  DALI Woodward

## 2021-08-05 NOTE — DISCHARGE SUMMARY
Internal Medicine Progress Note     [facility]     Silvana Gill. CAPRICE DeOAnniaIAnnia Bassett D.O., JAROCHO Urena, MSN, APRN, NP-C  Harman Carter. Latasha Dennis, MSN, [id number] [facility]       Internal Medicine  Discharge Summary    NAME: Iesha Damian  :  1950  MRN:  [id number]  PCP:Ravi Bates DO  ADMITTED: 2021      DISCHARGED: 21    ADMITTING PHYSICIAN: Abbie Magana DO    CONSULTANT(S):   IP CONSULT TO INFECTIOUS DISEASES  IP CONSULT TO PHARMACY  IP CONSULT TO UROLOGY     ADMITTING DIAGNOSIS:   Sacral wound, initial encounter [V49.525M]     DISCHARGE DIAGNOSES:   1. Sacral wound with infection in the setting of bedbound status and immobility  2. Recurrent urinary tract infection in the setting of radiation cystitis complicated by Jardiance usage as an outpatient  3. Radiographic evidence of bilateral hydronephrosis but without renal dysfunction  4. Severe protein calorie malnutrition  5. Non-insulin-dependent diabetes mellitus type 2  6. Essential hypertension  7. History of uterine and cervical cancer with history of radiation    BRIEF HISTORY OF PRESENT ILLNESS:   Lyudmila Britt is a polite and pleasantly confused 69-year-old female who presented to the emergency department last evening for the evaluation of a progressive sacral wound. No family members were present during my examination but in my discussion with the emergency department physician, the patient has developed increased immobility and is essentially bedbound. This is resulted in a sacral wound that has progressed. Work-up in the emergency department also revealed a urinary tract infection which is chronic in nature in the setting of radiation cystitis with previous history of cervical/uterine cancer. She denies any overt pain or discomfort during my examination. She appears chronically debilitated.     LABS[de-identified]  Lab Results   Component Value Date    WBC 5.0 08/05/2021    HGB 10.2 (L) 08/05/2021    HCT 31.7 (L) 08/05/2021     08/05/2021     08/05/2021    K 4.3 08/05/2021     (H) 08/05/2021    CREATININE 0.7 08/05/2021    BUN 15 08/05/2021    CO2 22 08/05/2021    GLUCOSE 118 (H) 08/05/2021    ALT 40 (H) 08/02/2021    AST 38 (H) 08/02/2021     No results found for: INR, PROTIME   Lab Results   Component Value Date    TSH 0.746 08/03/2021     Lab Results   Component Value Date    TRIG 93 08/03/2021    TRIG 109 03/05/2021    TRIG 102 01/18/2021     Lab Results   Component Value Date    HDL 42 08/03/2021    HDL 49 03/05/2021    HDL 47 01/18/2021     Lab Results   Component Value Date    LDLCALC 53 08/03/2021    LDLCALC 63 03/05/2021    LDLCALC 49 01/18/2021     Lab Results   Component Value Date    LABA1C 6.6 (H) 08/03/2021       IMAGING:  CT ABDOMEN PELVIS W IV CONTRAST Additional Contrast? None    Result Date: 8/3/2021  EXAMINATION: CT OF THE ABDOMEN AND PELVIS WITH CONTRAST 8/2/2021 9:31 pm TECHNIQUE: CT of the abdomen and pelvis was performed with the administration of intravenous contrast. Multiplanar reformatted images are provided for review. Dose modulation, iterative reconstruction, and/or weight based adjustment of the mA/kV was utilized to reduce the radiation dose to as low as reasonably achievable. COMPARISON: September 2017 HISTORY: ORDERING SYSTEM PROVIDED HISTORY: sacral wound, increased pain TECHNOLOGIST PROVIDED HISTORY: Reason for exam:->sacral wound, increased pain Additional Contrast?->None Decision Support Exception - unselect if not a suspected or confirmed emergency medical condition->Emergency Medical Condition (MA) FINDINGS: Lower Chest: The lung bases are clear. Organs: The liver, spleen, adrenals, pancreas and gallbladder are within normal limits. Parapelvic right renal cysts, also present on the prior examination. Moderate hydronephrosis bilaterally, not present on the prior examination.  No evidence of renal or ureteral calculi. Follow-up evaluation with additional delayed images would be helpful in further evaluation of the renal collecting systems and ureters. Mild dilatation of the proximal right ureter. No gross hydroureter distally. GI/Bowel: No bowel obstruction or free intraperitoneal air. Large amount of stool in the colon which may reflect constipation. No evidence of colitis or diverticulitis. Pelvis: Multiple urinary bladder stones, not present on the prior examination. Thickening of the walls of the urinary bladder which may represent cystitis. Urothelial mass cannot be excluded. The urinary bladder is incompletely distended. Patient is status post hysterectomy. There is air within the urinary bladder which may be secondary to prior instrumentation. Fluid and air or so seen in the vagina. Vaginal vesicle fistula cannot be excluded in the right clinical setting. Clinical correlation recommended. There is mild loss of the subcutaneous fat dorsal to the lower sacrum and coccyx, this is nonspecific and could represent mild cellulitis. There is no disruption of the retro sacral soft tissue planes or ulceration. Peritoneum/Retroperitoneum: Atherosclerotic calcification along the abdominal aorta with no evidence of aneurysmal dilatation. Nonspecific retroperitoneal nodes. Bones/Soft Tissues: No acute bony pathology. Moderate hydronephrosis bilaterally, not present on the prior examination. No evidence of renal or ureteral calculi. Follow-up evaluation with additional delayed images would be helpful in further assessment of the renal collecting systems and ureters. Mild dilatation of the proximal right ureter and no gross distal hydroureter. Multiple urinary bladder stones, not present on the prior examination. Thickening of the walls of the urinary bladder which may represent cystitis. Urothelial mass cannot be excluded. Air within the urinary bladder, which may be secondary to prior instrumentation.   Fluid and air also seen in the vagina. Vesico vaginal fistula cannot be excluded. Clinical correlation recommended. Mild loss of the subcutaneous fat dorsal to the lower sacrum and coccyx is nonspecific and which could represent mild cellulitis. No disruption of the retro sacral soft tissue planes or ulceration. US RETROPERITONEAL COMPLETE    Result Date: 8/3/2021  EXAMINATION: RETROPERITONEAL ULTRASOUND OF THE KIDNEYS AND URINARY BLADDER 8/3/2021 COMPARISON: None HISTORY: ORDERING SYSTEM PROVIDED HISTORY: [place] on CT TECHNOLOGIST PROVIDED HISTORY: Reason for exam:->Hydro on CT What reading provider will be dictating this exam?->CRC FINDINGS: Kidneys: The right kidney measures 8.0 cm in length and the left kidney measures 10.0 cm in length. Kidneys demonstrate normal cortical echogenicity. Bilateral moderate hydronephrosis. No calculi demonstrated. . Bladder: Kaplan catheter in a decompressed urinary bladder. Bilateral moderate hydronephrosis. No calculi. HOSPITAL COURSE:   Amos Becker did very well throughout the hospitalization. She was found to be suffering from recurrent urinary tract infection in the setting of known radiation cystitis. She was also found to be suffering from a sacral wound with a large fungal component in the setting of incontinence. As CT scan indicated bilateral hydronephrosis, the urology team did also provide consultation. As creatinine remained stable, there were no plans for further intervention. Urologic medications have been adjusted. She was placed empirically on IV antibiotic therapy and was evaluated by the infectious disease team.  Anti-infective therapy will be transitioned to oral including antifungal therapy. Urena [place] has been discontinued permanently and should not be restarted on this patient in the setting of her known tendency to develop urinary tract infections. Cardiac medications have been adjusted as well. She worked with the therapy teams.   She was deemed acceptable for discharge to the nursing home facility for ongoing rehabilitation. Her  was updated at the bedside today. BRIEF PHYSICAL EXAMINATION AND LABORATORIES ON DAY OF DISCHARGE:  VITALS:  BP (!) 174/70   Pulse 80   Temp 98 °F (36.7 °C) (Oral)   Resp 18   Ht 5' 4\" (1.626 m)   Wt 101 lb 1.6 oz (45.9 kg)   LMP  (LMP Unknown)   SpO2 96%   BMI 17.35 kg/m²     General appearance:  Alert, responsive, oriented to person, place, and time. Well preserved, alert, no distress. Head:  Normocephalic. No masses, lesions or tenderness. Eyes:  PERRLA. EOMI. Sclera clear. Buccal mucosa moist.  ENT:  Ears normal. Mucosa normal.  Neck:    Supple. Trachea midline. No thyromegaly. No JVD. No bruits. Heart:    Rhythm regular. Rate controlled. No murmurs. Lungs:    Symmetrical. Clear to auscultation bilaterally. No wheezes. No rhonchi. No rales. Abdomen:   Soft. Non-tender. Non-distended. Bowel sounds positive. No organomegaly or masses. No pain on palpation. Extremities:    Peripheral pulses present. No peripheral edema. No ulcers. No cyanosis. No clubbing. Neurologic:    Alert x 3. No focal deficit. Cranial nerves grossly intact. Extensive weakness  Psych:   Behavior is normal. Mood appears normal. Speech is not rapid and/or pressured. Musculoskeletal:   Spine ROM normal. Muscular strength intact. Gait not assessed. Integumentary:  Sacral wound is dressed  Genitalia/Breast:  Intermittent incontinence with inability to place Ka[place]      DISPOSITION:  The patient's condition is good. At this time the patient is without objective evidence of an acute process requiring continuing hospitalization or inpatient management. They are stable for discharge with outpatient follow-up. I have spoken with the patient and discussed the results of the current hospitalization, in addition to providing specific details for the plan of care and counseling regarding the diagnosis and prognosis.   The plan has been discussed in detail and they are aware of the specific conditions for emergent return, as well as the importance of follow-up. Their questions are answered at this time and they are agreeable with the plan for discharge to nursing home    DISCHARGE MEDICATIONS:    Lyriciglesialloyd Poster   Home Medication Instructions BGE:[id number]    Printed on:08/05/21 3571   Medication Information                      atorvastatin (LIPITOR) 20 MG tablet  TAKE 1 TABLET BY MOUTH EVERY DAY             HYDROcodone-acetaminophen (NORCO) 5-325 MG per tablet  Take 1 tablet by mouth every 6 hours as needed for Pain for up to 3 days. insulin lispro (HUMALOG) 100 UNIT/ML injection vial  Inject 0-6 Units into the skin 3 times daily (with meals)             Memantine HCl-Donepezil HCl (NAMZARIC) 14-10 MG CP24  Take 14 mg by mouth every morning (before breakfast)             metFORMIN (GLUCOPHAGE) 1000 MG tablet  TAKE 1 TABLET BY MOUTH TWICE A DAY WITH MEALS             miconazole nitrate 2 % OINT  Apply topically 2 times daily             Semaglutide,0.25 or 0.5MG/DOS, (OZEMPIC, 0.25 OR 0.5 MG/DOSE,) 2 MG/1.5ML SOPN  Inject 0.5 mLs into the skin once a week               Antibiotics will be administered as per the infectious disease team.    FOLLOW UP/INSTRUCTIONS:  · This patient is instructed to follow-up with her primary care physician. · Patient is instructed to follow-up with the consults listed above as directed by them. · she is instructed to resume home medications and take new medications as indicated in the list above. · If the patient has a recurrence of symptoms, she is instructed to go to the ED. Preparing for this patient's discharge, including paperwork, orders, instructions, and meeting with patient did require > 40 minutes.     Niurka Aly DO   8/5/2021  9:25 AM

## 2021-08-05 NOTE — PROGRESS NOTES
Attempted to call [facility] twice for nurse to nurse. No one picking up phone at the nurses station.

## 2021-08-05 NOTE — DISCHARGE INSTR - DIET

## 2021-08-05 NOTE — PROGRESS NOTES
Physical Therapy    Physical Therapy Treatment Note/Plan of Care    Room #:  5628/2428-04  Patient Name: Jose Angel Cedeño  YOB: 1950  MRN: [id number]    Date of Service: 8/5/2021      Tentative placement recommendation: Subacute rehab    Equipment recommendation: To be determined      Evaluating Physical Therapist: Caryle Bevel, [place]  #[id number]        Specific Provider Orders/Date/Referring Provider :  08/03/21 0245   PT eval and treat Start: 08/03/21 0245, End: 08/03/21 0245, ONE TIME, Standing Count: 1 Occurrences, R    Rasta DO Osmani    Admitting Diagnosis:   Sacral wound, initial encounter [S31.000A]             Patient Active Problem List   Diagnosis    Sepsis secondary to [address]TI [address] [address]    Post-operative pain    H/O total hysterectomy    Essential hypertension    Type 2 diabetes mellitus with hyperglycemia, without long-term current use of insulin (HCC)    Other hyperlipidemia    Sacral wound    Sacral wound, initial encounter    Acute cystitis with hematuria    Severe protein-calorie malnutrition ([address]        ASSESSMENT of Current Deficits Patient exhibits decreased strength, balance, endurance, range of motion, coordination and pain in perineum and buttocks impairing functional mobility, transfers, gait , tolerance to activity and participation are barriers to d/c and require skilled intervention during hospital stay to attain pre hospital level of function. Patient needing maximal assist for log rolling to get to the edge of the bed. Patient displays a narrow base of support, small step lengths, kyphotic posture, and confusion at times: all factors that increase her risk for falling. Pt needing extra time for all functional mobility due to her pain; especially with ambulation due to a very slow maru and needing extra time to process my directions in taking steps forwards and backwards.       PHYSICAL THERAPY  PLAN OF CARE       Physical therapy plan of care is established based on physician order,  patient diagnosis and clinical assessment    Current Treatment Recommendations:    -Bed Mobility: Lower extremity exercises , Upper extremity exercises  and Trunk control activities   -Sitting Balance: Incorporate reaching activities to activate trunk muscles  and Facilitate active trunk muscle engagement   -Standing Balance: Perform strengthening exercises in standing to promote motor control with or without upper extremity support  and Instruct patient on adequate base of support to maintain balance  -Transfers: Provide instruction on proper hand and foot position for adequate transfer of weight onto lower extremities and use of gait device, Cues for hand placement, technique and safety, Support transfer of weight on to lower extremities and Provide stabilization to prevent fall   -Gait: Gait training, Standing activities to improve: base of support, weight shift, weight bearing  and Exercises to improve hip and knee control   -Endurance: Utilize Supervised activities to increase level of endurance to allow for safe functional mobility including transfers and gait     PT long term treatment goals are located in below grid    Patient and or family understand(s) diagnosis, prognosis, and plan of care. Frequency of treatments: Patient will be seen  daily.          Prior Level of Function: Patient ambulated with wheeled walker and assist  Rehab Potential: fair    for baseline    Past medical history:   Past Medical History:   Diagnosis Date    Arthritis     right hand    Cancer ([facility] [address] 1985    uterus and cervix had radiation and implant    Diabetes mellitus ([facility] [address]     Hyperlipidemia     Hypertension     Radiation cystitis     Urinary incontinence     complication due to uterine cancer    Urinary retention     caused by complications from uterine cancer     Past Surgical History:   Procedure Laterality Date    CYSTOSCOPY N/A 7/17/2019    CYSTOSCOPY BOTOX  INJECTION 200 UNITS performed by Migel Alfonso A Drew, DO at [facility] removed    HYSTERECTOMY  1985    [place] CYSTOURETHROSCOPY INJ CHEMODENERVATION BLADDER N/A 11/9/2018    CYSTOSCOPY  BOTOX INJECTION (200 UNITS) performed by Chang Ellington MD at [facility]:    Precautions: Activity as tolerated, falls and alarm , sacral wound  Social history: Patient lives with spouse   in a two story home bedroom and bathroom 2nd floor full flight of steps   with ?  to enter    [address] owned: Wheelchair, [address] [employer] [address] [employer] [employer] chair,       [address] [facility]   How much difficulty turning over in bed?: A Little  How much difficulty sitting down on / standing up from a chair with arms?: A Little  How much difficulty moving from lying on back to sitting on side of bed?: A Lot  How much help from another person moving to and from a bed to a chair?: A Little  How much help from another person needed to walk in hospital room?: A Lot  How much help from another person for climbing 3-5 steps with a railing?: Total  AM-PAC Inpatient Mobility Raw Score : 14  AM-PAC Inpatient T-Scale Score : 38.1  Mobility Inpatient CMS 0-100% Score: 61.29  Mobility Inpatient CMS G-Code Modifier : CL    Nursing cleared patient for PT treatment. .      OBJECTIVE;   Initial Evaluation  Date: 8/3/2021 Treatment Date:  8/5/2021       Short Term/ Long Term   Goals   Was pt agreeable to Eval/treatment? Yes   yes To be met in 4 days   Pain level   10/10  perineum 10/10  tailbone    Bed Mobility    Rolling:  Moderate assist of 1    Supine to sit: Moderate assist of 1 side lying to avoid shear on buttocks d/t rash/ wounds  Sit to supine: Maximal assist of 1    Scooting: Maximal assist of 1   Rolling: Minimal assist of 1   Supine to sit: Moderate assist of 1 log rolling technique  Sit to supine: Not assessed    Scooting: Not assessed     Rolling: Supervision     Supine to sit: Supervision     Sit to supine: Supervision     Scooting: Supervision      Transfers Sit to stand: Maximal assist of 1   Sit to stand: Minimal assist of 1 Cues for hand placement and safety       Sit to stand: Minimal assist of 1     Ambulation    not assessed  2 x 6 feet using  wheeled walker with Moderate assist of 1   cues for sequencing, upright posture, walker approximation, increased base of support, increased step length, safety and proper hand placement    10 feet using  wheeled walker with Minimal assist of 1    ROM Within functional limits with exception of bilateral knees and ankles     Increase range of motion 10% of affected joints    Strength BUE:  refer to OT eval  RLE:  3-/5  LLE:  3-/5   Increase strength in affected mm groups by 1/3 grade   Balance Sitting EOB:  poor    Dynamic Standing:  poor   Sitting EOB: fair   Dynamic Standing: fair wheeled walker   Sitting EOB:  fair    Dynamic Standing: fair with wheeled walker      Patient is Alert & Oriented x person, place and situation and follows one step directions    Sensation:  Patient  denies numbness/tingling     Edema:  yes perineum    Endurance: poor         Patient education  Patient educated on role of Physical Therapy, risks of immobility, safety and plan of care      Patient response to education:   Pt verbalized understanding Pt demonstrated skill Pt requires further education in this area   Yes Partial Yes      Treatment:  Patient practiced and was instructed/facilitated in the following treatment: Patient instructed on log rolling to get to the edge of bed. Pt   Sat edge of bed 10 minutes with Minimal assist of 1 to increase dynamic sitting balance and activity tolerance. Pt stood, ambulated in the room, pt incontinent of urine and bowels, nursing came in the room, assisted with clean up, got her back to the chair, while standing assisted her with hygiene. Nursing was going to change her gown and slippers and wash her up.      Therapeutic Exercises:  not performed At end of session, patient in chair with with nursing and her  present, call light and phone within reach,   all lines and tubes intact. Patient would benefit from continued skilled Physical Therapy to improve functional independence and quality of life. Patient's/ family goals   home        Time in 11:07  Time out  11:30    Total Treatment Time  23 minutes        CPT codes:    Therapeutic activities (17347)   23 minutes  2 unit(s)    Zoran Marcos  [facility]  LIC # [id number]

## 2021-08-05 NOTE — PROGRESS NOTES
[facility]  [facility]  Progress Note    NAME: Chapito Vargas  MR:  [id number]  :   1950  DATE OF SERVICE:21    This is a face to face encounter with Chapito Vargas 66 y.o. female on 21    CHIEF COMPLAINT     ID following for   Chief Complaint   Patient presents with    Wound Check     coccyx wound few days,      HISTORY OF PRESENT ILLNESS   Pt seen and examined  21    present has decrease appetite  periarea tender  2021   present  Pt in chair  Pt feels better    cefepime (MAXIPIME) 1000 mg IVPB minibag, Q12H  vancomycin (VANCOCIN) 750 mg in dextrose 5 % 250 mL IVPB, Q18H  fluconazole (DIFLUCAN) in 0.9 % sodium chloride IVPB 400 mg, Q24H  miconazole nitrate 2 % ointment, BID    Patient is tolerating medications. No reported adverse drug reactions. REVIEW OF SYSTEMS     As stated above in the chief complaint, otherwise negative. CURRENT MEDICATIONS      cefepime  1,000 mg Intravenous Q12H    atorvastatin  20 mg Oral Daily    insulin lispro  0-6 Units Subcutaneous TID WC    insulin lispro  0-3 Units Subcutaneous Nightly    enoxaparin  40 mg Subcutaneous Daily    memantine  5 mg Oral BID    And    donepezil  10 mg Oral Nightly    vancomycin  750 mg Intravenous Q18H    fluconazole  400 mg Intravenous Q24H    miconazole nitrate   Topical BID     Continuous Infusions:   dextrose      sodium chloride 60 mL/hr at 217    sodium chloride Stopped (21 2215)     PRN Meds:glucose, dextrose, glucagon (rDNA), dextrose, morphine, HYDROcodone 5 mg - acetaminophen, albuterol, ondansetron    PHYSICAL EXAM     BP (!) 174/70   Pulse 80   Temp 98 °F (36.7 °C) (Oral)   Resp 18   Ht 5' 4\" (1.626 m)   Wt 101 lb 1.6 oz (45.9 kg)   LMP  (LMP Unknown)   SpO2 96%   BMI 17.35 kg/m²   Temp  Av.3 °F (36.8 °C)  Min: 98 °F (36.7 °C)  Max: 98.7 °F (37.1 °C)  Constitutional:  The patient is awake thin  Skin:    Warm and dry.  No NEW rashes noted. HEENT:   AT/NC  Chest:   No use of accessory muscles to breathe. Symmetrical expansion. cta ant  Cardiovascular:  S1 and S2 are rhythmic and regular. No murmurs appreciated. Abdomen:   Positive bowel sounds to auscultation. Benign to palpation. Extremities:     no edema. CNS    alert  Lines: piv      DIAGNOSTIC RESULTS   Radiology:    Recent Labs     08/03/21 0924 08/04/21 0542 08/05/21  0538   WBC 8.8 5.4 5.0   RBC 4.04 3.77 3.44*   HGB 11.9 10.8* 10.2*   HCT 36.4 34.4 31.7*   MCV 90.1 91.2 92.2   MCH 29.5 28.6 29.7   MCHC 32.7 31.4* 32.2   RDW 12.7 13.0 13.2    183 151   MPV 11.0 10.6 10.9     Recent Labs     08/02/21 1937 08/02/21 1937 08/03/21 0924 08/04/21 0542 08/05/21  0538      < > 135 139 138   K 4.9  --  4.1 4.4 4.3   CL 94*   < > 100 107 109*   CO2 24   < > 22 24 22   BUN 28*   < > 18 17 15   CREATININE 0.9   < > 0.9 0.9 0.7   GLUCOSE 231*   < > 158* 114* 118*   PROT 7.1  --   --   --   --    LABALBU 3.8  --   --   --   --    CALCIUM 9.5   < > 8.7 9.1 8.7   BILITOT 0.4  --   --   --   --    ALKPHOS 187*  --   --   --   --    AST 38*  --   --   --   --    ALT 40*  --   --   --   --     < > = values in this interval not displayed. Lab Results   Component Value Date    CRP 0.5 (H) 08/03/2021     Lab Results   Component Value Date    SEDRATE 46 (H) 08/03/2021     Recent Labs     08/02/21 1937 08/03/21 0924   CRP  --  0.5*   AST 38*  --    ALT 40*  --    TRIG  --  93     Lab Results   Component Value Date    CHOL 114 08/03/2021    TRIG 93 08/03/2021    HDL 42 08/03/2021    LDLCALC 53 08/03/2021    LABVLDL 19 08/03/2021     No results found for: VITD25    Microbiology:   No results for input(s): COVID19 in the last 72 hours.   Lab Results   Component Value Date    BC 24 Hours no growth 08/02/2021    BC 5 Days- no growth 05/04/2018    BC 5 Days- no growth 09/29/2017    BLOODCULT2 24 Hours no growth 08/02/2021    BLOODCULT2 5 Days- no growth 05/04/2018    BLOODCULT2 5 Days- no growth 09/29/2017    ORG Escherichia coli 07/14/2020    ORG Escherichia coli 01/28/2020    ORG Escherichia coli 01/06/2020     WOUND/ABSCESS   Date Value Ref Range Status   08/02/2021   Preliminary    Growth present, evaluating for:  Gram negative rods         Recent Labs     08/02/21  2200   WNDABS Growth present, evaluating for:  Gram negative rods       Recent Labs     08/02/21  1937 08/02/21  2200   LABURIN Growth present, evaluating for:  Mixed gram negative rods   Growth present, evaluating for:  Gram negative rods          FINAL IMPRESSION    perirearea dermatitis from incontinence  prob UTI GNR  Candiduria   B hydronephrosis  -cont atbx  -await final cx    cefepime (MAXIPIME) 1000 mg IVPB minibag, Q12H  vancomycin (VANCOCIN) 750 mg in dextrose 5 % 250 mL IVPB, Q18H  fluconazole (DIFLUCAN) in 0.9 % sodium chloride IVPB 400 mg, Q24H  miconazole nitrate 2 % ointment, BID    switch to orals      · Monitor labs    Imaging and labs were reviewed per medical records. The patient was educated about the diagnosis, prognosis, indications, risks and benefits of treatment. An opportunity to ask questions was given to the patient/FAMILY. Thank you for involving me in the care of Iesha Damian I will continue to follow. Please do not hesitate to call for any questions or concerns.     Electronically signed by Zaria Chew MD on 8/5/2021 at 9:12 AM

## 2021-08-05 NOTE — PROGRESS NOTES
Pharmacy Consultation Note  (Antibiotic Dosing and Monitoring)    Vancomycin has been discontinued; pharmacy will sign-off. Please reconsult if needed.     Thank you,  Keyon Spain, PharmD, BCPS 8/5/2021 9:45 AM

## 2021-08-06 LAB
ORGANISM: ABNORMAL
ORGANISM: ABNORMAL
RPR: NORMAL
URINE CULTURE, ROUTINE: ABNORMAL
URINE CULTURE, ROUTINE: ABNORMAL
URINE CULTURE, ROUTINE: NORMAL

## 2021-08-08 LAB
BLOOD CULTURE, ROUTINE: NORMAL
CULTURE, BLOOD 2: NORMAL

## 2021-08-19 DIAGNOSIS — E78.5 DYSLIPIDEMIA: ICD-10-CM

## 2021-08-19 RX ORDER — ATORVASTATIN CALCIUM 20 MG/1
TABLET, FILM COATED ORAL
Qty: 90 TABLET | Refills: 0 | Status: SHIPPED
Start: 2021-08-19 | End: 2021-09-07 | Stop reason: SDUPTHER

## 2021-08-24 DIAGNOSIS — R32 URINARY INCONTINENCE, UNSPECIFIED TYPE: ICD-10-CM

## 2021-08-25 RX ORDER — OXYBUTYNIN CHLORIDE 5 MG/1
TABLET, EXTENDED RELEASE ORAL
Qty: 90 TABLET | Refills: 1 | OUTPATIENT
Start: 2021-08-25

## 2021-09-01 ENCOUNTER — TELEPHONE (OUTPATIENT)
Dept: FAMILY MEDICINE CLINIC | Age: 71
End: 2021-09-01

## 2021-09-01 NOTE — TELEPHONE ENCOUNTER
Nurse Radha Andujar from [facility] called in stating that patient was experiencing hypotension 85/50 and is not on any blood pressure medication. Radha Andujar also stated that Anam Hogan also had a pressure ulcer on her bottom. Got the a verbal order from Dr. Adelaida Serrano to cancel Anam Hogan VV and send her to the ER to be evaluated.

## 2021-09-07 DIAGNOSIS — E78.5 DYSLIPIDEMIA: ICD-10-CM

## 2021-09-07 DIAGNOSIS — E11.9 TYPE 2 DIABETES MELLITUS WITHOUT COMPLICATION, WITHOUT LONG-TERM CURRENT USE OF INSULIN (HCC): ICD-10-CM

## 2021-09-07 RX ORDER — ATORVASTATIN CALCIUM 20 MG/1
TABLET, FILM COATED ORAL
Qty: 30 TABLET | Refills: 0 | Status: SHIPPED
Start: 2021-09-07 | End: 2021-10-15 | Stop reason: SDUPTHER

## 2021-09-07 RX ORDER — MEMANTINE HYDROCHLORIDE AND DONEPEZIL HYDROCHLORIDE 14; 10 MG/1; MG/1
14 CAPSULE ORAL
Qty: 30 CAPSULE | Refills: 0 | Status: SHIPPED
Start: 2021-09-07 | End: 2021-10-15 | Stop reason: SDUPTHER

## 2021-09-07 RX ORDER — SEMAGLUTIDE 1.34 MG/ML
0.5 INJECTION, SOLUTION SUBCUTANEOUS WEEKLY
Qty: 3 PEN | Refills: 0 | Status: SHIPPED
Start: 2021-09-07 | End: 2021-10-15 | Stop reason: SDUPTHER

## 2021-09-08 ENCOUNTER — TELEPHONE (OUTPATIENT)
Dept: FAMILY MEDICINE CLINIC | Age: 71
End: 2021-09-08

## 2021-09-08 NOTE — TELEPHONE ENCOUNTER
----- Message from Abhinav Degroot MA sent at 9/7/2021 10:10 AM EDT -----  Subject: Refill Request    QUESTIONS  Name of Medication? atorvastatin (LIPITOR) 20 MG tablet  Patient-reported dosage and instructions? 1 tablet daily  How many days do you have left? 0  Preferred Pharmacy? [facility]/PHARMACY #[id number]  Pharmacy phone number (if available)? [phone]  ---------------------------------------------------------------------------  --------------,  Name of Medication? Other - Jardiance  Patient-reported dosage and instructions? 1 tablet by mouth daily  How many days do you have left? 0  Preferred Pharmacy? [facility]/PHARMACY #[id number]  Pharmacy phone number (if available)? [phone]  ---------------------------------------------------------------------------  --------------,  Name of Medication? Other - Oxybutynin  Patient-reported dosage and instructions? 1 tablet by mouth daily  How many days do you have left? 0  Preferred Pharmacy? [facility]/PHARMACY #[id number]  Pharmacy phone number (if available)? [phone]  ---------------------------------------------------------------------------  --------------,  Name of Medication? Other - Tramadol  Patient-reported dosage and instructions? 1 tablet by mouth every 6 hours   as needed for pain for up to 5 days  How many days do you have left? 0  Preferred Pharmacy? [facility]Y #[id number]  Pharmacy phone number (if available)? [phone]  ---------------------------------------------------------------------------  --------------  CALL BACK INFO  What is the best way for the office to contact you? OK to leave message on   voicemail  Preferred Call Back Phone Number?  [phone]

## 2021-09-08 NOTE — TELEPHONE ENCOUNTER
Atorvastatin was sent yesterday as 30 day supply, patient is due for check up. Oxybutinin and Jardiance are not on med list, Tramadol requires an appointment. I called patient to inform her of this, no answer, no VM set up.

## 2021-10-15 ENCOUNTER — OFFICE VISIT (OUTPATIENT)
Dept: FAMILY MEDICINE CLINIC | Age: 71
End: 2021-10-15
Payer: MEDICARE

## 2021-10-15 VITALS
WEIGHT: 109 LBS | TEMPERATURE: 98.6 F | HEIGHT: 64 IN | RESPIRATION RATE: 18 BRPM | OXYGEN SATURATION: 98 % | SYSTOLIC BLOOD PRESSURE: 108 MMHG | BODY MASS INDEX: 18.61 KG/M2 | DIASTOLIC BLOOD PRESSURE: 64 MMHG | HEART RATE: 73 BPM

## 2021-10-15 DIAGNOSIS — G30.9 ALZHEIMER DISEASE (HCC): ICD-10-CM

## 2021-10-15 DIAGNOSIS — E78.5 DYSLIPIDEMIA: ICD-10-CM

## 2021-10-15 DIAGNOSIS — N20.0 KIDNEY STONES: ICD-10-CM

## 2021-10-15 DIAGNOSIS — E11.9 TYPE 2 DIABETES MELLITUS WITHOUT COMPLICATION, WITHOUT LONG-TERM CURRENT USE OF INSULIN (HCC): Primary | ICD-10-CM

## 2021-10-15 DIAGNOSIS — F02.80 ALZHEIMER DISEASE (HCC): ICD-10-CM

## 2021-10-15 DIAGNOSIS — R53.83 FATIGUE, UNSPECIFIED TYPE: ICD-10-CM

## 2021-10-15 DIAGNOSIS — Z12.11 SCREEN FOR COLON CANCER: ICD-10-CM

## 2021-10-15 LAB — HBA1C MFR BLD: 7 %

## 2021-10-15 PROCEDURE — 1036F TOBACCO NON-USER: CPT | Performed by: FAMILY MEDICINE

## 2021-10-15 PROCEDURE — 1123F ACP DISCUSS/DSCN MKR DOCD: CPT | Performed by: FAMILY MEDICINE

## 2021-10-15 PROCEDURE — 2022F DILAT RTA XM EVC RTNOPTHY: CPT | Performed by: FAMILY MEDICINE

## 2021-10-15 PROCEDURE — 99214 OFFICE O/P EST MOD 30 MIN: CPT | Performed by: FAMILY MEDICINE

## 2021-10-15 PROCEDURE — 1090F PRES/ABSN URINE INCON ASSESS: CPT | Performed by: FAMILY MEDICINE

## 2021-10-15 PROCEDURE — 3017F COLORECTAL CA SCREEN DOC REV: CPT | Performed by: FAMILY MEDICINE

## 2021-10-15 PROCEDURE — 3051F HG A1C>EQUAL 7.0%<8.0%: CPT | Performed by: FAMILY MEDICINE

## 2021-10-15 PROCEDURE — G8427 DOCREV CUR MEDS BY ELIG CLIN: HCPCS | Performed by: FAMILY MEDICINE

## 2021-10-15 PROCEDURE — 4040F PNEUMOC VAC/ADMIN/RCVD: CPT | Performed by: FAMILY MEDICINE

## 2021-10-15 PROCEDURE — G8484 FLU IMMUNIZE NO ADMIN: HCPCS | Performed by: FAMILY MEDICINE

## 2021-10-15 PROCEDURE — G8420 CALC BMI NORM PARAMETERS: HCPCS | Performed by: FAMILY MEDICINE

## 2021-10-15 PROCEDURE — G8400 PT W/DXA NO RESULTS DOC: HCPCS | Performed by: FAMILY MEDICINE

## 2021-10-15 RX ORDER — ATORVASTATIN CALCIUM 20 MG/1
TABLET, FILM COATED ORAL
Qty: 90 TABLET | Refills: 1 | Status: SHIPPED
Start: 2021-10-15 | End: 2022-02-28 | Stop reason: SDUPTHER

## 2021-10-15 RX ORDER — MEMANTINE HYDROCHLORIDE AND DONEPEZIL HYDROCHLORIDE 14; 10 MG/1; MG/1
14 CAPSULE ORAL
Qty: 90 CAPSULE | Refills: 1 | Status: SHIPPED
Start: 2021-10-15 | End: 2022-02-28 | Stop reason: SDUPTHER

## 2021-10-15 RX ORDER — SEMAGLUTIDE 1.34 MG/ML
0.5 INJECTION, SOLUTION SUBCUTANEOUS WEEKLY
Qty: 3 PEN | Refills: 1 | Status: SHIPPED | OUTPATIENT
Start: 2021-10-15 | End: 2022-04-13

## 2021-10-15 SDOH — ECONOMIC STABILITY: FOOD INSECURITY: WITHIN THE PAST 12 MONTHS, YOU WORRIED THAT YOUR FOOD WOULD RUN OUT BEFORE YOU GOT MONEY TO BUY MORE.: NEVER TRUE

## 2021-10-15 SDOH — ECONOMIC STABILITY: FOOD INSECURITY: WITHIN THE PAST 12 MONTHS, THE FOOD YOU BOUGHT JUST DIDN'T LAST AND YOU DIDN'T HAVE MONEY TO GET MORE.: NEVER TRUE

## 2021-10-15 ASSESSMENT — ENCOUNTER SYMPTOMS
VOICE CHANGE: 0
ALLERGIC/IMMUNOLOGIC NEGATIVE: 1
EYE PAIN: 0
CHOKING: 0
ORTHOPNEA: 0
EYE ITCHING: 0
ABDOMINAL DISTENTION: 0
VISUAL CHANGE: 0
STRIDOR: 0
GASTROINTESTINAL NEGATIVE: 1
ANAL BLEEDING: 0
PHOTOPHOBIA: 0
RHINORRHEA: 0
FACIAL SWELLING: 0
EYE REDNESS: 0
COLOR CHANGE: 0
TROUBLE SWALLOWING: 0
RESPIRATORY NEGATIVE: 1
SHORTNESS OF BREATH: 0
COUGH: 0
SINUS PAIN: 0
SINUS PRESSURE: 0
EYE DISCHARGE: 0
RECTAL PAIN: 0
BLURRED VISION: 0
WHEEZING: 0
CHEST TIGHTNESS: 0
APNEA: 0
BLOOD IN STOOL: 0

## 2021-10-15 ASSESSMENT — SOCIAL DETERMINANTS OF HEALTH (SDOH): HOW HARD IS IT FOR YOU TO PAY FOR THE VERY BASICS LIKE FOOD, HOUSING, MEDICAL CARE, AND HEATING?: NOT HARD AT ALL

## 2021-10-15 NOTE — PROGRESS NOTES
Javier Son is a 66 y.o. female. HPI/Chief C/O:  Chief Complaint   Patient presents with    Shoulder Pain     Pt c/o R shoulder pain, ongoing since March after fall, would like to discuss referral to PT    Medication Refill     Pharmacy confirmed, meds pended    Groin Pain     Pt also c/o L groin pain     Allergies   Allergen Reactions    Tetracyclines & Related    The patient is here for a medication list and treatment planning review  We will go over our care planning goals as well as take care of all refills  We will set up labs as well   Severe pain to her right shoulder     Hypertension  This is a chronic problem. The current episode started more than 1 year ago. The problem is controlled. Associated symptoms include malaise/fatigue. Pertinent negatives include no anxiety, blurred vision, chest pain, headaches, neck pain, orthopnea, palpitations, peripheral edema, PND, shortness of breath or sweats. Risk factors for coronary artery disease include post-menopausal state, diabetes mellitus and dyslipidemia. Past treatments include lifestyle changes. The current treatment provides significant improvement. Compliance problems include exercise and diet. There is no history of angina, kidney disease, CAD/MI, CVA, heart failure, left ventricular hypertrophy, PVD or retinopathy. There is no history of chronic renal disease, coarctation of the aorta, hyperaldosteronism, hypercortisolism, hyperparathyroidism, a hypertension causing med, pheochromocytoma, renovascular disease, sleep apnea or a thyroid problem. Diabetes  She presents for her follow-up diabetic visit. She has type 2 diabetes mellitus. Pertinent negatives for hypoglycemia include no confusion, dizziness, headaches, nervousness/anxiousness, pallor, seizures, speech difficulty, sweats or tremors. Associated symptoms include fatigue and weakness.  Pertinent negatives for diabetes include no blurred vision, no chest pain, no foot paresthesias, no foot ulcerations, no polydipsia, no polyphagia, no polyuria, no visual change and no weight loss. There are no hypoglycemic complications. Pertinent negatives for diabetic complications include no autonomic neuropathy, CVA, heart disease, nephropathy, peripheral neuropathy, PVD or retinopathy. Risk factors for coronary artery disease include hypertension, dyslipidemia, diabetes mellitus and post-menopausal. Current diabetic treatment includes diet, oral agent (monotherapy) and insulin injections (Ozempic). She is compliant with treatment some of the time. She is following a generally unhealthy diet. An ACE inhibitor/angiotensin II receptor blocker is not being taken. ROS:  Review of Systems   Constitutional: Positive for fatigue, malaise/fatigue and unexpected weight change. Negative for activity change, appetite change, diaphoresis and weight loss. HENT: Negative. Negative for congestion, dental problem, drooling, ear discharge, ear pain, facial swelling, hearing loss, mouth sores, nosebleeds, postnasal drip, rhinorrhea, sinus pressure, sinus pain, sneezing, tinnitus, trouble swallowing and voice change. Eyes: Negative for blurred vision, photophobia, pain, discharge, redness, itching and visual disturbance. Respiratory: Negative. Negative for apnea, cough, choking, chest tightness, shortness of breath, wheezing and stridor. Cardiovascular: Negative. Negative for chest pain, palpitations, orthopnea, leg swelling and PND. Gastrointestinal: Negative. Negative for abdominal distention, anal bleeding, blood in stool and rectal pain. Endocrine: Negative. Negative for cold intolerance, heat intolerance, polydipsia, polyphagia and polyuria. Genitourinary: Negative. Negative for decreased urine volume, difficulty urinating, enuresis and genital sores. Musculoskeletal: Positive for arthralgias (pain right shoulder ).  Negative for gait problem, joint swelling, myalgias, neck pain and neck stiffness. Skin: Negative. Negative for color change, pallor and wound. Allergic/Immunologic: Negative. Negative for environmental allergies, food allergies and immunocompromised state. Neurological: Positive for weakness. Negative for dizziness, tremors, seizures, syncope, facial asymmetry, speech difficulty, light-headedness and headaches. Hematological: Negative. Negative for adenopathy. Does not bruise/bleed easily. Psychiatric/Behavioral: Positive for agitation and dysphoric mood. Negative for behavioral problems, confusion, decreased concentration, hallucinations, self-injury, sleep disturbance and suicidal ideas. The patient is not nervous/anxious and is not hyperactive.          Past Medical/Surgical Hx;  Reviewed with patient      Diagnosis Date    Arthritis     right hand    Cancer ([facility] [address] 1985    uterus and cervix had radiation and implant    Diabetes mellitus ([facility] [address]     Hyperlipidemia     Hypertension     Radiation cystitis     Urinary incontinence     complication due to uterine cancer    Urinary retention     caused by complications from uterine cancer     Past Surgical History:   Procedure Laterality Date    CYSTOSCOPY N/A 7/17/2019    CYSTOSCOPY BOTOX  INJECTION 200 UNITS performed by Nonnie Kocher A Memo,  at [address] [address] CYSTOURETHROSCOPY INJ CHEMODENERVATION BLADDER N/A 11/9/2018    CYSTOSCOPY  BOTOX INJECTION (200 UNITS) performed by Ebony Panchal MD at [address]       Past Family Hx:  Reviewed with patient      Problem Relation Age of Onset    Heart Disease Mother     No Known Problems Father     Other Sister     Other Brother     Other Other        Social Hx:  Reviewed with patient  Social History     Tobacco Use    Smoking status: Former Smoker     Packs/day: 1.00     Years: 44.00     Pack years: 44.00     Types: Cigarettes     Start date: 1/1/1965     Quit date: 12/2/2009     Years since quittin.8    Smokeless tobacco: Never Used   Substance Use Topics    Alcohol use: Yes     Comment: seldom       OBJECTIVE  /64   Pulse 73   Temp 98.6 °F (37 °C) (Temporal)   Resp 18   Ht 5' 4\" (1.626 m)   Wt 109 lb (49.4 kg)   LMP  (LMP Unknown)   SpO2 98%   Breastfeeding No   BMI 18.71 kg/m²     Problem List:  Jeannine Prescott does not have any pertinent problems on file. PHYS EX:  Physical Exam  Vitals and nursing note reviewed. Constitutional:       General: She is not in acute distress. Appearance: Normal appearance. She is well-developed. She is not ill-appearing, toxic-appearing or diaphoretic. HENT:      Head: Normocephalic and atraumatic. Right Ear: External ear normal. There is no impacted cerumen. Left Ear: External ear normal. There is no impacted cerumen. Nose: Nose normal. No congestion or rhinorrhea. Mouth/Throat:      Mouth: Mucous membranes are moist.      Pharynx: No oropharyngeal exudate or posterior oropharyngeal erythema. Eyes:      General: No scleral icterus. Right eye: No discharge. Left eye: No discharge. Extraocular Movements: Extraocular movements intact. Conjunctiva/sclera: Conjunctivae normal.      Pupils: Pupils are equal, round, and reactive to light. Neck:      Thyroid: No thyromegaly. Vascular: No carotid bruit or JVD. Trachea: No tracheal deviation. Cardiovascular:      Rate and Rhythm: Normal rate and regular rhythm. No extrasystoles are present. Chest Wall: PMI is not displaced. Pulses: Normal pulses. Heart sounds: Heart sounds not distant. Murmur heard. Systolic murmur is present with a grade of 1/6. No diastolic murmur is present. No friction rub. No gallop. No S3 or S4 sounds. Pulmonary:      Effort: Pulmonary effort is normal. No respiratory distress. Breath sounds: Normal breath sounds. No stridor. No wheezing, rhonchi or rales.    Chest:      Chest wall: No tenderness. Abdominal:      General: Bowel sounds are normal. There is no distension. Palpations: Abdomen is soft. There is no mass. Tenderness: There is no abdominal tenderness. There is no right CVA tenderness, left CVA tenderness, guarding or rebound. Hernia: No hernia is present. Musculoskeletal:         General: Tenderness (multiple joints ) present. No swelling, deformity or signs of injury. Normal range of motion. Cervical back: Normal range of motion and neck supple. No rigidity. No muscular tenderness. Right lower leg: No edema. Left lower leg: No edema. Comments: Severe pain with limit to ROM right shoulder    Lymphadenopathy:      Cervical: No cervical adenopathy. Skin:     General: Skin is warm. Coloration: Skin is not jaundiced or pale. Findings: No bruising, erythema, lesion or rash. Neurological:      General: No focal deficit present. Mental Status: She is alert. Cranial Nerves: No cranial nerve deficit. Sensory: Sensory deficit present. Motor: Weakness present. No abnormal muscle tone. Coordination: Coordination abnormal.      Gait: Gait abnormal.      Deep Tendon Reflexes: Reflexes abnormal.      Comments: falling         ASSESSMENT/PLAN  Keith Mai was seen today for shoulder pain, medication refill and groin pain. Diagnoses and all orders for this visit:    Type 2 diabetes mellitus without complication, without long-term current use of insulin (HCC)  -     insulin lispro (HUMALOG) 100 UNIT/ML injection vial; Inject 0-6 Units into the skin 3 times daily (with meals)  -     metFORMIN (GLUCOPHAGE) 1000 MG tablet; TAKE 1 TABLET BY MOUTH TWICE A DAY WITH MEALS  -     POCT glycosylated hemoglobin (Hb A1C)  -     Comprehensive Metabolic Panel;  Future  -     CBC Auto Differential; Future  -     Hemoglobin A1C; Future  -     Microalbumin, Ur; Future    ---VASCULAR PANEL  A) asa, plavix, aggrenox  B) coumadin, pletal, tzd, STATIN  C) ace, hctz, folic, ccb  D) cannikinumab, fish oils     ---CARDIAC---asa, ace, beta, STATIN, hctz, ( ccb )    Dyslipidemia  -     atorvastatin (LIPITOR) 20 MG tablet; TAKE 1 TABLET BY MOUTH EVERY DAY  -     Comprehensive Metabolic Panel; Future  -     Lipid Panel; Future  -     CBC Auto Differential; Future  --Mediterranean diet, exercise, weight loss, vitamins    We have a long talk on cholesterol and importance of lowering it       Kidney stones  -     Comprehensive Metabolic Panel; Future  -     CBC Auto Differential; Future  -     US RETROPERITONEAL LIMITED; Future  --set up urology Dr Sharif Iniguez for colon cancer  -     Cologjanice (For External Results Only); Future  -     Comprehensive Metabolic Panel; Future  -     CBC Auto Differential; Future    Fatigue, unspecified type  -     TSH without Reflex; Future  -     Uric Acid; Future  -     Comprehensive Metabolic Panel; Future  -     CBC Auto Differential; Future  Long talk on treatment and prevention  Literature is given       Alzheimer disease ([facility] [address]  -     Semaglutide,0.25 or 0.5MG/DOS, (OZEMPIC, 0.25 OR 0.5 MG/DOSE,) 2 MG/1.5ML SOPN; Inject 0.5 mLs into the skin once a week  -     Comprehensive Metabolic Panel; Future  -     CBC Auto Differential; Future    Other orders  -     Memantine HCl-Donepezil HCl (NAMZARIC) 14-10 MG CP24; Take 14 mg by mouth every morning (before breakfast)  -     diclofenac sodium (VOLTAREN) 1 % GEL;  Apply 4 g topically 4 times daily        Outpatient Encounter Medications as of 10/15/2021   Medication Sig Dispense Refill    atorvastatin (LIPITOR) 20 MG tablet TAKE 1 TABLET BY MOUTH EVERY DAY 90 tablet 1    insulin lispro (HUMALOG) 100 UNIT/ML injection vial Inject 0-6 Units into the skin 3 times daily (with meals) 16 mL 1    Memantine HCl-Donepezil HCl (NAMZARIC) 14-10 MG CP24 Take 14 mg by mouth every morning (before breakfast) 90 capsule 1    metFORMIN (GLUCOPHAGE) 1000 MG tablet TAKE 1 TABLET BY MOUTH TWICE A DAY WITH MEALS 180 tablet 1    Semaglutide,0.25 or 0.5MG/DOS, (OZEMPIC, 0.25 OR 0.5 MG/DOSE,) 2 MG/1.5ML SOPN Inject 0.5 mLs into the skin once a week 3 pen 1    diclofenac sodium (VOLTAREN) 1 % GEL Apply 4 g topically 4 times daily 100 g 3    miconazole nitrate 2 % OINT Apply topically 2 times daily  0    [DISCONTINUED] atorvastatin (LIPITOR) 20 MG tablet TAKE 1 TABLET BY MOUTH EVERY DAY 30 tablet 0    [DISCONTINUED] insulin lispro (HUMALOG) 100 UNIT/ML injection vial Inject 0-6 Units into the skin 3 times daily (with meals) 3 each 0    [DISCONTINUED] Memantine HCl-Donepezil HCl (NAMZARIC) 14-10 MG CP24 Take 14 mg by mouth every morning (before breakfast) 30 capsule 0    [DISCONTINUED] metFORMIN (GLUCOPHAGE) 1000 MG tablet TAKE 1 TABLET BY MOUTH TWICE A DAY WITH MEALS 60 tablet 0    [DISCONTINUED] Semaglutide,0.25 or 0.5MG/DOS, (OZEMPIC, 0.25 OR 0.5 MG/DOSE,) 2 MG/1.5ML SOPN Inject 0.5 mLs into the skin once a week 3 pen 0     No facility-administered encounter medications on file as of 10/15/2021. No follow-ups on file.         Reviewed recent labs related to Rashmi's current problems      Discussed importance of regular Health Maintenance follow up  Health Maintenance   Topic    Diabetic foot exam     Diabetic retinal exam     DTaP/Tdap/Td vaccine (1 - Tdap)    Colon cancer screen colonoscopy     Shingles Vaccine (1 of 2)    Low dose CT lung screening     DEXA (modify frequency per FRAX score)     Diabetic microalbuminuria test     Breast cancer screen     Flu vaccine (1)    Annual Wellness Visit (AWV)     Lipid screen     Potassium monitoring     Creatinine monitoring     A1C test (Diabetic or Prediabetic)     Pneumococcal 65+ years Vaccine     COVID-19 Vaccine     Hepatitis C screen     Hepatitis A vaccine     Hib vaccine     Meningococcal (ACWY) vaccine

## 2021-10-15 NOTE — PATIENT INSTRUCTIONS
Patient Education        Shoulder Arthritis: Exercises  Introduction  Here are some examples of exercises for you to try. The exercises may be suggested for a condition or for rehabilitation. Start each exercise slowly. Ease off the exercises if you start to have pain. You will be told when to start these exercises and which ones will work best for you. How to do the exercises  Shoulder flexion (lying down)    To make a wand for this exercise, use a piece of PVC pipe or a broom handle with the broom removed. Make the wand about a foot wider than your shoulders. 1. Lie on your back, holding a wand with both hands. Your palms should face down as you hold the wand. 2. Keeping your elbows straight, slowly raise your arms over your head. Raise them until you feel a stretch in your shoulders, upper back, and chest.  3. Hold for 15 to 30 seconds. 4. Repeat 2 to 4 times. Shoulder rotation (lying down)    To make a wand for this exercise, use a piece of PVC pipe or a broom handle with the broom removed. Make the wand about a foot wider than your shoulders. 1. Lie on your back. Hold a wand with both hands with your elbows bent and palms up. 2. Keep your elbows close to your body, and move the wand across your body toward the sore arm. 3. Hold for 8 to 12 seconds. 4. Repeat 2 to 4 times. Shoulder internal rotation with towel    1. Hold a towel above and behind your head with the arm that is not sore. 2. With your sore arm, reach behind your back and grasp the towel. 3. With the arm above your head, pull the towel upward. Do this until you feel a stretch on the front and outside of your sore shoulder. 4. Hold 15 to 30 seconds. 5. Repeat 2 to 4 times. Shoulder blade squeeze    1. Stand with your arms at your sides, and squeeze your shoulder blades together. Do not raise your shoulders up as you squeeze. 2. Hold 6 seconds. 3. Repeat 8 to 12 times.   Resisted rows    For this exercise, you will need elastic exercise material, such as surgical tubing or Thera-Band. 1. Put the band around a solid object at about waist level. (A bedpost will work well.) Each hand should hold an end of the band. 2. With your elbows at your sides and bent to 90 degrees, pull the band back. Your shoulder blades should move toward each other. Return to the starting position. 3. Repeat 8 to 12 times. External rotator strengthening exercise    1. Start by tying a piece of elastic exercise material to a doorknob. You can use surgical tubing or Thera-Band. (You may also hold one end of the band in each hand.)  2. Stand or sit with your shoulder relaxed and your elbow bent 90 degrees. Your upper arm should rest comfortably against your side. Squeeze a rolled towel between your elbow and your body for comfort. This will help keep your arm at your side. 3. Hold one end of the elastic band with the hand of the painful arm. 4. Start with your forearm across your belly. Slowly rotate the forearm out away from your body. Keep your elbow and upper arm tucked against the towel roll or the side of your body until you begin to feel tightness in your shoulder. Slowly move your arm back to where you started. 5. Repeat 8 to 12 times. Internal rotator strengthening exercise    1. Start by tying a piece of elastic exercise material to a doorknob. You can use surgical tubing or Thera-Band. 2. Stand or sit with your shoulder relaxed and your elbow bent 90 degrees. Your upper arm should rest comfortably against your side. Squeeze a rolled towel between your elbow and your body for comfort. This will help keep your arm at your side. 3. Hold one end of the elastic band in the hand of the painful arm. 4. Slowly rotate your forearm toward your body until it touches your belly. Slowly move it back to where you started. 5. Keep your elbow and upper arm firmly tucked against the towel roll or at your side. 6. Repeat 8 to 12 times.   Pendulum swing    If you have pain in your back, do not do this exercise. 1. Hold on to a table or the back of a chair with your good arm. Then bend forward a little and let your sore arm hang straight down. This exercise does not use the arm muscles. Rather, use your legs and your hips to create movement that makes your arm swing freely. 2. Use the movement from your hips and legs to guide the slightly swinging arm back and forth like a pendulum (or elephant trunk). Then guide it in circles that start small (about the size of a dinner plate). Make the circles a bit larger each day, as your pain allows. 3. Do this exercise for 5 minutes, 5 to 7 times each day. 4. As you have less pain, try bending over a little farther to do this exercise. This will increase the amount of movement at your shoulder. Follow-up care is a key part of your treatment and safety. Be sure to make and go to all appointments, and call your doctor if you are having problems. It's also a good idea to know your test results and keep a list of the medicines you take. Where can you learn more? Go to https://[employer]. org and sign in to your [employer] account. Enter H562 in the [employer] box to learn more about \"Shoulder Arthritis: Exercises. \"     If you do not have an account, please click on the \"Sign Up Now\" link. Current as of: July 1, 2021               Content Version: 13.0  © 2006-2021 [employer]. Care instructions adapted under license by [facility] ([facility]). If you have questions about a medical condition or this instruction, always ask your healthcare professional. [place] [address] any warranty or liability for your use of this information.

## 2022-02-28 ENCOUNTER — TELEMEDICINE (OUTPATIENT)
Dept: FAMILY MEDICINE CLINIC | Age: 72
End: 2022-02-28
Payer: MEDICARE

## 2022-02-28 DIAGNOSIS — Z90.710 H/O TOTAL HYSTERECTOMY: ICD-10-CM

## 2022-02-28 DIAGNOSIS — E11.9 TYPE 2 DIABETES MELLITUS WITHOUT COMPLICATION, WITHOUT LONG-TERM CURRENT USE OF INSULIN (HCC): ICD-10-CM

## 2022-02-28 DIAGNOSIS — E11.65 TYPE 2 DIABETES MELLITUS WITH HYPERGLYCEMIA, WITHOUT LONG-TERM CURRENT USE OF INSULIN (HCC): Primary | ICD-10-CM

## 2022-02-28 DIAGNOSIS — E78.5 DYSLIPIDEMIA: ICD-10-CM

## 2022-02-28 DIAGNOSIS — R53.83 FATIGUE, UNSPECIFIED TYPE: ICD-10-CM

## 2022-02-28 DIAGNOSIS — S31.000S WOUND OF SACRAL REGION, SEQUELA: ICD-10-CM

## 2022-02-28 DIAGNOSIS — I10 ESSENTIAL HYPERTENSION: ICD-10-CM

## 2022-02-28 DIAGNOSIS — E43 SEVERE PROTEIN-CALORIE MALNUTRITION (HCC): ICD-10-CM

## 2022-02-28 PROCEDURE — 1036F TOBACCO NON-USER: CPT | Performed by: FAMILY MEDICINE

## 2022-02-28 PROCEDURE — 3017F COLORECTAL CA SCREEN DOC REV: CPT | Performed by: FAMILY MEDICINE

## 2022-02-28 PROCEDURE — G8400 PT W/DXA NO RESULTS DOC: HCPCS | Performed by: FAMILY MEDICINE

## 2022-02-28 PROCEDURE — 2022F DILAT RTA XM EVC RTNOPTHY: CPT | Performed by: FAMILY MEDICINE

## 2022-02-28 PROCEDURE — G8484 FLU IMMUNIZE NO ADMIN: HCPCS | Performed by: FAMILY MEDICINE

## 2022-02-28 PROCEDURE — 4040F PNEUMOC VAC/ADMIN/RCVD: CPT | Performed by: FAMILY MEDICINE

## 2022-02-28 PROCEDURE — G8420 CALC BMI NORM PARAMETERS: HCPCS | Performed by: FAMILY MEDICINE

## 2022-02-28 PROCEDURE — 1090F PRES/ABSN URINE INCON ASSESS: CPT | Performed by: FAMILY MEDICINE

## 2022-02-28 PROCEDURE — 1123F ACP DISCUSS/DSCN MKR DOCD: CPT | Performed by: FAMILY MEDICINE

## 2022-02-28 PROCEDURE — 3046F HEMOGLOBIN A1C LEVEL >9.0%: CPT | Performed by: FAMILY MEDICINE

## 2022-02-28 PROCEDURE — 99214 OFFICE O/P EST MOD 30 MIN: CPT | Performed by: FAMILY MEDICINE

## 2022-02-28 PROCEDURE — G8427 DOCREV CUR MEDS BY ELIG CLIN: HCPCS | Performed by: FAMILY MEDICINE

## 2022-02-28 RX ORDER — MEMANTINE HYDROCHLORIDE AND DONEPEZIL HYDROCHLORIDE 14; 10 MG/1; MG/1
14 CAPSULE ORAL
Qty: 90 CAPSULE | Refills: 1 | Status: ON HOLD
Start: 2022-02-28 | End: 2022-10-21 | Stop reason: HOSPADM

## 2022-02-28 RX ORDER — ATORVASTATIN CALCIUM 20 MG/1
TABLET, FILM COATED ORAL
Qty: 90 TABLET | Refills: 1 | Status: SHIPPED | OUTPATIENT
Start: 2022-02-28

## 2022-02-28 ASSESSMENT — ENCOUNTER SYMPTOMS
APNEA: 0
ANAL BLEEDING: 0
CHOKING: 0
RHINORRHEA: 0
SINUS PRESSURE: 0
ALLERGIC/IMMUNOLOGIC NEGATIVE: 1
SORE THROAT: 0
NAUSEA: 0
ABDOMINAL PAIN: 0
GASTROINTESTINAL NEGATIVE: 1
TROUBLE SWALLOWING: 0
WHEEZING: 0
EYE PAIN: 0
PHOTOPHOBIA: 0
BLURRED VISION: 0
COUGH: 0
VISUAL CHANGE: 0
RECTAL PAIN: 0
VOMITING: 0
SHORTNESS OF BREATH: 0
RESPIRATORY NEGATIVE: 1
STRIDOR: 0
ABDOMINAL DISTENTION: 0
CHEST TIGHTNESS: 0
BLOOD IN STOOL: 0
SINUS PAIN: 0
ORTHOPNEA: 0
BACK PAIN: 1
DIARRHEA: 0
CONSTIPATION: 0
EYE REDNESS: 0
FACIAL SWELLING: 0
COLOR CHANGE: 0
EYE DISCHARGE: 0
EYE ITCHING: 0
VOICE CHANGE: 0

## 2022-02-28 ASSESSMENT — PATIENT HEALTH QUESTIONNAIRE - PHQ9
SUM OF ALL RESPONSES TO PHQ9 QUESTIONS 1 & 2: 0
SUM OF ALL RESPONSES TO PHQ QUESTIONS 1-9: 0
1. LITTLE INTEREST OR PLEASURE IN DOING THINGS: 0
2. FEELING DOWN, DEPRESSED OR HOPELESS: 0
SUM OF ALL RESPONSES TO PHQ QUESTIONS 1-9: 0

## 2022-02-28 NOTE — PROGRESS NOTES
Inocente Santiago is a 67 y.o. female. HPI/Chief C/O:  Chief Complaint   Patient presents with    Orders     Needs home health care orders. Allergies   Allergen Reactions    Tetracyclines & Related    TeleMedicine Video Visit    Capri Guo, was evaluated through a synchronous (real-time) audio-video encounter. The patient (or guardian if applicable) is aware that this is a billable service. , which includes applicable co-pays. This virtual visit was conducted with the patient's  (and/or legal guardian's) consent. The visit was conducted pursuant to the emergency declaration under the [address] [place] [address], [address] [place] [address] authority and the [employer] General Act. Patient identification was verified, and a caregiver was present when appropriate. The patient was located in a state where the provider was licensed to provide care. Patient identification was verified at the start of the visit, including the patient's telephone number and physical location. I discussed with the patient the nature of our telehealth visits, that:     Due to the nature of an audio- video modality, the only components of a physical exam that could be done are the elements supported by direct observation. I would evaluate the patient and recommend diagnostics and treatments based on my assessment. If it was felt that the patient should be evaluated in clinic or an emergency room setting, then they would be directed there. Our sessions are not being recorded and that personal health information is protected. Our team would provide follow up care in person if/when the patient needs it.            Patient's location: home address in [facility]  Physician  location home address in [facility] other people involved in call  Is her care giver       On this date 2/28/2022 I have spent 30  minutes reviewing previous notes, test results and face to face (virtual) with the patient discussing the diagnosis and importance of compliance with the treatment plan as well as documenting on the day of the visit. ,     This visit was completed virtually using D[employer]. me  The patient is here for a medication list and treatment planning review  We will go over our care planning goals as well as take care of all refills  We will set up labs as well   She can not get out of the house           Hypertension  This is a chronic problem. The current episode started more than 1 year ago. The problem is controlled. Associated symptoms include malaise/fatigue and neck pain. Pertinent negatives include no anxiety, blurred vision, chest pain, headaches, orthopnea, palpitations, peripheral edema, PND, shortness of breath or sweats. Risk factors for coronary artery disease include post-menopausal state, diabetes mellitus and dyslipidemia. Past treatments include lifestyle changes. The current treatment provides significant improvement. Compliance problems include exercise and diet. There is no history of angina, kidney disease, CAD/MI, CVA, heart failure, left ventricular hypertrophy, PVD or retinopathy. There is no history of chronic renal disease, coarctation of the aorta, hyperaldosteronism, hypercortisolism, hyperparathyroidism, a hypertension causing med, pheochromocytoma, renovascular disease, sleep apnea or a thyroid problem. Diabetes  She presents for her follow-up diabetic visit. She has type 2 diabetes mellitus. Hypoglycemia symptoms include nervousness/anxiousness. Pertinent negatives for hypoglycemia include no confusion, dizziness, headaches, pallor, seizures, speech difficulty, sweats or tremors. Associated symptoms include fatigue and weakness. Pertinent negatives for diabetes include no blurred vision, no chest pain, no foot paresthesias, no foot ulcerations, no polydipsia, no polyphagia, no polyuria, no visual change and no weight loss. There are no hypoglycemic complications.  Pertinent negatives for diabetic complications include no autonomic neuropathy, CVA, heart disease, nephropathy, peripheral neuropathy, PVD or retinopathy. Risk factors for coronary artery disease include hypertension, dyslipidemia, diabetes mellitus and post-menopausal. Current diabetic treatment includes diet, oral agent (monotherapy) and insulin injections (Ozempic). She is compliant with treatment some of the time. She is following a generally unhealthy diet. An ACE inhibitor/angiotensin II receptor blocker is not being taken. ROS:  Review of Systems   Constitutional: Positive for fatigue, malaise/fatigue and unexpected weight change. Negative for activity change, appetite change, chills, diaphoresis, fever and weight loss. HENT: Negative. Negative for congestion, dental problem, drooling, ear discharge, ear pain, facial swelling, hearing loss, mouth sores, nosebleeds, postnasal drip, rhinorrhea, sinus pressure, sinus pain, sneezing, sore throat, tinnitus, trouble swallowing and voice change. Eyes: Negative for blurred vision, photophobia, pain, discharge, redness, itching and visual disturbance. Respiratory: Negative. Negative for apnea, cough, choking, chest tightness, shortness of breath, wheezing and stridor. Cardiovascular: Negative. Negative for chest pain, palpitations, orthopnea, leg swelling and PND. Gastrointestinal: Negative. Negative for abdominal distention, abdominal pain, anal bleeding, blood in stool, constipation, diarrhea, nausea, rectal pain and vomiting. Endocrine: Negative. Negative for cold intolerance, heat intolerance, polydipsia, polyphagia and polyuria. Genitourinary: Negative. Negative for decreased urine volume, difficulty urinating, dysuria, enuresis, flank pain, frequency, genital sores, hematuria and urgency. Musculoskeletal: Positive for arthralgias (pain right shoulder ), back pain, gait problem, myalgias and neck pain.  Negative for joint swelling and neck stiffness. Skin: Positive for wound (sacral wound). Negative for color change, pallor and rash. Allergic/Immunologic: Negative. Negative for environmental allergies, food allergies and immunocompromised state. Neurological: Positive for weakness. Negative for dizziness, tremors, seizures, syncope, facial asymmetry, speech difficulty, light-headedness, numbness and headaches. Hematological: Negative. Negative for adenopathy. Does not bruise/bleed easily. Psychiatric/Behavioral: Positive for agitation, decreased concentration and dysphoric mood. Negative for behavioral problems, confusion, hallucinations, self-injury, sleep disturbance and suicidal ideas. The patient is nervous/anxious. The patient is not hyperactive.          Past Medical/Surgical Hx;  Reviewed with patient      Diagnosis Date    Arthritis     right hand    Cancer ([facility] [address] 1985    uterus and cervix had radiation and implant    Diabetes mellitus ([facility] [address]     Hyperlipidemia     Hypertension     Radiation cystitis     Urinary incontinence     complication due to uterine cancer    Urinary retention     caused by complications from uterine cancer     Past Surgical History:   Procedure Laterality Date    CYSTOSCOPY N/A 7/17/2019    CYSTOSCOPY BOTOX  INJECTION 200 UNITS performed by Elizabeth CALLOWAY Drew, DO at [address] [address] CYSTOURETHROSCOPY INJ CHEMODENERVATION BLADDER N/A 11/9/2018    CYSTOSCOPY  BOTOX INJECTION (200 UNITS) performed by Kate Roman MD at [address]       Past Family Hx:  Reviewed with patient      Problem Relation Age of Onset    Heart Disease Mother     No Known Problems Father     Other Sister     Other Brother     Other Other        Social Hx:  Reviewed with patient  Social History     Tobacco Use    Smoking status: Former Smoker     Packs/day: 1.00     Years: 44.00     Pack years: 44.00     Types: Cigarettes     Start date: 1/1/1965     Quit date: 2009     Years since quittin.2    Smokeless tobacco: Never Used   Substance Use Topics    Alcohol use: Yes     Comment: seldom       OBJECTIVE  LMP  (LMP Unknown)     Problem List:  Vargas Hassan does not have any pertinent problems on file. PHYS EX:  General: Awake/Alert/Oriented/No Acute Distress      ASSESSMENT/PLAN  Vargas Hassan was seen today for orders. Diagnoses and all orders for this visit:    Type 2 diabetes mellitus with hyperglycemia, without long-term current use of insulin (HCC)  -     Comprehensive Metabolic Panel; Future  -     CBC with Auto Differential; Future    ---VASCULAR PANEL  A) asa, plavix, aggrenox  B) coumadin, pletal, tzd, STATIN  C) ace, hctz, folic, ccb  D) cannikinumab, fish oils     ---CARDIAC---asa, ace, beta, STATIN, hctz, ( ccb )    Dyslipidemia  -     atorvastatin (LIPITOR) 20 MG tablet; TAKE 1 TABLET BY MOUTH EVERY DAY  -     Comprehensive Metabolic Panel; Future  -     Lipid Panel; Future  -     CBC with Auto Differential; Future  -     Hemoglobin A1C; Future  -     Microalbumin, Ur; Future  --Mediterranean diet, exercise, weight loss, vitamins    We have a long talk on cholesterol and importance of lowering it       Type 2 diabetes mellitus without complication, without long-term current use of insulin (HCC)  -     metFORMIN (GLUCOPHAGE) 1000 MG tablet; TAKE 1 TABLET BY MOUTH TWICE A DAY WITH MEALS  -     Comprehensive Metabolic Panel; Future  -     CBC with Auto Differential; Future    Essential hypertension  --patient is instructed on low to moderate sodium ( 2 to 2.5 grams ), daily    Also to increase potassium in the diet to about 3.5 grams daily    Literature is provided     -     Comprehensive Metabolic Panel; Future  -     CBC with Auto Differential; Future    Severe protein-calorie malnutrition (HCC)  -     Comprehensive Metabolic Panel;  Future  -     CBC with Auto Differential; Future  Long talk on treatment and prevention  Literature is given       Wound of sacral region, sequela  -     Comprehensive Metabolic Panel; Future  -     CBC with Auto Differential; Future  Long talk on treatment and prevention  Literature is given       H/O total hysterectomy  -     Comprehensive Metabolic Panel; Future  -     CBC with Auto Differential; Future    Fatigue, unspecified type  -     TSH; Future  -     Uric Acid; Future  -     Comprehensive Metabolic Panel; Future  -     CBC with Auto Differential; Future    Other orders  -     Memantine HCl-Donepezil HCl (NAMZARIC) 14-10 MG CP24; Take 14 mg by mouth every morning (before breakfast)  -     diclofenac sodium (VOLTAREN) 1 % GEL; Apply 4 g topically 4 times daily  -     miconazole nitrate 2 % OINT;  Apply topically 2 times daily        Outpatient Encounter Medications as of 2/28/2022   Medication Sig Dispense Refill    atorvastatin (LIPITOR) 20 MG tablet TAKE 1 TABLET BY MOUTH EVERY DAY 90 tablet 1    Memantine HCl-Donepezil HCl (NAMZARIC) 14-10 MG CP24 Take 14 mg by mouth every morning (before breakfast) 90 capsule 1    metFORMIN (GLUCOPHAGE) 1000 MG tablet TAKE 1 TABLET BY MOUTH TWICE A DAY WITH MEALS 180 tablet 1    diclofenac sodium (VOLTAREN) 1 % GEL Apply 4 g topically 4 times daily 100 g 3    miconazole nitrate 2 % OINT Apply topically 2 times daily 1 each 5    insulin lispro (HUMALOG) 100 UNIT/ML injection vial Inject 0-6 Units into the skin 3 times daily (with meals) (Patient not taking: Reported on 2/28/2022) 16 mL 1    Semaglutide,0.25 or 0.5MG/DOS, (OZEMPIC, 0.25 OR 0.5 MG/DOSE,) 2 MG/1.5ML SOPN Inject 0.5 mLs into the skin once a week (Patient not taking: Reported on 2/28/2022) 3 pen 1    [DISCONTINUED] atorvastatin (LIPITOR) 20 MG tablet TAKE 1 TABLET BY MOUTH EVERY DAY 90 tablet 1    [DISCONTINUED] Memantine HCl-Donepezil HCl (NAMZARIC) 14-10 MG CP24 Take 14 mg by mouth every morning (before breakfast) 90 capsule 1    [DISCONTINUED] metFORMIN (GLUCOPHAGE) 1000 MG tablet TAKE 1 TABLET BY MOUTH TWICE A DAY WITH MEALS 180 tablet 1    [DISCONTINUED] diclofenac sodium (VOLTAREN) 1 % GEL Apply 4 g topically 4 times daily 100 g 3    [DISCONTINUED] miconazole nitrate 2 % OINT Apply topically 2 times daily  0     No facility-administered encounter medications on file as of 2/28/2022. No follow-ups on file.         Reviewed recent labs related to Merlene current problems      Discussed importance of regular Health Maintenance follow up  Health Maintenance   Topic    Diabetic foot exam     Depression Screen     Diabetic retinal exam     DTaP/Tdap/Td vaccine (1 - Tdap)    Colorectal Cancer Screen     Shingles Vaccine (1 of 2)    Low dose CT lung screening     DEXA (modify frequency per FRAX score)     Diabetic microalbuminuria test     Breast cancer screen     Flu vaccine (1)    COVID-19 Vaccine (3 - Booster for Huggins Peter series)    Annual Wellness Visit (AWV)     Lipid screen     Potassium monitoring     Creatinine monitoring     A1C test (Diabetic or Prediabetic)     Pneumococcal 65+ years Vaccine     Hepatitis C screen     Hepatitis A vaccine     Hib vaccine     Meningococcal (ACWY) vaccine

## 2022-03-07 ENCOUNTER — TELEPHONE (OUTPATIENT)
Dept: FAMILY MEDICINE CLINIC | Age: 72
End: 2022-03-07

## 2022-03-07 DIAGNOSIS — G30.1 LATE ONSET ALZHEIMER'S DISEASE WITH BEHAVIORAL DISTURBANCE (HCC): ICD-10-CM

## 2022-03-07 DIAGNOSIS — R26.9 GAIT DISTURBANCE: ICD-10-CM

## 2022-03-07 DIAGNOSIS — E43 SEVERE PROTEIN-CALORIE MALNUTRITION (HCC): ICD-10-CM

## 2022-03-07 DIAGNOSIS — S31.000S WOUND OF SACRAL REGION, SEQUELA: ICD-10-CM

## 2022-03-07 DIAGNOSIS — F02.818 LATE ONSET ALZHEIMER'S DISEASE WITH BEHAVIORAL DISTURBANCE (HCC): ICD-10-CM

## 2022-03-07 DIAGNOSIS — L89.309 PRESSURE INJURY OF SKIN OF BUTTOCK, UNSPECIFIED INJURY STAGE, UNSPECIFIED LATERALITY: Primary | ICD-10-CM

## 2022-03-07 DIAGNOSIS — F02.80 ALZHEIMER DISEASE (HCC): ICD-10-CM

## 2022-03-07 DIAGNOSIS — R26.89 IMBALANCE: ICD-10-CM

## 2022-03-07 DIAGNOSIS — G30.9 ALZHEIMER DISEASE (HCC): ICD-10-CM

## 2022-03-07 NOTE — TELEPHONE ENCOUNTER
What are we referring to home health for?     Electronically signed by Rajan Pimentel MA on 3/7/22 at 4:35 PM EST

## 2022-03-07 NOTE — TELEPHONE ENCOUNTER
Pt had appt on 2/28 she wanted a referral to [facility]. Ca this be sent for her?

## 2022-03-25 DIAGNOSIS — E11.65 TYPE 2 DIABETES MELLITUS WITH HYPERGLYCEMIA, WITHOUT LONG-TERM CURRENT USE OF INSULIN (HCC): ICD-10-CM

## 2022-03-25 DIAGNOSIS — G30.9 ALZHEIMER DISEASE (HCC): ICD-10-CM

## 2022-03-25 DIAGNOSIS — E43 SEVERE PROTEIN-CALORIE MALNUTRITION (HCC): ICD-10-CM

## 2022-03-25 DIAGNOSIS — L89.309 PRESSURE INJURY OF SKIN OF BUTTOCK, UNSPECIFIED INJURY STAGE, UNSPECIFIED LATERALITY: ICD-10-CM

## 2022-03-25 DIAGNOSIS — R26.89 IMBALANCE: ICD-10-CM

## 2022-03-25 DIAGNOSIS — R26.9 GAIT DISTURBANCE: ICD-10-CM

## 2022-03-25 DIAGNOSIS — S31.000S WOUND OF SACRAL REGION, SEQUELA: Primary | ICD-10-CM

## 2022-03-25 DIAGNOSIS — F02.80 ALZHEIMER DISEASE (HCC): ICD-10-CM

## 2022-04-13 ENCOUNTER — TELEPHONE (OUTPATIENT)
Dept: FAMILY MEDICINE CLINIC | Age: 72
End: 2022-04-13

## 2022-04-13 NOTE — TELEPHONE ENCOUNTER
[facility] states patient is non compliant with her meds and checking blood sugar. Patient has been educated.    Last seen 2/28/2022  Next appt Visit date not found  [facility]

## 2022-04-14 ENCOUNTER — TELEPHONE (OUTPATIENT)
Dept: FAMILY MEDICINE CLINIC | Age: 72
End: 2022-04-14

## 2022-04-14 NOTE — TELEPHONE ENCOUNTER
Oneal Stafford called in from [employer] she just saw Donneta Fees and patient is non-compliant with her meds. Oneal Stafford asked Rashmi's niece if she could put her pills in a container so that Carolee Aleisha remembers to take them daily. The niece stopped Oneal Stafford on her way out of the home away from Rashmi's  and the niece told Oneal Stafford that her  refuses to give Carolee Fees her medication because he doesn't thinks she needs them. Elia Dunn stated that she thinks they have bed bugs and she has a  coming to the house.

## 2022-10-16 ENCOUNTER — APPOINTMENT (OUTPATIENT)
Dept: CT IMAGING | Age: 72
DRG: 064 | End: 2022-10-16
Payer: MEDICARE

## 2022-10-16 ENCOUNTER — HOSPITAL ENCOUNTER (INPATIENT)
Age: 72
LOS: 5 days | Discharge: NURSING, REHAB OR LONG-TERM CARE | DRG: 064 | End: 2022-10-21
Attending: EMERGENCY MEDICINE | Admitting: INTERNAL MEDICINE
Payer: MEDICARE

## 2022-10-16 ENCOUNTER — APPOINTMENT (OUTPATIENT)
Dept: GENERAL RADIOLOGY | Age: 72
DRG: 064 | End: 2022-10-16
Payer: MEDICARE

## 2022-10-16 DIAGNOSIS — G93.40 ACUTE ENCEPHALOPATHY: Primary | ICD-10-CM

## 2022-10-16 DIAGNOSIS — N39.0 URINARY TRACT INFECTION IN FEMALE: ICD-10-CM

## 2022-10-16 DIAGNOSIS — I63.9 CEREBROVASCULAR ACCIDENT (CVA), UNSPECIFIED MECHANISM (HCC): ICD-10-CM

## 2022-10-16 LAB
ACETAMINOPHEN LEVEL: <5 MCG/ML (ref 10–30)
ALBUMIN SERPL-MCNC: 4.1 G/DL (ref 3.5–5.2)
ALP BLD-CCNC: 127 U/L (ref 35–104)
ALT SERPL-CCNC: 29 U/L (ref 0–32)
AMPHETAMINE SCREEN, URINE: NOT DETECTED
ANION GAP SERPL CALCULATED.3IONS-SCNC: 13 MMOL/L (ref 7–16)
AST SERPL-CCNC: 24 U/L (ref 0–31)
BACTERIA: ABNORMAL /HPF
BARBITURATE SCREEN URINE: NOT DETECTED
BASOPHILS ABSOLUTE: 0.04 E9/L (ref 0–0.2)
BASOPHILS RELATIVE PERCENT: 0.4 % (ref 0–2)
BENZODIAZEPINE SCREEN, URINE: NOT DETECTED
BILIRUB SERPL-MCNC: 0.7 MG/DL (ref 0–1.2)
BILIRUBIN URINE: NEGATIVE
BLOOD, URINE: ABNORMAL
BUN BLDV-MCNC: 32 MG/DL (ref 6–23)
CALCIUM SERPL-MCNC: 10.2 MG/DL (ref 8.6–10.2)
CANNABINOID SCREEN URINE: NOT DETECTED
CHLORIDE BLD-SCNC: 108 MMOL/L (ref 98–107)
CHP ED QC CHECK: YES
CLARITY: ABNORMAL
CO2: 21 MMOL/L (ref 22–29)
COCAINE METABOLITE SCREEN URINE: NOT DETECTED
COLOR: YELLOW
CREAT SERPL-MCNC: 1.4 MG/DL (ref 0.5–1)
EOSINOPHILS ABSOLUTE: 0 E9/L (ref 0.05–0.5)
EOSINOPHILS RELATIVE PERCENT: 0 % (ref 0–6)
EPITHELIAL CELLS, UA: ABNORMAL /HPF
ETHANOL: <10 MG/DL (ref 0–0.08)
FENTANYL SCREEN, URINE: NOT DETECTED
GFR AFRICAN AMERICAN: 45
GFR NON-AFRICAN AMERICAN: 37 ML/MIN/1.73
GLUCOSE BLD-MCNC: 264 MG/DL
GLUCOSE BLD-MCNC: 306 MG/DL (ref 74–99)
GLUCOSE URINE: 100 MG/DL
HCT VFR BLD CALC: 47.6 % (ref 34–48)
HEMOGLOBIN: 15 G/DL (ref 11.5–15.5)
IMMATURE GRANULOCYTES #: 0.03 E9/L
IMMATURE GRANULOCYTES %: 0.3 % (ref 0–5)
KETONES, URINE: NEGATIVE MG/DL
LACTIC ACID, SEPSIS: 1.6 MMOL/L (ref 0.5–1.9)
LACTIC ACID, SEPSIS: 2.8 MMOL/L (ref 0.5–1.9)
LEUKOCYTE ESTERASE, URINE: ABNORMAL
LYMPHOCYTES ABSOLUTE: 1.51 E9/L (ref 1.5–4)
LYMPHOCYTES RELATIVE PERCENT: 16.3 % (ref 20–42)
Lab: NORMAL
MCH RBC QN AUTO: 28 PG (ref 26–35)
MCHC RBC AUTO-ENTMCNC: 31.5 % (ref 32–34.5)
MCV RBC AUTO: 89 FL (ref 80–99.9)
METER GLUCOSE: 264 MG/DL (ref 74–99)
METHADONE SCREEN, URINE: NOT DETECTED
MONOCYTES ABSOLUTE: 0.72 E9/L (ref 0.1–0.95)
MONOCYTES RELATIVE PERCENT: 7.8 % (ref 2–12)
NEUTROPHILS ABSOLUTE: 6.99 E9/L (ref 1.8–7.3)
NEUTROPHILS RELATIVE PERCENT: 75.2 % (ref 43–80)
NITRITE, URINE: POSITIVE
OPIATE SCREEN URINE: NOT DETECTED
OXYCODONE URINE: NOT DETECTED
PDW BLD-RTO: 12.2 FL (ref 11.5–15)
PH UA: 5 (ref 5–9)
PHENCYCLIDINE SCREEN URINE: NOT DETECTED
PLATELET # BLD: 225 E9/L (ref 130–450)
PMV BLD AUTO: 10.5 FL (ref 7–12)
POTASSIUM REFLEX MAGNESIUM: 4.6 MMOL/L (ref 3.5–5)
PROTEIN UA: 30 MG/DL
RBC # BLD: 5.35 E12/L (ref 3.5–5.5)
RBC UA: ABNORMAL /HPF (ref 0–2)
SALICYLATE, SERUM: <0.3 MG/DL (ref 0–30)
SARS-COV-2, NAAT: NOT DETECTED
SODIUM BLD-SCNC: 142 MMOL/L (ref 132–146)
SPECIFIC GRAVITY UA: >=1.03 (ref 1–1.03)
TOTAL CK: 167 U/L (ref 20–180)
TOTAL PROTEIN: 7.9 G/DL (ref 6.4–8.3)
TRICYCLIC ANTIDEPRESSANTS SCREEN SERUM: NEGATIVE NG/ML
TROPONIN, HIGH SENSITIVITY: 33 NG/L (ref 0–9)
TROPONIN, HIGH SENSITIVITY: 33 NG/L (ref 0–9)
UROBILINOGEN, URINE: 0.2 E.U./DL
WBC # BLD: 9.3 E9/L (ref 4.5–11.5)
WBC UA: >20 /HPF (ref 0–5)

## 2022-10-16 PROCEDURE — 93005 ELECTROCARDIOGRAM TRACING: CPT

## 2022-10-16 PROCEDURE — 80179 DRUG ASSAY SALICYLATE: CPT

## 2022-10-16 PROCEDURE — 6360000004 HC RX CONTRAST MEDICATION: Performed by: RADIOLOGY

## 2022-10-16 PROCEDURE — 80307 DRUG TEST PRSMV CHEM ANLYZR: CPT

## 2022-10-16 PROCEDURE — 85025 COMPLETE CBC W/AUTO DIFF WBC: CPT

## 2022-10-16 PROCEDURE — 71045 X-RAY EXAM CHEST 1 VIEW: CPT

## 2022-10-16 PROCEDURE — 83605 ASSAY OF LACTIC ACID: CPT

## 2022-10-16 PROCEDURE — 2580000003 HC RX 258

## 2022-10-16 PROCEDURE — 0042T CT BRAIN PERFUSION: CPT

## 2022-10-16 PROCEDURE — 87088 URINE BACTERIA CULTURE: CPT

## 2022-10-16 PROCEDURE — 2500000003 HC RX 250 WO HCPCS

## 2022-10-16 PROCEDURE — 70498 CT ANGIOGRAPHY NECK: CPT

## 2022-10-16 PROCEDURE — 87186 SC STD MICRODIL/AGAR DIL: CPT

## 2022-10-16 PROCEDURE — 70496 CT ANGIOGRAPHY HEAD: CPT

## 2022-10-16 PROCEDURE — 6360000002 HC RX W HCPCS

## 2022-10-16 PROCEDURE — 82962 GLUCOSE BLOOD TEST: CPT

## 2022-10-16 PROCEDURE — 96374 THER/PROPH/DIAG INJ IV PUSH: CPT

## 2022-10-16 PROCEDURE — 82077 ASSAY SPEC XCP UR&BREATH IA: CPT

## 2022-10-16 PROCEDURE — 99285 EMERGENCY DEPT VISIT HI MDM: CPT

## 2022-10-16 PROCEDURE — 1200000000 HC SEMI PRIVATE

## 2022-10-16 PROCEDURE — 70450 CT HEAD/BRAIN W/O DYE: CPT

## 2022-10-16 PROCEDURE — 81001 URINALYSIS AUTO W/SCOPE: CPT

## 2022-10-16 PROCEDURE — 87077 CULTURE AEROBIC IDENTIFY: CPT

## 2022-10-16 PROCEDURE — 36415 COLL VENOUS BLD VENIPUNCTURE: CPT

## 2022-10-16 PROCEDURE — 96375 TX/PRO/DX INJ NEW DRUG ADDON: CPT

## 2022-10-16 PROCEDURE — 82550 ASSAY OF CK (CPK): CPT

## 2022-10-16 PROCEDURE — 2580000003 HC RX 258: Performed by: INTERNAL MEDICINE

## 2022-10-16 PROCEDURE — 87635 SARS-COV-2 COVID-19 AMP PRB: CPT

## 2022-10-16 PROCEDURE — 80053 COMPREHEN METABOLIC PANEL: CPT

## 2022-10-16 PROCEDURE — 80143 DRUG ASSAY ACETAMINOPHEN: CPT

## 2022-10-16 PROCEDURE — 73502 X-RAY EXAM HIP UNI 2-3 VIEWS: CPT

## 2022-10-16 PROCEDURE — 84484 ASSAY OF TROPONIN QUANT: CPT

## 2022-10-16 RX ORDER — SODIUM CHLORIDE 9 MG/ML
INJECTION, SOLUTION INTRAVENOUS CONTINUOUS
Status: DISCONTINUED | OUTPATIENT
Start: 2022-10-16 | End: 2022-10-17

## 2022-10-16 RX ORDER — 0.9 % SODIUM CHLORIDE 0.9 %
500 INTRAVENOUS SOLUTION INTRAVENOUS ONCE
Status: COMPLETED | OUTPATIENT
Start: 2022-10-16 | End: 2022-10-16

## 2022-10-16 RX ORDER — ENOXAPARIN SODIUM 100 MG/ML
30 INJECTION SUBCUTANEOUS DAILY
Status: DISCONTINUED | OUTPATIENT
Start: 2022-10-16 | End: 2022-10-21 | Stop reason: HOSPADM

## 2022-10-16 RX ORDER — DEXTROSE MONOHYDRATE 100 MG/ML
INJECTION, SOLUTION INTRAVENOUS CONTINUOUS PRN
Status: DISCONTINUED | OUTPATIENT
Start: 2022-10-16 | End: 2022-10-21 | Stop reason: HOSPADM

## 2022-10-16 RX ORDER — SODIUM CHLORIDE 9 MG/ML
INJECTION, SOLUTION INTRAVENOUS PRN
Status: DISCONTINUED | OUTPATIENT
Start: 2022-10-16 | End: 2022-10-21 | Stop reason: HOSPADM

## 2022-10-16 RX ORDER — 0.9 % SODIUM CHLORIDE 0.9 %
1000 INTRAVENOUS SOLUTION INTRAVENOUS ONCE
Status: DISCONTINUED | OUTPATIENT
Start: 2022-10-16 | End: 2022-10-17

## 2022-10-16 RX ORDER — LABETALOL HYDROCHLORIDE 5 MG/ML
10 INJECTION, SOLUTION INTRAVENOUS ONCE
Status: COMPLETED | OUTPATIENT
Start: 2022-10-16 | End: 2022-10-16

## 2022-10-16 RX ORDER — ACETAMINOPHEN 325 MG/1
650 TABLET ORAL EVERY 6 HOURS PRN
Status: DISCONTINUED | OUTPATIENT
Start: 2022-10-16 | End: 2022-10-21 | Stop reason: HOSPADM

## 2022-10-16 RX ORDER — SODIUM CHLORIDE 0.9 % (FLUSH) 0.9 %
5-40 SYRINGE (ML) INJECTION PRN
Status: DISCONTINUED | OUTPATIENT
Start: 2022-10-16 | End: 2022-10-21 | Stop reason: HOSPADM

## 2022-10-16 RX ORDER — ENOXAPARIN SODIUM 100 MG/ML
40 INJECTION SUBCUTANEOUS DAILY
Status: DISCONTINUED | OUTPATIENT
Start: 2022-10-16 | End: 2022-10-16 | Stop reason: DRUGHIGH

## 2022-10-16 RX ORDER — POLYETHYLENE GLYCOL 3350 17 G/17G
17 POWDER, FOR SOLUTION ORAL DAILY PRN
Status: DISCONTINUED | OUTPATIENT
Start: 2022-10-16 | End: 2022-10-21 | Stop reason: HOSPADM

## 2022-10-16 RX ORDER — ASPIRIN 81 MG/1
324 TABLET, CHEWABLE ORAL ONCE
Status: DISCONTINUED | OUTPATIENT
Start: 2022-10-16 | End: 2022-10-17

## 2022-10-16 RX ORDER — ACETAMINOPHEN 650 MG/1
650 SUPPOSITORY RECTAL EVERY 6 HOURS PRN
Status: DISCONTINUED | OUTPATIENT
Start: 2022-10-16 | End: 2022-10-21 | Stop reason: HOSPADM

## 2022-10-16 RX ORDER — SODIUM CHLORIDE 0.9 % (FLUSH) 0.9 %
5-40 SYRINGE (ML) INJECTION EVERY 12 HOURS SCHEDULED
Status: DISCONTINUED | OUTPATIENT
Start: 2022-10-16 | End: 2022-10-21 | Stop reason: HOSPADM

## 2022-10-16 RX ORDER — MEMANTINE HYDROCHLORIDE 5 MG/1
5 TABLET ORAL 2 TIMES DAILY
Status: DISCONTINUED | OUTPATIENT
Start: 2022-10-17 | End: 2022-10-21 | Stop reason: HOSPADM

## 2022-10-16 RX ORDER — SODIUM CHLORIDE 9 MG/ML
INJECTION, SOLUTION INTRAVENOUS
Status: COMPLETED
Start: 2022-10-16 | End: 2022-10-16

## 2022-10-16 RX ORDER — DONEPEZIL HYDROCHLORIDE 10 MG/1
10 TABLET, FILM COATED ORAL NIGHTLY
Status: DISCONTINUED | OUTPATIENT
Start: 2022-10-17 | End: 2022-10-21 | Stop reason: HOSPADM

## 2022-10-16 RX ADMIN — SODIUM CHLORIDE: 9 INJECTION, SOLUTION INTRAVENOUS at 18:02

## 2022-10-16 RX ADMIN — Medication 500 ML: at 13:57

## 2022-10-16 RX ADMIN — IOPAMIDOL 150 ML: 755 INJECTION, SOLUTION INTRAVENOUS at 18:35

## 2022-10-16 RX ADMIN — LABETALOL HYDROCHLORIDE 10 MG: 5 INJECTION INTRAVENOUS at 14:16

## 2022-10-16 RX ADMIN — WATER 1000 MG: 1 INJECTION INTRAMUSCULAR; INTRAVENOUS; SUBCUTANEOUS at 16:00

## 2022-10-16 RX ADMIN — SODIUM CHLORIDE 500 ML: 9 INJECTION, SOLUTION INTRAVENOUS at 13:57

## 2022-10-16 NOTE — ED PROVIDER NOTES
ATTENDING PROVIDER ATTESTATION:     Heladio Handy presented to the emergency department for evaluation of altered mental status and was initially evaluated by the resident physician. See Original ED Note for H&P and ED course above. I have reviewed and discussed the case, including pertinent history (medical, surgical, family and social) and exam findings with the resident. I have personally performed and/or participated in the history, exam, medical decision making, and procedures and agree with all pertinent clinical information except for any differences as noted below. I was present for all key portions of any procedures performed during the ED course. I personally reviewed any EKG obtained and agree with the resident interpretation unless stated otherwise below. I have reviewed my findings and recommendations with the assigned resident, the patient, and members of family present at the time of disposition. Patient presents by EMS for altered mental status. On arrival patient is ill appearing and disheveled appearance. Patient oriented to self, not to place or time. Does answer some questions. States her buttocks hurts. Exam notable for left hemiplegia and right gaze preference. Per EMS family states patient has been altered for the past 2 days. Workup notable for UTI. CTH shows multiple lacunar infarcts which were not present on prior CTH from last year. CTA obtained to r/o LVO, however with unknown LKW and advanced age, unlikely to be a candidate for intervention. Discussed w/ interventional neurology who agrees patient is not a candidate for endovascular intervention. Admitted to medicine service for further management. My findings/plan:   1. Acute encephalopathy    2. Urinary tract infection in female    3.  Cerebrovascular accident (CVA), unspecified mechanism ([facility] [address]          Lowry Olszewski, MD Lowry Olszewski, MD  10/16/22 2808

## 2022-10-16 NOTE — H&P
Department of Internal Medicine  History and Physical    PCP: Gavi Christiansen DO   Admitting Physician: Dr. Neva Lafleur  Consultants:   Date of Service: 10/16/2022    CHIEF COMPLAINT: Fatigue/failure to thrive    HISTORY OF PRESENT ILLNESS:    Patient is a 39-year-old female who presented to the ED from home due to altered mental status and failure to thrive. Overall patient is a poor historian and as such HPI supplemented by going over EMR. According to ED notes patient has been more confused over the last 2 days. PAST MEDICAL Hx:  Past Medical History:   Diagnosis Date    Arthritis     right hand    Cancer ([address] 1985    uterus and cervix had radiation and implant    Diabetes mellitus ([facility] [address]     Hyperlipidemia     Hypertension     Radiation cystitis     Urinary incontinence     complication due to uterine cancer    Urinary retention     caused by complications from uterine cancer       PAST SURGICAL Hx:   Past Surgical History:   Procedure Laterality Date    CYSTOSCOPY N/A 7/17/2019    CYSTOSCOPY BOTOX  INJECTION 200 UNITS performed by Jennifer Russell DO at [address] [facility] removed    HYSTERECTOMY (CERVIX STATUS UNKNOWN)  1985    NM CYSTOURETHROSCOPY INJ CHEMODENERVATION BLADDER N/A 11/9/2018    CYSTOSCOPY  BOTOX INJECTION (200 UNITS) performed by Billie Ramírez MD at [address] Hx:  Family History   Problem Relation Age of Onset    Heart Disease Mother     No Known Problems Father     Other Sister     Other Brother     Other Other        HOME MEDICATIONS:  Prior to Admission medications    Medication Sig Start Date End Date Taking?  Authorizing Provider   atorvastatin (LIPITOR) 20 MG tablet TAKE 1 TABLET BY MOUTH EVERY DAY 2/28/22   [facility]   Memantine HCl-Donepezil HCl [facility]) 14-10 MG CP24 Take 14 mg by mouth every morning (before breakfast) 2/28/22   [facility]   metFORMIN (GLUCOPHAGE) 1000 MG tablet TAKE 1 TABLET BY MOUTH TWICE A DAY WITH MEALS 22   Ravi Avila Catterlin, DO   diclofenac sodium (VOLTAREN) 1 % GEL Apply 4 g topically 4 times daily 22   [place] Catterlin, DO   miconazole nitrate 2 % OINT Apply topically 2 times daily 22   [place] Catterlin, DO   insulin lispro (HUMALOG) 100 UNIT/ML injection vial Inject 0-6 Units into the skin 3 times daily (with meals)  Patient not taking: Reported on 2022 10/15/21   Gavi Christiansen DO       ALLERGIES:  Tetracyclines & related    SOCIAL Hx:  Social History     Socioeconomic History    Marital status:      Spouse name: Not on file    Number of children: Not on file    Years of education: Not on file    Highest education level: Not on file   Occupational History    Not on file   Tobacco Use    Smoking status: Former     Packs/day: 1.00     Years: 44.00     Pack years: 44.00     Types: Cigarettes     Start date: 1965     Quit date: 2009     Years since quittin.8    Smokeless tobacco: Never   Vaping Use    Vaping Use: Never used   Substance and Sexual Activity    Alcohol use: Yes     Comment: seldom    Drug use: No    Sexual activity: Not on file   Other Topics Concern    Not on file   Social History Narrative    Not on file     Social Determinants of Health     Financial Resource Strain: Not on file   Food Insecurity: Not on file   Transportation Needs: Not on file   Physical Activity: Not on file   Stress: Not on file   Social Connections: Not on file   Intimate Partner Violence: Not on file   Housing Stability: Not on file       ROS: Positive in bold  General:   Denies chills, fatigue, fever, malaise, night sweats or weight loss    Psychological:   Denies anxiety, disorientation or hallucinations    ENT:    Denies epistaxis, headaches, vertigo or visual changes    Cardiovascular:   Denies any chest pain, irregular heartbeats, or palpitations. No paroxysmal nocturnal dyspnea.     Respiratory:   Denies shortness of breath, coughing, sputum production, hemoptysis, or wheezing. No orthopnea. Gastrointestinal:   Denies nausea, vomiting, diarrhea, or constipation. Denies any abdominal pain. Denies change in bowel habits or stools. Genito-Urinary:    Denies any urgency, frequency, hematuria. Voiding without difficulty. Musculoskeletal:   Denies joint pain, joint stiffness, joint swelling or muscle pain    Neurology:    Denies any headache or focal neurological deficits. No weakness or paresthesia. Derm:    Denies any rashes, ulcers, or excoriations. Denies bruising. Extremities:   Denies any lower extremity swelling or edema. PHYSICAL EXAM: Abnormal findings noted  VITALS:  Vitals:    10/16/22 1615   BP: (!) 180/88   Pulse: 75   Resp: 16   Temp:    SpO2: 98%         CONSTITUTIONAL:    Awake, alert, cooperative, no apparent distress, and appears stated age    EYES:    EOMI, sclera clear, conjunctiva normal    ENT:    Normocephalic, atraumatic,  External ears without lesions. NECK:    Supple, symmetrical, trachea midline, no JVD    HEMATOLOGIC/LYMPHATICS:    No cervical lymphadenopathy and no supraclavicular lymphadenopathy    LUNGS:    Symmetric. No increased work of breathing, good air exchange, clear to auscultation bilaterally, no wheezes, rhonchi, or rales,     CARDIOVASCULAR:    Normal apical impulse, regular rate and rhythm, normal S1 and S2, no S3 or S4, and no murmur noted    ABDOMEN:    soft, non-distended, non-tender    MUSCULOSKELETAL:    There is no redness, warmth, or swelling of the joints. NEUROLOGIC:    Awake, alert, oriented to name, place and time. Patient has left-sided facial droop as well as left upper and lower extremity weakness with muscle strength 0 out of 5 with associated spasticity    SKIN:    No bruising or bleeding. No redness, warmth, or swelling    EXTREMITIES:    Peripheral pulses present. No edema, cyanosis, or swelling.     LINES/CATHETERS     LABORATORY DATA:  CBC with Differential:    Lab Results   Component Value Date/Time    WBC 9.3 10/16/2022 01:40 PM    RBC 5.35 10/16/2022 01:40 PM    HGB 15.0 10/16/2022 01:40 PM    HCT 47.6 10/16/2022 01:40 PM     10/16/2022 01:40 PM    MCV 89.0 10/16/2022 01:40 PM    MCH 28.0 10/16/2022 01:40 PM    MCHC 31.5 10/16/2022 01:40 PM    RDW 12.2 10/16/2022 01:40 PM    SEGSPCT 61 09/01/2012 08:15 AM    LYMPHOPCT 16.3 10/16/2022 01:40 PM    MONOPCT 7.8 10/16/2022 01:40 PM    BASOPCT 0.4 10/16/2022 01:40 PM    MONOSABS 0.72 10/16/2022 01:40 PM    LYMPHSABS 1.51 10/16/2022 01:40 PM    EOSABS 0.00 10/16/2022 01:40 PM    BASOSABS 0.04 10/16/2022 01:40 PM     CMP:    Lab Results   Component Value Date/Time     10/16/2022 01:40 PM    K 4.6 10/16/2022 01:40 PM     10/16/2022 01:40 PM    CO2 21 10/16/2022 01:40 PM    BUN 32 10/16/2022 01:40 PM    CREATININE 1.4 10/16/2022 01:40 PM    GFRAA 45 10/16/2022 01:40 PM    LABGLOM 37 10/16/2022 01:40 PM    GLUCOSE 306 10/16/2022 01:40 PM    PROT 7.9 10/16/2022 01:40 PM    LABALBU 4.1 10/16/2022 01:40 PM    CALCIUM 10.2 10/16/2022 01:40 PM    BILITOT 0.7 10/16/2022 01:40 PM    ALKPHOS 127 10/16/2022 01:40 PM    AST 24 10/16/2022 01:40 PM    ALT 29 10/16/2022 01:40 PM       ASSESSMENT/PLAN:  Acute to subacute stroke right thalamic lacunar type infarct  UTI  Failure to thrive  Severe protein calorie malnutrition  Non-insulin-dependent diabetes mellitus type 2  Essential hypertension  History of uterine and cervical cancer with history of radiation  Mild mitral regurgitation     Patient found to have acute versus subacute stroke. She has a left-sided weakness and facial droop otherwise she is able to hold conversation and answer questions appropriately. MRI brain and echocardiogram ordered. Neurology consulted. PT OT consulted. Speech therapy consulted. Patient does have UTI. Patient placed on Rocephin. Cultures ordered.       Carlos Vega, [place]  5:25 PM  10/16/2022    Electronically signed by Ethan Wren DO on 10/16/22 at 5:25 PM EDT

## 2022-10-16 NOTE — ED NOTES
Nurse noted multiple bed bugs to patient bedding and clothes. Patient had to be taken immediately to decontamination room and showered. Triage delayed. Patient is alert upon initial assessment, able to speak but confused. Airway clear. Respirations WNL. Patient laying curled up on ER cot, moved by EMS personnel.      Kelley Solomon RN  10/16/22 1325

## 2022-10-16 NOTE — ED PROVIDER NOTES
HPI     Patient is a 67 y.o. female presents with a chief complaint of altered mental status. Patient is unable to answer any questions during initial evaluation. She states that she just feels warm. Review of Systems   Unable to perform ROS: Mental status change      Physical Exam  Vitals and nursing note reviewed. Constitutional:       Appearance: She is ill-appearing. She is not diaphoretic. HENT:      Head: Normocephalic and atraumatic. Mouth/Throat:      Comments: Dry  Eyes:      General: No scleral icterus. Extraocular Movements:      Right eye: Normal extraocular motion. Left eye: Abnormal extraocular motion present. Pupils: Pupils are equal, round, and reactive to light. Pupils are equal.      Right eye: Pupil is round and reactive. Left eye: Pupil is round and reactive. Comments: Patient is unable to look to the left. Cardiovascular:      Rate and Rhythm: Normal rate and regular rhythm. Heart sounds: Normal heart sounds. No murmur heard. No friction rub. No gallop. Pulmonary:      Effort: Pulmonary effort is normal. No respiratory distress. Breath sounds: No stridor. No wheezing, rhonchi or rales. Abdominal:      General: There is no distension. Palpations: Abdomen is soft. Tenderness: There is no abdominal tenderness. There is no guarding. Musculoskeletal:         General: No swelling or tenderness. Cervical back: No rigidity. Skin:     General: Skin is warm. Coloration: Skin is not cyanotic. Neurological:      Mental Status: She is alert. She is disoriented and confused. GCS: GCS eye subscore is 4. GCS verbal subscore is 5. GCS motor subscore is 5. Psychiatric:         Cognition and Memory: Cognition is impaired. Memory is impaired. EKG: This EKG is signed and interpreted by me.     Rate: 96 bpm  Rhythm: Sinus  Interpretation: no acute changes  Comparison: stable as compared to patient's most recent EKG, 8/2/2021    Procedures     MDM     Amount and/or Complexity of Data Reviewed  Clinical lab tests: reviewed  Tests in the radiology section of CPT®: reviewed  Tests in the medicine section of CPT®: reviewed  Decide to obtain previous medical records or to obtain history from someone other than the patient: yes    Patient presented altered to the emergency department via EMS. Patient was given a very extensive work-up to determine the cause of her altered mental status. Patient's  was also not able to provide history. Urinalysis was obtained and showed a urinary tract infection for which the patient was given Rocephin. Patient was also given a noncontrast CT of the head initially. Noncontrast CT showed a history of lacunar infarcts as well as generalized atrophy. CTA of the head neck and perfusion was then ordered. CTA and perfusion studies showed a mismatch of 3 mL. There was no large vessel obstruction. MRI is needed to determine if these are acute or subacute infarcts. Due to patient's presentation last known well after talking to son patient was outside of interventional window. Further, areas of infarction regardless of timeframe are not available for intervention. Patient was given aspirin. MRI is ordered for tomorrow. Patient will be admitted to medicine for further work-up. ED Course as of 10/16/22 2338   NeSwypeShieldyn Paget Oct 16, 2022   6339 Spoke with patient's son Sanjay Borrego and he states that Friday night the mother did not recognize them. He states that this was different than her baseline. [JH]      ED Course User Index  [JH] Vicente Wilkerson,       Patient is a 67 y.o. female presenting with altered mental status. Patient was given return precautions. Labs were interpreted by me. Patient will follow up with their primary care provider. Patient is agreeable to this plan. Patient has remained stable throughout their stay in the ED.    ED Course as of 10/16/22 2342   Nelwyn Paget Oct 16, 2022   Shoaib Vasquez with patient's son Satish Cazares and he states that Friday night the mother did not recognize them. He states that this was different than her baseline. []      ED Course User Index  [] Matthew Blood, DO       --------------------------------------------- PAST HISTORY ---------------------------------------------  Past Medical History:  has a past medical history of Arthritis, Cancer ([facility] [address] Diabetes mellitus ([facility] [address] Hyperlipidemia, Hypertension, Radiation cystitis, Urinary incontinence, and Urinary retention. Past Surgical History:  has a past surgical history that includes fracture surgery; Hysterectomy (1985); pr cystourethroscopy inj chemodenervation bladder (N/A, 11/9/2018); and Cystoscopy (N/A, 7/17/2019). Social History:  reports that she quit smoking about 12 years ago. Her smoking use included cigarettes. She started smoking about 57 years ago. She has a 44.00 pack-year smoking history. She has never used smokeless tobacco. She reports current alcohol use. She reports that she does not use drugs. Family History: family history includes Heart Disease in her mother; No Known Problems in her father; Other in her brother, sister, and another family member. The patients home medications have been reviewed.     Allergies: Tetracyclines & related    -------------------------------------------------- RESULTS -------------------------------------------------    LABS:  Results for orders placed or performed during the hospital encounter of 10/16/22   COVID-19, Rapid    Specimen: Nasopharyngeal Swab   Result Value Ref Range    SARS-CoV-2, NAAT Not Detected Not Detected   CBC with Auto Differential   Result Value Ref Range    WBC 9.3 4.5 - 11.5 E9/L    RBC 5.35 3.50 - 5.50 E12/L    Hemoglobin 15.0 11.5 - 15.5 g/dL    Hematocrit 47.6 34.0 - 48.0 %    MCV 89.0 80.0 - 99.9 fL    MCH 28.0 26.0 - 35.0 pg    MCHC 31.5 (L) 32.0 - 34.5 %    RDW 12.2 11.5 - 15.0 fL    Platelets 173 844 - 636 E9/L    MPV 10.5 7.0 - 12.0 fL Neutrophils % 75.2 43.0 - 80.0 %    Immature Granulocytes % 0.3 0.0 - 5.0 %    Lymphocytes % 16.3 (L) 20.0 - 42.0 %    Monocytes % 7.8 2.0 - 12.0 %    Eosinophils % 0.0 0.0 - 6.0 %    Basophils % 0.4 0.0 - 2.0 %    Neutrophils Absolute 6.99 1.80 - 7.30 E9/L    Immature Granulocytes # 0.03 E9/L    Lymphocytes Absolute 1.51 1.50 - 4.00 E9/L    Monocytes Absolute 0.72 0.10 - 0.95 E9/L    Eosinophils Absolute 0.00 (L) 0.05 - 0.50 E9/L    Basophils Absolute 0.04 0.00 - 0.20 E9/L   Comprehensive Metabolic Panel w/ Reflex to MG   Result Value Ref Range    Sodium 142 132 - 146 mmol/L    Potassium reflex Magnesium 4.6 3.5 - 5.0 mmol/L    Chloride 108 (H) 98 - 107 mmol/L    CO2 21 (L) 22 - 29 mmol/L    Anion Gap 13 7 - 16 mmol/L    Glucose 306 (H) 74 - 99 mg/dL    BUN 32 (H) 6 - 23 mg/dL    Creatinine 1.4 (H) 0.5 - 1.0 mg/dL    GFR Non-African American 37 >=60 mL/min/1.73    GFR African American 45     Calcium 10.2 8.6 - 10.2 mg/dL    Total Protein 7.9 6.4 - 8.3 g/dL    Albumin 4.1 3.5 - 5.2 g/dL    Total Bilirubin 0.7 0.0 - 1.2 mg/dL    Alkaline Phosphatase 127 (H) 35 - 104 U/L    ALT 29 0 - 32 U/L    AST 24 0 - 31 U/L   Troponin   Result Value Ref Range    Troponin, High Sensitivity 33 (H) 0 - 9 ng/L   Urinalysis with Microscopic   Result Value Ref Range    Color, UA Yellow Straw/Yellow    Clarity, UA CLOUDY (A) Clear    Glucose, Ur 100 (A) Negative mg/dL    Bilirubin Urine Negative Negative    Ketones, Urine Negative Negative mg/dL    Specific Gravity, UA >=1.030 1.005 - 1.030    Blood, Urine SMALL (A) Negative    pH, UA 5.0 5.0 - 9.0    Protein, UA 30 (A) Negative mg/dL    Urobilinogen, Urine 0.2 <2.0 E.U./dL    Nitrite, Urine POSITIVE (A) Negative    Leukocyte Esterase, Urine SMALL (A) Negative    WBC, UA >20 (A) 0 - 5 /HPF    RBC, UA 1-3 0 - 2 /HPF    Epithelial Cells, UA FEW /HPF    Bacteria, UA MANY (A) None Seen /HPF   Lactate, Sepsis   Result Value Ref Range    Lactic Acid, Sepsis 2.8 (H) 0.5 - 1.9 mmol/L Lactate, Sepsis   Result Value Ref Range    Lactic Acid, Sepsis 1.6 0.5 - 1.9 mmol/L   CK   Result Value Ref Range    Total  20 - 180 U/L   Serum Drug Screen   Result Value Ref Range    Ethanol Lvl <10 mg/dL    Acetaminophen Level <5.0 (L) 10.0 - 51.6 mcg/mL    Salicylate, Serum <6.5 0.0 - 30.0 mg/dL    TCA Scrn NEGATIVE Cutoff:300 ng/mL   URINE DRUG SCREEN   Result Value Ref Range    Amphetamine Screen, Urine NOT DETECTED Negative <1000 ng/mL    Barbiturate Screen, Ur NOT DETECTED Negative < 200 ng/mL    Benzodiazepine Screen, Urine NOT DETECTED Negative < 200 ng/mL    Cannabinoid Scrn, Ur NOT DETECTED Negative < 50ng/mL    Cocaine Metabolite Screen, Urine NOT DETECTED Negative < 300 ng/mL    Opiate Scrn, Ur NOT DETECTED Negative < 300ng/mL    PCP Screen, Urine NOT DETECTED Negative < 25 ng/mL    Methadone Screen, Urine NOT DETECTED Negative <300 ng/mL    Oxycodone Urine NOT DETECTED Negative <100 ng/mL    FENTANYL SCREEN, URINE NOT DETECTED Negative <1 ng/mL    Drug Screen Comment: see below    Troponin   Result Value Ref Range    Troponin, High Sensitivity 33 (H) 0 - 9 ng/L   POCT Glucose   Result Value Ref Range    QC OK? yes     Glucose 264 mg/dL   POCT Glucose   Result Value Ref Range    Meter Glucose 264 (H) 74 - 99 mg/dL   EKG 12 Lead   Result Value Ref Range    Ventricular Rate 96 BPM    Atrial Rate 96 BPM    P-R Interval 124 ms    QRS Duration 102 ms    Q-T Interval 398 ms    QTc Calculation (Bazett) 502 ms    P Axis 43 degrees    R Axis 83 degrees    T Axis 20 degrees       RADIOLOGY:  CTA HEAD W CONTRAST   Final Result   Addendum (preliminary) 1 of 1   ADDENDUM:   CT angiogram head/neck and CT perfusion findings were verbally    communicated   by Dr. Niya Riley to Dr. Gladys Muir on 10/16/2022 at 6:45 p.m. Final   1. Perfusion images demonstrate a 3 mL area of penumbra in the left posterior   temporal/occipital region which could relate to acute ischemia or artifact.    2. No obvious high-grade stenosis or occlusion of the proximal large   intracranial arteries given patient motion artifact. If high clinical   concern persists then short-term follow-up CTA or MRA head may be helpful for   further evaluation as warranted. 3. No hemodynamically significant stenosis in the bilateral cervical internal   carotid arteries per NASCET criteria. Bilateral carotid bulb atherosclerotic   plaque. 4. Patent bilateral vertebral arteries. 5. Right thalamic lacunar type infarct may be acute or subacute. Follow-up   brain MRI may be helpful for further evaluation. The findings were sent to the Radiology Results [id number] at 7:01   pm on 10/16/2022 to be communicated to a licensed caregiver. CTA NECK W CONTRAST   Final Result   Addendum (preliminary) 1 of 1   ADDENDUM:   CT angiogram head/neck and CT perfusion findings were verbally    communicated   by Dr. Keily Riley to Dr. Jyothi Love on 10/16/2022 at 6:45 p.m. Final   1. Perfusion images demonstrate a 3 mL area of penumbra in the left posterior   temporal/occipital region which could relate to acute ischemia or artifact. 2. No obvious high-grade stenosis or occlusion of the proximal large   intracranial arteries given patient motion artifact. If high clinical   concern persists then short-term follow-up CTA or MRA head may be helpful for   further evaluation as warranted. 3. No hemodynamically significant stenosis in the bilateral cervical internal   carotid arteries per NASCET criteria. Bilateral carotid bulb atherosclerotic   plaque. 4. Patent bilateral vertebral arteries. 5. Right thalamic lacunar type infarct may be acute or subacute. Follow-up   brain MRI may be helpful for further evaluation. The findings were sent to the Radiology Results [id number] at 7:01   pm on 10/16/2022 to be communicated to a licensed caregiver.             CT BRAIN PERFUSION   Final Result   Addendum (preliminary) 1 of 1   ADDENDUM:   CT angiogram head/neck and CT perfusion findings were verbally    communicated   by Dr. Nathaly Riley to Dr. Roxana Thomas on 10/16/2022 at 6:45 p.m. Final   1. Perfusion images demonstrate a 3 mL area of penumbra in the left posterior   temporal/occipital region which could relate to acute ischemia or artifact. 2. No obvious high-grade stenosis or occlusion of the proximal large   intracranial arteries given patient motion artifact. If high clinical   concern persists then short-term follow-up CTA or MRA head may be helpful for   further evaluation as warranted. 3. No hemodynamically significant stenosis in the bilateral cervical internal   carotid arteries per NASCET criteria. Bilateral carotid bulb atherosclerotic   plaque. 4. Patent bilateral vertebral arteries. 5. Right thalamic lacunar type infarct may be acute or subacute. Follow-up   brain MRI may be helpful for further evaluation. The findings were sent to the Radiology Results [id number] at 7:01   pm on 10/16/2022 to be communicated to a licensed caregiver. XR HIP LEFT (2-3 VIEWS)   Final Result   1. No acute fracture or subluxation. 2.  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 XR CHEST 1 VIEW   Final Result   No acute process. CT HEAD WO CONTRAST   Final Result   Diffuse atrophy with small vessel ischemic disease. Multiple prior lacunar CVAs bilaterally. Consider MRI for better assessment. MRI BRAIN WO CONTRAST    (Results Pending)           ------------------------- NURSING NOTES AND VITALS REVIEWED ---------------------------  Date / Time Roomed:  10/16/2022 11:45 AM  ED Bed Assignment:  09/09    The nursing notes within the ED encounter and vital signs as below have been reviewed.      Patient Vitals for the past 24 hrs:   BP Temp Pulse Resp SpO2 Weight   10/16/22 2030 138/86 -- 72 17 97 % 112 lb 12.8 oz (51.2 kg)   10/16/22 1930 (!) 160/75 -- 75 17 97 % --   10/16/22 1900 132/68 -- 78 20 98 % --   10/16/22 1830 (!) 177/69 -- 88 21 98 % --   10/16/22 1800 (!) 187/97 -- 71 18 98 % --   10/16/22 1730 (!) 160/87 -- 70 18 98 % --   10/16/22 1700 (!) 143/68 -- 65 18 98 % --   10/16/22 1630 (!) 171/82 -- 75 16 98 % --   10/16/22 1615 (!) 180/88 -- 75 16 98 % --   10/16/22 1600 (!) 187/87 -- 76 18 99 % --   10/16/22 1500 (!) 157/88 -- 67 15 97 % --   10/16/22 1454 -- -- 66 16 98 % --   10/16/22 1433 (!) 180/79 -- 71 15 99 % --   10/16/22 1415 (!) 214/105 -- 92 15 98 % --   10/16/22 1334 (!) 221/121 -- 99 20 99 % --   10/16/22 1250 -- 97.5 °F (36.4 °C) 95 21 98 % --   10/16/22 1234 (!) 189/103 -- 98 18 97 % --       Oxygen Saturation Interpretation: Normal    ------------------------------------------ PROGRESS NOTES ------------------------------------------  Re-evaluation(s):   Patients symptoms show no change  Repeat physical examination is not changed    Counseling:  I have spoken with the patient and family and discussed todays results, in addition to providing specific details for the plan of care and counseling regarding the diagnosis and prognosis. Their questions are answered at this time and they are agreeable with the plan of admission.    --------------------------------- ADDITIONAL PROVIDER NOTES ---------------------------------  Consultations:  Spoke with Dr. Marah Esteban. Discussed case. They will admit the patient. This patient's ED course included: a personal history and physicial examination, re-evaluation prior to disposition, multiple bedside re-evaluations, IV medications, cardiac monitoring, continuous pulse oximetry, and complex medical decision making and emergency management    This patient has remained hemodynamically stable during their ED course. Diagnosis:  1. Acute encephalopathy    2. Urinary tract infection in female    3.  Cerebrovascular accident (CVA), unspecified mechanism ([facility] [address] Disposition:  Patient's disposition: Admit to med/surg floor  Patient's condition is Stable       Patient was seen and evaluated by myself and my attending Lavinia Farrell MD. Assessment and Plan discussed with attending provider, please see attestation for final plan of care. This note was done using dictation software and there may be some grammatical errors associated with this.     DO Ashlie Conroy DO  Resident  10/16/22 4628

## 2022-10-17 ENCOUNTER — APPOINTMENT (OUTPATIENT)
Dept: MRI IMAGING | Age: 72
DRG: 064 | End: 2022-10-17
Payer: MEDICARE

## 2022-10-17 PROBLEM — I63.9 ACUTE CVA (CEREBROVASCULAR ACCIDENT) (HCC): Status: ACTIVE | Noted: 2022-10-17

## 2022-10-17 LAB
ALBUMIN SERPL-MCNC: 3.4 G/DL (ref 3.5–5.2)
ALP BLD-CCNC: 100 U/L (ref 35–104)
ALT SERPL-CCNC: 23 U/L (ref 0–32)
ANION GAP SERPL CALCULATED.3IONS-SCNC: 11 MMOL/L (ref 7–16)
AST SERPL-CCNC: 24 U/L (ref 0–31)
BASOPHILS ABSOLUTE: 0.03 E9/L (ref 0–0.2)
BASOPHILS RELATIVE PERCENT: 0.5 % (ref 0–2)
BILIRUB SERPL-MCNC: 0.4 MG/DL (ref 0–1.2)
BUN BLDV-MCNC: 27 MG/DL (ref 6–23)
CALCIUM SERPL-MCNC: 9.1 MG/DL (ref 8.6–10.2)
CHLORIDE BLD-SCNC: 115 MMOL/L (ref 98–107)
CHOLESTEROL, TOTAL: 199 MG/DL (ref 0–199)
CO2: 20 MMOL/L (ref 22–29)
CREAT SERPL-MCNC: 1 MG/DL (ref 0.5–1)
EKG ATRIAL RATE: 96 BPM
EKG P AXIS: 43 DEGREES
EKG P-R INTERVAL: 124 MS
EKG Q-T INTERVAL: 398 MS
EKG QRS DURATION: 102 MS
EKG QTC CALCULATION (BAZETT): 502 MS
EKG R AXIS: 83 DEGREES
EKG T AXIS: 20 DEGREES
EKG VENTRICULAR RATE: 96 BPM
EOSINOPHILS ABSOLUTE: 0.04 E9/L (ref 0.05–0.5)
EOSINOPHILS RELATIVE PERCENT: 0.7 % (ref 0–6)
FOLATE: 17.3 NG/ML (ref 4.8–24.2)
GFR AFRICAN AMERICAN: >60
GFR NON-AFRICAN AMERICAN: 54 ML/MIN/1.73
GLUCOSE BLD-MCNC: 224 MG/DL (ref 74–99)
HBA1C MFR BLD: 8.2 % (ref 4–5.6)
HCT VFR BLD CALC: 38.8 % (ref 34–48)
HDLC SERPL-MCNC: 31 MG/DL
HEMOGLOBIN: 12.6 G/DL (ref 11.5–15.5)
IMMATURE GRANULOCYTES #: 0.02 E9/L
IMMATURE GRANULOCYTES %: 0.3 % (ref 0–5)
LDL CHOLESTEROL CALCULATED: 139 MG/DL (ref 0–99)
LV EF: 70 %
LVEF MODALITY: NORMAL
LYMPHOCYTES ABSOLUTE: 1.16 E9/L (ref 1.5–4)
LYMPHOCYTES RELATIVE PERCENT: 20.2 % (ref 20–42)
MAGNESIUM: 2.2 MG/DL (ref 1.6–2.6)
MCH RBC QN AUTO: 29.1 PG (ref 26–35)
MCHC RBC AUTO-ENTMCNC: 32.5 % (ref 32–34.5)
MCV RBC AUTO: 89.6 FL (ref 80–99.9)
METER GLUCOSE: 141 MG/DL (ref 74–99)
METER GLUCOSE: 187 MG/DL (ref 74–99)
METER GLUCOSE: 240 MG/DL (ref 74–99)
METER GLUCOSE: 306 MG/DL (ref 74–99)
MONOCYTES ABSOLUTE: 0.46 E9/L (ref 0.1–0.95)
MONOCYTES RELATIVE PERCENT: 8 % (ref 2–12)
NEUTROPHILS ABSOLUTE: 4.02 E9/L (ref 1.8–7.3)
NEUTROPHILS RELATIVE PERCENT: 70.3 % (ref 43–80)
PDW BLD-RTO: 12.5 FL (ref 11.5–15)
PHOSPHORUS: 3.8 MG/DL (ref 2.5–4.5)
PLATELET # BLD: 156 E9/L (ref 130–450)
PMV BLD AUTO: 11 FL (ref 7–12)
POTASSIUM SERPL-SCNC: 5.1 MMOL/L (ref 3.5–5)
PROCALCITONIN: 0.11 NG/ML (ref 0–0.08)
RBC # BLD: 4.33 E12/L (ref 3.5–5.5)
REASON FOR REJECTION: NORMAL
REJECTED TEST: NORMAL
SODIUM BLD-SCNC: 146 MMOL/L (ref 132–146)
T4 FREE: 1.22 NG/DL (ref 0.93–1.7)
TOTAL PROTEIN: 6.8 G/DL (ref 6.4–8.3)
TRIGL SERPL-MCNC: 144 MG/DL (ref 0–149)
TSH SERPL DL<=0.05 MIU/L-ACNC: 0.14 UIU/ML (ref 0.27–4.2)
VITAMIN B-12: 689 PG/ML (ref 211–946)
VLDLC SERPL CALC-MCNC: 29 MG/DL
WBC # BLD: 5.7 E9/L (ref 4.5–11.5)

## 2022-10-17 PROCEDURE — 1200000000 HC SEMI PRIVATE

## 2022-10-17 PROCEDURE — 80061 LIPID PANEL: CPT

## 2022-10-17 PROCEDURE — 6360000002 HC RX W HCPCS: Performed by: NURSE PRACTITIONER

## 2022-10-17 PROCEDURE — 2580000003 HC RX 258: Performed by: NURSE PRACTITIONER

## 2022-10-17 PROCEDURE — 84443 ASSAY THYROID STIM HORMONE: CPT

## 2022-10-17 PROCEDURE — 2580000003 HC RX 258: Performed by: INTERNAL MEDICINE

## 2022-10-17 PROCEDURE — 84100 ASSAY OF PHOSPHORUS: CPT

## 2022-10-17 PROCEDURE — 87040 BLOOD CULTURE FOR BACTERIA: CPT

## 2022-10-17 PROCEDURE — 70553 MRI BRAIN STEM W/O & W/DYE: CPT

## 2022-10-17 PROCEDURE — 6370000000 HC RX 637 (ALT 250 FOR IP): Performed by: NURSE PRACTITIONER

## 2022-10-17 PROCEDURE — 83735 ASSAY OF MAGNESIUM: CPT

## 2022-10-17 PROCEDURE — 36415 COLL VENOUS BLD VENIPUNCTURE: CPT

## 2022-10-17 PROCEDURE — 6360000002 HC RX W HCPCS: Performed by: INTERNAL MEDICINE

## 2022-10-17 PROCEDURE — 84439 ASSAY OF FREE THYROXINE: CPT

## 2022-10-17 PROCEDURE — 93306 TTE W/DOPPLER COMPLETE: CPT

## 2022-10-17 PROCEDURE — 92610 EVALUATE SWALLOWING FUNCTION: CPT | Performed by: SPEECH-LANGUAGE PATHOLOGIST

## 2022-10-17 PROCEDURE — 82746 ASSAY OF FOLIC ACID SERUM: CPT

## 2022-10-17 PROCEDURE — 6370000000 HC RX 637 (ALT 250 FOR IP): Performed by: INTERNAL MEDICINE

## 2022-10-17 PROCEDURE — 97535 SELF CARE MNGMENT TRAINING: CPT | Performed by: OCCUPATIONAL THERAPIST

## 2022-10-17 PROCEDURE — 82962 GLUCOSE BLOOD TEST: CPT

## 2022-10-17 PROCEDURE — 83036 HEMOGLOBIN GLYCOSYLATED A1C: CPT

## 2022-10-17 PROCEDURE — A9577 INJ MULTIHANCE: HCPCS | Performed by: RADIOLOGY

## 2022-10-17 PROCEDURE — 6360000004 HC RX CONTRAST MEDICATION: Performed by: RADIOLOGY

## 2022-10-17 PROCEDURE — 84145 PROCALCITONIN (PCT): CPT

## 2022-10-17 PROCEDURE — 80053 COMPREHEN METABOLIC PANEL: CPT

## 2022-10-17 PROCEDURE — 82607 VITAMIN B-12: CPT

## 2022-10-17 PROCEDURE — 85025 COMPLETE CBC W/AUTO DIFF WBC: CPT

## 2022-10-17 PROCEDURE — 97165 OT EVAL LOW COMPLEX 30 MIN: CPT | Performed by: OCCUPATIONAL THERAPIST

## 2022-10-17 RX ORDER — MAGNESIUM SULFATE 1 G/100ML
1000 INJECTION INTRAVENOUS PRN
Status: DISCONTINUED | OUTPATIENT
Start: 2022-10-17 | End: 2022-10-20

## 2022-10-17 RX ORDER — ATORVASTATIN CALCIUM 40 MG/1
80 TABLET, FILM COATED ORAL NIGHTLY
Status: DISCONTINUED | OUTPATIENT
Start: 2022-10-17 | End: 2022-10-21 | Stop reason: HOSPADM

## 2022-10-17 RX ORDER — SODIUM CHLORIDE, SODIUM LACTATE, POTASSIUM CHLORIDE, CALCIUM CHLORIDE 600; 310; 30; 20 MG/100ML; MG/100ML; MG/100ML; MG/100ML
INJECTION, SOLUTION INTRAVENOUS CONTINUOUS
Status: DISCONTINUED | OUTPATIENT
Start: 2022-10-17 | End: 2022-10-21 | Stop reason: HOSPADM

## 2022-10-17 RX ORDER — CLOPIDOGREL BISULFATE 75 MG/1
75 TABLET ORAL DAILY
Status: DISCONTINUED | OUTPATIENT
Start: 2022-10-17 | End: 2022-10-21 | Stop reason: HOSPADM

## 2022-10-17 RX ORDER — INSULIN LISPRO 100 [IU]/ML
0-4 INJECTION, SOLUTION INTRAVENOUS; SUBCUTANEOUS EVERY 4 HOURS
Status: DISCONTINUED | OUTPATIENT
Start: 2022-10-17 | End: 2022-10-19

## 2022-10-17 RX ORDER — POTASSIUM CHLORIDE 7.45 MG/ML
10 INJECTION INTRAVENOUS PRN
Status: DISCONTINUED | OUTPATIENT
Start: 2022-10-17 | End: 2022-10-20

## 2022-10-17 RX ORDER — POTASSIUM CHLORIDE 20 MEQ/1
40 TABLET, EXTENDED RELEASE ORAL PRN
Status: DISCONTINUED | OUTPATIENT
Start: 2022-10-17 | End: 2022-10-20

## 2022-10-17 RX ORDER — ASPIRIN 81 MG/1
81 TABLET, CHEWABLE ORAL DAILY
Status: DISCONTINUED | OUTPATIENT
Start: 2022-10-18 | End: 2022-10-21 | Stop reason: HOSPADM

## 2022-10-17 RX ORDER — 0.9 % SODIUM CHLORIDE 0.9 %
1000 INTRAVENOUS SOLUTION INTRAVENOUS ONCE
Status: DISCONTINUED | OUTPATIENT
Start: 2022-10-17 | End: 2022-10-21 | Stop reason: HOSPADM

## 2022-10-17 RX ORDER — ASPIRIN 300 MG/1
300 SUPPOSITORY RECTAL ONCE
Status: COMPLETED | OUTPATIENT
Start: 2022-10-17 | End: 2022-10-17

## 2022-10-17 RX ADMIN — INSULIN LISPRO 1 UNITS: 100 INJECTION, SOLUTION INTRAVENOUS; SUBCUTANEOUS at 23:21

## 2022-10-17 RX ADMIN — INSULIN LISPRO 3 UNITS: 100 INJECTION, SOLUTION INTRAVENOUS; SUBCUTANEOUS at 20:37

## 2022-10-17 RX ADMIN — WATER 1000 MG: 1 INJECTION INTRAMUSCULAR; INTRAVENOUS; SUBCUTANEOUS at 10:30

## 2022-10-17 RX ADMIN — ENOXAPARIN SODIUM 30 MG: 100 INJECTION SUBCUTANEOUS at 02:53

## 2022-10-17 RX ADMIN — MEMANTINE 5 MG: 5 TABLET ORAL at 11:54

## 2022-10-17 RX ADMIN — ENOXAPARIN SODIUM 30 MG: 100 INJECTION SUBCUTANEOUS at 20:33

## 2022-10-17 RX ADMIN — SODIUM CHLORIDE, PRESERVATIVE FREE 10 ML: 5 INJECTION INTRAVENOUS at 10:29

## 2022-10-17 RX ADMIN — CLOPIDOGREL BISULFATE 75 MG: 75 TABLET ORAL at 11:55

## 2022-10-17 RX ADMIN — SODIUM CHLORIDE, POTASSIUM CHLORIDE, SODIUM LACTATE AND CALCIUM CHLORIDE: 600; 310; 30; 20 INJECTION, SOLUTION INTRAVENOUS at 10:53

## 2022-10-17 RX ADMIN — DONEPEZIL HYDROCHLORIDE 10 MG: 10 TABLET, FILM COATED ORAL at 20:33

## 2022-10-17 RX ADMIN — MEMANTINE 5 MG: 5 TABLET ORAL at 20:33

## 2022-10-17 RX ADMIN — GADOBENATE DIMEGLUMINE 10 ML: 529 INJECTION, SOLUTION INTRAVENOUS at 17:56

## 2022-10-17 RX ADMIN — SODIUM CHLORIDE, PRESERVATIVE FREE 10 ML: 5 INJECTION INTRAVENOUS at 20:33

## 2022-10-17 RX ADMIN — ASPIRIN 300 MG: 300 SUPPOSITORY RECTAL at 05:10

## 2022-10-17 RX ADMIN — ATORVASTATIN CALCIUM 80 MG: 40 TABLET, FILM COATED ORAL at 20:33

## 2022-10-17 RX ADMIN — SODIUM CHLORIDE: 9 INJECTION, SOLUTION INTRAVENOUS at 05:03

## 2022-10-17 ASSESSMENT — LIFESTYLE VARIABLES
HOW MANY STANDARD DRINKS CONTAINING ALCOHOL DO YOU HAVE ON A TYPICAL DAY: PATIENT DOES NOT DRINK
HOW OFTEN DO YOU HAVE A DRINK CONTAINING ALCOHOL: NEVER

## 2022-10-17 NOTE — PLAN OF CARE
Problem: Skin/Tissue Integrity  Goal: Absence of new skin breakdown  Description: 1. Monitor for areas of redness and/or skin breakdown  2. Assess vascular access sites hourly  3. Every 4-6 hours minimum:  Change oxygen saturation probe site  4. Every 4-6 hours:  If on nasal continuous positive airway pressure, respiratory therapy assess nares and determine need for appliance change or resting period. Outcome: Progressing     Problem: ABCDS Injury Assessment  Goal: Absence of physical injury  Outcome: Progressing     Problem: Confusion  Goal: Confusion, delirium, dementia, or psychosis is improved or at baseline  Description: INTERVENTIONS:  1. Assess for possible contributors to thought disturbance, including medications, impaired vision or hearing, underlying metabolic abnormalities, dehydration, psychiatric diagnoses, and notify attending LIP  2. [place] high risk fall precautions, as indicated  3. Provide frequent short contacts to provide reality reorientation, refocusing and direction  4. Decrease environmental stimuli, including noise as appropriate  5. Monitor and intervene to maintain adequate nutrition, hydration, elimination, sleep and activity  6. If unable to ensure safety without constant attention obtain sitter and review sitter guidelines with assigned personnel  7.  Initiate Psychosocial CNS and Spiritual Care consult, as indicated  Outcome: Progressing  Flowsheets (Taken 10/17/2022 0845)  Effect of thought disturbance (confusion, delirium, dementia, or psychosis) are managed with adequate functional status:   Assess for contributors to thought disturbance, including medications, impaired vision or hearing, underlying metabolic abnormalities, dehydration, psychiatric diagnoses, notify [place] high risk fall precautions, as indicated   Provide frequent short contacts to provide reality reorientation, refocusing and direction   Decrease environmental stimuli, including noise as appropriate   Monitor and intervene to maintain adequate nutrition, hydration, elimination, sleep and activity     Problem: Safety - Adult  Goal: Free from fall injury  Outcome: Progressing

## 2022-10-17 NOTE — PROGRESS NOTES
2 d echo completed along with a bubble study done by the rn Pt moving constantly very difficult to image Yo LEWIS

## 2022-10-17 NOTE — PROGRESS NOTES
Physical Therapy Initial Evaluation/Plan of Care    Room #:  6957/4388-07  Patient Name: Jalyn Phillips  YOB: 1950  MRN: [id number]    Date of Service: 10/18/2022     Tentative placement recommendation: Subacute rehab  Equipment recommendation: To be determined      Evaluating Physical Therapist: Devan Agarwal, PT #[id number]      Specific Provider Orders/Date/Referring Provider :  10/16/22 1945    PT eval and treat  Start:  10/16/22 1945,   End:  10/16/22 1945,   ONE TIME,   Standing Count:  1 Occurrences,   R         Ethan Wren DO     Admitting Diagnosis:   UTI (urinary tract infection) [N39.0]  Acute encephalopathy [G93.40]  Cerebrovascular accident (CVA), unspecified mechanism ([address] [I63.9]  Urinary tract infection in female [N39.0]     altered mental status  Surgery: none  Visit Diagnoses         Codes    Acute encephalopathy    -  Primary G93.40    Urinary tract infection in female     N39.0    Cerebrovascular accident (CVA), unspecified mechanism ([address]     I63.9            Patient Active Problem List   Diagnosis    Sepsis secondary to UTI ([address]    Post-operative pain    H/O total hysterectomy    Essential hypertension    Type 2 diabetes mellitus with hyperglycemia, without long-term current use of insulin ([address]    Other hyperlipidemia    Sacral wound    Sacral wound, initial encounter    Acute cystitis with hematuria    Severe protein-calorie malnutrition ([address]    UTI (urinary tract infection)    Acute CVA (cerebrovascular accident) ([address]        ASSESSMENT of Current Deficits Patient exhibits decreased strength, balance, endurance, and range of motion impairing functional mobility, transfers, gait , gait distance, and tolerance to activity confusion, left knee flexion contracture. Moderate assist progressing to minimal assist for balance in sitting Edge of bed. Patient requires continued skilled physical therapy to address concerns listed above for increased safety and function at discharge. PHYSICAL THERAPY  PLAN OF CARE       Physical therapy plan of care is established based on physician order,  patient diagnosis and clinical assessment    Current Treatment Recommendations:    -Bed Mobility: Lower extremity exercises , Upper extremity exercises , and Trunk control activities   -Sitting Balance: Hands on support to maintain midline , Facilitate active trunk muscle engagement , and Facilitate postural control in all planes   -Transfers: Cues for hand placement, technique and safety. Provide stabilization to prevent fall  and Assist with extension of knees trunk and hip to accept weight transfer   -Endurance: Utilize Supervised activities to increase level of endurance to allow for safe functional mobility including transfers and gait     PT long term treatment goals are located in below grid    Patient and or family understand(s) diagnosis, prognosis, and plan of care. Frequency of treatments: Patient will be seen  daily.          Prior Level of Function: Patient poor historian, states walked without assistive device however left knee flexion contracture <90*    Rehab Potential: fair   for baseline    Past medical history:   Past Medical History:   Diagnosis Date    Arthritis     right hand    Cancer ([facility] 1985    uterus and cervix had radiation and implant    Diabetes mellitus ([facility]     Hyperlipidemia     Hypertension     Radiation cystitis     Urinary incontinence     complication due to uterine cancer    Urinary retention     caused by complications from uterine cancer     Past Surgical History:   Procedure Laterality Date    CYSTOSCOPY N/A 7/17/2019    CYSTOSCOPY BOTOX  INJECTION 200 UNITS performed by Beba CALLOWAY Drew, DO at [facility] removed    HYSTERECTOMY (CERVIX STATUS UNKNOWN)  1985    [place] CYSTOURETHROSCOPY INJ CHEMODENERVATION BLADDER N/A 11/9/2018    CYSTOSCOPY  BOTOX INJECTION (200 UNITS) performed by Dinah Wood MD at [address] [facility] [address] SUBJECTIVE:    Precautions: Up with assistance, falls, alarm, and confusion ,  bed bugs-decontaminated in ED    Imaging results: XR HIP LEFT (2-3 VIEWS)    Result Date: 10/16/2022  EXAMINATION: TWO XRAY VIEWS OF THE LEFT HIP 10/16/2022 3:29 pm      1. No acute fracture or subluxation. 2.  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 CT HEAD WO CONTRAST    Result Date: 10/16/2022  EXAMINATION: CT OF THE HEAD WITHOUT CONTRAST  10/16/2022 3:15 pm      Diffuse atrophy with small vessel ischemic disease. Multiple prior lacunar CVAs bilaterally. Consider MRI for better assessment. XR CHEST 1 VIEW    Result Date: 10/16/2022  EXAMINATION: ONE XRAY VIEW OF THE CHEST 10/16/2022 1:29 pm      No acute process. CTA NECK W CONTRAST    Addendum Date: 10/16/2022    ADDENDUM: CT angiogram head/neck and CT perfusion findings were verbally communicated by Dr. Niya Riley to Dr. Quincy Arreola on 10/16/2022 at 6:45 p.m. Result Date: 10/16/2022  EXAMINATION: CTA OF THE HEAD WITH CONTRAST; CTA OF THE NECK; CTA OF THE HEAD WITH CONTRAST WITH PERFUSION 10/16/2022 6:18 pm:      1. Perfusion images demonstrate a 3 mL area of penumbra in the left posterior temporal/occipital region which could relate to acute ischemia or artifact. 2. No obvious high-grade stenosis or occlusion of the proximal large intracranial arteries given patient motion artifact. If high clinical concern persists then short-term follow-up CTA or MRA head may be helpful for further evaluation as warranted. 3. No hemodynamically significant stenosis in the bilateral cervical internal carotid arteries per NASCET criteria. Bilateral carotid bulb atherosclerotic plaque. 4. Patent bilateral vertebral arteries. 5. Right thalamic lacunar type infarct may be acute or subacute. Follow-up brain MRI may be helpful for further evaluation.  The findings were sent to the Radiology Results [address] at 7:01 pm on 10/16/2022 to be communicated to a licensed caregiver. Social history: per charting: Patient lives with spouse in a two story home bedroom and bathroom 2nd floor full flight stairs with Rail   Tub shower deplorable conditions per charting     Equipment owned: Wheelchair, [address], and Shower chair,       [address] [facility]  Mobility Inpatient   How much difficulty turning over in bed?: A Lot  How much difficulty sitting down on / standing up from a chair with arms?: Unable  How much difficulty moving from lying on back to sitting on side of bed?: A Lot  How much help from another person moving to and from a bed to a chair?: Total  How much help from another person needed to walk in hospital room?: Total  How much help from another person for climbing 3-5 steps with a railing?: Total  AM-PAC Inpatient Mobility Raw Score : 8  AM-PAC Inpatient T-Scale Score : 28.52  Mobility Inpatient CMS 0-100% Score: 86.62  Mobility Inpatient CMS G-Code Modifier : CM    Nursing cleared patient for PT evaluation. The admitting diagnosis and active problem list as listed above have been reviewed prior to the initiation of this evaluation. OBJECTIVE;   Initial Evaluation  Date: 10/18/2022 Treatment Date:     Short Term/ Long Term   Goals   Was pt agreeable to Eval/treatment? Yes  To be met in 3 days   Pain level   0/10        Bed Mobility    Rolling: Moderate assist of 1    Supine to sit: Moderate assist of 1    Sit to supine: Maximal assist of 1    Scooting:  Moderate assist of 1    Rolling: Minimal assist of 1    Supine to sit: Minimal assist of 1    Sit to supine: Minimal assist of 1    Scooting: Minimal assist of 1     Transfers Sit to stand: Not assessed  left knee flexion contracture ~90*  Stand pivot Right lower extremity  Maximal assist of 1     ROM Limited Left upper extremity, left knee flexion contracture ~90*  Increase range of motion 10% of affected joints    Strength BUE:  refer to OT eval  RLE:  3/5  LLE:  0/5  Increase strength in affected mm groups by 1/3 grade   Balance Sitting EOB:  poor progressing to fair  Dynamic Standing:  not assessed    Sitting EOB:  fair    Dynamic Standing: poor       Patient is Alert & Oriented x person and follows one step directions  50%  Sensation:  Patient  denies numbness/tingling   Edema:  no   Endurance: fair       Vitals: room air   Blood Pressure at rest  Blood Pressure during session    Heart Rate at rest   Heart Rate during session     SPO2 at rest 100%  SPO2 during session 98%     Patient education  Patient educated on role of Physical Therapy, risks of immobility, safety and plan of care,  importance of mobility while in hospital , and ankle pumps, quad set and glut set for edema control, blood clot prevention     Patient response to education:   Pt verbalized understanding Pt demonstrated skill Pt requires further education in this area   Yes Partial Yes      Treatment:  Patient practiced and was instructed/facilitated in the following treatment: Patient performed active assist exercises as able. Sat edge of bed 15 minutes with Moderate assist of 1 to increase dynamic sitting balance and activity tolerance. Weight shifting for balance. Returned to bed, Dependent of  2 to scoot to Head of bed in trendelenburg . Placed in semi upright position for breakfast tray      Therapeutic Exercises:  ankle pumps, heel slide, and hip abduction/adduction  x 15 reps. At end of session, patient in bed with nursing present call light and phone within reach,  all lines and tubes intact, nursing notified. Patient would benefit from continued skilled Physical Therapy to improve functional independence and quality of life.          Patient's/ family goals   none stated    Time in  0821  Time out  0851    Total Treatment Time  10 minutes    Evaluation time includes thorough review of current medical information, gathering information on past medical history/social history and prior level of function, completion of standardized testing/informal observation of tasks, assessment of data, and development of Plan of care and goals.      CPT codes:  Low Complexity PT evaluation (63720)  Therapeutic activities (04136)   10 minutes  1 unit(s)    Devan Agarwal, JESUS

## 2022-10-17 NOTE — PROGRESS NOTES
Occupational Therapy  OCCUPATIONAL THERAPY INITIAL EVALUATION     [facility]  [facility]         Date:10/17/2022                                                   Patient Name: Siena Walker     MRN: [id number]     : 1950     Room:        Evaluating OT: Joy Tillman OTR/L; [id number]       Referring Provider and Orders/Date:    OT eval and treat  Start:  10/16/22 1945,   End:  10/16/22 1945,   ONE TIME,   Standing Count:  1 Occurrences,   R         [address] Zamzam Gordon DO        Diagnosis:   1. Acute encephalopathy    2. Urinary tract infection in female    3.  Cerebrovascular accident (CVA), unspecified mechanism ([address]         Surgery: none      Pertinent Medical History:        Past Medical History:   Diagnosis Date    Arthritis     right hand    Cancer ([address]     uterus and cervix had radiation and implant    Diabetes mellitus ([address]     Hyperlipidemia     Hypertension     Radiation cystitis     Urinary incontinence     complication due to uterine cancer    Urinary retention     caused by complications from uterine cancer          Past Surgical History:   Procedure Laterality Date    CYSTOSCOPY N/A 2019    CYSTOSCOPY BOTOX  INJECTION 200 UNITS performed by Anthony Russell DO at [facility] removed    HYSTERECTOMY (CERVIX STATUS UNKNOWN)      [place] CYSTOURETHROSCOPY INJ CHEMODENERVATION BLADDER N/A 2018    CYSTOSCOPY  BOTOX INJECTION (200 UNITS) performed by Adelaida Vallecillo MD at [facility] OR       Precautions:  Fall Risk, confused    Recommended placement: subacute    Assessment of current deficits     [x] Functional mobility  [x]ADLs  [x] Strength               [x]Cognition     [x] Functional transfers   [x] IADLs         [x] Safety Awareness   [x]Endurance     [x] Fine Coordination              [x] Balance      [] Vision/perception   []Sensation      [x]Gross Motor Coordination  [] ROM  [] Delirium [] Motor Control     OT PLAN OF CARE   OT POC based on physician orders, patient diagnosis and results of clinical assessment    Frequency/Duration 1-3 days/wk for 2 weeks PRN   Specific OT Treatment Interventions to include:   * Instruction/training on adapted ADL techniques and AE recommendations to increase functional independence within precautions       * Training on energy conservation strategies, correct breathing pattern and techniques to improve independence/tolerance for self-care routine  * Functional transfer/mobility training/DME recommendations for increased independence, safety, and fall prevention  * Patient/Family education to increase follow through with safety techniques and functional independence  * Recommendation of environmental modifications for increased safety with functional transfers/mobility and ADLs  * Cognitive retraining/development of therapeutic activities to improve problem solving, judgement, memory, and attention for increased safety/participation in ADL/IADL tasks  * Therapeutic exercise to improve motor endurance, ROM, and functional strength for ADLs/functional transfers  * Therapeutic activities to facilitate/challenge dynamic balance, stand tolerance for increased safety and independence with ADLs  * Therapeutic activities to facilitate gross/fine motor skills for increased independence with ADLs  * Neuro-muscular re-education: facilitation of righting/equilibrium reactions, midline orientation, scapular stability/mobility, normalization of muscle tone, and facilitation of volitional active controled movement  * Positioning to improve skin integrity, interaction with environment and functional independence     Recommended Adaptive Equipment/DME: TBD      Home Living: with family : spouse of 50+ years; single family home, 2 story, bedroom and bathroom on 2nd floor, one flight to second floor, tub shower.  Pt and spouse arrived to ED confused and in deplorable conditions. Equipment owned: wheeled walker, wheelchair, shower chair     Prior Level of Function: needs assistance with ADLs , needs assistance with IADLs; ambulated wheeled walker or uses the wheelchair, pt states her spouse assists with everything including transfers. Driving: no   Occupation: retired   Enjoys: watching TV    Pain Level: none  Cognition: A&O: 1/4 to person; Follows 1-2 step directions   Memory:  poor+   Sequencing:  poor+   Problem solving:  poor+   Judgement/safety:  Poor+    AM-PAC Daily Activity Inpatient   How much help for putting on and taking off regular lower body clothing?: Total  How much help for Bathing?: A Lot  How much help for Toileting?: Total  How much help for putting on and taking off regular upper body clothing?: A Lot  How much help for taking care of personal grooming?: A Lot  How much help for eating meals?: A Lot  AM-Located within [facility] Inpatient Daily Activity Raw Score: 10  AM-PAC Inpatient ADL T-Scale Score : 27.31  ADL Inpatient CMS 0-100% Score: 74.7  ADL Inpatient CMS G-Code Modifier : CL    Functional Assessment:     Initial Eval Status  Date: 10/17/2022   Treatment Status  Date: STGs = LTGs  Time frame: 10-14 days   Feeding Maximal Assist with simulation attempted. NPO on eval. Pt confused. Min A   Grooming Maximal Assist for oral care (completed x 4 with brush and mouth wash due to amount of debris in mouth), face/hand wash and hair brush  Minimal Assist    UB Dressing Maximal Assist with gown management from supine.  Attempted EOB, but not safe due to loss of balance and confuseion  Moderate Assist    LB Dressing Dependent with socks from supine level  Maximal Assist    Bathing Maximal Assist with sponge bathing from supine level  Moderate Assist    Toileting Dependent from supine with incontinence on arrival and no awareness  Moderate Assist    Bed Mobility  Supine to sit: Maximal Assist   Sit to supine: Maximal Assist     Supine to sit: Minimal Assist   Sit to supine: Minimal Assist    Functional Transfers  NT due to pt overall debility, decreased activity tolerance, balance deficits, safety and fall risk. Maximal Assist    Functional Mobility  NT due to pt overall debility, decreased activity tolerance, balance deficits, safety and fall risk. Maximal Assist    Balance Sitting:     Static:  poor+    Dynamic:poor  Standing: NT  Sitting:     Static:  fair    Dynamic:fair-  Standing: poor   Activity Tolerance Vitals with activity: WFL overall on room air; fair- tolerance to ADLs with confusion overall;   Sitting EOB for <5min  Increase sitting tolerance for >15min with stable vital signs for carry over into toileting, functional tranfers and indep in ADLs   Visual/  Perceptual Glasses: not Present; difficult to assess due to overall confusion and pt staring     Reports change in vision since admission: No     NA     Hand Dominance  [x] Right  [] Left    AROM (PROM) Strength Additional Info:  Goal:   RUE  Pt resistive overall with PROM, but demonstrated AROM around 45% function. Limited with shoulder planes 3-/5 Fair   and fair- FMC/dexterity noted during ADL tasks  Opposition [] Intact [x] Impaired  Finger to nose [] Intact [] Impaired 4-/5MMT generally for carry over into self care, functional transfers and functional mobility with AD. LUE Pt resistive overall with PROM, but demonstrated AROM around 45% function. Limited with shoulder planes 3-/5 Fair  and fair- FMC/dexterity noted during ADL tasks  Opposition [] Intact [x] Impaired  Finger to nose [x] Intact [x] Impaired 4-/5MMT generally for carry over into self care, functional transfers and functional mobility with AD. Hearing: WFL   Sensation:  No c/o numbness or tingling   Tone: WFL   Edema: none    Comments: Upon arrival patient supine. Pt required max A for most UB ADLs and dep A LB ADLs tasks.  Limited with standing during LB ADLs and functional transfers and not safe at this time with pt folding legs into her body. Unsure if this is due to contractures, PLOF or due to confusion. The biggest barriers reflect that of functional transfers, functional mobility, UB/LB ADLs, cognition, activity tolerance, balance, safety and strengthening. At end of session, patient supine with call light and phone within reach, all lines and tubes intact. Overall patient demonstrated decreased independence and safety during completion of ADL/functional transfer/mobility tasks compared to PLOF. Nursing updated on pt position and status following OT eval. Pt would benefit from continued skilled OT to increase safety and independence with completion of ADL/IADL tasks for functional independence and quality of life. Treatment: OT treatment provided this date includes:  Instruction, education and training on safe facilitation and adapted techniques for completion of ADLs. These include neuromuscular reeducation to facilitate balance/righting reactions, proper positioning/alignment to improve interaction with environment and overall function and on adapted techniques/work simplification for completion of ADLs. Education provided on hand/feet placement with bed rails, tooth brush, hair comb and body mechanics for fall prevention. Cues for energy conservation and [facility], including modifications and DME. Extended time to complete all tasks, including skilled monitoring of patient's response during treatment session and vital signs. Prior to and at the end of session, environmental modifications / line management completed for patients safety and efficiency of treatment session. See above for further details. Rehab Potential: Good for established goals     Patient / Family Goal: none reported      Patient and/or family were instructed on functional diagnosis, prognosis/goals and OT plan of care. Demonstrated fair- understanding.      Eval Complexity: Low  History: Brief review of medical records and additional review of physical, cognitive, or psychosocial history related to current functional performance  Exam: 3+ performance deficits  Assistance/Modification: Max assistance or modifications required to perform tasks. May have comorbidities that affect occupational performance. Time In: 0911  Time Out: 6935  Total Treatment Time: 12    Min Units   OT Eval Low 97165  x  1   OT Eval Medium 40435      OT Eval High 79666      OT Re-Eval [id number]       Therapeutic Ex 86125       Therapeutic Activities 58103       ADL/Self Care 81175  12 1    Orthotic Management 86577       Manual 04570     Neuro Re-Ed 37169       Non-Billable Time          Evaluation Time additionally includes thorough review of current medical information, gathering information on past medical history/social history and prior level of function, interpretation of standardized testing/informal observation of tasks, assessment of data and development of plan of care and goals.             Leonard Lynn OTR/L; [id number]

## 2022-10-17 NOTE — CONSULTS
History Of Present Illness: CHIEF COMPLAINT: Fatigue/failure to thrive     HISTORY OF PRESENT ILLNESS:    Patient is a 68-year-old female who presented to the ED from home due to altered mental status and failure to thrive. Overall patient is a poor historian and as such HPI supplemented by going over EMR. According to ED notes patient has been more confused over the last 2 days. as above per hospitalist.    The patient is a 67 y.o. female with significant past medical history of see below who presents with above. The patient has the following symptoms:    Change in level of consciousness: alert    New Weakness: yes    Numbness or Tingling: no    Difficulty Swallowing: no    Current Medications:   Scheduled Meds:   sodium chloride  1,000 mL IntraVENous Once    clopidogrel  75 mg Oral Daily    [START ON 10/18/2022] aspirin  81 mg Oral Daily    atorvastatin  80 mg Oral Nightly    cefTRIAXone (ROCEPHIN) IV  1,000 mg IntraVENous Q24H    insulin lispro  0-4 Units SubCUTAneous Q4H    sodium chloride flush  5-40 mL IntraVENous 2 times per day    memantine  5 mg Oral BID    And    donepezil  10 mg Oral Nightly    enoxaparin  30 mg SubCUTAneous Daily     Continuous Infusions:   lactated ringers 60 mL/hr at 10/17/22 1053    dextrose      sodium chloride       PRN Meds:magnesium sulfate, sodium phosphate IVPB **OR** sodium phosphate IVPB, potassium chloride **OR** potassium alternative oral replacement **OR** potassium chloride, glucose, dextrose bolus **OR** dextrose bolus, glucagon (rDNA), dextrose, sodium chloride flush, sodium chloride, polyethylene glycol, acetaminophen **OR** acetaminophen, perflutren lipid microspheres    Allergies:  Tetracyclines & related    Social History:   TOBACCO:   reports that she quit smoking about 12 years ago. Her smoking use included cigarettes. She started smoking about 57 years ago. She has a 44.00 pack-year smoking history.  She has never used smokeless tobacco.  ETOH:   reports current alcohol use. Past Medical History:        Diagnosis Date    Arthritis     right hand    Cancer ([facility] [address] 1985    uterus and cervix had radiation and implant    Diabetes mellitus ([facility] [address]     Hyperlipidemia     Hypertension     Radiation cystitis     Urinary incontinence     complication due to uterine cancer    Urinary retention     caused by complications from uterine cancer       Past Surgical History:        Procedure Laterality Date    CYSTOSCOPY N/A 7/17/2019    CYSTOSCOPY BOTOX  INJECTION 200 UNITS performed by Gui CALLOWAY Memo,  at [facility] removed    HYSTERECTOMY (CERVIX STATUS UNKNOWN)  1985    ND CYSTOURETHROSCOPY INJ CHEMODENERVATION BLADDER N/A 11/9/2018    CYSTOSCOPY  BOTOX INJECTION (200 UNITS) performed by Teena Munoz MD at [address]         Outside reports reviewed: ER records, historical medical records, lab reports and radiology reports. Patient's medications, allergies, past medical, surgical, social and family histories were reviewed and updated as appropriate. Review of Systems  A comprehensive review of systems was negative except for:       Objective:     Neuro exam 160/72 p 70 t 98  General: awake and alert. Follows simple commands with minimal language noted  Cranial nerve testing  unable to cooperate. Visual fields appear full to confrontation. PERRL, corneal reflexes +  . Funduscopic eye exam revealed not testable. Motor exam:  left hemiparesis with arm weaker than leg . Deep tendon reflexes were absent bilaterally. Plantar responses were left side extensor; right side flexor. Cerebellar exam noted  . Harden Beech Sensation was  . Harden Beech       Assessment:  Right subcortical stroke with negative CTA of carotids  MRI brain pending         Plan:   DAPT for 1 month then recommend reverting to asa 81 mg qd  Statin; target LDL<90  PT/?rehab

## 2022-10-17 NOTE — PROGRESS NOTES
Internal Medicine Progress Note    [facility]    Jennifer Diallo. Susan Herrera., CAPRICEOAnniaIAnnia Miramontes D.O., JAROCHO Crawford, MSN, APRN, NP-C  Herbert Lemus. Anselm Merlin, MSN, APRN-CNP     Primary Care Physician: Beth Palafox DO   Admitting Physician:  Johnathon Brooke DO  Admission date and time: 10/16/2022 11:45 AM    Room:  09/09  Admitting diagnosis: UTI (urinary tract infection) [N39.0]    Patient Name: Ranjan London  MRN: [id number]    Date of Service: 10/17/2022     Subjective:  Mohamud Vick is a 67 y.o. female who was seen and examined today,10/17/2022, at the bedside. She is awake but confused. She does not answer questions or follow commands. No family are present to augment history. ROS: 12 point review of symptoms was conducted, pertinent positives and negative were reviewed, aside from that all 12 systems were reviewed and negative. Physical Exam:  No intake/output data recorded. Intake/Output Summary (Last 24 hours) at 10/17/2022 0706  Last data filed at 10/17/2022 0353  Gross per 24 hour   Intake 1000 ml   Output --   Net 1000 ml   I/O last 3 completed shifts: In: 1000 [I.V.:1000]  Out: -   Patient Vitals for the past 96 hrs (Last 3 readings):   Weight   10/16/22 2030 112 lb 12.8 oz (51.2 kg)     Vital Signs:   Blood pressure (!) 146/69, pulse 73, temperature 97.5 °F (36.4 °C), resp. rate 13, weight 112 lb 12.8 oz (51.2 kg), SpO2 97 %, not currently breastfeeding. General appearance:  Alert, responsive, locates examiner when spoken to however he does not answer questions or follow commands. Chronically ill-appearing   head:  Normocephalic. No masses, lesions or tenderness. Eyes:  PERRLA. EOMI. Sclera clear. ENT:  Ears normal. Mucosa normal.  Very poor hygienic appearance of the teeth with secretions present which are thick and viscous, malodorous. Neck:    Supple. Trachea midline. No thyromegaly. No JVD.  No bruits. Heart:    Rhythm regular. Rate controlled. S1 and S2. Systolic murmur. Lungs:    Symmetrical. Clear to auscultation bilaterally. No wheezes. No rhonchi. No rales. Abdomen:   Soft. Non-tender. Non-distended. Bowel sounds positive. No organomegaly or masses. No pain on palpation. Extremities:    Peripheral pulses present. No peripheral edema. No ulcers. No cyanosis. No clubbing. Neurologic:    Awake and alert, locate examiner when spoken to however does not follow commands, answer questions  Psych:   Behavior is confused and abnormal  Musculoskeletal:   No unilateral joint edema, erythema or warmth  Integumentary:  No rashes  Skin normal color and texture.   Genitalia/Breast:  Deferred    Medication:  Scheduled Meds:   sodium chloride  1,000 mL IntraVENous Once    clopidogrel  75 mg Oral Daily    [START ON 10/18/2022] aspirin  81 mg Oral Daily    atorvastatin  80 mg Oral Nightly    sodium chloride flush  5-40 mL IntraVENous 2 times per day    memantine  5 mg Oral BID    And    donepezil  10 mg Oral Nightly    enoxaparin  30 mg SubCUTAneous Daily     Continuous Infusions:   lactated ringers      dextrose      sodium chloride      sodium chloride 75 mL/hr at 10/17/22 0503       Objective Data:  CBC with Differential:    Lab Results   Component Value Date/Time    WBC 5.7 10/17/2022 05:17 AM    RBC 4.33 10/17/2022 05:17 AM    HGB 12.6 10/17/2022 05:17 AM    HCT 38.8 10/17/2022 05:17 AM     10/17/2022 05:17 AM    MCV 89.6 10/17/2022 05:17 AM    MCH 29.1 10/17/2022 05:17 AM    MCHC 32.5 10/17/2022 05:17 AM    RDW 12.5 10/17/2022 05:17 AM    SEGSPCT 61 09/01/2012 08:15 AM    LYMPHOPCT 20.2 10/17/2022 05:17 AM    MONOPCT 8.0 10/17/2022 05:17 AM    BASOPCT 0.5 10/17/2022 05:17 AM    MONOSABS 0.46 10/17/2022 05:17 AM    LYMPHSABS 1.16 10/17/2022 05:17 AM    EOSABS 0.04 10/17/2022 05:17 AM    BASOSABS 0.03 10/17/2022 05:17 AM     BMP:    Lab Results   Component Value Date/Time     10/17/2022 05:17 AM    K 5.1 10/17/2022 05:17 AM    K 4.6 10/16/2022 01:40 PM     10/17/2022 05:17 AM    CO2 20 10/17/2022 05:17 AM    BUN 27 10/17/2022 05:17 AM    LABALBU 3.4 10/17/2022 05:17 AM    CREATININE 1.0 10/17/2022 05:17 AM    CALCIUM 9.1 10/17/2022 05:17 AM    GFRAA >60 10/17/2022 05:17 AM    LABGLOM 54 10/17/2022 05:17 AM    GLUCOSE 224 10/17/2022 05:17 AM       Wound Documentation:   Wound 08/03/21 Buttocks Inner; Lower;Right red, raw swollen unmeasurable area; skin dry flaking blanchable (Active)   Number of days: 440       Wound 08/03/21 Groin Inner;Left;Right red rash groin extending to sacral area, unmeasurable swollen (Active)   Number of days: 440       Wound 08/04/21 Coccyx (Active)   Number of days: 438       Assessment:  Acute versus subacute stroke in the right thalamic lacunar region with small nonhemorrhagic penumbra  Acute metabolic encephalopathy with unknown baseline  Sepsis (POA with AMS and HR) secondary to UTI  Non-insulin-dependent diabetes mellitus type 2  Hypertensive urgency with underlying essential hypertension  History of uterine and cervical cancer with prior radiation    Plan:   Shanelle Mustafa is a 25-year-old female who presented to [facility]. [facility] with altered mentation. Multiple potential causative etiologies were identified. Acute versus subacute CVA as per the assessment was noted with small penumbra described on CT perfusion suggesting ischemia with no overt hemorrhage identified. Neurology consultation will be obtained. MRI, echo with bubble study, carotid ultrasound will be assessed as well. Dual antiplatelets, statin medication will be utilized. Speech, PT and OT will follow. UTI was also noted and antibiotics have been employed while blood and urine cultures are pending. Other potential underlying metabolic derangement will be investigated and we will attempt to determine the patient's baseline mentation.   She is mildly hyperglycemic and we will utilize low-dose sliding scale coverage and reimplement diet once clinically appropriate. Chronic morbidities, lab values and vital signs being monitored and addressed accordingly. Social work will follow for discharge planning purposes we anticipate the need to place a skilled nursing facility upon eventual discharge. Continue current therapy. See orders for further plan of care. More than 50% of my  time was spent at the bedside counseling/coordinating care with the patient and/or family with face to face contact. This time was spent reviewing notes and laboratory data as well as instructing and counseling the patient. Time I spent with the family or surrogate(s) is included only if the patient was incapable of providing the necessary information or participating in medical decisions. I also discussed the differential diagnosis and all of the proposed management plans with the patient and individuals accompanying the patient. Callie July requires this high level of physician care and nursing on the IMC/Telemetry unit due the complexity of decision management and chance of rapid decline or death. Continued cardiac monitoring and higher level of nursing are required. I am readily available for any further decision-making and intervention.      Ashli Serna, ELIO - CNP  10/17/2022  7:06 AM

## 2022-10-18 ENCOUNTER — APPOINTMENT (OUTPATIENT)
Dept: CT IMAGING | Age: 72
DRG: 064 | End: 2022-10-18
Payer: MEDICARE

## 2022-10-18 LAB
ALBUMIN SERPL-MCNC: 3.3 G/DL (ref 3.5–5.2)
ALP BLD-CCNC: 103 U/L (ref 35–104)
ALT SERPL-CCNC: 19 U/L (ref 0–32)
ANION GAP SERPL CALCULATED.3IONS-SCNC: 10 MMOL/L (ref 7–16)
AST SERPL-CCNC: 27 U/L (ref 0–31)
BASOPHILS ABSOLUTE: 0.03 E9/L (ref 0–0.2)
BASOPHILS RELATIVE PERCENT: 0.4 % (ref 0–2)
BILIRUB SERPL-MCNC: 0.5 MG/DL (ref 0–1.2)
BUN BLDV-MCNC: 20 MG/DL (ref 6–23)
CALCIUM SERPL-MCNC: 8.8 MG/DL (ref 8.6–10.2)
CHLORIDE BLD-SCNC: 108 MMOL/L (ref 98–107)
CO2: 23 MMOL/L (ref 22–29)
CREAT SERPL-MCNC: 0.9 MG/DL (ref 0.5–1)
EOSINOPHILS ABSOLUTE: 0.06 E9/L (ref 0.05–0.5)
EOSINOPHILS RELATIVE PERCENT: 0.8 % (ref 0–6)
GFR SERPL CREATININE-BSD FRML MDRD: >60 ML/MIN/1.73
GLUCOSE BLD-MCNC: 159 MG/DL (ref 74–99)
HCT VFR BLD CALC: 39.5 % (ref 34–48)
HEMOGLOBIN: 12.9 G/DL (ref 11.5–15.5)
IMMATURE GRANULOCYTES #: 0.02 E9/L
IMMATURE GRANULOCYTES %: 0.3 % (ref 0–5)
LYMPHOCYTES ABSOLUTE: 1.42 E9/L (ref 1.5–4)
LYMPHOCYTES RELATIVE PERCENT: 18.8 % (ref 20–42)
MAGNESIUM: 1.9 MG/DL (ref 1.6–2.6)
MCH RBC QN AUTO: 29.1 PG (ref 26–35)
MCHC RBC AUTO-ENTMCNC: 32.7 % (ref 32–34.5)
MCV RBC AUTO: 89 FL (ref 80–99.9)
METER GLUCOSE: 127 MG/DL (ref 74–99)
METER GLUCOSE: 140 MG/DL (ref 74–99)
METER GLUCOSE: 154 MG/DL (ref 74–99)
METER GLUCOSE: 217 MG/DL (ref 74–99)
METER GLUCOSE: 280 MG/DL (ref 74–99)
MONOCYTES ABSOLUTE: 0.44 E9/L (ref 0.1–0.95)
MONOCYTES RELATIVE PERCENT: 5.8 % (ref 2–12)
NEUTROPHILS ABSOLUTE: 5.57 E9/L (ref 1.8–7.3)
NEUTROPHILS RELATIVE PERCENT: 73.9 % (ref 43–80)
ORGANISM: ABNORMAL
PDW BLD-RTO: 12.3 FL (ref 11.5–15)
PHOSPHORUS: 2.6 MG/DL (ref 2.5–4.5)
PLATELET # BLD: 143 E9/L (ref 130–450)
PMV BLD AUTO: 10.2 FL (ref 7–12)
POTASSIUM SERPL-SCNC: 4.2 MMOL/L (ref 3.5–5)
RBC # BLD: 4.44 E12/L (ref 3.5–5.5)
SODIUM BLD-SCNC: 141 MMOL/L (ref 132–146)
TOTAL PROTEIN: 6.4 G/DL (ref 6.4–8.3)
URINE CULTURE, ROUTINE: ABNORMAL
WBC # BLD: 7.5 E9/L (ref 4.5–11.5)

## 2022-10-18 PROCEDURE — 74177 CT ABD & PELVIS W/CONTRAST: CPT

## 2022-10-18 PROCEDURE — 80053 COMPREHEN METABOLIC PANEL: CPT

## 2022-10-18 PROCEDURE — 82962 GLUCOSE BLOOD TEST: CPT

## 2022-10-18 PROCEDURE — 97530 THERAPEUTIC ACTIVITIES: CPT

## 2022-10-18 PROCEDURE — 6360000004 HC RX CONTRAST MEDICATION: Performed by: RADIOLOGY

## 2022-10-18 PROCEDURE — 6360000002 HC RX W HCPCS: Performed by: INTERNAL MEDICINE

## 2022-10-18 PROCEDURE — 1200000000 HC SEMI PRIVATE

## 2022-10-18 PROCEDURE — 83735 ASSAY OF MAGNESIUM: CPT

## 2022-10-18 PROCEDURE — 2580000003 HC RX 258: Performed by: NURSE PRACTITIONER

## 2022-10-18 PROCEDURE — 97530 THERAPEUTIC ACTIVITIES: CPT | Performed by: PHYSICAL THERAPIST

## 2022-10-18 PROCEDURE — 6370000000 HC RX 637 (ALT 250 FOR IP): Performed by: NURSE PRACTITIONER

## 2022-10-18 PROCEDURE — 6370000000 HC RX 637 (ALT 250 FOR IP): Performed by: INTERNAL MEDICINE

## 2022-10-18 PROCEDURE — 97110 THERAPEUTIC EXERCISES: CPT

## 2022-10-18 PROCEDURE — 85025 COMPLETE CBC W/AUTO DIFF WBC: CPT

## 2022-10-18 PROCEDURE — 2580000003 HC RX 258: Performed by: INTERNAL MEDICINE

## 2022-10-18 PROCEDURE — 6360000002 HC RX W HCPCS: Performed by: NURSE PRACTITIONER

## 2022-10-18 PROCEDURE — 97161 PT EVAL LOW COMPLEX 20 MIN: CPT | Performed by: PHYSICAL THERAPIST

## 2022-10-18 PROCEDURE — 84100 ASSAY OF PHOSPHORUS: CPT

## 2022-10-18 PROCEDURE — 36415 COLL VENOUS BLD VENIPUNCTURE: CPT

## 2022-10-18 RX ADMIN — DONEPEZIL HYDROCHLORIDE 10 MG: 10 TABLET, FILM COATED ORAL at 21:11

## 2022-10-18 RX ADMIN — MEMANTINE 5 MG: 5 TABLET ORAL at 09:29

## 2022-10-18 RX ADMIN — ASPIRIN 81 MG 81 MG: 81 TABLET ORAL at 09:29

## 2022-10-18 RX ADMIN — CLOPIDOGREL BISULFATE 75 MG: 75 TABLET ORAL at 09:29

## 2022-10-18 RX ADMIN — IOPAMIDOL 75 ML: 755 INJECTION, SOLUTION INTRAVENOUS at 13:00

## 2022-10-18 RX ADMIN — IOPAMIDOL 50 ML: 612 INJECTION, SOLUTION INTRATHECAL at 13:00

## 2022-10-18 RX ADMIN — SODIUM CHLORIDE, POTASSIUM CHLORIDE, SODIUM LACTATE AND CALCIUM CHLORIDE: 600; 310; 30; 20 INJECTION, SOLUTION INTRAVENOUS at 02:07

## 2022-10-18 RX ADMIN — SODIUM CHLORIDE, POTASSIUM CHLORIDE, SODIUM LACTATE AND CALCIUM CHLORIDE: 600; 310; 30; 20 INJECTION, SOLUTION INTRAVENOUS at 18:20

## 2022-10-18 RX ADMIN — ENOXAPARIN SODIUM 30 MG: 100 INJECTION SUBCUTANEOUS at 21:11

## 2022-10-18 RX ADMIN — SODIUM CHLORIDE, PRESERVATIVE FREE 10 ML: 5 INJECTION INTRAVENOUS at 09:41

## 2022-10-18 RX ADMIN — MEMANTINE 5 MG: 5 TABLET ORAL at 21:11

## 2022-10-18 RX ADMIN — ATORVASTATIN CALCIUM 80 MG: 40 TABLET, FILM COATED ORAL at 21:11

## 2022-10-18 RX ADMIN — WATER 1000 MG: 1 INJECTION INTRAMUSCULAR; INTRAVENOUS; SUBCUTANEOUS at 05:18

## 2022-10-18 RX ADMIN — INSULIN LISPRO 1 UNITS: 100 INJECTION, SOLUTION INTRAVENOUS; SUBCUTANEOUS at 11:36

## 2022-10-18 ASSESSMENT — ENCOUNTER SYMPTOMS: TACHYPNEA: 1

## 2022-10-18 NOTE — PLAN OF CARE
Problem: Skin/Tissue Integrity  Goal: Absence of new skin breakdown  Description: 1. Monitor for areas of redness and/or skin breakdown  2. Assess vascular access sites hourly  3. Every 4-6 hours minimum:  Change oxygen saturation probe site  4. Every 4-6 hours:  If on nasal continuous positive airway pressure, respiratory therapy assess nares and determine need for appliance change or resting period. 10/18/2022 1048 by Aditya Glaser RN  Outcome: Progressing  10/17/2022 2301 by Viviane Serna RN  Outcome: Progressing     Problem: ABCDS Injury Assessment  Goal: Absence of physical injury  10/18/2022 1048 by Adtiya Glaser RN  Outcome: Progressing  10/17/2022 2301 by Viviane Serna RN  Outcome: Progressing     Problem: Confusion  Goal: Confusion, delirium, dementia, or psychosis is improved or at baseline  Description: INTERVENTIONS:  1. Assess for possible contributors to thought disturbance, including medications, impaired vision or hearing, underlying metabolic abnormalities, dehydration, psychiatric diagnoses, and notify attending LIP  2. [place] high risk fall precautions, as indicated  3. Provide frequent short contacts to provide reality reorientation, refocusing and direction  4. Decrease environmental stimuli, including noise as appropriate  5. Monitor and intervene to maintain adequate nutrition, hydration, elimination, sleep and activity  6. If unable to ensure safety without constant attention obtain sitter and review sitter guidelines with assigned personnel  7.  Initiate Psychosocial CNS and Spiritual Care consult, as indicated  10/18/2022 1048 by Aditya Glaser RN  Outcome: Progressing  10/17/2022 2301 by Viviane Serna RN  Outcome: Progressing     Problem: Safety - Adult  Goal: Free from fall injury  10/18/2022 1048 by Aditya Glaser RN  Outcome: Progressing  10/17/2022 2301 by Viviane Serna RN  Outcome: Progressing

## 2022-10-18 NOTE — PROGRESS NOTES
OCCUPATIONAL THERAPY BEDSIDE TREATMENT NOTE   [address] [facility] [place] [facility]  [facility] [place]. [place]     Date:10/18/2022  Patient Name: Nba Camara  MRN: [id number]  : 1950  Room: [facility]7519-       Evaluating OT: Laura Roman, OTR/L; [id number]        Referring Provider and Orders/Date:    OT eval and treat  Start:  10/16/22 1945,   End:  10/16/22 1945,   ONE TIME,   Standing Count:  1 Occurrences,   R         Carlos Vega DO         Diagnosis:   1. Acute encephalopathy    2. Urinary tract infection in female    3. Cerebrovascular accident (CVA), unspecified mechanism ([address]          Surgery: none      Pertinent Medical History:        Past Medical History        Past Medical History:   Diagnosis Date    Arthritis       right hand    Cancer ([address]      uterus and cervix had radiation and implant    Diabetes mellitus ([address]      Hyperlipidemia      Hypertension      Radiation cystitis      Urinary incontinence       complication due to uterine cancer    Urinary retention       caused by complications from uterine cancer             Past Surgical History         Past Surgical History:   Procedure Laterality Date    CYSTOSCOPY N/A 2019     CYSTOSCOPY BOTOX  INJECTION 200 UNITS performed by Roxana Russell DO at [facility]    HYSTERECTOMY (CERVIX STATUS UNKNOWN)       [place] CYSTOURETHROSCOPY INJ CHEMODENERVATION BLADDER N/A 2018     CYSTOSCOPY  BOTOX INJECTION (200 UNITS) performed by Earl Dupree MD at [facility] OR           Precautions:  Fall Risk, confused    MRI Brain 10/18/22:   1. 1.5 x 0.9 cm focus of acute or early subacute infarction in the right   thalamus. 2. No mass, mass effect, hemorrhage or midline shift is seen in the brain. 3. Ventricular dilation concerning for normal pressure hydrocephalus. Clinical correlation is needed.        RECOMMENDATIONS:   Unavailable       Recommended placement: SUBJECTIVE:  Ari Schmitz presents today for follow-up after he was seen at the [facility] emergency room August 6 for complains of back pain and knee pain. The patient was found to have degenerative disc disease of the lumbar spine. He also had thoracic aortic aneurysm about 4.5 cm in the ascending aorta. He does not have any chest pain no dissection.  The patient states that he is feeling so much better with no back pain no knee pain although his legs are still a little bit weak. He is walking without any assistive device.    PAST MEDICAL HISTORY:    Past Medical History:   Diagnosis Date   • Diabetes mellitus (CMS/HCC)    • HTN (hypertension)    • Trigger finger        SOCIAL HISTORY:    Social History     Socioeconomic History   • Marital status: /Civil Union     Spouse name: Not on file   • Number of children: Not on file   • Years of education: Not on file   • Highest education level: Not on file   Occupational History   • Not on file   Social Needs   • Financial resource strain: Not on file   • Food insecurity:     Worry: Not on file     Inability: Not on file   • Transportation needs:     Medical: Not on file     Non-medical: Not on file   Tobacco Use   • Smoking status: Never Smoker   • Smokeless tobacco: Never Used   • Tobacco comment:    Substance and Sexual Activity   • Alcohol use: Yes     Frequency: Never     Drinks per session: 1 or 2     Binge frequency: Never     Comment: gin once in a while.   • Drug use: No   • Sexual activity: Yes     Partners: Female     Comment:  with 2 children   Lifestyle   • Physical activity:     Days per week: Not on file     Minutes per session: Not on file   • Stress: Not on file   Relationships   • Social connections:     Talks on phone: Not on file     Gets together: Not on file     Attends L[employer]-day service: Not on file     Active member of club or organization: Not on file     Attends meetings of clubs or organizations: Not  on file     Relationship status: Not on file   • Intimate partner violence:     Fear of current or ex partner: Not on file     Emotionally abused: Not on file     Physically abused: Not on file     Forced sexual activity: Not on file   Other Topics Concern   • Not on file   Social History Narrative   • Not on file       MEDICATIONS:    Current Outpatient Medications   Medication Sig Dispense Refill   • LANTUS SOLOSTAR 100 UNIT/ML pen-injector INJECT 16 UNITS SUBCUTANEOUSLY AT NIGHT 15 mL 6   • blood glucose (ONE TOUCH ULTRA TEST) test strip 4 times daily. Test blood sugar 4 times daily. Diagnosis: E11.9. Meter: One Touch Ultra 400 strip 2   • metFORMIN (GLUCOPHAGE) 500 MG tablet TAKE 1 TABLET TWICE DAILY WITH MEALS 180 tablet 3   • furosemide (LASIX) 40 MG tablet TAKE 1 TABLET IN THE MORNING AND 1/2 TABLET IN THE EVENING 135 tablet 3   • HUMALOG KWIKPEN 100 UNIT/ML pen-injector INJECT 5 UNITS SUBCUTANEOUSLY THREE TIMES DAILY BEFORE A MEAL 15 mL 5   • ONETOUCH DELICA LANCETS 33G Misc Check blood sugar 4 times a day. 400 each 2   • atorvastatin (LIPITOR) 10 MG tablet TAKE 1 TABLET EVERY DAY (DUE FOR OFFICE VISIT) 90 tablet 3   • Insulin Pen Needle (BD PEN NEEDLE LUCILA U/F) 32G X 4 MM Misc USE TO INJECT INSULIN 4 TIMES DAILY (REMOVE NEEDLE COVER TO EXPOSE NEEDLE BEFORE INJECTING) 300 each 2   • spironolactone (ALDACTONE) 25 MG tablet TAKE 1 TABLET TWICE DAILY 180 tablet 2   • labetalol (NORMODYNE) 200 MG tablet TAKE 1 TABLET TWICE DAILY 180 tablet 2   • losartan (COZAAR) 100 MG tablet Take 1 tablet by mouth daily. 90 tablet 3   • tiZANidine (ZANAFLEX) 4 MG tablet Take 1 tablet by mouth every 8 hours as needed (spasm). 30 tablet 0   • efinaconazole (JUBLIA) 10 % topical solution Apply topically daily. 4 mL 11   • ONE TOUCH ULTRA TEST test strip Test  4 times daily as directed Dx Type 2 diabetes mellitus One Touch ultra 150 strip 11   • ONE TOUCH ULTRA TEST test strip TEST BLOOD SUGAR 4 TIMES DAILY AS DIRECTED 400 strip 5  subacute      Assessment of current deficits     [x] Functional mobility           [x]ADLs           [x] Strength                  [x]Cognition     [x] Functional transfers         [x] IADLs         [x] Safety Awareness   [x]Endurance     [x] Fine Coordination                        [x] Balance      [] Vision/perception   []Sensation       [x]Gross Motor Coordination            [] ROM           [] Delirium                   [] Motor Control      OT PLAN OF CARE   OT POC based on physician orders, patient diagnosis and results of clinical assessment     Frequency/Duration 1-3 days/wk for 2 weeks PRN   Specific OT Treatment Interventions to include:   * Instruction/training on adapted ADL techniques and AE recommendations to increase functional independence within precautions       * Training on energy conservation strategies, correct breathing pattern and techniques to improve independence/tolerance for self-care routine  * Functional transfer/mobility training/DME recommendations for increased independence, safety, and fall prevention  * Patient/Family education to increase follow through with safety techniques and functional independence  * Recommendation of environmental modifications for increased safety with functional transfers/mobility and ADLs  * Cognitive retraining/development of therapeutic activities to improve problem solving, judgement, memory, and attention for increased safety/participation in ADL/IADL tasks  * Therapeutic exercise to improve motor endurance, ROM, and functional strength for ADLs/functional transfers  * Therapeutic activities to facilitate/challenge dynamic balance, stand tolerance for increased safety and independence with ADLs  * Therapeutic activities to facilitate gross/fine motor skills for increased independence with ADLs  * Neuro-muscular re-education: facilitation of righting/equilibrium reactions, midline orientation, scapular stability/mobility, normalization of muscle tone,   • Insulin Syringe-Needle U-100 31G X 5/16\" 0.3 ML Misc Use to inject insulin 4 times a day. 100 each 5   • silver sulfADIAZINE (SILVADENE) 1 % cream Apply daily as directed 50 g prn   • Blood Glucose Monitoring Suppl (ONE TOUCH ULTRA 2) w/Device Kit Check blood sugar 4 times a day as directed. 1 kit 0   • carbamide peroxide (DEBROX) 6.5 % OTIC solution Place 5 drops into both ears 2 times daily. 15 mL 1   • aspirin 81 MG tablet Take 1 tablet by mouth daily.  0   • predniSONE (DELTASONE) 20 MG tablet Take 2 tablets by mouth daily. 10 tablet 0   • HYDROcodone-acetaminophen (NORCO) 5-325 MG per tablet Take 1 tablet by mouth every 6 hours as needed for Pain. 10 tablet 0     No current facility-administered medications for this visit.        ALLERGIES:    Allergies as of 08/15/2019   • (No Known Allergies)       REVIEW OF SYSTEMS:  Denies any headaches or dizziness, no chest pain shortness of breath nausea vomiting cough no abdominal pain no swelling of his legs no bowel or bladder problems.  .      PHYSICAL EXAM:  Visit Vitals  BP 92/60   Pulse 72   Temp 98.6 °F (37 °C)   Resp 16   Wt 118.8 kg   BMI 38.69 kg/m²     General: On examination the patient appears comfortable not in any distress vitals reviewed  HEENT: Pink conjunctiva anicteric sclerae neck supple no JVD no bruits no thyromegaly no cervical lymphadenopathy.    Respiratory:  Normal breath sounds.  No respiratory distress.  No wheezing.  No chest tenderness.    Cardiovascular:  Normal rate and rhythm.  No murmurs, no gallops, no rubs..   Extremities: No leg edema no knee pain swelling or tenderness.  Back mild discomfort on palpation at the lumbosacral area could not do a straight leg raise seems the patient does not want to lie down on the examining table.    ASSESSMENT:  M51.36 DDD (degenerative disc disease), lumbar  (primary encounter diagnosis)  Continue using heating pad ×2 as needed. Make sure he follows up with a back specialist.    I71.2 Thoracic  and facilitation of volitional active controled movement  * Positioning to improve skin integrity, interaction with environment and functional independence      Recommended Adaptive Equipment/DME: TBD       Home Living: with family : spouse of 50+ years; single family home, 2 story, bedroom and bathroom on 2nd floor, one flight to second floor, tub shower. Pt and spouse arrived to ED confused and in deplorable conditions. Equipment owned: wheeled walker, wheelchair, shower chair     Prior Level of Function: needs assistance with ADLs , needs assistance with IADLs; ambulated wheeled walker or uses the wheelchair, pt states her spouse assists with everything including transfers. Driving: no  Occupation: retired  Enjoys: watching TV     Pain Level: none    Cognition: A&O: 1/4 to person; Follows 1-2 step directions              Memory:  poor+              Sequencing:  poor+              Problem solving:  poor+              Judgement/safety:  Poor+     AM-PAC Daily Activity Inpatient   How much help for putting on and taking off regular lower body clothing?: Total  How much help for Bathing?: Total  How much help for Toileting?: Total  How much help for putting on and taking off regular upper body clothing?: A Lot  How much help for taking care of personal grooming?: A Lot  How much help for eating meals?: A Lot  AM-[facility] Inpatient Daily Activity Raw Score: 9  AM-PAC Inpatient ADL T-Scale Score : 25.33  ADL Inpatient CMS 0-100% Score: 79.59  ADL Inpatient CMS G-Code Modifier : CL     Functional Assessment:      Initial Eval Status  Date: 10/17/2022   Treatment Status  Date: 10/18/22   STGs = LTGs  Time frame: 10-14 days   Feeding Maximal Assist with simulation attempted. NPO on eval. Pt confused. N/T  Min A   Grooming Maximal Assist for oral care (completed x 4 with brush and mouth wash due to amount of debris in mouth), face/hand wash and hair brush  Maximal Assist simulated.  PROM to place arm on lap with aortic aneurysm without rupture (CMS/HCC)  I will refer him to Dr. Joseph for further evaluation of this.    I will see him again in 3 months.    Orders Placed This Encounter   • SERVICE TO CARDIOLOGY                impaired tone noted. Minimal Assist    UB Dressing Maximal Assist with gown management from supine. Attempted EOB, but not safe due to loss of balance and confuseion  Maximal Assist to pull gown over left shoulder with right hand. Cues for direction. Moderate Assist    LB Dressing Dependent with socks from supine level  Dependent for socks at bed level. Assist to lift heels of bed to doff/don heel protectors. Maximal Assist    Bathing Maximal Assist with sponge bathing from supine level  N/T  Moderate Assist    Toileting Dependent from supine with incontinence on arrival and no awareness N/T ; dependent per nursing   Moderate Assist    Bed Mobility  Supine to sit: Maximal Assist   Sit to supine: Maximal Assist    Supine to sit deferred d/t safety concerns 2/2 cognition and left knee flexion contracture. Moderate Assist for rolling toward right side for repositioning. Supine to sit: Minimal Assist   Sit to supine: Minimal Assist    Functional Transfers  NT due to pt overall debility, decreased activity tolerance, balance deficits, safety and fall risk. N/T  Maximal Assist    Functional Mobility  NT due to pt overall debility, decreased activity tolerance, balance deficits, safety and fall risk. N/T  Maximal Assist    Balance Sitting:     Static:  poor+    Dynamic:poor  Standing: NT  N/T  Sitting:     Static:  fair    Dynamic:fair-  Standing: poor   Activity Tolerance Vitals with activity: WFL overall on room air; fair- tolerance to ADLs with confusion overall;   Sitting EOB for <5min Fair minus ; vitals remained WNL throughout. Increase sitting tolerance for >15min with stable vital signs for carry over into toileting, functional tranfers and indep in ADLs   Visual/  Perceptual Glasses: not Present; difficult to assess due to overall confusion and pt staring      Reports change in vision since admission: No      NA       Comments: RN cleared patient for OT.  Upon arrival to the room the patient was supine. At end of the session, the patient was supine and repositioned for protection of skin integrity. Call light and phone within reach. Pt required verbal cues and instruction as noted above for safe facilitation and completion of tasks. Therapist provided skilled monitoring of the patient's response during treatment session. Prior to and at the end of session, environmental modifications/line management completed for patients safety and efficiency of treatment session. Overall, the patient demonstrates decreased independence with ADLs and functional transfers and mobility. Pt limited by generalized weakness, ROM, and activity tolerance. Pt would benefit from continued skilled OT to increase independence with BADL's and IADL's. Treatment: OT treatment provided this date includes:  Instructions/training on safety, sequencing, and adapted techniques for completion of ADLs. Facilitated bed mobility with cues for proper body mechanics and sequencing to prepare for ADL completion. Facilitated proper positioning/alignment to improve interaction with environment, breathing, overall functioning and decrease/prevent edema. 1x5-10 reps L UE PROM/gentle stretches completed to increase strength/ROM with ADLs     Pt has made fair progress towards set goals. Continue with current plan of care       Treatment time includes thorough review of current medical information, gathering information on past medical history/social history and prior level of function, completion of standardized testing/informal observation of tasks, assessment of data, and development of POC/Goals.     Time In: 11:45 AM    Time Out: 12:08 PM                  Min Units   OT Eval Low 34646     OT Eval Medium 96151     OT Eval High 37608     OT Re-Eval 27380          ADL/Self Care 03574     Therapeutic Activities 80745 15 1   Therapeutic Ex 63594 8 1   Orthotic Management 38102     Neuro Re-Ed 69063     Non-Billable Time     TOTAL TIMED TREATMENT 23 2     Ranjit Dunlap OTR/L #[id number]

## 2022-10-18 NOTE — PROGRESS NOTES
SPEECH/LANGUAGE PATHOLOGY  CLINICAL ASSESSMENT OF SWALLOWING FUNCTION   and PLAN OF CARE    PATIENT NAME:  Jalyn Phillips  (female)     MRN:  [id number]    :  1950  (67 y.o.)  STATUS:  Inpatient: Room 5650/2440-51    TODAY'S DATE:  10/17/2022  REFERRING PROVIDER:     SLP swallowing-dysphagia (IP)  Start:  10/16/22 1945,   End:  10/16/22 1945,   ONE TIME,   Standing Count:  1 Occurrences,   R         Ethan Wren,    REASON FOR REFERRAL: dysphagia    EVALUATING THERAPIST: FERMIN Borden                 RESULTS:    DYSPHAGIA DIAGNOSIS:   Clinical indicators of mild-moderate oropharyngeal phase dysphagia       DIET RECOMMENDATIONS:  Pureed consistency solids (IDDSI level 4) with  thin liquids (IDDSI level 0)     FEEDING RECOMMENDATIONS:     Assistance level:  Set-up is required for all oral intake, Supervision is needed during all oral intake, Encourage self-feeding      Compensatory strategies recommended: Small bites/sips, Alternate solids and liquids, and Check for oral pocketing      Discussed recommendations with nursing and/or faxed report to referring provider: Yes    SPEECH THERAPY  PLAN OF CARE   The dysphagia POC is established based on physician order, dysphagia diagnosis and results of clinical assessment     Skilled SLP intervention for dysphagia management up to 5x per week until goals met, pt plateaus in function and/or discharged from hospital    Conditions Requiring Skilled Therapeutic Intervention for dysphagia:    Patient is performing below functional baseline d/t  current acute condition, Multiple diagnoses, multiple medications, and increased dependency upon caregivers.   Impaired mastication    Specific dysphagia interventions to include:     Training in positioning for improved integrity of swallow  Compensatory strategy training   Therapeutic exercises  Trials of upgraded diet/liquid     Specific instructions for next treatment:  development and training of compensatory swallow strategies to improve airway protection and swallow function  Patient Treatment Goals:    Short Term Goals:  Pt will implement identified compensatory swallowing strategies on 90% of opportunities or greater to improve airway protection and swallow function. Pt will participate in ongoing mealtime assessment to provide diet modification and compensatory strategy implementation to minimize risk of aspiration associated with PO intake  Pt will complete PO trials of upgraded diet textures with SLP only to determine the least restrictive PO diet to maintain adequate nutrition/hydration with no more than 1 overt s/s of pen/asp. Long Term Goals:   Pt will improve oropharyngeal swallow function to ensure airway protection during PO intake to maintain adequate nutrition/hydration and decrease signs/symptoms of aspiration to less than 1 x/day. OTHER RECOMMENDATIONS:  A Speech, Language and Cognitive Evaluation is recommended and requires a physician order     Patient/family Goal:    To be able to [facility] discussed with Patient   The Patient did not demonstrate complete understanding of the diagnosis, prognosis and plan of care     Rehabilitation Potential/Prognosis: good                    ADMITTING DIAGNOSIS: UTI (urinary tract infection) [N39.0]  Acute encephalopathy [G93.40]  Cerebrovascular accident (CVA), unspecified mechanism ([facility] [address] [address] [I63.9]  Urinary tract infection in female [N39.0]    VISIT DIAGNOSIS:   Visit Diagnoses         Codes    Acute encephalopathy    -  Primary G93.40    Urinary tract infection in female     N39.0    Cerebrovascular accident (CVA), unspecified mechanism ([facility] [address]     I63.9             PATIENT REPORT/COMPLAINT: denies difficulty swallowing  RN cleared patient for participation in assessment     yes     PRIOR LEVEL OF SWALLOW FUNCTION:    PAST HISTORY OF DYSPHAGIA?: none reported    Home diet: Diet information not available   Current Diet Order:  ADULT DIET;  Dysphagia - Pureed    PROCEDURE:  Consistencies Administered During the Evaluation   Liquids: thin liquid   Solids:  pureed foods      Method of Intake:   cup, spoon  Self fed, Hand over hand assist      Position:   Seated, upright    CLINICAL ASSESSMENT:  Oral Stage:       Decreased mastication due to:  cognitive function  Delayed A-P transit due to: cognitive function       Pharyngeal Stage:    No signs of aspiration were noted during this evaluation however, silent aspiration cannot be ruled out at bedside. If silent aspiration is suspected, a Videofluoroscopic Study of Swallowing (MBS) is recommended and requires a physician order. Cognition:   Follows 1 - step directions appropriate for this assessment and Confusion noted    Oral Peripheral Examination   Generalized oral weakness    Current Respiratory Status    room air     Parameters of Speech Production  Respiration:  Adequate for speech production  Quality:   Within functional limits  Intensity: Within functional limits    Volitional Swallow: present     Volitional Cough:   present     Pain: No pain reported. EDUCATION:   The Speech Language Pathologist (SLP) completed education regarding results of evaluation and that intervention is warranted at this time. Learner: Patient  Education: Reviewed results and recommendations of this evaluation  Evaluation of Education:  No evidence of learning    This plan may be re-evaluated and revised as warranted. Evaluation Time includes thorough review of current medical information, gathering information on past medical history/social history and prior level of function, completion of standardized testing/informal observation of tasks, assessment of data and education on plan of care and goals. [x]The admitting diagnosis and active problem list, have been reviewed prior to initiation of this evaluation.         ACTIVE PROBLEM LIST:   Patient Active Problem List   Diagnosis    Sepsis secondary to UTI ([facility] [address] Post-operative pain    H/O total hysterectomy    Essential hypertension    Type 2 diabetes mellitus with hyperglycemia, without long-term current use of insulin (HCC)    Other hyperlipidemia    Sacral wound    Sacral wound, initial encounter    Acute cystitis with hematuria    Severe protein-calorie malnutrition ([facility] [address]    UTI (urinary tract infection)    Acute CVA (cerebrovascular accident) [facility])         CPT code:  [address]  [facility]  bedside swallow jacklyn Lo MSCCC/SLP  Speech Language Pathologist  [id number]

## 2022-10-18 NOTE — ACP (ADVANCE CARE PLANNING)
Advance Care Planning   Healthcare Decision Maker:    Primary Decision Maker: Arianna Pimentel Child - [phone]    Secondary Decision Maker- Fleet Ice spouse [phone]    Supplemental (Other) Decision Maker: Jignesh Jorge A - Niece/Nephew - [phone]      Click here to complete Healthcare Decision Makers including selection of the Healthcare Decision Maker Relationship (ie \"Primary\").

## 2022-10-18 NOTE — CARE COORDINATION
SS NOTE: Fan NEGATIVE 10/16. Pt came to ED in deplorable condition as per ED notes. Pt's  Joo Lit confused and unable to answer questions. SW made contact with their son, Luigi Carrington today since pt too is unable to answer questions. Per Manohar Frnaco the couple live independently in their own home - Manohar Franco relates that he and his girlfriend go there frequently - last time being over the weekend. He relates that his father has: \". ..memory loss and is hard of hearing. Sheeba Danes Sheeba Danes \" He cares for pt at home. He approves pt going to [address] [place] [address] since she was there in 2021. SW completed a referral to [address] [place] [address] and await a final decision from them. SW also made contact with [facility]  APS - who relates that they opened and closed a case on the couple last July. They are going to investigate pt's 's home situation and speak with the couple's son. For a new SNF placement pt will need a PRECERT, signed HERLINDA, CURRENT PT AND OT and SW will need to complete the HENs. SS to continue. DALI Field.10/18/2022.3:55 PM.       The Plan for Transition of Care is related to the following treatment goals: skilled nursing facility at [address] [place] [address]    The Patient and/or patient representative Son- Mikala Bingham was provided with a choice of provider and agrees   with the discharge plan. [x] Yes [] No    Freedom of choice list was provided with basic dialogue that supports the patient's individualized plan of care/goals, treatment preferences and shares the quality data associated with the providers.  [x] Yes [] No

## 2022-10-18 NOTE — PLAN OF CARE
Problem: Skin/Tissue Integrity  Goal: Absence of new skin breakdown  Description: 1. Monitor for areas of redness and/or skin breakdown  2. Assess vascular access sites hourly  3. Every 4-6 hours minimum:  Change oxygen saturation probe site  4. Every 4-6 hours:  If on nasal continuous positive airway pressure, respiratory therapy assess nares and determine need for appliance change or resting period. 10/17/2022 2301 by Viviane Serna RN  Outcome: Progressing     Problem: ABCDS Injury Assessment  Goal: Absence of physical injury  10/17/2022 2301 by Viviane Serna RN  Outcome: Progressing     Problem: Confusion  Goal: Confusion, delirium, dementia, or psychosis is improved or at baseline  Description: INTERVENTIONS:  1. Assess for possible contributors to thought disturbance, including medications, impaired vision or hearing, underlying metabolic abnormalities, dehydration, psychiatric diagnoses, and notify attending LIP  2. [place] high risk fall precautions, as indicated  3. Provide frequent short contacts to provide reality reorientation, refocusing and direction  4. Decrease environmental stimuli, including noise as appropriate  5. Monitor and intervene to maintain adequate nutrition, hydration, elimination, sleep and activity  6. If unable to ensure safety without constant attention obtain sitter and review sitter guidelines with assigned personnel  7.  Initiate Psychosocial CNS and Spiritual Care consult, as indicated  10/17/2022 2301 by Viviane Serna RN  Outcome: Progressing     Problem: Safety - Adult  Goal: Free from fall injury  10/17/2022 2301 by Viviane Serna RN  Outcome: Progressing

## 2022-10-18 NOTE — CARE COORDINATION
SS NOTE: COVID NEGATIVE 10/16, PT WILL NEED A NEGATIVE RAPID COVID TEST THE DAY OF [facility] FOR THE NEW SNF PLACEMENT AT [address] [place] Saeed. Pt came to ED in deplorable condition as per ED notes. Pt's  Jose Courtney confused and unable to answer questions. SW made contact with their son, Layla Nolan today since pt too is unable to answer questions. Per Margo Zapata the couple live independently in their own home - Margo Zapata relates that he and his girlfriend go there frequently - last time being over the weekend. He relates that his father has: \". ..memory loss and is hard of hearing. Shanita Huddleston \" He cares for pt at home. He approves pt going to [address] [place] [address] since she was there in 2021. SW completed a referral to [address] [place] [address] and they have accepted pt for admission. SW also made contact with [facility]  APS - who relates that they opened and closed a case on the couple last July. They are going to investigate pt's 's home situation and speak with the couple's son. For a new SNF placement pt will need a PRECERT, signed HERLINDA, CURRENT PT AND OT and SW will need to complete the HENs. SS to continue. DALI Nieves.10/18/2022.3:55 PM

## 2022-10-18 NOTE — PROGRESS NOTES
Internal Medicine Progress Note    [facility]    Yaz Ching. Kaye Benson., F.A.CAnniaOAnniaI. Vernon Mcrae D.O., CAPRICEOLONDON Mcmahon D.O. Keeley Montoya, MSN, APRN, NP-C  Willie Villanueva, MSN, APRN-CNP     Primary Care Physician: Landen Villafana DO   Admitting Physician:  Loren Ortega DO  Admission date and time: 10/16/2022 11:45 AM    Room:  [address] [place] [address]  Admitting diagnosis: UTI (urinary tract infection) [N39.0]  Acute encephalopathy [G93.40]  Cerebrovascular accident (CVA), unspecified mechanism ([facility] [address] [I63.9]  Urinary tract infection in female [N39.0]    Patient Name: Derrek Prader  MRN: [id number]    Date of Service: 10/18/2022     Subjective:  Corinna Solorzano is a 67 y.o. female who was seen and examined today,10/18/2022, at the bedside. She she is more awake and alert today. She follows some commands and answers some questions but not consistently or reliably. She remains confused with an underlying history of dementia which makes history difficult and unreliable as well. No family are present to augment history. ROS: 12 point review of symptoms was conducted, pertinent positives and negative were reviewed, aside from that all 12 systems were reviewed and negative. Physical Exam:  No intake/output data recorded. Intake/Output Summary (Last 24 hours) at 10/18/2022 0859  Last data filed at 10/17/2022 1846  Gross per 24 hour   Intake 360 ml   Output --   Net 360 ml     I/O last 3 completed shifts: In: 1360 [P.O.:360; I.V.:1000]  Out: -   Patient Vitals for the past 96 hrs (Last 3 readings):   Weight   10/17/22 1030 111 lb 3.2 oz (50.4 kg)   10/16/22 2030 112 lb 12.8 oz (51.2 kg)       Vital Signs:   Blood pressure (!) 160/79, pulse 79, temperature 98 °F (36.7 °C), temperature source Axillary, resp. rate 20, height 5' 3\" (1.6 m), weight 111 lb 3.2 oz (50.4 kg), SpO2 98 %, not currently breastfeeding.     General appearance:  Awake and alert today, interacts as above over remains confused and altered. Chronically ill-appearing   head:  Normocephalic. No masses, lesions or tenderness. Eyes:  PERRLA. EOMI. Sclera clear. ENT:  Ears normal. Mucosa normal.   Neck:    Supple. Trachea midline. No thyromegaly. No JVD. No bruits. Heart:    Rhythm regular. Rate controlled. S1 and S2. Systolic murmur. Lungs:    Symmetrical. Clear to auscultation bilaterally. No wheezes. No rhonchi. No rales. Abdomen:   Soft. Non-tender. Non-distended. Bowel sounds positive. No organomegaly or masses. No pain on palpation. Extremities:    Peripheral pulses present. No significant pitting peripheral edema. No ulcers. No cyanosis. No clubbing. Neurologic:    More awake and alert today. Answers some signs and follows some commands but not consistently or reliably. The left upper extremity is not independently moved during exam and is resisted in regards to passive range of motion. The same for the left lower extremity as well as it is flexed at the hip slightly and the knee completely with resistance to straightening suggesting underlying contractures to both. Psych:   Behavior is confused and abnormal, but improving. Musculoskeletal:   No unilateral joint edema, erythema or warmth  Integumentary:  No rashes  Skin normal color and texture.   Genitalia/Breast:  Deferred    Medication:  Scheduled Meds:   sodium chloride  1,000 mL IntraVENous Once    clopidogrel  75 mg Oral Daily    aspirin  81 mg Oral Daily    atorvastatin  80 mg Oral Nightly    cefTRIAXone (ROCEPHIN) IV  1,000 mg IntraVENous Q24H    insulin lispro  0-4 Units SubCUTAneous Q4H    sodium chloride flush  5-40 mL IntraVENous 2 times per day    memantine  5 mg Oral BID    And    donepezil  10 mg Oral Nightly    enoxaparin  30 mg SubCUTAneous Daily     Continuous Infusions:   lactated ringers 60 mL/hr at 10/18/22 0207    dextrose      sodium chloride         Objective Data:  CBC with Differential:    Lab Results   Component Value Date/Time    WBC 7.5 10/18/2022 08:31 AM    RBC 4.44 10/18/2022 08:31 AM    HGB 12.9 10/18/2022 08:31 AM    HCT 39.5 10/18/2022 08:31 AM     10/18/2022 08:31 AM    MCV 89.0 10/18/2022 08:31 AM    MCH 29.1 10/18/2022 08:31 AM    MCHC 32.7 10/18/2022 08:31 AM    RDW 12.3 10/18/2022 08:31 AM    SEGSPCT 61 09/01/2012 08:15 AM    LYMPHOPCT 18.8 10/18/2022 08:31 AM    MONOPCT 5.8 10/18/2022 08:31 AM    BASOPCT 0.4 10/18/2022 08:31 AM    MONOSABS 0.44 10/18/2022 08:31 AM    LYMPHSABS 1.42 10/18/2022 08:31 AM    EOSABS 0.06 10/18/2022 08:31 AM    BASOSABS 0.03 10/18/2022 08:31 AM     BMP:    Lab Results   Component Value Date/Time     10/17/2022 05:17 AM    K 5.1 10/17/2022 05:17 AM    K 4.6 10/16/2022 01:40 PM     10/17/2022 05:17 AM    CO2 20 10/17/2022 05:17 AM    BUN 27 10/17/2022 05:17 AM    LABALBU 3.4 10/17/2022 05:17 AM    CREATININE 1.0 10/17/2022 05:17 AM    CALCIUM 9.1 10/17/2022 05:17 AM    GFRAA >60 10/17/2022 05:17 AM    LABGLOM 54 10/17/2022 05:17 AM    GLUCOSE 224 10/17/2022 05:17 AM       Wound Documentation:   Wound 08/03/21 Buttocks Inner; Lower;Right red, raw swollen unmeasurable area; skin dry flaking blanchable (Active)   Number of days: 440       Wound 08/03/21 Groin Inner;Left;Right red rash groin extending to sacral area, unmeasurable swollen (Active)   Number of days: 440       Wound 08/04/21 Coccyx (Active)   Number of days: 438       Assessment:  Acute versus subacute stroke in the right thalamic lacunar region with small nonhemorrhagic penumbra  Acute metabolic encephalopathy with unknown baseline  Sepsis (POA with AMS and HR) secondary to E. coli UTI  Non-insulin-dependent diabetes mellitus type 2  Hypertensive urgency with underlying essential hypertension  History of uterine and cervical cancer with prior radiation    Plan:   Belinda Courtney is doing better overall today. MRI confirmed stroke suspected based on CTA and CT perfusion studies.   Aspirin, Plavix and Lipitor were added. Treatment of the underlying urinary tract infection has improved mentation as well. As discussed, her baseline is unknown as she has underlying dementia as well. She may be near this. Her left upper and lower extremity deficits would suggest a more chronic issue as she is somewhat contracted. We discussed this with the therapist and she will work with range of motion exercises as well. Urine cultures returned positive for E. coli and we await further sensitivity and identification. We will transition antibiotics to oral appropriately based on this. Echo with bubble study was performed and we will review the preliminary report. Neurology recommendations have been noted as well. Blood glucose values have improved with adjustments made in insulin regimen yesterday. Dietary adjustments have been made at the recommendations of the speech therapist with a puréed diet. Chronic morbidities, lab values and vital signs being monitored and addressed accordingly. Social work will follow for discharge planning purposes we anticipate the need to place a skilled nursing facility upon eventual discharge. Continue current therapy. See orders for further plan of care. More than 50% of my  time was spent at the bedside counseling/coordinating care with the patient and/or family with face to face contact. This time was spent reviewing notes and laboratory data as well as instructing and counseling the patient. Time I spent with the family or surrogate(s) is included only if the patient was incapable of providing the necessary information or participating in medical decisions. I also discussed the differential diagnosis and all of the proposed management plans with the patient and individuals accompanying the patient. Mayra Lane requires this high level of physician care and nursing on the IMC/Telemetry unit due the complexity of decision management and chance of rapid decline or death.   Continued

## 2022-10-18 NOTE — FLOWSHEET NOTE
Inpatient Wound Care    Admit Date: 10/16/2022 11:45 AM    Reason for consult:  skin care    Significant history:    Past Medical History:   Diagnosis Date    Arthritis     right hand    Cancer ([facility] [address] [facility] 1985    uterus and cervix had radiation and implant    Diabetes mellitus ([facility] [address] [facility]     Hyperlipidemia     Hypertension     Radiation cystitis     Urinary incontinence     complication due to uterine cancer    Urinary retention     caused by complications from uterine cancer       Wound history:      Findings:     10/18/22 1444   Skin Integrity Site 3   Skin Integrity Location 3 Redness    Location 3 sacral   Dressing Site Sacrum   Skin Integrity Site 4   Skin Integrity Location 4 Redness   Location 4 heels   Preventative Dressing Yes       Impression:  redness sacral coccyx  Pt is incontinent    Interventions in place:  mepilex    Plan:  [address] [facility]   calmoseptine      Yaima Mock RN 10/18/2022 2:45 PM

## 2022-10-18 NOTE — DISCHARGE INSTR - COC
Essential hypertension I10    Type 2 diabetes mellitus with hyperglycemia, without long-term current use of insulin (HCC) E11.65    Other hyperlipidemia E78.49    Sacral wound S31.000A    Sacral wound, initial encounter S31.000A    Acute cystitis with hematuria N30.01    Severe protein-calorie malnutrition ([facility] [address] E43    UTI (urinary tract infection) N39.0    Acute CVA (cerebrovascular accident) ([facility] [address] I63.9       Isolation/Infection:   Isolation            No Isolation          Patient Infection Status       Infection Onset Added Last Indicated Last Indicated By Review Planned Expiration Resolved Resolved By    None active    Resolved    ESBL (Extended Spectrum Beta Lactamase) 07/25/19 07/27/19 08/02/19 Urine Culture   01/08/20 Marciana Dakin, RN    E Coli Urine 8/219    ESBL (Extended Spectrum Beta Lactamase) 04/24/19 04/26/19 04/24/19 Urine Culture   07/15/19 Marciana Dakin, RN    Resolved- no growth-5/9/19  E Coli Urine 4/24/19            Nurse Assessment:  Last Vital Signs: BP (!) 160/79   Pulse 79   Temp 98 °F (36.7 °C) (Axillary)   Resp 20   Ht 5' 3\" (1.6 m)   Wt 111 lb 3.2 oz (50.4 kg)   LMP  (LMP Unknown)   SpO2 98%   BMI 19.70 kg/m²     Last documented pain score (0-10 scale):    Last Weight:   Wt Readings from Last 1 Encounters:   10/17/22 111 lb 3.2 oz (50.4 kg)     Mental Status:  disoriented    IV Access:  - None    Nursing Mobility/ADLs:  Walking   Dependent  Transfer  Dependent  Bathing  Dependent  Dressing  Dependent  Toileting  Dependent  Feeding  Dependent  Med Admin  Dependent  Med Delivery   crushed and prefers mixed with pudding    Wound Care Documentation and Therapy:  Wound 08/03/21 Buttocks Inner; Lower;Right red, raw swollen unmeasurable area; skin dry flaking blanchable (Active)   Number of days: 441       Wound 08/03/21 Groin Inner;Left;Right red rash groin extending to sacral area, unmeasurable swollen (Active)   Number of days: 441       Wound 08/04/21 Coccyx (Active)   Number of days: 440        Elimination:  Continence: Bowel: No  Bladder: No  Urinary Catheter: None no  Colostomy/Ileostomy/Ileal Conduit: No       Date of Last BM: ***    Intake/Output Summary (Last 24 hours) at 10/18/2022 1822  Last data filed at 10/18/2022 1742  Gross per 24 hour   Intake 1080 ml   Output --   Net 1080 ml     I/O last 3 completed shifts: In: 1360 [P.O.:360; I.V.:1000]  Out: -     Safety Concerns: At Risk for Falls    Impairments/Disabilities:      Vision, Hearing, and Contractures - left leg    Nutrition Therapy:  Current Nutrition Therapy:   - ***    Routes of Feeding: Oral  Liquids: {Slp liquid thickness:11436}  Daily Fluid Restriction: {CHP DME Yes amt example:642509291}  Last Modified Barium Swallow with Video (Video Swallowing Test): {Done Not Done KEPT:947736631}    Treatments at the Time of Hospital Discharge:   Respiratory Treatments: ***  Oxygen Therapy:  {Therapy; copd oxygen:94508}  Ventilator:    { CC Vent QKDF:719544948}    Rehab Therapies: {THERAPEUTIC INTERVENTION:0430633349}  Weight Bearing Status/Restrictions: [facility] CC Weight Bearin}  Other Medical Equipment (for information only, NOT a DME order):  {EQUIPMENT:580901468}  Other Treatments:PT/OT    Patient's personal belongings (please select all that are sent with patient):  None here    RN SIGNATURE:  Luis Felton RN    CASE MANAGEMENT/SOCIAL WORK SECTION    Inpatient Status Date: 10/16/2022    Readmission Risk Assessment Score:  Readmission Risk              Risk of Unplanned Readmission:  12           Discharging to Facility/ Agency   Name: [facility]: [phone]  FAX: [phone]    Dialysis Facility (if applicable)   Name:  Address:  Dialysis Schedule:  Phone:  Fax:    / signature: DALI Thomas10/18/24597:23 PM.    PHYSICIAN SECTION    Prognosis: Good    Condition at Discharge: Stable    Rehab Potential (if transferring to Rehab): Good    Recommended Labs or Other Treatments After Discharge:   PT/OT    Physician Certification: I certify the above information and transfer of Minus Milder  is necessary for the continuing treatment of the diagnosis listed and that she requires [place] for less 30 days.      Update Admission H&P: {CHP DME Changes in MLXJE:687503454}    PHYSICIAN SIGNATURE:  Electronically signed by Key Tuttle DO on 10/21/22 at 9:10 AM EDT

## 2022-10-19 ENCOUNTER — APPOINTMENT (OUTPATIENT)
Dept: ULTRASOUND IMAGING | Age: 72
DRG: 064 | End: 2022-10-19
Payer: MEDICARE

## 2022-10-19 LAB
ALBUMIN SERPL-MCNC: 3 G/DL (ref 3.5–5.2)
ALP BLD-CCNC: 97 U/L (ref 35–104)
ALT SERPL-CCNC: 16 U/L (ref 0–32)
ANION GAP SERPL CALCULATED.3IONS-SCNC: 11 MMOL/L (ref 7–16)
AST SERPL-CCNC: 19 U/L (ref 0–31)
BASOPHILS ABSOLUTE: 0.02 E9/L (ref 0–0.2)
BASOPHILS RELATIVE PERCENT: 0.4 % (ref 0–2)
BILIRUB SERPL-MCNC: 0.5 MG/DL (ref 0–1.2)
BUN BLDV-MCNC: 13 MG/DL (ref 6–23)
CALCIUM SERPL-MCNC: 8.7 MG/DL (ref 8.6–10.2)
CHLORIDE BLD-SCNC: 101 MMOL/L (ref 98–107)
CO2: 24 MMOL/L (ref 22–29)
CREAT SERPL-MCNC: 0.7 MG/DL (ref 0.5–1)
EOSINOPHILS ABSOLUTE: 0.03 E9/L (ref 0.05–0.5)
EOSINOPHILS RELATIVE PERCENT: 0.6 % (ref 0–6)
GFR SERPL CREATININE-BSD FRML MDRD: >60 ML/MIN/1.73
GLUCOSE BLD-MCNC: 182 MG/DL (ref 74–99)
HCT VFR BLD CALC: 38.5 % (ref 34–48)
HEMOGLOBIN: 13.1 G/DL (ref 11.5–15.5)
IMMATURE GRANULOCYTES #: 0.02 E9/L
IMMATURE GRANULOCYTES %: 0.4 % (ref 0–5)
LYMPHOCYTES ABSOLUTE: 1.14 E9/L (ref 1.5–4)
LYMPHOCYTES RELATIVE PERCENT: 23.4 % (ref 20–42)
MAGNESIUM: 1.7 MG/DL (ref 1.6–2.6)
MCH RBC QN AUTO: 28.7 PG (ref 26–35)
MCHC RBC AUTO-ENTMCNC: 34 % (ref 32–34.5)
MCV RBC AUTO: 84.4 FL (ref 80–99.9)
METER GLUCOSE: 158 MG/DL (ref 74–99)
METER GLUCOSE: 183 MG/DL (ref 74–99)
METER GLUCOSE: 252 MG/DL (ref 74–99)
METER GLUCOSE: 310 MG/DL (ref 74–99)
METER GLUCOSE: 321 MG/DL (ref 74–99)
MONOCYTES ABSOLUTE: 0.39 E9/L (ref 0.1–0.95)
MONOCYTES RELATIVE PERCENT: 8 % (ref 2–12)
NEUTROPHILS ABSOLUTE: 3.27 E9/L (ref 1.8–7.3)
NEUTROPHILS RELATIVE PERCENT: 67.2 % (ref 43–80)
PDW BLD-RTO: 11.9 FL (ref 11.5–15)
PHOSPHORUS: 2.7 MG/DL (ref 2.5–4.5)
PLATELET # BLD: 148 E9/L (ref 130–450)
PMV BLD AUTO: 10.9 FL (ref 7–12)
POTASSIUM SERPL-SCNC: 3.6 MMOL/L (ref 3.5–5)
RBC # BLD: 4.56 E12/L (ref 3.5–5.5)
SODIUM BLD-SCNC: 136 MMOL/L (ref 132–146)
TOTAL PROTEIN: 5.7 G/DL (ref 6.4–8.3)
WBC # BLD: 4.9 E9/L (ref 4.5–11.5)

## 2022-10-19 PROCEDURE — 6370000000 HC RX 637 (ALT 250 FOR IP): Performed by: INTERNAL MEDICINE

## 2022-10-19 PROCEDURE — 1200000000 HC SEMI PRIVATE

## 2022-10-19 PROCEDURE — 36415 COLL VENOUS BLD VENIPUNCTURE: CPT

## 2022-10-19 PROCEDURE — 93880 EXTRACRANIAL BILAT STUDY: CPT

## 2022-10-19 PROCEDURE — 6360000002 HC RX W HCPCS: Performed by: NURSE PRACTITIONER

## 2022-10-19 PROCEDURE — 82962 GLUCOSE BLOOD TEST: CPT

## 2022-10-19 PROCEDURE — 84100 ASSAY OF PHOSPHORUS: CPT

## 2022-10-19 PROCEDURE — 80053 COMPREHEN METABOLIC PANEL: CPT

## 2022-10-19 PROCEDURE — 2580000003 HC RX 258: Performed by: NURSE PRACTITIONER

## 2022-10-19 PROCEDURE — 6370000000 HC RX 637 (ALT 250 FOR IP): Performed by: NURSE PRACTITIONER

## 2022-10-19 PROCEDURE — 83735 ASSAY OF MAGNESIUM: CPT

## 2022-10-19 PROCEDURE — 85025 COMPLETE CBC W/AUTO DIFF WBC: CPT

## 2022-10-19 PROCEDURE — 6360000002 HC RX W HCPCS: Performed by: INTERNAL MEDICINE

## 2022-10-19 RX ORDER — INSULIN LISPRO 100 [IU]/ML
0-4 INJECTION, SOLUTION INTRAVENOUS; SUBCUTANEOUS
Status: DISCONTINUED | OUTPATIENT
Start: 2022-10-20 | End: 2022-10-21 | Stop reason: HOSPADM

## 2022-10-19 RX ADMIN — WATER 1000 MG: 1 INJECTION INTRAMUSCULAR; INTRAVENOUS; SUBCUTANEOUS at 06:02

## 2022-10-19 RX ADMIN — ANORECTAL OINTMENT: 15.7; .44; 24; 20.6 OINTMENT TOPICAL at 08:58

## 2022-10-19 RX ADMIN — ENOXAPARIN SODIUM 30 MG: 100 INJECTION SUBCUTANEOUS at 21:53

## 2022-10-19 RX ADMIN — MEMANTINE 5 MG: 5 TABLET ORAL at 09:00

## 2022-10-19 RX ADMIN — MEMANTINE 5 MG: 5 TABLET ORAL at 21:53

## 2022-10-19 RX ADMIN — ASPIRIN 81 MG 81 MG: 81 TABLET ORAL at 08:58

## 2022-10-19 RX ADMIN — CLOPIDOGREL BISULFATE 75 MG: 75 TABLET ORAL at 08:58

## 2022-10-19 RX ADMIN — SODIUM CHLORIDE, POTASSIUM CHLORIDE, SODIUM LACTATE AND CALCIUM CHLORIDE: 600; 310; 30; 20 INJECTION, SOLUTION INTRAVENOUS at 17:32

## 2022-10-19 RX ADMIN — DONEPEZIL HYDROCHLORIDE 10 MG: 10 TABLET, FILM COATED ORAL at 21:53

## 2022-10-19 RX ADMIN — INSULIN LISPRO 3 UNITS: 100 INJECTION, SOLUTION INTRAVENOUS; SUBCUTANEOUS at 12:48

## 2022-10-19 RX ADMIN — ATORVASTATIN CALCIUM 80 MG: 40 TABLET, FILM COATED ORAL at 21:53

## 2022-10-19 RX ADMIN — SODIUM CHLORIDE, POTASSIUM CHLORIDE, SODIUM LACTATE AND CALCIUM CHLORIDE: 600; 310; 30; 20 INJECTION, SOLUTION INTRAVENOUS at 12:43

## 2022-10-19 NOTE — CARE COORDINATION
10/19/2022 1030 CM note: Negative covid 10/16/22. Pt came to ED in deplorable condition as per ED notes. Pt's  Evan Starch confused and unable to answer questions. Per previous SW note,pt's son Francisco Boyd requested referral to [facility] SN[facility]. Per liaison Katty Rodriguez accepted pt and initiated [address] on 20/85/24. FOR SNF, WILL NEED PRECERT, HENS, signed HERLINDA and rapid covid test. (SW also made contact with Jenae at [place] [address]- who relays that they opened and closed a case on the couple last July and are going to investigate pt's 's home situation and speak with the couple's son.) KRISTEN Goldman RN

## 2022-10-19 NOTE — PLAN OF CARE
Problem: Skin/Tissue Integrity  Goal: Absence of new skin breakdown  Description: 1. Monitor for areas of redness and/or skin breakdown  2. Assess vascular access sites hourly  3. Every 4-6 hours minimum:  Change oxygen saturation probe site  4. Every 4-6 hours:  If on nasal continuous positive airway pressure, respiratory therapy assess nares and determine need for appliance change or resting period. Outcome: Progressing     Problem: ABCDS Injury Assessment  Goal: Absence of physical injury  Outcome: Progressing     Problem: Confusion  Goal: Confusion, delirium, dementia, or psychosis is improved or at baseline  Description: INTERVENTIONS:  1. Assess for possible contributors to thought disturbance, including medications, impaired vision or hearing, underlying metabolic abnormalities, dehydration, psychiatric diagnoses, and notify attending LIP  2. [place] high risk fall precautions, as indicated  3. Provide frequent short contacts to provide reality reorientation, refocusing and direction  4. Decrease environmental stimuli, including noise as appropriate  5. Monitor and intervene to maintain adequate nutrition, hydration, elimination, sleep and activity  6. If unable to ensure safety without constant attention obtain sitter and review sitter guidelines with assigned personnel  7.  Initiate Psychosocial CNS and Spiritual Care consult, as indicated  Outcome: Progressing     Problem: Safety - Adult  Goal: Free from fall injury  Outcome: Progressing

## 2022-10-19 NOTE — PROGRESS NOTES
MRI of brain noted. Normal pressure hydrocephalus is unlikely. If patient and family want to pursue an evaluation for normal pressure hydrocephalus I do not recommend this be done locally. If normal pressure hydrocephalus evaluation is to be done recommend tertiary care center such as [facility].

## 2022-10-19 NOTE — PROGRESS NOTES
Internal Medicine Progress Note    [facility]    Annette Charles., CAPRICEOAnniaIAnnia Strange D.O., CAPRICEOAnniaIAnnia Gilbert D.O. Chiara Rangel, MSN, APRN, NP-C  Sue Rizvi. Jessica Montiel, MSN, APRN-CNP     Primary Care Physician: Cathi Huerta DO   Admitting Physician:  Pavan Silva DO  Admission date and time: 10/16/2022 11:45 AM    Room:  [address] [place] [address]  Admitting diagnosis: UTI (urinary tract infection) [N39.0]  Acute encephalopathy [G93.40]  Cerebrovascular accident (CVA), unspecified mechanism ([facility] [address] [I63.9]  Urinary tract infection in female [N39.0]    Patient Name: Randy Yanez  MRN: [id number]    Date of Service: 10/19/2022     Subjective:  Karolina Macias is a 67 y.o. female who was seen and examined today,10/19/2022, at the bedside. She is awake and pleasantly confused. She does answer questions, but she does not follow commands. Appetite is good. States food is good. Admits to being fatigued. No family are present to augment history. ROS: Review of rest of 12 systems negative except as mentioned above-subjective section    Physical Exam:  No intake/output data recorded. Intake/Output Summary (Last 24 hours) at 10/19/2022 1535  Last data filed at 10/19/2022 0505  Gross per 24 hour   Intake 240 ml   Output 300 ml   Net -60 ml   I/O last 3 completed shifts: In: 840 [P.O.:840]  Out: 300 [Urine:300]  Patient Vitals for the past 96 hrs (Last 3 readings):   Weight   10/17/22 1030 111 lb 3.2 oz (50.4 kg)   10/16/22 2030 112 lb 12.8 oz (51.2 kg)     Vital Signs:   Blood pressure (!) 144/75, pulse 86, temperature 97.1 °F (36.2 °C), temperature source Axillary, resp. rate 20, height 5' 3\" (1.6 m), weight 111 lb 3.2 oz (50.4 kg), SpO2 99 %, not currently breastfeeding. General appearance:  Alert, responsive, locates examiner when spoken to and does answer questions. She does not follow commands. Chronically ill-appearing   head:  Normocephalic.  No masses, lesions or tenderness. Eyes:  PERRLA. EOMI. Sclera clear. ENT:  Ears normal. Mucosa normal.  Very poor hygienic appearance of the teeth with secretions present which are thick and viscous, malodorous. Neck:    Supple. Trachea midline. No thyromegaly. No JVD. No bruits. Heart:    Rhythm regular. Rate controlled. S1 and S2. Systolic murmur. Lungs:    Symmetrical. Clear to auscultation bilaterally. No wheezes. No rhonchi. No rales. Abdomen:   Soft. Non-tender. Non-distended. Bowel sounds positive. No organomegaly or masses. No pain on palpation. Extremities:    Peripheral pulses present. No peripheral edema. No ulcers. No cyanosis. No clubbing. Neurologic:    Awake and alert, does not follow commands. Answer squestions  Psych:   Behavior is confused. Musculoskeletal:   No unilateral joint edema, erythema or warmth  Integumentary:  No rashes  Skin normal color and texture.   Genitalia/Breast:  Deferred    Medication:  Scheduled Meds:   sodium chloride  1,000 mL IntraVENous Once    clopidogrel  75 mg Oral Daily    aspirin  81 mg Oral Daily    atorvastatin  80 mg Oral Nightly    cefTRIAXone (ROCEPHIN) IV  1,000 mg IntraVENous Q24H    insulin lispro  0-4 Units SubCUTAneous Q4H    sodium chloride flush  5-40 mL IntraVENous 2 times per day    memantine  5 mg Oral BID    And    donepezil  10 mg Oral Nightly    enoxaparin  30 mg SubCUTAneous Daily     Continuous Infusions:   lactated ringers 60 mL/hr at 10/19/22 1243    dextrose      sodium chloride         Objective Data:  CBC with Differential:    Lab Results   Component Value Date/Time    WBC 4.9 10/19/2022 07:46 AM    RBC 4.56 10/19/2022 07:46 AM    HGB 13.1 10/19/2022 07:46 AM    HCT 38.5 10/19/2022 07:46 AM     10/19/2022 07:46 AM    MCV 84.4 10/19/2022 07:46 AM    MCH 28.7 10/19/2022 07:46 AM    MCHC 34.0 10/19/2022 07:46 AM    RDW 11.9 10/19/2022 07:46 AM    SEGSPCT 61 09/01/2012 08:15 AM    LYMPHOPCT 23.4 10/19/2022 07:46 AM MONOPCT 8.0 10/19/2022 07:46 AM    BASOPCT 0.4 10/19/2022 07:46 AM    MONOSABS 0.39 10/19/2022 07:46 AM    LYMPHSABS 1.14 10/19/2022 07:46 AM    EOSABS 0.03 10/19/2022 07:46 AM    BASOSABS 0.02 10/19/2022 07:46 AM     BMP:    Lab Results   Component Value Date/Time     10/19/2022 07:46 AM    K 3.6 10/19/2022 07:46 AM    K 4.6 10/16/2022 01:40 PM     10/19/2022 07:46 AM    CO2 24 10/19/2022 07:46 AM    BUN 13 10/19/2022 07:46 AM    LABALBU 3.0 10/19/2022 07:46 AM    CREATININE 0.7 10/19/2022 07:46 AM    CALCIUM 8.7 10/19/2022 07:46 AM    GFRAA >60 10/17/2022 05:17 AM    LABGLOM >60 10/19/2022 07:46 AM    GLUCOSE 182 10/19/2022 07:46 AM       Wound Documentation:   Wound 08/03/21 Buttocks Inner; Lower;Right red, raw swollen unmeasurable area; skin dry flaking blanchable (Active)   Number of days: 440       Wound 08/03/21 Groin Inner;Left;Right red rash groin extending to sacral area, unmeasurable swollen (Active)   Number of days: 440       Wound 08/04/21 Coccyx (Active)   Number of days: 438       Assessment:  Acute versus subacute stroke in the right thalamic lacunar region with small nonhemorrhagic penumbra  Abnormal MRI concerning for normal pressure hydrocephalus  Acute metabolic encephalopathy with unknown baseline  Sepsis (POA with AMS and HR) secondary to E. coli UTI  Hydronephrosis  CT findings of 1.5 x 2.9 cm rounded opacity in the right lung base-follow-up to be recommended  Non-insulin-dependent diabetes mellitus type 2  Hypertensive urgency with underlying essential hypertension  History of uterine and cervical cancer with prior radiation    Plan:   Neurology is following patient recommendations have been reviewed. Recommends dual antiplatelet therapy for approximately 1 month and recommend reverting to aspirin 81 mg daily. Continue PT/OT. MRI of the brain revealed 1.5 x 0.9 cm focus of acute or early subacute infarction in the right thalamus.   Ventricular dilatation concerning for normal pressure hydrocephalus. Echocardiogram results are pending. Speech, PT and OT will follow. Continue antibiotic therapy for E. coli UTI. Monitor blood glucose levels and treat accordingly. Resources for discharge planning. Chronic morbidities, lab values and vital signs being monitored and addressed accordingly. Social work will follow for discharge planning purposes we anticipate the need to place a skilled nursing facility upon eventual discharge. Continue current therapy. See orders for further plan of care. More than 50% of my  time was spent at the bedside counseling/coordinating care with the patient and/or family with face to face contact. This time was spent reviewing notes and laboratory data as well as instructing and counseling the patient. Time I spent with the family or surrogate(s) is included only if the patient was incapable of providing the necessary information or participating in medical decisions. I also discussed the differential diagnosis and all of the proposed management plans with the patient and individuals accompanying the patient. Susan Oviedo requires this high level of physician care and nursing on the IMC/Telemetry unit due the complexity of decision management and chance of rapid decline or death. Continued cardiac monitoring and higher level of nursing are required. I am readily available for any further decision-making and intervention. The patient was seen, examined and then discussed with Dr. Ramya Bolton. [facility], APRN - CNP  10/19/2022  3:35 PM        I saw and evaluated the patient. I agree with the findings and the plan of care as documented in [facility] NP-C's  note.     Jennifer Morgan DO, D.O., FACOI  4:14 PM  10/19/2022

## 2022-10-19 NOTE — PROGRESS NOTES
Physical Therapy        [id number]    Patient unavailable for physical therapy treatment due to need for decontamination, see nurses notes Nursing captured insect and put in specimen cup.     Ezekiel Monge, PTA  #[id number]

## 2022-10-20 LAB
ALBUMIN SERPL-MCNC: 2.8 G/DL (ref 3.5–5.2)
ALP BLD-CCNC: 107 U/L (ref 35–104)
ALT SERPL-CCNC: 18 U/L (ref 0–32)
ANION GAP SERPL CALCULATED.3IONS-SCNC: 9 MMOL/L (ref 7–16)
AST SERPL-CCNC: 27 U/L (ref 0–31)
BASOPHILS ABSOLUTE: 0.01 E9/L (ref 0–0.2)
BASOPHILS RELATIVE PERCENT: 0.2 % (ref 0–2)
BILIRUB SERPL-MCNC: 0.4 MG/DL (ref 0–1.2)
BUN BLDV-MCNC: 14 MG/DL (ref 6–23)
CALCIUM SERPL-MCNC: 8.5 MG/DL (ref 8.6–10.2)
CHLORIDE BLD-SCNC: 99 MMOL/L (ref 98–107)
CO2: 27 MMOL/L (ref 22–29)
CREAT SERPL-MCNC: 0.8 MG/DL (ref 0.5–1)
EOSINOPHILS ABSOLUTE: 0.05 E9/L (ref 0.05–0.5)
EOSINOPHILS RELATIVE PERCENT: 0.9 % (ref 0–6)
GFR SERPL CREATININE-BSD FRML MDRD: >60 ML/MIN/1.73
GLUCOSE BLD-MCNC: 261 MG/DL (ref 74–99)
HCT VFR BLD CALC: 38.9 % (ref 34–48)
HEMOGLOBIN: 13 G/DL (ref 11.5–15.5)
IMMATURE GRANULOCYTES #: 0.04 E9/L
IMMATURE GRANULOCYTES %: 0.7 % (ref 0–5)
LYMPHOCYTES ABSOLUTE: 1.29 E9/L (ref 1.5–4)
LYMPHOCYTES RELATIVE PERCENT: 22.1 % (ref 20–42)
MAGNESIUM: 1.6 MG/DL (ref 1.6–2.6)
MCH RBC QN AUTO: 29.1 PG (ref 26–35)
MCHC RBC AUTO-ENTMCNC: 33.4 % (ref 32–34.5)
MCV RBC AUTO: 87 FL (ref 80–99.9)
METER GLUCOSE: 152 MG/DL (ref 74–99)
METER GLUCOSE: 238 MG/DL (ref 74–99)
METER GLUCOSE: 241 MG/DL (ref 74–99)
METER GLUCOSE: 245 MG/DL (ref 74–99)
MONOCYTES ABSOLUTE: 0.43 E9/L (ref 0.1–0.95)
MONOCYTES RELATIVE PERCENT: 7.4 % (ref 2–12)
NEUTROPHILS ABSOLUTE: 4.01 E9/L (ref 1.8–7.3)
NEUTROPHILS RELATIVE PERCENT: 68.7 % (ref 43–80)
PDW BLD-RTO: 11.9 FL (ref 11.5–15)
PHOSPHORUS: 2 MG/DL (ref 2.5–4.5)
PLATELET # BLD: 141 E9/L (ref 130–450)
PMV BLD AUTO: 10.5 FL (ref 7–12)
POTASSIUM SERPL-SCNC: 3.3 MMOL/L (ref 3.5–5)
RBC # BLD: 4.47 E12/L (ref 3.5–5.5)
REASON FOR REJECTION: NORMAL
REJECTED TEST: NORMAL
SARS-COV-2, NAAT: NOT DETECTED
SODIUM BLD-SCNC: 135 MMOL/L (ref 132–146)
TOTAL PROTEIN: 5.7 G/DL (ref 6.4–8.3)
WBC # BLD: 5.8 E9/L (ref 4.5–11.5)

## 2022-10-20 PROCEDURE — 82962 GLUCOSE BLOOD TEST: CPT

## 2022-10-20 PROCEDURE — 80053 COMPREHEN METABOLIC PANEL: CPT

## 2022-10-20 PROCEDURE — 85025 COMPLETE CBC W/AUTO DIFF WBC: CPT

## 2022-10-20 PROCEDURE — 84100 ASSAY OF PHOSPHORUS: CPT

## 2022-10-20 PROCEDURE — 92507 TX SP LANG VOICE COMM INDIV: CPT | Performed by: SPEECH-LANGUAGE PATHOLOGIST

## 2022-10-20 PROCEDURE — 2580000003 HC RX 258: Performed by: INTERNAL MEDICINE

## 2022-10-20 PROCEDURE — 6360000002 HC RX W HCPCS: Performed by: NURSE PRACTITIONER

## 2022-10-20 PROCEDURE — 87635 SARS-COV-2 COVID-19 AMP PRB: CPT

## 2022-10-20 PROCEDURE — 6370000000 HC RX 637 (ALT 250 FOR IP): Performed by: NURSE PRACTITIONER

## 2022-10-20 PROCEDURE — 6370000000 HC RX 637 (ALT 250 FOR IP): Performed by: INTERNAL MEDICINE

## 2022-10-20 PROCEDURE — 83735 ASSAY OF MAGNESIUM: CPT

## 2022-10-20 PROCEDURE — 1200000000 HC SEMI PRIVATE

## 2022-10-20 PROCEDURE — 36415 COLL VENOUS BLD VENIPUNCTURE: CPT

## 2022-10-20 PROCEDURE — 2580000003 HC RX 258: Performed by: NURSE PRACTITIONER

## 2022-10-20 PROCEDURE — 6360000002 HC RX W HCPCS: Performed by: INTERNAL MEDICINE

## 2022-10-20 PROCEDURE — 92526 ORAL FUNCTION THERAPY: CPT | Performed by: SPEECH-LANGUAGE PATHOLOGIST

## 2022-10-20 RX ORDER — POTASSIUM BICARBONATE 25 MEQ/1
25 TABLET, EFFERVESCENT ORAL ONCE
Status: COMPLETED | OUTPATIENT
Start: 2022-10-20 | End: 2022-10-20

## 2022-10-20 RX ADMIN — ATORVASTATIN CALCIUM 80 MG: 40 TABLET, FILM COATED ORAL at 22:15

## 2022-10-20 RX ADMIN — ASPIRIN 81 MG 81 MG: 81 TABLET ORAL at 09:33

## 2022-10-20 RX ADMIN — CLOPIDOGREL BISULFATE 75 MG: 75 TABLET ORAL at 09:32

## 2022-10-20 RX ADMIN — INSULIN LISPRO 1 UNITS: 100 INJECTION, SOLUTION INTRAVENOUS; SUBCUTANEOUS at 22:16

## 2022-10-20 RX ADMIN — WATER 1000 MG: 1 INJECTION INTRAMUSCULAR; INTRAVENOUS; SUBCUTANEOUS at 06:48

## 2022-10-20 RX ADMIN — ENOXAPARIN SODIUM 30 MG: 100 INJECTION SUBCUTANEOUS at 22:14

## 2022-10-20 RX ADMIN — INSULIN LISPRO 1 UNITS: 100 INJECTION, SOLUTION INTRAVENOUS; SUBCUTANEOUS at 17:34

## 2022-10-20 RX ADMIN — POTASSIUM BICARBONATE 25 MEQ: 977.5 TABLET, EFFERVESCENT ORAL at 17:20

## 2022-10-20 RX ADMIN — SODIUM CHLORIDE, PRESERVATIVE FREE 10 ML: 5 INJECTION INTRAVENOUS at 22:15

## 2022-10-20 RX ADMIN — MEMANTINE 5 MG: 5 TABLET ORAL at 09:32

## 2022-10-20 ASSESSMENT — PAIN SCALES - GENERAL: PAINLEVEL_OUTOF10: 0

## 2022-10-20 NOTE — PROGRESS NOTES
Speech Language Pathology      NAME:  Lorna Bloch  :  1950  DATE: 10/20/2022  ROOM:  [facility]/[id number]    Patient seen for therapy 25 minutes. Patient alert and communicating confusion present. Able to follow simple directions. Able to feed self but needs frequent cuing to continue task. Due to left leg contracture difficult to position food in front of her. No pocketing and no coughing with thin.   Recommend advance solids to Minced and moist consistency solids (IDDSI level 5) with  thin liquids (IDDSI level 0)  will continue POC        UTI (urinary tract infection) [N39.0]  Acute encephalopathy [G93.40]  Cerebrovascular accident (CVA), unspecified mechanism ([facility] [address] [I63.9]  Urinary tract infection in female [N39.0]    46089  dysphagia tx  46489 speech tx    [employer] Staff MSCCC/SLP  Speech Language Pathologist  [phone]

## 2022-10-20 NOTE — PROGRESS NOTES
Internal Medicine Progress Note    [facility]    Elmira Herron. Calvin Jones., CASSIDY.CRISELDAOAnniaI. Jaiden Snyder D.O., CAPRICEOJAROCHO Fernandez Junior, MSN, APRN, NP-C  Pamela Elizondo. Verner Ink, MSN, APRN-CNP     Primary Care Physician: Yvette Marmolejo DO   Admitting Physician:  Donald Sims DO  Admission date and time: 10/16/2022 11:45 AM    Room:  [address] [place] [address]  Admitting diagnosis: UTI (urinary tract infection) [N39.0]  Acute encephalopathy [G93.40]  Cerebrovascular accident (CVA), unspecified mechanism ([facility] [address] [I63.9]  Urinary tract infection in female [N39.0]    Patient Name: Lui Hunter  MRN: [id number]    Date of Service: 10/20/2022     Subjective:  Staci Florian is a 67 y.o. female who was seen and examined today,10/20/2022, at the bedside. She is awake and pleasantly confused. She does know that she is in the hospital today but is unable to state which one. Her nephew was present at the bedside. She does know who he is. Patient ate half of her breakfast.  Admits she does feel little better. Nephew present at the bedside. ROS: Review of rest of 12 systems negative except as mentioned above-subjective section    Physical Exam:  No intake/output data recorded. No intake or output data in the 24 hours ending 10/20/22 1602  I/O last 3 completed shifts:  In: -   Out: 300 [Urine:300]  Patient Vitals for the past 96 hrs (Last 3 readings):   Weight   10/17/22 1030 111 lb 3.2 oz (50.4 kg)   10/16/22 2030 112 lb 12.8 oz (51.2 kg)     Vital Signs:   Blood pressure 126/77, pulse 82, temperature 97.9 °F (36.6 °C), temperature source Oral, resp. rate 17, height 5' 3\" (1.6 m), weight 111 lb 3.2 oz (50.4 kg), SpO2 100 %, not currently breastfeeding. General appearance:  Alert, oriented to person, responsive, she does follow some commands today. Chronically ill-appearing   head:  Normocephalic. No masses, lesions or tenderness. Eyes:  PERRLA. EOMI.   Sclera clear.    ENT:  Ears normal. Mucosa normal.    Neck:    Supple. Trachea midline. No thyromegaly. No JVD. No bruits. Heart:    Rhythm regular. Rate controlled. S1 and S2. Systolic murmur. Lungs:    Symmetrical. Clear to auscultation bilaterally. No wheezes. No rhonchi. No rales. Abdomen:   Soft. Non-tender. Non-distended. Bowel sounds positive. No organomegaly or masses. No pain on palpation. Extremities:    Peripheral pulses present. No peripheral edema. No ulcers. No cyanosis. No clubbing. Neurologic:    Awake and alert, patient did follow some commands. Will squeeze hands when asked. Answer squestions  Psych:   Behavior is confused. Musculoskeletal:   No unilateral joint edema, erythema or warmth. Weak. Contracture of the right lower extremity. Integumentary:  No rashes  Skin normal color and texture.   Genitalia/Breast:  Deferred    Medication:  Scheduled Meds:   potassium bicarbonate  25 mEq Oral Once    insulin lispro  0-4 Units SubCUTAneous 4x Daily AC & HS    sodium chloride  1,000 mL IntraVENous Once    clopidogrel  75 mg Oral Daily    aspirin  81 mg Oral Daily    atorvastatin  80 mg Oral Nightly    cefTRIAXone (ROCEPHIN) IV  1,000 mg IntraVENous Q24H    sodium chloride flush  5-40 mL IntraVENous 2 times per day    memantine  5 mg Oral BID    And    donepezil  10 mg Oral Nightly    enoxaparin  30 mg SubCUTAneous Daily     Continuous Infusions:   lactated ringers 60 mL/hr at 10/19/22 1732    dextrose      sodium chloride         Objective Data:  CBC with Differential:    Lab Results   Component Value Date/Time    WBC 5.8 10/20/2022 10:40 AM    RBC 4.47 10/20/2022 10:40 AM    HGB 13.0 10/20/2022 10:40 AM    HCT 38.9 10/20/2022 10:40 AM     10/20/2022 10:40 AM    MCV 87.0 10/20/2022 10:40 AM    MCH 29.1 10/20/2022 10:40 AM    MCHC 33.4 10/20/2022 10:40 AM    RDW 11.9 10/20/2022 10:40 AM    SEGSPCT 61 09/01/2012 08:15 AM    LYMPHOPCT 22.1 10/20/2022 10:40 AM    MONOPCT 7.4 10/20/2022 10:40 AM    BASOPCT 0.2 10/20/2022 10:40 AM    MONOSABS 0.43 10/20/2022 10:40 AM    LYMPHSABS 1.29 10/20/2022 10:40 AM    EOSABS 0.05 10/20/2022 10:40 AM    BASOSABS 0.01 10/20/2022 10:40 AM     BMP:    Lab Results   Component Value Date/Time     10/20/2022 10:40 AM    K 3.3 10/20/2022 10:40 AM    K 4.6 10/16/2022 01:40 PM    CL 99 10/20/2022 10:40 AM    CO2 27 10/20/2022 10:40 AM    BUN 14 10/20/2022 10:40 AM    LABALBU 2.8 10/20/2022 10:40 AM    CREATININE 0.8 10/20/2022 10:40 AM    CALCIUM 8.5 10/20/2022 10:40 AM    GFRAA >60 10/17/2022 05:17 AM    LABGLOM >60 10/20/2022 10:40 AM    GLUCOSE 261 10/20/2022 10:40 AM       Wound Documentation:   Wound 08/03/21 Buttocks Inner; Lower;Right red, raw swollen unmeasurable area; skin dry flaking blanchable (Active)   Number of days: 440       Wound 08/03/21 Groin Inner;Left;Right red rash groin extending to sacral area, unmeasurable swollen (Active)   Number of days: 440       Wound 08/04/21 Coccyx (Active)   Number of days: 438       Assessment:  Acute versus subacute stroke in the right thalamic lacunar region with small nonhemorrhagic penumbra  Abnormal MRI concerning for normal pressure hydrocephalus  Acute metabolic encephalopathy with unknown baseline  Sepsis (POA with AMS and HR) secondary to E. coli UTI  Hydronephrosis  Hypokalemia  CT findings of 1.5 x 2.9 cm rounded opacity in the right lung base-follow-up to be recommended  Non-insulin-dependent diabetes mellitus type 2  Hypertensive urgency with underlying essential hypertension  History of uterine and cervical cancer with prior radiation    Plan:   Nephew present at the bedside. Updated patient status. States the patient's  is on his way to the hospital.  Discussed discharge planning. As per discussion with the care coordinator patient has been accepted at [facility] and precertification has been initiated. Neurology is following patient recommendations have been reviewed.   As per Dr. Zoe Ruth progress note yesterday he does not feel that normal pressure hydrocephalus is likely. Continue PT/OT. Potassium supplementation to replete potassium stores. Monitor electrolytes closely. Echocardiogram results revealed LVEF of 70%. Speech, PT and OT will follow. Continue antibiotic therapy for E. coli UTI. Monitor blood glucose levels and treat accordingly. Resources for discharge planning. Chronic morbidities, lab values and vital signs being monitored and addressed accordingly. Continue current therapy. See orders for further plan of care. More than 50% of my  time was spent at the bedside counseling/coordinating care with the patient and/or family with face to face contact. This time was spent reviewing notes and laboratory data as well as instructing and counseling the patient. Time I spent with the family or surrogate(s) is included only if the patient was incapable of providing the necessary information or participating in medical decisions. I also discussed the differential diagnosis and all of the proposed management plans with the patient and individuals accompanying the patient. Silvina Scott requires this high level of physician care and nursing on the IMC/Telemetry unit due the complexity of decision management and chance of rapid decline or death. Continued cardiac monitoring and higher level of nursing are required. I am readily available for any further decision-making and intervention. The patient was seen, examined and then discussed with Dr. Claudette Blanca. Bonilla Muñoz, ELIO - CNP  10/20/2022  4:02 PM        I saw and evaluated the patient. I agree with the findings and the plan of care as documented in Bonilla Muñoz NP-C's  note.     Concetta Medina DO, D.O., FACOI  4:15 PM  10/20/2022

## 2022-10-20 NOTE — CARE COORDINATION
10/20/2022 1133 CM note: Negative covid 10/20/22. Per kimmie Lange Needle accepted pt and initiated [address] on 38/04/92.   FOR SNF, WILL NEED PRECERT, HENS, signed HERLINDA and rapid covid test. Clarissa SABILLON

## 2022-10-21 VITALS
HEART RATE: 85 BPM | OXYGEN SATURATION: 98 % | SYSTOLIC BLOOD PRESSURE: 142 MMHG | WEIGHT: 111.2 LBS | HEIGHT: 63 IN | RESPIRATION RATE: 16 BRPM | DIASTOLIC BLOOD PRESSURE: 70 MMHG | TEMPERATURE: 98.3 F | BODY MASS INDEX: 19.7 KG/M2

## 2022-10-21 LAB
ALBUMIN SERPL-MCNC: 2.6 G/DL (ref 3.5–5.2)
ALP BLD-CCNC: 119 U/L (ref 35–104)
ALT SERPL-CCNC: 25 U/L (ref 0–32)
ANION GAP SERPL CALCULATED.3IONS-SCNC: 9 MMOL/L (ref 7–16)
AST SERPL-CCNC: 32 U/L (ref 0–31)
BASOPHILS ABSOLUTE: 0.02 E9/L (ref 0–0.2)
BASOPHILS RELATIVE PERCENT: 0.4 % (ref 0–2)
BILIRUB SERPL-MCNC: 0.4 MG/DL (ref 0–1.2)
BUN BLDV-MCNC: 13 MG/DL (ref 6–23)
CALCIUM SERPL-MCNC: 8.7 MG/DL (ref 8.6–10.2)
CHLORIDE BLD-SCNC: 101 MMOL/L (ref 98–107)
CO2: 27 MMOL/L (ref 22–29)
CREAT SERPL-MCNC: 0.8 MG/DL (ref 0.5–1)
EOSINOPHILS ABSOLUTE: 0.06 E9/L (ref 0.05–0.5)
EOSINOPHILS RELATIVE PERCENT: 1.1 % (ref 0–6)
GFR SERPL CREATININE-BSD FRML MDRD: >60 ML/MIN/1.73
GLUCOSE BLD-MCNC: 179 MG/DL (ref 74–99)
HCT VFR BLD CALC: 36.9 % (ref 34–48)
HEMOGLOBIN: 11.9 G/DL (ref 11.5–15.5)
IMMATURE GRANULOCYTES #: 0.02 E9/L
IMMATURE GRANULOCYTES %: 0.4 % (ref 0–5)
LYMPHOCYTES ABSOLUTE: 1.29 E9/L (ref 1.5–4)
LYMPHOCYTES RELATIVE PERCENT: 23.5 % (ref 20–42)
MAGNESIUM: 1.5 MG/DL (ref 1.6–2.6)
MCH RBC QN AUTO: 28.4 PG (ref 26–35)
MCHC RBC AUTO-ENTMCNC: 32.2 % (ref 32–34.5)
MCV RBC AUTO: 88.1 FL (ref 80–99.9)
METER GLUCOSE: 160 MG/DL (ref 74–99)
METER GLUCOSE: 179 MG/DL (ref 74–99)
MONOCYTES ABSOLUTE: 0.45 E9/L (ref 0.1–0.95)
MONOCYTES RELATIVE PERCENT: 8.2 % (ref 2–12)
NEUTROPHILS ABSOLUTE: 3.64 E9/L (ref 1.8–7.3)
NEUTROPHILS RELATIVE PERCENT: 66.4 % (ref 43–80)
PDW BLD-RTO: 11.9 FL (ref 11.5–15)
PHOSPHORUS: 2.2 MG/DL (ref 2.5–4.5)
PLATELET # BLD: 136 E9/L (ref 130–450)
PMV BLD AUTO: 11.1 FL (ref 7–12)
POTASSIUM SERPL-SCNC: 4.1 MMOL/L (ref 3.5–5)
RBC # BLD: 4.19 E12/L (ref 3.5–5.5)
SODIUM BLD-SCNC: 137 MMOL/L (ref 132–146)
TOTAL PROTEIN: 5.7 G/DL (ref 6.4–8.3)
WBC # BLD: 5.5 E9/L (ref 4.5–11.5)

## 2022-10-21 PROCEDURE — 2580000003 HC RX 258: Performed by: NURSE PRACTITIONER

## 2022-10-21 PROCEDURE — 82962 GLUCOSE BLOOD TEST: CPT

## 2022-10-21 PROCEDURE — 6370000000 HC RX 637 (ALT 250 FOR IP): Performed by: INTERNAL MEDICINE

## 2022-10-21 PROCEDURE — 6360000002 HC RX W HCPCS: Performed by: NURSE PRACTITIONER

## 2022-10-21 PROCEDURE — 80053 COMPREHEN METABOLIC PANEL: CPT

## 2022-10-21 PROCEDURE — 6370000000 HC RX 637 (ALT 250 FOR IP): Performed by: NURSE PRACTITIONER

## 2022-10-21 PROCEDURE — 36415 COLL VENOUS BLD VENIPUNCTURE: CPT

## 2022-10-21 PROCEDURE — 85025 COMPLETE CBC W/AUTO DIFF WBC: CPT

## 2022-10-21 PROCEDURE — 83735 ASSAY OF MAGNESIUM: CPT

## 2022-10-21 PROCEDURE — 84100 ASSAY OF PHOSPHORUS: CPT

## 2022-10-21 RX ORDER — DONEPEZIL HYDROCHLORIDE 10 MG/1
10 TABLET, FILM COATED ORAL NIGHTLY
Qty: 30 TABLET | Refills: 3 | DISCHARGE
Start: 2022-10-21

## 2022-10-21 RX ORDER — MEMANTINE HYDROCHLORIDE 5 MG/1
5 TABLET ORAL 2 TIMES DAILY
Qty: 60 TABLET | Refills: 3 | DISCHARGE
Start: 2022-10-21

## 2022-10-21 RX ORDER — POLYETHYLENE GLYCOL 3350 17 G/17G
17 POWDER, FOR SOLUTION ORAL DAILY PRN
Qty: 527 G | Refills: 1 | DISCHARGE
Start: 2022-10-21 | End: 2022-11-20

## 2022-10-21 RX ORDER — ASPIRIN 81 MG/1
81 TABLET, CHEWABLE ORAL DAILY
Qty: 30 TABLET | Refills: 3 | DISCHARGE
Start: 2022-10-21 | End: 2022-11-14 | Stop reason: ALTCHOICE

## 2022-10-21 RX ORDER — CLOPIDOGREL BISULFATE 75 MG/1
75 TABLET ORAL DAILY
Qty: 30 TABLET | Refills: 3 | Status: ON HOLD | DISCHARGE
Start: 2022-10-21 | End: 2022-11-18 | Stop reason: HOSPADM

## 2022-10-21 RX ADMIN — WATER 1000 MG: 1 INJECTION INTRAMUSCULAR; INTRAVENOUS; SUBCUTANEOUS at 06:05

## 2022-10-21 RX ADMIN — CLOPIDOGREL BISULFATE 75 MG: 75 TABLET ORAL at 09:22

## 2022-10-21 RX ADMIN — MEMANTINE 5 MG: 5 TABLET ORAL at 09:22

## 2022-10-21 RX ADMIN — ASPIRIN 81 MG 81 MG: 81 TABLET ORAL at 09:22

## 2022-10-21 ASSESSMENT — PAIN SCALES - GENERAL
PAINLEVEL_OUTOF10: 0
PAINLEVEL_OUTOF10: 0

## 2022-10-21 NOTE — CARE COORDINATION
10/21/2022 0904 CM note: Negative covid 10/20/22. Per liaison Katty Rodriguez accepted pt and obtained PRECERT for skilled care. Arrangements made for pt to be transported to \[facility]""S. at 1230pm by [facility]. Call placed to pt's son Francisco Boyd to inform him of arrangements-no answer and unable to leave vm message, will try again.  and SNF liaison Shirley informed of arrangements. HENS completed. N-N [phone].  Clarissa SABILLON

## 2022-10-21 NOTE — DISCHARGE INSTRUCTIONS
Your information:  Name: Juan Stephens  : 1950    Your instructions:    Discharged to [facility]... Please make and keep any follow up appointments. What to do after you leave the hospital:    Recommended diet:  minced and moist    Recommended activity: activity as tolerated        The following personal items were collected during your admission and were returned to you:    Belongings  Dental Appliances: None  Vision - Corrective Lenses: Eyeglasses, At home  Hearing Aid: None  Clothing: Other (Comment) (none)  Jewelry: None  Body Piercings Removed: N/A  Electronic Devices: None  Weapons (Notify Protective Services/Security): None  Other Valuables: Other (Comment) (none)  Home Medications: None  Valuables Given To: Other (Comment) (no valuables)  Provide Name(s) of Who Valuable(s) Were Given To: na  Responsible person(s) in the waiting room: na  Patient approves for provider to speak to responsible person post operatively: Yes    Information obtained by:  By signing below, I understand that if any problems occur once I leave the hospital I am to contact [facility]... I understand and acknowledge receipt of the instructions indicated above.

## 2022-10-21 NOTE — DISCHARGE SUMMARY
Internal Medicine Progress Note     [facility]     Annette Carter. Claudia Charles., CASSIDY.A.C.OAnniaI. Laura Strange D.O., CASSIDY.LEVONCAnniaOAnniaI. Jovanna Gilbert D.O. Chiara Rangel, MSN, APRN, NP-C  Sue Rizvi. Jessica Montiel, MSN, APRN-CNP       Internal Medicine  Discharge Summary    NAME: Randy Yanez  :  1950  MRN:  [id number]  PCP:Ravi Jean Baptiste DO  ADMITTED: 10/16/2022      DISCHARGED: 10/21/22    ADMITTING PHYSICIAN: Annette Murphy DO    CONSULTANT(S):   IP CONSULT TO NEUROLOGY     ADMITTING DIAGNOSIS:   UTI (urinary tract infection) [N39.0]  Acute encephalopathy [G93.40]  Cerebrovascular accident (CVA), unspecified mechanism ([facility] [address] [I63.9]  Urinary tract infection in female [N39.0]     DISCHARGE DIAGNOSES:   Acute versus subacute stroke in the right thalamic lacunar region with small nonhemorrhagic penumbra  Abnormal MRI concerning for normal pressure hydrocephalus, felt to be unlikely based on neurology recommendations  Acute metabolic encephalopathy with moderate to severe underlying dementia, return to baseline  Sepsis (POA with AMS and HR) secondary to E. coli UTI, completed 5-day IV course of Rocephin during hospitalization  Hypokalemia  CT findings of 1.5 x 2.9 cm rounded opacity in the right lung base-follow-up imaging to evaluate for resolution and underlying mass presence   Non-insulin-dependent diabetes mellitus type 2  Hypertensive urgency with underlying essential hypertension  History of uterine and cervical cancer with prior radiation    BRIEF HISTORY OF PRESENT ILLNESS:   Patient is a 78-year-old female who presented to the ED from home due to altered mental status and failure to thrive. Overall patient is a poor historian and as such HPI supplemented by going over EMR. According to ED notes patient has been more confused over the last 2 days.     LABS[de-identified]  Lab Results   Component Value Date    WBC 5.8 10/20/2022    HGB 13.0 10/20/2022    HCT 38.9 10/20/2022  10/20/2022     10/20/2022    K 3.3 (L) 10/20/2022    CL 99 10/20/2022    CREATININE 0.8 10/20/2022    BUN 14 10/20/2022    CO2 27 10/20/2022    GLUCOSE 261 (H) 10/20/2022    ALT 18 10/20/2022    AST 27 10/20/2022     No results found for: INR, PROTIME   Lab Results   Component Value Date    TSH 0.144 (L) 10/17/2022     Lab Results   Component Value Date    TRIG 144 10/17/2022    TRIG 93 08/03/2021    TRIG 109 03/05/2021     Lab Results   Component Value Date    HDL 31 10/17/2022    HDL 42 08/03/2021    HDL 49 03/05/2021     Lab Results   Component Value Date    LDLCALC 139 (H) 10/17/2022    LDLCALC 53 08/03/2021    LDLCALC 63 03/05/2021     Lab Results   Component Value Date    LABA1C 8.2 (H) 10/17/2022       IMAGING:  XR HIP LEFT (2-3 VIEWS)    Result Date: 10/16/2022  EXAMINATION: TWO XRAY VIEWS OF THE LEFT HIP 10/16/2022 3:29 pm COMPARISON: None. HISTORY: ORDERING SYSTEM PROVIDED HISTORY: ams TECHNOLOGIST PROVIDED HISTORY: Reason for exam:->ams FINDINGS: Osteopenia is present. No acute fracture or subluxation identified. There is subchondral sclerosis of the left femoral head which could represent AVN. No evidence of collapse of the femoral head articular margin. Pelvic ring structure appears intact. Mild degenerative changes of the right hip. Sclerosis was noted in the femoral heads on a prior CT dated 08/02/2021     1. No acute fracture or subluxation. 2.  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 CT HEAD WO CONTRAST    Result Date: 10/16/2022  EXAMINATION: CT OF THE HEAD WITHOUT CONTRAST  10/16/2022 3:15 pm TECHNIQUE: CT of the head was performed without the administration of intravenous contrast. Automated exposure control, iterative reconstruction, and/or weight based adjustment of the mA/kV was utilized to reduce the radiation dose to as low as reasonably achievable. COMPARISON: None.  HISTORY: ORDERING SYSTEM PROVIDED HISTORY: ams TECHNOLOGIST PROVIDED HISTORY: Reason for exam:->ams Has a \"code stroke\" or \"stroke alert\" been called? ->No Decision Support Exception - unselect if not a suspected or confirmed emergency medical condition->Emergency Medical Condition (MA) FINDINGS: There is diffuse atrophy with prominence of the ventricles and sulci. Confluent areas of decreased attenuation are present in the periventricular white matter and brainstem consistent with microvascular/ischemic changes. Multiple areas of prior lacunar CVAs are identified in the basal ganglia bilaterally, right thalamus, left coronary radiata, and left eliza, some of which are new since the previous examination. There is no acute hemorrhage. There is no midline shift. The paranasal sinuses are clear. Diffuse atrophy with small vessel ischemic disease. Multiple prior lacunar CVAs bilaterally. Consider MRI for better assessment. CT ABDOMEN PELVIS W IV CONTRAST Additional Contrast? Oral    Result Date: 10/18/2022  EXAMINATION: CT OF THE ABDOMEN AND PELVIS WITH CONTRAST 10/18/2022 12:46 pm TECHNIQUE: CT of the abdomen and pelvis was performed with the administration of intravenous contrast. Multiplanar reformatted images are provided for review. Automated exposure control, iterative reconstruction, and/or weight based adjustment of the mA/kV was utilized to reduce the radiation dose to as low as reasonably achievable. COMPARISON: None. HISTORY: ORDERING SYSTEM PROVIDED HISTORY: Known OBGYN cancer - assess progression TECHNOLOGIST PROVIDED HISTORY: Reason for exam:->Known OBGYN cancer - assess progression Additional Contrast?->Oral FINDINGS: Lower Chest: A rounded opacity which is pleural based is noted in the right lung base measuring 1.5 by 2.9 cm. No pneumothorax. No pleural effusion. No pericardial effusion. Organs: No abnormal enhancement of the liver and spleen. Portal vein is patent. Pancreas enhances as expected. No peripancreatic fluid or fat stranding is noted. The gallbladder is normal size. The adrenal glands normal in appearance. The kidneys enhance as expected. There is been interval decrease in the degree of calyceal distension within the right kidney. The left-sided hydronephrosis has resolved. Atheromatous plaque is noted which has progressed within the aorta compared to the prior study. Reference image 48 of the axial images. The origin of the superior mesenteric artery, celiac artery and splenic artery are patent. GI/Bowel: The large and small bowel caliber is within normal limits. No abscess or free air is noted. Subcutaneous air within the left lower abdominal wall noted compatible with injection. Pelvis: Hyperdensities layering within the gallbladder compatible with multiple bladder stones, reference image 154. Stool is noted in the rectum. Slight thickening of the distal rectosigmoid colon is noted. No evidence for abscess or free air. No pathologically enlarged retroperitoneal lymphadenopathy. No inguinal lymphadenopathy noted. Curvature of the lumbar spine with concavity to the right centered at the L2 level is noted. Straightening of the natural lumbar lordosis on the lateral view is noted. No evidence for facet dislocation. The bones of the sacrum are in anatomic alignment. 1.  Rounded opacity in the right lung base measuring 1.5 x 2.9 cm. Follow-up to resolution is recommended. 2.  Progression of aortic atheromatous disease, as referenced above. The aorta remains patent. 3.  No evidence for intra-abdominal ascites. No abnormal enhancing masses are identified. 4.  There has been improvement of the left-sided hydronephrosis a partial improvement of the right-sided hydronephrosis. 5.  Bladder calculi.      XR CHEST 1 VIEW    Result Date: 10/16/2022  EXAMINATION: ONE XRAY VIEW OF THE CHEST 10/16/2022 1:29 pm COMPARISON: 05/04/2018 HISTORY: ORDERING SYSTEM PROVIDED HISTORY: altered mental status TECHNOLOGIST PROVIDED HISTORY: Reason for exam:->altered mental status FINDINGS: The lungs are without acute focal process. There is no effusion or pneumothorax. The cardiomediastinal silhouette is without acute process. The osseous structures are without acute process. No acute process. CTA NECK W CONTRAST    Addendum Date: 10/16/2022    ADDENDUM: CT angiogram head/neck and CT perfusion findings were verbally communicated by Dr. Lawanda Riley to Dr. Kj Barron on 10/16/2022 at 6:45 p.m. Result Date: 10/16/2022  EXAMINATION: CTA OF THE HEAD WITH CONTRAST; CTA OF THE NECK; CTA OF THE HEAD WITH CONTRAST WITH PERFUSION 10/16/2022 6:18 pm: TECHNIQUE: CTA of the head/brain was performed with the administration of intravenous contrast. Multiplanar reformatted images are provided for review. MIP images are provided for review. Automated exposure control, iterative reconstruction, and/or weight based adjustment of the mA/kV was utilized to reduce the radiation dose to as low as reasonably achievable.; CTA of the neck was performed with the administration of intravenous contrast. Multiplanar reformatted images are provided for review. MIP images are provided for review. Stenosis of the internal carotid arteries measured using NASCET criteria. Automated exposure control, iterative reconstruction, and/or weight based adjustment of the mA/kV was utilized to reduce the radiation dose to as low as reasonably achievable. COMPARISON: Noncontrast CT head done same day. HISTORY: ORDERING SYSTEM PROVIDED HISTORY: left sided weakness TECHNOLOGIST PROVIDED HISTORY: Reason for exam:->left sided weakness Has a \"code stroke\" or \"stroke alert\" been called? ->No FINDINGS: CTA NECK: AORTIC ARCH/ARCH VESSELS: No dissection or arterial injury. No significant stenosis of the brachiocephalic or subclavian arteries. Atherosclerosis in the visualized thoracic aorta and proximal right subclavian artery.  CAROTID ARTERIES: No dissection, arterial injury, or hemodynamically significant stenosis by NASCET criteria. Bilateral carotid bulb atherosclerotic plaque. VERTEBRAL ARTERIES: No dissection, arterial injury, or significant stenosis. SOFT TISSUES: The lung apices are clear. No cervical or superior mediastinal lymphadenopathy. The larynx and pharynx are unremarkable. No acute abnormality of the salivary and thyroid glands. BONES: No acute osseous abnormality. Multilevel degenerative changes in the visualized spine. CTA HEAD: Image quality is degraded by patient motion artifact. ANTERIOR CIRCULATION: No obvious high-grade stenosis or occlusion of the intracranial internal carotid, anterior cerebral, or middle cerebral arteries. No aneurysm. Mild atherosclerosis in the bilateral intracranial internal carotid arteries without hemodynamically significant stenosis. POSTERIOR CIRCULATION: No obvious high-grade stenosis or occlusion of the vertebral, basilar, or posterior cerebral arteries. No aneurysm. OTHER: No dural venous sinus thrombosis on this non-dedicated study. BRAIN: Generalized cerebral and cerebellar volume loss. Chronic small vessel ischemic disease. Bilateral periventricular lacunar type infarcts. Right thalamic lacunar type infarct may be acute or subacute. CT PERFUSION: EXAM QUALITY: The examination is diagnostic with appropriate arterial inflow and venous outflow curves, and diagnostic perfusion maps. CORE INFARCT: The total area of ischemic core is 0 mL (CBF<30% volume). TOTAL HYPOPERFUSION: The total area of hypoperfusion is 3 mL (Tmax>6s volume). PENUMBRA: The penumbra (mismatch) volume is 3 mL and is located in the left posterior temporal/occipital region. The mismatch ratio is n/a.     1. Perfusion images demonstrate a 3 mL area of penumbra in the left posterior temporal/occipital region which could relate to acute ischemia or artifact.  2. No obvious high-grade stenosis or occlusion of the proximal large intracranial arteries given patient motion artifact. If high clinical concern persists then short-term follow-up CTA or MRA head may be helpful for further evaluation as warranted. 3. No hemodynamically significant stenosis in the bilateral cervical internal carotid arteries per NASCET criteria. Bilateral carotid bulb atherosclerotic plaque. 4. Patent bilateral vertebral arteries. 5. Right thalamic lacunar type infarct may be acute or subacute. Follow-up brain MRI may be helpful for further evaluation. The findings were sent to the Radiology Results [id number] at 7:01 pm on 10/16/2022 to be communicated to a licensed caregiver. CT BRAIN PERFUSION    Addendum Date: 10/16/2022    ADDENDUM: CT angiogram head/neck and CT perfusion findings were verbally communicated by Dr. Núñez Sessions to Dr. Quincy Arreola on 10/16/2022 at 6:45 p.m. Result Date: 10/16/2022  EXAMINATION: CTA OF THE HEAD WITH CONTRAST; CTA OF THE NECK; CTA OF THE HEAD WITH CONTRAST WITH PERFUSION 10/16/2022 6:18 pm: TECHNIQUE: CTA of the head/brain was performed with the administration of intravenous contrast. Multiplanar reformatted images are provided for review. MIP images are provided for review. Automated exposure control, iterative reconstruction, and/or weight based adjustment of the mA/kV was utilized to reduce the radiation dose to as low as reasonably achievable.; CTA of the neck was performed with the administration of intravenous contrast. Multiplanar reformatted images are provided for review. MIP images are provided for review. Stenosis of the internal carotid arteries measured using NASCET criteria. Automated exposure control, iterative reconstruction, and/or weight based adjustment of the mA/kV was utilized to reduce the radiation dose to as low as reasonably achievable. COMPARISON: Noncontrast CT head done same day.  HISTORY: ORDERING SYSTEM PROVIDED HISTORY: left sided weakness TECHNOLOGIST PROVIDED HISTORY: Reason for exam:->left sided weakness Has a \"code stroke\" or \"stroke alert\" been called? ->No FINDINGS: CTA NECK: AORTIC ARCH/ARCH VESSELS: No dissection or arterial injury. No significant stenosis of the brachiocephalic or subclavian arteries. Atherosclerosis in the visualized thoracic aorta and proximal right subclavian artery. CAROTID ARTERIES: No dissection, arterial injury, or hemodynamically significant stenosis by NASCET criteria. Bilateral carotid bulb atherosclerotic plaque. VERTEBRAL ARTERIES: No dissection, arterial injury, or significant stenosis. SOFT TISSUES: The lung apices are clear. No cervical or superior mediastinal lymphadenopathy. The larynx and pharynx are unremarkable. No acute abnormality of the salivary and thyroid glands. BONES: No acute osseous abnormality. Multilevel degenerative changes in the visualized spine. CTA HEAD: Image quality is degraded by patient motion artifact. ANTERIOR CIRCULATION: No obvious high-grade stenosis or occlusion of the intracranial internal carotid, anterior cerebral, or middle cerebral arteries. No aneurysm. Mild atherosclerosis in the bilateral intracranial internal carotid arteries without hemodynamically significant stenosis. POSTERIOR CIRCULATION: No obvious high-grade stenosis or occlusion of the vertebral, basilar, or posterior cerebral arteries. No aneurysm. OTHER: No dural venous sinus thrombosis on this non-dedicated study. BRAIN: Generalized cerebral and cerebellar volume loss. Chronic small vessel ischemic disease. Bilateral periventricular lacunar type infarcts. Right thalamic lacunar type infarct may be acute or subacute. CT PERFUSION: EXAM QUALITY: The examination is diagnostic with appropriate arterial inflow and venous outflow curves, and diagnostic perfusion maps. CORE INFARCT: The total area of ischemic core is 0 mL (CBF<30% volume). TOTAL HYPOPERFUSION: The total area of hypoperfusion is 3 mL (Tmax>6s volume).  PENUMBRA: The penumbra (mismatch) volume is 3 mL and is located in the left posterior temporal/occipital region. The mismatch ratio is n/a.     1. Perfusion images demonstrate a 3 mL area of penumbra in the left posterior temporal/occipital region which could relate to acute ischemia or artifact. 2. No obvious high-grade stenosis or occlusion of the proximal large intracranial arteries given patient motion artifact. If high clinical concern persists then short-term follow-up CTA or MRA head may be helpful for further evaluation as warranted. 3. No hemodynamically significant stenosis in the bilateral cervical internal carotid arteries per NASCET criteria. Bilateral carotid bulb atherosclerotic plaque. 4. Patent bilateral vertebral arteries. 5. Right thalamic lacunar type infarct may be acute or subacute. Follow-up brain MRI may be helpful for further evaluation. The findings were sent to the Radiology Results [id number] at 7:01 pm on 10/16/2022 to be communicated to a licensed caregiver. CTA HEAD W CONTRAST    Addendum Date: 10/16/2022    ADDENDUM: CT angiogram head/neck and CT perfusion findings were verbally communicated by Dr. Niya Riley to Dr. Wang Cisneros on 10/16/2022 at 6:45 p.m. Result Date: 10/16/2022  EXAMINATION: CTA OF THE HEAD WITH CONTRAST; CTA OF THE NECK; CTA OF THE HEAD WITH CONTRAST WITH PERFUSION 10/16/2022 6:18 pm: TECHNIQUE: CTA of the head/brain was performed with the administration of intravenous contrast. Multiplanar reformatted images are provided for review. MIP images are provided for review. Automated exposure control, iterative reconstruction, and/or weight based adjustment of the mA/kV was utilized to reduce the radiation dose to as low as reasonably achievable.; CTA of the neck was performed with the administration of intravenous contrast. Multiplanar reformatted images are provided for review. MIP images are provided for review. Stenosis of the internal carotid arteries measured using NASCET criteria. Automated exposure control, iterative reconstruction, and/or weight based adjustment of the mA/kV was utilized to reduce the radiation dose to as low as reasonably achievable. COMPARISON: Noncontrast CT head done same day. HISTORY: ORDERING SYSTEM PROVIDED HISTORY: left sided weakness TECHNOLOGIST PROVIDED HISTORY: Reason for exam:->left sided weakness Has a \"code stroke\" or \"stroke alert\" been called? ->No FINDINGS: CTA NECK: AORTIC ARCH/ARCH VESSELS: No dissection or arterial injury. No significant stenosis of the brachiocephalic or subclavian arteries. Atherosclerosis in the visualized thoracic aorta and proximal right subclavian artery. CAROTID ARTERIES: No dissection, arterial injury, or hemodynamically significant stenosis by NASCET criteria. Bilateral carotid bulb atherosclerotic plaque. VERTEBRAL ARTERIES: No dissection, arterial injury, or significant stenosis. SOFT TISSUES: The lung apices are clear. No cervical or superior mediastinal lymphadenopathy. The larynx and pharynx are unremarkable. No acute abnormality of the salivary and thyroid glands. BONES: No acute osseous abnormality. Multilevel degenerative changes in the visualized spine. CTA HEAD: Image quality is degraded by patient motion artifact. ANTERIOR CIRCULATION: No obvious high-grade stenosis or occlusion of the intracranial internal carotid, anterior cerebral, or middle cerebral arteries. No aneurysm. Mild atherosclerosis in the bilateral intracranial internal carotid arteries without hemodynamically significant stenosis. POSTERIOR CIRCULATION: No obvious high-grade stenosis or occlusion of the vertebral, basilar, or posterior cerebral arteries. No aneurysm. OTHER: No dural venous sinus thrombosis on this non-dedicated study. BRAIN: Generalized cerebral and cerebellar volume loss. Chronic small vessel ischemic disease. Bilateral periventricular lacunar type infarcts. Right thalamic lacunar type infarct may be acute or subacute.  CT PERFUSION: EXAM QUALITY: The examination is diagnostic with appropriate arterial inflow and venous outflow curves, and diagnostic perfusion maps. CORE INFARCT: The total area of ischemic core is 0 mL (CBF<30% volume). TOTAL HYPOPERFUSION: The total area of hypoperfusion is 3 mL (Tmax>6s volume). PENUMBRA: The penumbra (mismatch) volume is 3 mL and is located in the left posterior temporal/occipital region. The mismatch ratio is n/a.     1. Perfusion images demonstrate a 3 mL area of penumbra in the left posterior temporal/occipital region which could relate to acute ischemia or artifact. 2. No obvious high-grade stenosis or occlusion of the proximal large intracranial arteries given patient motion artifact. If high clinical concern persists then short-term follow-up CTA or MRA head may be helpful for further evaluation as warranted. 3. No hemodynamically significant stenosis in the bilateral cervical internal carotid arteries per NASCET criteria. Bilateral carotid bulb atherosclerotic plaque. 4. Patent bilateral vertebral arteries. 5. Right thalamic lacunar type infarct may be acute or subacute. Follow-up brain MRI may be helpful for further evaluation. The findings were sent to the Radiology Results [id number] at 7:01 pm on 10/16/2022 to be communicated to a licensed caregiver.      MRI BRAIN W WO CONTRAST    Result Date: 10/17/2022  EXAMINATION: MRI OF THE BRAIN WITHOUT AND WITH CONTRAST  10/17/2022 5:22 pm TECHNIQUE: Multiplanar multisequence MRI of the head/brain was performed without and with the administration of intravenous contrast. COMPARISON: CT head without contrast, CTA of the head and CT perfusion, 10/16/2022 HISTORY: ORDERING SYSTEM PROVIDED HISTORY: cva vs tia with AMS, history of gyn malignancy, rule out mass/mets TECHNOLOGIST PROVIDED HISTORY: Reason for exam:->cva vs tia with AMS, history of gyn malignancy, rule out mass/mets What is the sedation requirement?->None FINDINGS: INTRACRANIAL STRUCTURES/VENTRICLES:  1.5 x 0.9 cm focus of acute or early subacute infarction is seen in the right thalamus. No mass, mass effect, hemorrhage or midline shift is seen in the brain. No abnormal enhancement. Moderate cerebral volume loss and moderate chronic microvascular ischemic changes are noted. Ventricular dilatation appears somewhat out of proportion to the extent of cerebral atrophy. Clinical correlation for normal pressure hydrocephalus is recommended. ORBITS: The visualized portion of the orbits demonstrate no acute abnormality. SINUSES: The visualized paranasal sinuses and mastoid air cells demonstrate no acute abnormality. BONES/SOFT TISSUES: The bone marrow signal intensity appears normal. The soft tissues demonstrate no acute abnormality. 1. 1.5 x 0.9 cm focus of acute or early subacute infarction in the right thalamus. 2. No mass, mass effect, hemorrhage or midline shift is seen in the brain. 3. Ventricular dilation concerning for normal pressure hydrocephalus. Clinical correlation is needed. RECOMMENDATIONS: Unavailable     US CAROTID ARTERY BILATERAL    Result Date: 10/19/2022  EXAMINATION: ULTRASOUND EVALUATION OF THE CAROTID ARTERIES 10/19/2022 TECHNIQUE: Duplex ultrasound using B-mode/gray scaled imaging, Doppler spectral analysis and color flow Doppler was obtained of the carotid arteries. COMPARISON: None. HISTORY: ORDERING SYSTEM PROVIDED HISTORY: Stroke TECHNOLOGIST PROVIDED HISTORY: Reason for exam:->Stroke What reading provider will be dictating this exam?->CRC FINDINGS: RIGHT: The right common carotid artery demonstrates peak systolic velocities of  751 and 58.1 cm/sec in the proximal and distal segments respectively. The right internal carotid artery demonstrates the systolic velocities of 74.1, 65.3 and 61.8  cm/sec in the proximal, mid and distal segments respectively. The external carotid artery is patent. The vertebral artery demonstrates normal antegrade flow. Mild to moderate calcified carotid bulb atherosclerotic plaque. ICA/CCA ratio of  1.1 LEFT: The left common carotid artery demonstrates peak systolic velocities of 35.5 and 61.3 cm/sec in the proximal and distal segments respectively. The left internal carotid artery demonstrates the systolic velocities of 92.5, 104.2 and 118.6 cm/sec in the proximal, mid and distal segments respectively. The external carotid artery is patent. The vertebral artery demonstrates normal antegrade flow. Mild to moderate calcified carotid bulb atherosclerotic plaque. ICA/CCA ratio of 1.9     The right internal carotid artery demonstrates 0-50% stenosis. The left internal carotid artery demonstrates 0-50% stenosis. Bilateral vertebral arteries are patent with flow in the normal direction. Mild-to-moderate bilateral calcified carotid bulb atherosclerotic plaque. HOSPITAL COURSE:   Lena Galindo spent an extended period of time hospitalized and this will serve as a brief synopsis. She was admitted October 16 secondary to altered mental status from home and rather poor conditions. Reportedly the home conditions were deplorable as well and she was infested with bedbugs upon admission and was very poorly hygienic covered in stool, feces and appearing as though she had not bathed or performed oral hygiene an extended period of time. Adult Protective Services were involved early in the hospitalization and will be following up with the patient's  who also was confused with dementia reportedly according to ER documentation. They have a history with the patient and  and had an open and close case earlier in the year according to social work documentation. She was identified as having multiple issues at play with resolved metabolic encephalopathy. UTI was identified and the patient was treated with 5 days with IV Rocephin with good result. Blood cultures returned negative.   Based on her degree of confusion and at that point unknown baseline further neuroimaging was undertaken as well the patient was found to have what appeared to be acute to subacute stroke of the right thalamic lacunar region. She was placed on statin, aspirin and Plavix and neurology provided consultation based upon this further recommendations as well as concern for normal pressure hydrocephalus suggested on MRI. Per neurology, NPH was felt to be unlikely and agreed with other management. She will likely require transition to monotherapy with Plavix 30 days following the institution of dual antiplatelet therapy. A multidisciplinary approach was taken to her care with PT, OT, social work, dietary involved in the patient's care as well as we attempted to optimize her functional and nutritional status. Wound care followed as well and the recommendations were noted regarding offloading and topical treatment to the area of the sacral wound. She returned to what appears to be her baseline. Her chronic morbidities, lab values and vital signs were monitored and addressed accordingly throughout the hospitalization. She would certainly benefit from skilled nursing facility placement for further treatment in the setting of acute to subacute stroke with consideration for transition to assisted living versus long-term care depending upon course and response to treatment. Patient is medically stable and acceptable for discharge today. BRIEF PHYSICAL EXAMINATION AND LABORATORIES ON DAY OF DISCHARGE:  VITALS:  BP (!) 172/79   Pulse 86   Temp 98.5 °F (36.9 °C) (Oral)   Resp 15   Ht 5' 3\" (1.6 m)   Wt 111 lb 3.2 oz (50.4 kg)   LMP  (LMP Unknown)   SpO2 98%   BMI 19.70 kg/m²     HEENT:  PERRLA. EOMI. Sclera clear. Buccal mucosa moist.    Neck:  Supple. Trachea midline. No thyromegaly. No JVD. No bruits. Heart:  Rhythm regular, rate controlled. S1 and S2. Systolic murmur.     Lungs:  Symmetrical.  Mildly diminished air exchange throughout, otherwise lungs are clear to auscultation bilaterally. No wheezes. No rhonchi. No rales. Abdomen: Soft. Non-tender. Non-distended. Bowel sounds positive. No organomegaly or masses. No pain on palpation    Extremities:  Peripheral pulses present. No significant pitting peripheral edema. No ulcers. Neurologic:  Alert x 1, person, disoriented to place and time. Mildly confused although her mentation, conversation and following of commands have dramatically improved from admission. Overall generally weak with no focal deficit. Skin: Sacral wound is not directly visualized. Some age related skin turgor changes, otherwise no petechia. No hemorrhage. No no additional wounds. DISPOSITION:  The patient's condition is stable. At this time the patient is without objective evidence of an acute process requiring continuing hospitalization or inpatient management. They are stable for discharge with outpatient follow-up. I have spoken with the patient and discussed the results of the current hospitalization, in addition to providing specific details for the plan of care and counseling regarding the diagnosis and prognosis. The plan has been discussed in detail and they are aware of the specific conditions for emergent return, as well as the importance of follow-up. Their questions are answered at this time and they are agreeable with the plan for discharge to nursing home    DISCHARGE MEDICATIONS:   Current Discharge Medication List             Details   aspirin 81 MG chewable tablet Take 1 tablet by mouth daily  Qty: 30 tablet, Refills: 3      menthol-zinc oxide (CALMOSEPTINE) 0.44-20.6 % OINT ointment Apply topically 2 times daily as needed (irritation) Max 30 ml per day. Apply to buttocks sacral area  .   Refills: 4      polyethylene glycol (GLYCOLAX) 17 g packet Take 17 g by mouth daily as needed for Constipation  Qty: 527 g, Refills: 1      clopidogrel (PLAVIX) 75 MG tablet Take 1 tablet by mouth daily  Qty: 30 tablet, Refills: 3      memantine (NAMENDA) 5 MG tablet Take 1 tablet by mouth 2 times daily  Qty: 60 tablet, Refills: 3      donepezil (ARICEPT) 10 MG tablet Take 1 tablet by mouth nightly  Qty: 30 tablet, Refills: 3                Details   atorvastatin (LIPITOR) 20 MG tablet TAKE 1 TABLET BY MOUTH EVERY DAY  Qty: 90 tablet, Refills: 1    Associated Diagnoses: Dyslipidemia      metFORMIN (GLUCOPHAGE) 1000 MG tablet TAKE 1 TABLET BY MOUTH TWICE A DAY WITH MEALS  Qty: 180 tablet, Refills: 1    Associated Diagnoses: Type 2 diabetes mellitus without complication, without long-term current use of insulin ([facility])      diclofenac sodium (VOLTAREN) 1 % GEL Apply 4 g topically 4 times daily  Qty: 100 g, Refills: 3      insulin lispro (HUMALOG) 100 UNIT/ML injection vial Inject 0-6 Units into the skin 3 times daily (with meals)  Qty: 16 mL, Refills: 1    Associated Diagnoses: Type 2 diabetes mellitus without complication, without long-term current use of insulin ([facility])             FOLLOW UP/INSTRUCTIONS:  This patient is instructed to follow-up with her primary care physician. Patient is instructed to follow-up with the consults listed above as directed by them. she is instructed to resume home medications and take new medications as indicated in the list above. If the patient has a recurrence of symptoms, she is instructed to go to the ED. Preparing for this patient's discharge, including paperwork, orders, instructions, and meeting with patient did require > 40 minutes.     ELIO Hicks CNP     10/21/2022  8:50 AM

## 2022-10-21 NOTE — CARE COORDINATION
10/21/2022 1039 CM note: Pt's son Hunter Comment informed of d/c to [facility] today,transportation arrangements and agreeable to plan. Hunter Comment will inform his father of arrangements.  Clarissa SABILLON

## 2022-10-21 NOTE — PLAN OF CARE
Problem: Skin/Tissue Integrity  Goal: Absence of new skin breakdown  Description: 1. Monitor for areas of redness and/or skin breakdown  2. Assess vascular access sites hourly  3. Every 4-6 hours minimum:  Change oxygen saturation probe site  4. Every 4-6 hours:  If on nasal continuous positive airway pressure, respiratory therapy assess nares and determine need for appliance change or resting period. Outcome: Completed     Problem: ABCDS Injury Assessment  Goal: Absence of physical injury  Outcome: Completed     Problem: Confusion  Goal: Confusion, delirium, dementia, or psychosis is improved or at baseline  Description: INTERVENTIONS:  1. Assess for possible contributors to thought disturbance, including medications, impaired vision or hearing, underlying metabolic abnormalities, dehydration, psychiatric diagnoses, and notify attending LIP  2. [place] high risk fall precautions, as indicated  3. Provide frequent short contacts to provide reality reorientation, refocusing and direction  4. Decrease environmental stimuli, including noise as appropriate  5. Monitor and intervene to maintain adequate nutrition, hydration, elimination, sleep and activity  6. If unable to ensure safety without constant attention obtain sitter and review sitter guidelines with assigned personnel  7.  Initiate Psychosocial CNS and Spiritual Care consult, as indicated  Outcome: Completed     Problem: Safety - Adult  Goal: Free from fall injury  Outcome: Completed     Problem: Pain  Goal: Verbalizes/displays adequate comfort level or baseline comfort level  Outcome: Completed

## 2022-10-22 LAB
BLOOD CULTURE, ROUTINE: NORMAL
CULTURE, BLOOD 2: NORMAL

## 2022-11-13 ENCOUNTER — HOSPITAL ENCOUNTER (INPATIENT)
Age: 72
LOS: 5 days | Discharge: HOSPICE | DRG: 871 | End: 2022-11-18
Attending: EMERGENCY MEDICINE | Admitting: INTERNAL MEDICINE
Payer: MEDICARE

## 2022-11-13 ENCOUNTER — APPOINTMENT (OUTPATIENT)
Dept: GENERAL RADIOLOGY | Age: 72
DRG: 871 | End: 2022-11-13
Payer: MEDICARE

## 2022-11-13 ENCOUNTER — APPOINTMENT (OUTPATIENT)
Dept: CT IMAGING | Age: 72
DRG: 871 | End: 2022-11-13
Payer: MEDICARE

## 2022-11-13 DIAGNOSIS — U07.1 COVID-19 VIRUS INFECTION: ICD-10-CM

## 2022-11-13 DIAGNOSIS — A41.9 SEPSIS WITHOUT ACUTE ORGAN DYSFUNCTION, DUE TO UNSPECIFIED ORGANISM (HCC): Primary | ICD-10-CM

## 2022-11-13 DIAGNOSIS — R40.0 SOMNOLENCE: ICD-10-CM

## 2022-11-13 LAB
ANION GAP SERPL CALCULATED.3IONS-SCNC: 14 MMOL/L (ref 7–16)
BACTERIA: ABNORMAL /HPF
BASOPHILS ABSOLUTE: 0.01 E9/L (ref 0–0.2)
BASOPHILS RELATIVE PERCENT: 0.1 % (ref 0–2)
BILIRUBIN URINE: NEGATIVE
BLOOD, URINE: ABNORMAL
BUN BLDV-MCNC: 50 MG/DL (ref 6–23)
CALCIUM SERPL-MCNC: 9.7 MG/DL (ref 8.6–10.2)
CHLORIDE BLD-SCNC: 105 MMOL/L (ref 98–107)
CLARITY: ABNORMAL
CO2: 22 MMOL/L (ref 22–29)
COLOR: YELLOW
CREAT SERPL-MCNC: 1.1 MG/DL (ref 0.5–1)
EOSINOPHILS ABSOLUTE: 0 E9/L (ref 0.05–0.5)
EOSINOPHILS RELATIVE PERCENT: 0 % (ref 0–6)
EPITHELIAL CELLS, UA: ABNORMAL /HPF
GFR SERPL CREATININE-BSD FRML MDRD: 53 ML/MIN/1.73
GLUCOSE BLD-MCNC: 193 MG/DL (ref 74–99)
GLUCOSE URINE: NEGATIVE MG/DL
HCT VFR BLD CALC: 42.1 % (ref 34–48)
HEMOGLOBIN: 13 G/DL (ref 11.5–15.5)
IMMATURE GRANULOCYTES #: 0.05 E9/L
IMMATURE GRANULOCYTES %: 0.6 % (ref 0–5)
INFLUENZA A BY PCR: NOT DETECTED
INFLUENZA B BY PCR: NOT DETECTED
KETONES, URINE: ABNORMAL MG/DL
LACTIC ACID, SEPSIS: 2 MMOL/L (ref 0.5–1.9)
LEUKOCYTE ESTERASE, URINE: ABNORMAL
LYMPHOCYTES ABSOLUTE: 1.01 E9/L (ref 1.5–4)
LYMPHOCYTES RELATIVE PERCENT: 11.3 % (ref 20–42)
MCH RBC QN AUTO: 27.6 PG (ref 26–35)
MCHC RBC AUTO-ENTMCNC: 30.9 % (ref 32–34.5)
MCV RBC AUTO: 89.4 FL (ref 80–99.9)
MONOCYTES ABSOLUTE: 0.81 E9/L (ref 0.1–0.95)
MONOCYTES RELATIVE PERCENT: 9.1 % (ref 2–12)
NEUTROPHILS ABSOLUTE: 7.07 E9/L (ref 1.8–7.3)
NEUTROPHILS RELATIVE PERCENT: 78.9 % (ref 43–80)
NITRITE, URINE: POSITIVE
PDW BLD-RTO: 13.9 FL (ref 11.5–15)
PH UA: 5.5 (ref 5–9)
PLATELET # BLD: 314 E9/L (ref 130–450)
PMV BLD AUTO: 10 FL (ref 7–12)
POTASSIUM SERPL-SCNC: 4.8 MMOL/L (ref 3.5–5)
PROTEIN UA: 30 MG/DL
RBC # BLD: 4.71 E12/L (ref 3.5–5.5)
RBC UA: ABNORMAL /HPF (ref 0–2)
SARS-COV-2, NAAT: DETECTED
SODIUM BLD-SCNC: 141 MMOL/L (ref 132–146)
SPECIFIC GRAVITY UA: >=1.03 (ref 1–1.03)
TROPONIN, HIGH SENSITIVITY: 66 NG/L (ref 0–9)
UROBILINOGEN, URINE: 0.2 E.U./DL
WBC # BLD: 9 E9/L (ref 4.5–11.5)
WBC UA: >20 /HPF (ref 0–5)

## 2022-11-13 PROCEDURE — 2580000003 HC RX 258: Performed by: EMERGENCY MEDICINE

## 2022-11-13 PROCEDURE — 99285 EMERGENCY DEPT VISIT HI MDM: CPT

## 2022-11-13 PROCEDURE — 87186 SC STD MICRODIL/AGAR DIL: CPT

## 2022-11-13 PROCEDURE — 83605 ASSAY OF LACTIC ACID: CPT

## 2022-11-13 PROCEDURE — 87088 URINE BACTERIA CULTURE: CPT

## 2022-11-13 PROCEDURE — 6370000000 HC RX 637 (ALT 250 FOR IP): Performed by: EMERGENCY MEDICINE

## 2022-11-13 PROCEDURE — 51702 INSERT TEMP BLADDER CATH: CPT

## 2022-11-13 PROCEDURE — 36415 COLL VENOUS BLD VENIPUNCTURE: CPT

## 2022-11-13 PROCEDURE — 87040 BLOOD CULTURE FOR BACTERIA: CPT

## 2022-11-13 PROCEDURE — 82306 VITAMIN D 25 HYDROXY: CPT

## 2022-11-13 PROCEDURE — 1200000000 HC SEMI PRIVATE

## 2022-11-13 PROCEDURE — G0378 HOSPITAL OBSERVATION PER HR: HCPCS

## 2022-11-13 PROCEDURE — 80048 BASIC METABOLIC PNL TOTAL CA: CPT

## 2022-11-13 PROCEDURE — 87077 CULTURE AEROBIC IDENTIFY: CPT

## 2022-11-13 PROCEDURE — 87150 DNA/RNA AMPLIFIED PROBE: CPT

## 2022-11-13 PROCEDURE — 84484 ASSAY OF TROPONIN QUANT: CPT

## 2022-11-13 PROCEDURE — 6360000002 HC RX W HCPCS: Performed by: INTERNAL MEDICINE

## 2022-11-13 PROCEDURE — 87635 SARS-COV-2 COVID-19 AMP PRB: CPT

## 2022-11-13 PROCEDURE — 87502 INFLUENZA DNA AMP PROBE: CPT

## 2022-11-13 PROCEDURE — 70450 CT HEAD/BRAIN W/O DYE: CPT

## 2022-11-13 PROCEDURE — 87070 CULTURE OTHR SPECIMN AEROBIC: CPT

## 2022-11-13 PROCEDURE — 85025 COMPLETE CBC W/AUTO DIFF WBC: CPT

## 2022-11-13 PROCEDURE — 81001 URINALYSIS AUTO W/SCOPE: CPT

## 2022-11-13 PROCEDURE — 71045 X-RAY EXAM CHEST 1 VIEW: CPT

## 2022-11-13 PROCEDURE — 96360 HYDRATION IV INFUSION INIT: CPT

## 2022-11-13 PROCEDURE — 96361 HYDRATE IV INFUSION ADD-ON: CPT

## 2022-11-13 PROCEDURE — 93005 ELECTROCARDIOGRAM TRACING: CPT | Performed by: EMERGENCY MEDICINE

## 2022-11-13 PROCEDURE — 87206 SMEAR FLUORESCENT/ACID STAI: CPT

## 2022-11-13 RX ORDER — SODIUM CHLORIDE 9 MG/ML
INJECTION, SOLUTION INTRAVENOUS PRN
Status: DISCONTINUED | OUTPATIENT
Start: 2022-11-13 | End: 2022-11-18 | Stop reason: HOSPADM

## 2022-11-13 RX ORDER — 0.9 % SODIUM CHLORIDE 0.9 %
1000 INTRAVENOUS SOLUTION INTRAVENOUS ONCE
Status: COMPLETED | OUTPATIENT
Start: 2022-11-13 | End: 2022-11-13

## 2022-11-13 RX ORDER — ACETAMINOPHEN 325 MG/1
650 TABLET ORAL EVERY 6 HOURS PRN
Status: DISCONTINUED | OUTPATIENT
Start: 2022-11-13 | End: 2022-11-18 | Stop reason: HOSPADM

## 2022-11-13 RX ORDER — DEXTROSE MONOHYDRATE 100 MG/ML
INJECTION, SOLUTION INTRAVENOUS CONTINUOUS PRN
Status: DISCONTINUED | OUTPATIENT
Start: 2022-11-13 | End: 2022-11-18 | Stop reason: HOSPADM

## 2022-11-13 RX ORDER — ASPIRIN 81 MG/1
81 TABLET, CHEWABLE ORAL DAILY
Status: DISCONTINUED | OUTPATIENT
Start: 2022-11-14 | End: 2022-11-18 | Stop reason: HOSPADM

## 2022-11-13 RX ORDER — SODIUM CHLORIDE 0.9 % (FLUSH) 0.9 %
5-40 SYRINGE (ML) INJECTION EVERY 12 HOURS SCHEDULED
Status: DISCONTINUED | OUTPATIENT
Start: 2022-11-13 | End: 2022-11-18 | Stop reason: HOSPADM

## 2022-11-13 RX ORDER — SODIUM CHLORIDE 0.9 % (FLUSH) 0.9 %
5-40 SYRINGE (ML) INJECTION PRN
Status: DISCONTINUED | OUTPATIENT
Start: 2022-11-13 | End: 2022-11-18 | Stop reason: HOSPADM

## 2022-11-13 RX ORDER — ACETAMINOPHEN 650 MG/1
650 SUPPOSITORY RECTAL ONCE
Status: COMPLETED | OUTPATIENT
Start: 2022-11-13 | End: 2022-11-13

## 2022-11-13 RX ORDER — DONEPEZIL HYDROCHLORIDE 5 MG/1
10 TABLET, FILM COATED ORAL NIGHTLY
Status: DISCONTINUED | OUTPATIENT
Start: 2022-11-13 | End: 2022-11-18 | Stop reason: HOSPADM

## 2022-11-13 RX ORDER — ENOXAPARIN SODIUM 100 MG/ML
30 INJECTION SUBCUTANEOUS 2 TIMES DAILY
Status: DISCONTINUED | OUTPATIENT
Start: 2022-11-13 | End: 2022-11-18 | Stop reason: HOSPADM

## 2022-11-13 RX ORDER — DEXAMETHASONE SODIUM PHOSPHATE 10 MG/ML
6 INJECTION INTRAMUSCULAR; INTRAVENOUS ONCE
Status: COMPLETED | OUTPATIENT
Start: 2022-11-13 | End: 2022-11-13

## 2022-11-13 RX ORDER — CLOPIDOGREL BISULFATE 75 MG/1
75 TABLET ORAL DAILY
Status: DISCONTINUED | OUTPATIENT
Start: 2022-11-14 | End: 2022-11-18 | Stop reason: HOSPADM

## 2022-11-13 RX ORDER — POLYETHYLENE GLYCOL 3350 17 G/17G
17 POWDER, FOR SOLUTION ORAL DAILY PRN
Status: DISCONTINUED | OUTPATIENT
Start: 2022-11-13 | End: 2022-11-18 | Stop reason: HOSPADM

## 2022-11-13 RX ORDER — MEMANTINE HYDROCHLORIDE 5 MG/1
5 TABLET ORAL 2 TIMES DAILY
Status: DISCONTINUED | OUTPATIENT
Start: 2022-11-13 | End: 2022-11-18 | Stop reason: HOSPADM

## 2022-11-13 RX ORDER — ACETAMINOPHEN 650 MG/1
650 SUPPOSITORY RECTAL EVERY 6 HOURS PRN
Status: DISCONTINUED | OUTPATIENT
Start: 2022-11-13 | End: 2022-11-18 | Stop reason: HOSPADM

## 2022-11-13 RX ORDER — ATORVASTATIN CALCIUM 20 MG/1
20 TABLET, FILM COATED ORAL NIGHTLY
Status: DISCONTINUED | OUTPATIENT
Start: 2022-11-14 | End: 2022-11-14

## 2022-11-13 RX ADMIN — DEXAMETHASONE SODIUM PHOSPHATE 6 MG: 10 INJECTION INTRAMUSCULAR; INTRAVENOUS at 23:09

## 2022-11-13 RX ADMIN — ACETAMINOPHEN 650 MG: 650 SUPPOSITORY RECTAL at 19:39

## 2022-11-13 RX ADMIN — SODIUM CHLORIDE 1000 ML: 9 INJECTION, SOLUTION INTRAVENOUS at 19:43

## 2022-11-13 ASSESSMENT — LIFESTYLE VARIABLES
HOW OFTEN DO YOU HAVE A DRINK CONTAINING ALCOHOL: NEVER
HOW MANY STANDARD DRINKS CONTAINING ALCOHOL DO YOU HAVE ON A TYPICAL DAY: PATIENT DOES NOT DRINK

## 2022-11-13 ASSESSMENT — PAIN - FUNCTIONAL ASSESSMENT: PAIN_FUNCTIONAL_ASSESSMENT: WONG-BAKER FACES

## 2022-11-13 ASSESSMENT — PAIN SCALES - WONG BAKER: WONGBAKER_NUMERICALRESPONSE: 0

## 2022-11-13 NOTE — LETTER
[place] [employer] Medicaid  CERTIFICATION OF NECESSITY  FOR NON-EMERGENCY TRANSPORTATION   BY GROUND AMBULANCE      Individual Information   1. Name: Siena Walker 2. [place] Medicaid Billing Number:    3. Address: [address] [place] [address] [address]      Transportation Provider Information   4. Provider Name: [facility]   5. [place] Medicaid Provider Number:  National Provider Identifier (NPI):      Certification  7. Criteria:  During transport, this individual requires:  [x] Medical treatment or continuous     supervision by an EMT. [] The administration or regulation of oxygen by another person. [] Supervised protective restraint. 8. Period Beginning Date: 11/18/22   9. Length  [x] Not more than 1 day(s)  [] One Year     Additional Information Relevant to Certification   10. Comments or Explanations, If Necessary or Appropriate     COVID - CVA left sided weakness, alert and oriented x 1, unable to sit for transport. Pt to return home to be enrolled in hospice care. - terminal conditon, weak unable support self. Certifying Practitioner Information   11. Name of Practitioner: Dr Royce Javier. [place] Medicaid Provider Number, If Applicable:  [address] Provider Identifier (NPI):      Signature Information   14. Date of Signature: 11/18/22 15. Name of Person Signing: Donnell MCNALLY    16.  Signature and Professional Designation: Electronically signed by DALI Jeter on 11/18/2022 at 1:02 PM     [facility] [id number]  Rev. 7/2015

## 2022-11-14 ENCOUNTER — APPOINTMENT (OUTPATIENT)
Dept: CT IMAGING | Age: 72
DRG: 871 | End: 2022-11-14
Payer: MEDICARE

## 2022-11-14 PROBLEM — U07.1 COVID-19: Status: ACTIVE | Noted: 2022-11-14

## 2022-11-14 PROBLEM — Z51.5 PALLIATIVE CARE ENCOUNTER: Status: ACTIVE | Noted: 2022-11-14

## 2022-11-14 LAB
ALBUMIN SERPL-MCNC: 3.1 G/DL (ref 3.5–5.2)
ALP BLD-CCNC: 150 U/L (ref 35–104)
ALT SERPL-CCNC: 54 U/L (ref 0–32)
ANION GAP SERPL CALCULATED.3IONS-SCNC: 17 MMOL/L (ref 7–16)
APTT: 29 SEC (ref 24.5–35.1)
AST SERPL-CCNC: 42 U/L (ref 0–31)
BASOPHILS ABSOLUTE: 0 E9/L (ref 0–0.2)
BASOPHILS RELATIVE PERCENT: 0 % (ref 0–2)
BILIRUB SERPL-MCNC: 0.6 MG/DL (ref 0–1.2)
BUN BLDV-MCNC: 48 MG/DL (ref 6–23)
C-REACTIVE PROTEIN: 6.2 MG/DL (ref 0–0.4)
CALCIUM SERPL-MCNC: 9.2 MG/DL (ref 8.6–10.2)
CHLORIDE BLD-SCNC: 111 MMOL/L (ref 98–107)
CHP ED QC CHECK: NORMAL
CHP ED QC CHECK: YES
CO2: 17 MMOL/L (ref 22–29)
CREAT SERPL-MCNC: 0.9 MG/DL (ref 0.5–1)
D DIMER: 2353 NG/ML DDU
EOSINOPHILS ABSOLUTE: 0 E9/L (ref 0.05–0.5)
EOSINOPHILS RELATIVE PERCENT: 0 % (ref 0–6)
FERRITIN: 463 NG/ML
FIBRINOGEN: 542 MG/DL (ref 200–400)
GFR SERPL CREATININE-BSD FRML MDRD: >60 ML/MIN/1.73
GLUCOSE BLD-MCNC: 202 MG/DL
GLUCOSE BLD-MCNC: 234 MG/DL
GLUCOSE BLD-MCNC: 268 MG/DL (ref 74–99)
HCT VFR BLD CALC: 37.6 % (ref 34–48)
HEMOGLOBIN: 11.7 G/DL (ref 11.5–15.5)
IMMATURE GRANULOCYTES #: 0.03 E9/L
IMMATURE GRANULOCYTES %: 0.5 % (ref 0–5)
INR BLD: 1.2
LACTATE DEHYDROGENASE: 236 U/L (ref 135–214)
LACTIC ACID: 1.4 MMOL/L (ref 0.5–2.2)
LYMPHOCYTES ABSOLUTE: 0.58 E9/L (ref 1.5–4)
LYMPHOCYTES RELATIVE PERCENT: 9.5 % (ref 20–42)
MAGNESIUM: 1.9 MG/DL (ref 1.6–2.6)
MCH RBC QN AUTO: 28.2 PG (ref 26–35)
MCHC RBC AUTO-ENTMCNC: 31.1 % (ref 32–34.5)
MCV RBC AUTO: 90.6 FL (ref 80–99.9)
METER GLUCOSE: 182 MG/DL (ref 74–99)
METER GLUCOSE: 202 MG/DL (ref 74–99)
METER GLUCOSE: 234 MG/DL (ref 74–99)
MONOCYTES ABSOLUTE: 0.2 E9/L (ref 0.1–0.95)
MONOCYTES RELATIVE PERCENT: 3.3 % (ref 2–12)
NEUTROPHILS ABSOLUTE: 5.28 E9/L (ref 1.8–7.3)
NEUTROPHILS RELATIVE PERCENT: 86.7 % (ref 43–80)
OVALOCYTES: ABNORMAL
PDW BLD-RTO: 14.1 FL (ref 11.5–15)
PHOSPHORUS: 3.5 MG/DL (ref 2.5–4.5)
PLATELET # BLD: 267 E9/L (ref 130–450)
PMV BLD AUTO: 10.1 FL (ref 7–12)
POIKILOCYTES: ABNORMAL
POTASSIUM SERPL-SCNC: 4.8 MMOL/L (ref 3.5–5)
PRO-BNP: 1037 PG/ML (ref 0–125)
PROCALCITONIN: 0.35 NG/ML (ref 0–0.08)
PROCALCITONIN: 0.41 NG/ML (ref 0–0.08)
PROTHROMBIN TIME: 13.6 SEC (ref 9.3–12.4)
RBC # BLD: 4.15 E12/L (ref 3.5–5.5)
SODIUM BLD-SCNC: 145 MMOL/L (ref 132–146)
TOTAL PROTEIN: 6.6 G/DL (ref 6.4–8.3)
TROPONIN, HIGH SENSITIVITY: 63 NG/L (ref 0–9)
VITAMIN D 25-HYDROXY: 35 NG/ML (ref 30–100)
VITAMIN D 25-HYDROXY: 48 NG/ML (ref 30–100)
WBC # BLD: 6.1 E9/L (ref 4.5–11.5)

## 2022-11-14 PROCEDURE — 2580000003 HC RX 258: Performed by: NURSE PRACTITIONER

## 2022-11-14 PROCEDURE — 85610 PROTHROMBIN TIME: CPT

## 2022-11-14 PROCEDURE — 36415 COLL VENOUS BLD VENIPUNCTURE: CPT

## 2022-11-14 PROCEDURE — 85025 COMPLETE CBC W/AUTO DIFF WBC: CPT

## 2022-11-14 PROCEDURE — 6360000002 HC RX W HCPCS: Performed by: INTERNAL MEDICINE

## 2022-11-14 PROCEDURE — 83735 ASSAY OF MAGNESIUM: CPT

## 2022-11-14 PROCEDURE — 6360000004 HC RX CONTRAST MEDICATION: Performed by: RADIOLOGY

## 2022-11-14 PROCEDURE — 84484 ASSAY OF TROPONIN QUANT: CPT

## 2022-11-14 PROCEDURE — 6360000002 HC RX W HCPCS: Performed by: NURSE PRACTITIONER

## 2022-11-14 PROCEDURE — 83605 ASSAY OF LACTIC ACID: CPT

## 2022-11-14 PROCEDURE — 83880 ASSAY OF NATRIURETIC PEPTIDE: CPT

## 2022-11-14 PROCEDURE — G0378 HOSPITAL OBSERVATION PER HR: HCPCS

## 2022-11-14 PROCEDURE — 2580000003 HC RX 258: Performed by: INTERNAL MEDICINE

## 2022-11-14 PROCEDURE — 94669 MECHANICAL CHEST WALL OSCILL: CPT

## 2022-11-14 PROCEDURE — 82728 ASSAY OF FERRITIN: CPT

## 2022-11-14 PROCEDURE — 84145 PROCALCITONIN (PCT): CPT

## 2022-11-14 PROCEDURE — 2500000003 HC RX 250 WO HCPCS: Performed by: NURSE PRACTITIONER

## 2022-11-14 PROCEDURE — 85378 FIBRIN DEGRADE SEMIQUANT: CPT

## 2022-11-14 PROCEDURE — 84100 ASSAY OF PHOSPHORUS: CPT

## 2022-11-14 PROCEDURE — 85384 FIBRINOGEN ACTIVITY: CPT

## 2022-11-14 PROCEDURE — 99203 OFFICE O/P NEW LOW 30 MIN: CPT | Performed by: NURSE PRACTITIONER

## 2022-11-14 PROCEDURE — 86140 C-REACTIVE PROTEIN: CPT

## 2022-11-14 PROCEDURE — 85730 THROMBOPLASTIN TIME PARTIAL: CPT

## 2022-11-14 PROCEDURE — 71260 CT THORAX DX C+: CPT

## 2022-11-14 PROCEDURE — 82962 GLUCOSE BLOOD TEST: CPT

## 2022-11-14 PROCEDURE — 87449 NOS EACH ORGANISM AG IA: CPT

## 2022-11-14 PROCEDURE — 99223 1ST HOSP IP/OBS HIGH 75: CPT | Performed by: INTERNAL MEDICINE

## 2022-11-14 PROCEDURE — 82306 VITAMIN D 25 HYDROXY: CPT

## 2022-11-14 PROCEDURE — 92610 EVALUATE SWALLOWING FUNCTION: CPT | Performed by: SPEECH-LANGUAGE PATHOLOGIST

## 2022-11-14 PROCEDURE — 80053 COMPREHEN METABOLIC PANEL: CPT

## 2022-11-14 PROCEDURE — 97161 PT EVAL LOW COMPLEX 20 MIN: CPT | Performed by: PHYSICAL THERAPIST

## 2022-11-14 PROCEDURE — 51702 INSERT TEMP BLADDER CATH: CPT

## 2022-11-14 PROCEDURE — 1200000000 HC SEMI PRIVATE

## 2022-11-14 PROCEDURE — 83615 LACTATE (LD) (LDH) ENZYME: CPT

## 2022-11-14 RX ORDER — POTASSIUM CHLORIDE 7.45 MG/ML
10 INJECTION INTRAVENOUS PRN
Status: DISCONTINUED | OUTPATIENT
Start: 2022-11-14 | End: 2022-11-18 | Stop reason: HOSPADM

## 2022-11-14 RX ORDER — INSULIN LISPRO 100 [IU]/ML
0-4 INJECTION, SOLUTION INTRAVENOUS; SUBCUTANEOUS
Status: DISCONTINUED | OUTPATIENT
Start: 2022-11-14 | End: 2022-11-18 | Stop reason: HOSPADM

## 2022-11-14 RX ORDER — ASCORBIC ACID 500 MG
1000 TABLET ORAL DAILY
Status: DISCONTINUED | OUTPATIENT
Start: 2022-11-14 | End: 2022-11-18 | Stop reason: HOSPADM

## 2022-11-14 RX ORDER — ZINC SULFATE 50(220)MG
50 CAPSULE ORAL DAILY
Status: DISCONTINUED | OUTPATIENT
Start: 2022-11-14 | End: 2022-11-18 | Stop reason: HOSPADM

## 2022-11-14 RX ORDER — VITAMIN B COMPLEX
2000 TABLET ORAL DAILY
Status: DISCONTINUED | OUTPATIENT
Start: 2022-11-14 | End: 2022-11-18 | Stop reason: HOSPADM

## 2022-11-14 RX ORDER — INSULIN LISPRO 100 [IU]/ML
0-4 INJECTION, SOLUTION INTRAVENOUS; SUBCUTANEOUS NIGHTLY
Status: DISCONTINUED | OUTPATIENT
Start: 2022-11-14 | End: 2022-11-18 | Stop reason: HOSPADM

## 2022-11-14 RX ORDER — ASPIRIN 81 MG/1
81 TABLET ORAL DAILY
Status: ON HOLD | COMMUNITY
End: 2022-11-18 | Stop reason: HOSPADM

## 2022-11-14 RX ORDER — METOPROLOL SUCCINATE 25 MG/1
25 TABLET, EXTENDED RELEASE ORAL DAILY
Status: DISCONTINUED | OUTPATIENT
Start: 2022-11-14 | End: 2022-11-18 | Stop reason: HOSPADM

## 2022-11-14 RX ORDER — 0.9 % SODIUM CHLORIDE 0.9 %
1000 INTRAVENOUS SOLUTION INTRAVENOUS ONCE
Status: COMPLETED | OUTPATIENT
Start: 2022-11-14 | End: 2022-11-14

## 2022-11-14 RX ORDER — ATORVASTATIN CALCIUM 40 MG/1
80 TABLET, FILM COATED ORAL NIGHTLY
Status: DISCONTINUED | OUTPATIENT
Start: 2022-11-14 | End: 2022-11-18 | Stop reason: HOSPADM

## 2022-11-14 RX ORDER — BENZONATATE 100 MG/1
200 CAPSULE ORAL 3 TIMES DAILY PRN
Status: DISCONTINUED | OUTPATIENT
Start: 2022-11-14 | End: 2022-11-18 | Stop reason: HOSPADM

## 2022-11-14 RX ORDER — ATORVASTATIN CALCIUM 80 MG/1
80 TABLET, FILM COATED ORAL NIGHTLY
Status: ON HOLD | COMMUNITY
End: 2022-11-18 | Stop reason: HOSPADM

## 2022-11-14 RX ORDER — ASCORBIC ACID 500 MG
500 TABLET ORAL 2 TIMES DAILY
Status: ON HOLD | COMMUNITY
Start: 2022-11-03 | End: 2022-11-18 | Stop reason: HOSPADM

## 2022-11-14 RX ORDER — M-VIT,TX,IRON,MINS/CALC/FOLIC 27MG-0.4MG
1 TABLET ORAL DAILY
Status: ON HOLD | COMMUNITY
End: 2022-11-18 | Stop reason: HOSPADM

## 2022-11-14 RX ORDER — ZINC SULFATE 50(220)MG
50 CAPSULE ORAL DAILY
Status: ON HOLD | COMMUNITY
Start: 2022-11-04 | End: 2022-11-18 | Stop reason: HOSPADM

## 2022-11-14 RX ORDER — MAGNESIUM SULFATE 1 G/100ML
1000 INJECTION INTRAVENOUS PRN
Status: DISCONTINUED | OUTPATIENT
Start: 2022-11-14 | End: 2022-11-18 | Stop reason: HOSPADM

## 2022-11-14 RX ORDER — POTASSIUM CHLORIDE 20 MEQ/1
40 TABLET, EXTENDED RELEASE ORAL PRN
Status: DISCONTINUED | OUTPATIENT
Start: 2022-11-14 | End: 2022-11-18 | Stop reason: HOSPADM

## 2022-11-14 RX ADMIN — ENOXAPARIN SODIUM 30 MG: 100 INJECTION SUBCUTANEOUS at 09:55

## 2022-11-14 RX ADMIN — ENOXAPARIN SODIUM 30 MG: 100 INJECTION SUBCUTANEOUS at 00:57

## 2022-11-14 RX ADMIN — Medication: at 09:40

## 2022-11-14 RX ADMIN — CEFTRIAXONE SODIUM 1000 MG: 1 INJECTION, POWDER, FOR SOLUTION INTRAMUSCULAR; INTRAVENOUS at 00:55

## 2022-11-14 RX ADMIN — Medication 10 ML: at 09:45

## 2022-11-14 RX ADMIN — Medication: at 23:00

## 2022-11-14 RX ADMIN — SODIUM CHLORIDE 1000 ML: 9 INJECTION, SOLUTION INTRAVENOUS at 00:58

## 2022-11-14 RX ADMIN — IOPAMIDOL 75 ML: 755 INJECTION, SOLUTION INTRAVENOUS at 11:30

## 2022-11-14 ASSESSMENT — LIFESTYLE VARIABLES: HOW OFTEN DO YOU HAVE A DRINK CONTAINING ALCOHOL: NEVER

## 2022-11-14 NOTE — ED PROVIDER NOTES
Brought into the ED from the nursing home for evaluation. Patient brought in for evaluation of altered mental status which started around 5:00 this evening. Apparently patient has history of dementia and is ANO x1.  While she was eating she became ANO x0. She is no longer answering her name and will not even make eye contact when you say her name which she usually does. She is not able to provide any history therefore HPI is severely limited. Review of Systems   Unable to perform ROS: Mental status change      Physical Exam  Vitals and nursing note reviewed. Constitutional:       General: She is not in acute distress. Appearance: She is well-developed and normal weight. She is not ill-appearing, toxic-appearing or diaphoretic. HENT:      Head: Normocephalic and atraumatic. Eyes:      Conjunctiva/sclera: Conjunctivae normal.   Cardiovascular:      Rate and Rhythm: Regular rhythm. Tachycardia present. Heart sounds: Normal heart sounds. No murmur heard. Pulmonary:      Effort: Pulmonary effort is normal. No respiratory distress. Breath sounds: Normal breath sounds. No wheezing or rales. Abdominal:      General: Bowel sounds are normal. There is no distension. Palpations: Abdomen is soft. Tenderness: There is no abdominal tenderness (No grimace with palpation of the abdomen). There is no guarding or rebound. Musculoskeletal:      Cervical back: Normal range of motion and neck supple. Comments: No swelling or erythema of lower extremities. No wounds visualized. Skin:     General: Skin is warm and dry. Coloration: Skin is not pale. Neurological:      Mental Status: She is alert. Comments: Patient is alert but does not follow any commands. Does not track with her eyes. Procedures     MDM  Patient brought to the ED for evaluation of altered mental status. Patient normally ANO x1 but is now not alert or following commands.   Symptoms started earlier this evening. On exam patient is nonverbal and not following any commands. Labs and imaging were assessed. CBC showed no evidence of leukocytosis or anemia. BMP showed no electrolyte abnormalities but did show mild renal insufficiency. Lactate was elevated 2. Patient is positive for COVID 19. Negative for influenza A&B. Chest x-ray was obtained which showed no acute process. CT of the head also shows no acute intracranial malady. During patient's stay she has had no change in her mentation. She is going to be admitted for further treatment evaluation. EKG Interpretation    Interpreted by emergency department physician    Rhythm: sinus tachycardia  Rate: 105  Axis: normal  Ectopy: none  Conduction: right bundle branch block (incomplete) and possible left atrial enlargement  ST Segments: no acute change  T Waves: no acute change  Q Waves: none    Clinical Impression: No acute change other than rate    Law Abdul DO    ED Course as of 11/13/22 2106   Sun Nov 13, 2022 2026 Patient is COVID-positive. Decadron has been ordered. Patient has no change in her mental status. Appears to be in no distress. [MS]   2055 Patient resting in bed in no distress. No change in her mentation. Discussed results of labs and imaging with her  who is at bedside. [MS]      ED Course User Index  [MS] Law Abdul DO       --------------------------------------------- PAST HISTORY ---------------------------------------------  Past Medical History:  has a past medical history of Arthritis, Cancer ([facility] [address] Diabetes mellitus ([facility] [address] Hyperlipidemia, Hypertension, Radiation cystitis, Urinary incontinence, and Urinary retention. Past Surgical History:  has a past surgical history that includes fracture surgery; Hysterectomy (1985); pr cystourethroscopy inj chemodenervation bladder (N/A, 11/9/2018); and Cystoscopy (N/A, 7/17/2019). Social History:  reports that she quit smoking about 12 years ago.  Her smoking use included cigarettes. She started smoking about 57 years ago. She has a 44.00 pack-year smoking history. She has never used smokeless tobacco. She reports current alcohol use. She reports that she does not use drugs. Family History: family history includes Heart Disease in her mother; No Known Problems in her father; Other in her brother, sister, and another family member. The patients home medications have been reviewed.     Allergies: Tetracyclines & related    -------------------------------------------------- RESULTS -------------------------------------------------    LABS:  Results for orders placed or performed during the hospital encounter of 11/13/22   COVID-19, Rapid    Specimen: Nasopharyngeal Swab   Result Value Ref Range    SARS-CoV-2, NAAT DETECTED (A) Not Detected   RAPID INFLUENZA A/B ANTIGENS    Specimen: Nasopharyngeal   Result Value Ref Range    Influenza A by PCR Not Detected Not Detected    Influenza B by PCR Not Detected Not Detected   BMP   Result Value Ref Range    Sodium 141 132 - 146 mmol/L    Potassium 4.8 3.5 - 5.0 mmol/L    Chloride 105 98 - 107 mmol/L    CO2 22 22 - 29 mmol/L    Anion Gap 14 7 - 16 mmol/L    Glucose 193 (H) 74 - 99 mg/dL    BUN 50 (H) 6 - 23 mg/dL    Creatinine 1.1 (H) 0.5 - 1.0 mg/dL    Est, Glom Filt Rate 53 >=60 mL/min/1.73    Calcium 9.7 8.6 - 10.2 mg/dL   CBC with Auto Differential   Result Value Ref Range    WBC 9.0 4.5 - 11.5 E9/L    RBC 4.71 3.50 - 5.50 E12/L    Hemoglobin 13.0 11.5 - 15.5 g/dL    Hematocrit 42.1 34.0 - 48.0 %    MCV 89.4 80.0 - 99.9 fL    MCH 27.6 26.0 - 35.0 pg    MCHC 30.9 (L) 32.0 - 34.5 %    RDW 13.9 11.5 - 15.0 fL    Platelets 956 323 - 983 E9/L    MPV 10.0 7.0 - 12.0 fL    Neutrophils % 78.9 43.0 - 80.0 %    Immature Granulocytes % 0.6 0.0 - 5.0 %    Lymphocytes % 11.3 (L) 20.0 - 42.0 %    Monocytes % 9.1 2.0 - 12.0 %    Eosinophils % 0.0 0.0 - 6.0 %    Basophils % 0.1 0.0 - 2.0 %    Neutrophils Absolute 7.07 1.80 - 7.30 E9/L    Immature Granulocytes # 0.05 E9/L    Lymphocytes Absolute 1.01 (L) 1.50 - 4.00 E9/L    Monocytes Absolute 0.81 0.10 - 0.95 E9/L    Eosinophils Absolute 0.00 (L) 0.05 - 0.50 E9/L    Basophils Absolute 0.01 0.00 - 0.20 E9/L   Lactate, Sepsis   Result Value Ref Range    Lactic Acid, Sepsis 2.0 (H) 0.5 - 1.9 mmol/L   EKG 12 Lead   Result Value Ref Range    Ventricular Rate 105 BPM    Atrial Rate 105 BPM    P-R Interval 128 ms    QRS Duration 100 ms    Q-T Interval 374 ms    QTc Calculation (Bazett) 494 ms    P Axis 68 degrees    R Axis 130 degrees    T Axis 33 degrees       RADIOLOGY:  XR CHEST PORTABLE   Final Result   No acute process. CT HEAD WO CONTRAST   Final Result   1. No acute intracranial abnormality. 2. Chronic ischemic changes. 3. Suspicious for normal pressure hydrocephalus. ------------------------- NURSING NOTES AND VITALS REVIEWED ---------------------------  Date / Time Roomed:  11/13/2022  6:47 PM  ED Bed Assignment:  10/10    The nursing notes within the ED encounter and vital signs as below have been reviewed. Patient Vitals for the past 24 hrs:   BP Temp Temp src Pulse Resp SpO2 Height Weight   11/13/22 1834 (!) 152/75 (!) 100.6 °F (38.1 °C) Axillary (!) 109 18 97 % 5' 3\" (1.6 m) 115 lb (52.2 kg)       Oxygen Saturation Interpretation: Normal    ------------------------------------------ PROGRESS NOTES ------------------------------------------  Re-evaluation(s):  Refer to ED course above. Counseling:  I have spoken with the patient and discussed todays results, in addition to providing specific details for the plan of care and counseling regarding the diagnosis and prognosis. Their questions are answered at this time and they are agreeable with the plan of admission.    --------------------------------- ADDITIONAL PROVIDER NOTES ---------------------------------  Consultations:  Time: 2101. Spoke with Dr. Joan Mccoy. Discussed case.   He will admit the patient. This patient's ED course included: a personal history and physicial examination, re-evaluation prior to disposition, multiple bedside re-evaluations, IV medications, cardiac monitoring, continuous pulse oximetry, and complex medical decision making and emergency management    This patient has remained hemodynamically stable during their ED course. Diagnosis:  1. Sepsis without acute organ dysfunction, due to unspecified organism ([address]    2. COVID-19 virus infection    3. Somnolence        Disposition:  Patient's disposition: Admit to med/surg floor  Patient's condition is fair.             Louis Whelan, DO  11/13/22 2107

## 2022-11-14 NOTE — PROGRESS NOTES
Physical Therapy    Physical Therapy Initial Evaluation/Plan of Care    Room #:  10/10  Patient Name: Enoc Ames  YOB: 1950  MRN: [id number]    Date of Service: 11/14/2022     Tentative placement recommendation: [facility]  Equipment recommendation: Equipment at Nursing Home      Evaluating Physical Therapist: Jaret Dominguez  #[id number]      Specific Provider Orders/Date/Referring Provider :  11/13/22 2315    PT eval and treat  Start:  11/13/22 2315,   End:  11/13/22 2315,   ONE TIME,   Standing Count:  1 Occurrences,   R         Ti Briggs DO     Admitting Diagnosis:   2019 novel coronavirus disease (COVID-19) [U07.1]    Admitted with    altered mental status from skilled nursing facility   Covid +  Surgery: none  Visit Diagnoses         Codes    COVID-19 virus infection     U07.1    Somnolence     R40.0            Patient Active Problem List   Diagnosis    Sepsis secondary to UTI ([address]    Post-operative pain    H/O total hysterectomy    Essential hypertension    Type 2 diabetes mellitus with hyperglycemia, without long-term current use of insulin ([address]    Other hyperlipidemia    Sacral wound    Sacral wound, initial encounter    Acute cystitis with hematuria    Severe protein-calorie malnutrition ([address]    UTI (urinary tract infection)    Acute CVA (cerebrovascular accident) ([address]    2019 novel coronavirus disease (COVID-19)    COVID-19        ASSESSMENT of Current Deficits    Patient exhibits decreased  strength  and range of motion impairing functional mobility and active movement,  along with  confusion, left knee flexion contracture. Patient requires continued skilled physical therapy to address concerns listed above for increased safety and function at discharge.     PHYSICAL THERAPY  PLAN OF CARE       Physical therapy plan of care is established based on physician order,  patient diagnosis and clinical assessment    Current Treatment Recommendations:    -Bed Mobility: Lower extremity exercises , Upper extremity exercises , and Trunk control activities   -Endurance: Utilize Supervised activities to increase level of endurance to allow for safe functional mobility including transfers and gait  and dangle if able to follow directions    PT long term treatment goals are located in below grid    Patient and or family understand(s) diagnosis, prognosis, and plan of care. Frequency of treatments: Patient will be seen  2-3 times/week. Prior Level of Function: bedbound  Rehab Potential: poor  for baseline    Past medical history:   Past Medical History:   Diagnosis Date    Arthritis     right hand    Cancer [facility] 1985    uterus and cervix had radiation and implant    Diabetes mellitus ([facility] [facility]     Hyperlipidemia     Hypertension     Radiation cystitis     Urinary incontinence     complication due to uterine cancer    Urinary retention     caused by complications from uterine cancer     Past Surgical History:   Procedure Laterality Date    CYSTOSCOPY N/A 7/17/2019    CYSTOSCOPY BOTOX  INJECTION 200 UNITS performed by Bryant Russell,  at [address] [place] [address] removed    HYSTERECTOMY (CERVIX STATUS UNKNOWN)  1985    AL CYSTOURETHROSCOPY INJ CHEMODENERVATION BLADDER N/A 11/9/2018    CYSTOSCOPY  BOTOX INJECTION (200 UNITS) performed by Umer Daniel MD at [facility]:    Precautions:  Up with assistance, falls, alarm, O2, and Droplet plus/COVID-19 ,      Social history:  Patient lives with spouse in a two story home bedroom and bathroom 2nd floor full flight stairs with Rail   Tub shower however has been at facility since acute care discharge    Equipment owned: Wheelchair, [address], and [address] [facility] [address] chair,       [address] [facility] [address]   How much difficulty turning over in bed?: Unable  How much difficulty sitting down on / standing up from a chair with arms?: Unable  How much difficulty moving from lying on back to sitting on side of bed?: Unable  How much help from another person moving to and from a bed to a chair?: Total  How much help from another person needed to walk in hospital room?: Total  How much help from another person for climbing 3-5 steps with a railing?: Total  AM-PAC Inpatient Mobility Raw Score : 6  AM-PAC Inpatient T-Scale Score : 23.55  Mobility Inpatient CMS 0-100% Score: 100  Mobility Inpatient CMS G-Code Modifier : CN    Nursing cleared patient for PT evaluation. The admitting diagnosis and active problem list as listed above have been reviewed prior to the initiation of this evaluation. OBJECTIVE;   Initial Evaluation  Date: 11/14/2022 Treatment Date:     Short Term/ Long Term   Goals   Was pt agreeable to Eval/treatment?  Yes  To be met in 4 days   Pain level   Grimaces with left upper and lower extremitiy rom        Bed Mobility    Rolling: Dependent assist of 1    Supine to sit: Not assessed     Sit to supine: Not assessed     Scooting: Not assessed     Rolling: Maximal assist of 1    Supine to sit: Maximal assist of  2    Sit to supine: Maximal assist of  2    Scooting: Maximal assist of  2     ROM impaired with left knee contracture, cervical spine with excessive lordosis ; positioned to reduce lordosis and place head in more neutral position  Increase range of motion 10% of affected joints    Strength BUE:   0/5  RLE:  0/5  LLE:  0/5  Increase strength in affected mm groups by 1/3 grade   Balance Sitting EOB:  not assessed     Sitting EOB:  poor        Patient is awake, non verbal and does not follow directions    Sensation:  Patient  not appropriate to assess    Edema:  no   Endurance: poor      Patient education  Patient educated on role of Physical Therapy, risks of immobility, safety and plan of care     Patient response to education:   Pt verbalized understanding Pt demonstrated skill Pt requires further education in this area   No No Yes Treatment:  Patient practiced and was instructed/facilitated in the following treatment:     Rom all extremities with gentle stretching however poor tolerance to left side  Therapeutic Exercises:  as above        At end of session, patient on gurney   with  call light and phone within reach,  all lines and tubes intact, nursing notified. Patient would benefit from continued skilled Physical Therapy to improve functional independence and quality of life. Patient's/ family goals   none stated    Time in  1215  Time out  1235    Total Treatment Time  0 minutes    Evaluation time includes thorough review of current medical information, gathering information on past medical history/social history and prior level of function, completion of standardized testing/informal observation of tasks, assessment of data, and development of Plan of care and goals.      CPT codes:  Low Complexity PT evaluation (84267)  No treatment billed    Agata Martinez, PT

## 2022-11-14 NOTE — ED NOTES
Spoke with family member, Kory Sheppard, Family reports that Pt baseline is usually altered but pt would talk and eat with assistance. Pt eyes remain open but pt does not respond.      Bar Cadet, RIDGE  11/14/22 [facility] [address], RN  11/14/22 1024

## 2022-11-14 NOTE — H&P
Department of Internal Medicine  History and Physical    PCP: Renata Fairchild DO  Admitting Physician: Dr. Sera Arthur  Consultants:   Date of Service: 11/13/2022    CHIEF COMPLAINT:  ams    HISTORY OF PRESENT ILLNESS:    Patient is a 69-year-old female who presented to the ED due to altered mental status. At baseline patient is alert and oriented x1. However. Today around 5 PM patient did become even more confused and essentially nonverbal.  Patient is awake and alert on exam however he does not follow commands and does not verbalize. According to nursing staff now [facility] yesterday patient appeared more lethargic. It was found that her  had tried to feed her something however apparently she did not swallow it properly. Patient was suctioned and following that patient's mentation did improve. oral     PAST MEDICAL Hx:  Past Medical History:   Diagnosis Date    Arthritis     right hand    Cancer ([facility] [address] 1985    uterus and cervix had radiation and implant    Diabetes mellitus ([facility] [facility]     Hyperlipidemia     Hypertension     Radiation cystitis     Urinary incontinence     complication due to uterine cancer    Urinary retention     caused by complications from uterine cancer       PAST SURGICAL Hx:   Past Surgical History:   Procedure Laterality Date    CYSTOSCOPY N/A 7/17/2019    CYSTOSCOPY BOTOX  INJECTION 200 UNITS performed by Doretha Russell DO at [facility] removed    HYSTERECTOMY (CERVIX STATUS UNKNOWN)  1985    MA CYSTOURETHROSCOPY INJ CHEMODENERVATION BLADDER N/A 11/9/2018    CYSTOSCOPY  BOTOX INJECTION (200 UNITS) performed by Margo Mukherjee MD at [address] Hx:  Family History   Problem Relation Age of Onset    Heart Disease Mother     No Known Problems Father     Other Sister     Other Brother     Other Other        HOME MEDICATIONS:  Prior to Admission medications    Medication Sig Start Date End Date Taking?  Authorizing Provider   aspirin 81 MG chewable tablet Take 1 tablet by mouth daily 10/21/22   ELIO Fagan CNP   menthol-zinc oxide (CALMOSEPTINE) 0.44-20.6 % OINT ointment Apply topically 2 times daily as needed (irritation) Max 30 ml per day. Apply to buttocks sacral area  .  10/21/22   ELIO Fagan CNP   polyethylene glycol (GLYCOLAX) 17 g packet Take 17 g by mouth daily as needed for Constipation 10/21/22 11/20/22  ELIO Fagan CNP   clopidogrel (PLAVIX) 75 MG tablet Take 1 tablet by mouth daily 10/21/22   ELIO Fagan CNP   memantine [facility]) 5 MG tablet Take 1 tablet by mouth 2 times daily 10/21/22   ELIO Fagan CNP   donepezil (ARICEPT) 10 MG tablet Take 1 tablet by mouth nightly 10/21/22   ELIO Fagan CNP   atorvastatin (LIPITOR) 20 MG tablet TAKE 1 TABLET BY MOUTH EVERY DAY 22   Daniel Griggs DO   metFORMIN (GLUCOPHAGE) 1000 MG tablet TAKE 1 TABLET BY MOUTH TWICE A DAY WITH MEALS 22   Ravi Griggs DO   diclofenac sodium (VOLTAREN) 1 % GEL Apply 4 g topically 4 times daily 22   [facility] Grig[facility] DO   insulin lispro (HUMALOG) 100 UNIT/ML injection vial Inject 0-6 Units into the skin 3 times daily (with meals) 10/15/21   Ravi Rodriguez DO       ALLERGIES:  Tetracyclines & related    SOCIAL Hx:  Social History     Socioeconomic History    Marital status:      Spouse name: Not on file    Number of children: Not on file    Years of education: Not on file    Highest education level: Not on file   Occupational History    Not on file   Tobacco Use    Smoking status: Former     Packs/day: 1.00     Years: 44.00     Pack years: 44.00     Types: Cigarettes     Start date: 1965     Quit date: 2009     Years since quittin.9    Smokeless tobacco: Never   Vaping Use    Vaping Use: Never used   Substance and Sexual Activity    Alcohol use: Yes     Comment: seldom    Drug use: No    Sexual activity: Not on file   Other Topics Concern    Not on file Social History Narrative    Not on file     Social Determinants of Health     Financial Resource Strain: Not on file   Food Insecurity: Not on file   Transportation Needs: Not on file   Physical Activity: Not on file   Stress: Not on file   Social Connections: Not on file   Intimate Partner Violence: Not on file   Housing Stability: Not on file       ROS: Positive in bold  General:   Denies chills, fatigue, fever, malaise, night sweats or weight loss    Psychological:   Denies anxiety, disorientation or hallucinations    ENT:    Denies epistaxis, headaches, vertigo or visual changes    Cardiovascular:   Denies any chest pain, irregular heartbeats, or palpitations. No paroxysmal nocturnal dyspnea. Respiratory:   Denies shortness of breath, coughing, sputum production, hemoptysis, or wheezing. No orthopnea. Gastrointestinal:   Denies nausea, vomiting, diarrhea, or constipation. Denies any abdominal pain. Denies change in bowel habits or stools. Genito-Urinary:    Denies any urgency, frequency, hematuria. Voiding without difficulty. Musculoskeletal:   Denies joint pain, joint stiffness, joint swelling or muscle pain    Neurology:    Denies any headache or focal neurological deficits. No weakness or paresthesia. Derm:    Denies any rashes, ulcers, or excoriations. Denies bruising. Extremities:   Denies any lower extremity swelling or edema. PHYSICAL EXAM: Abnormal findings noted  VITALS:  Vitals:    11/13/22 1834   BP: (!) 152/75   Pulse: (!) 109   Resp: 18   Temp: (!) 100.6 °F (38.1 °C)   SpO2: 97%         CONSTITUTIONAL:    Awake, alert, cooperative, no apparent distress, and appears stated age    EYES:    EOMI, sclera clear, conjunctiva normal    ENT:    Normocephalic, atraumatic,  External ears without lesions.      NECK:    Supple, symmetrical, trachea midline, no JVD    HEMATOLOGIC/LYMPHATICS:    No cervical lymphadenopathy and no supraclavicular lymphadenopathy    LUNGS: Symmetric. No increased work of breathing, good air exchange, clear to auscultation bilaterally, no wheezes, rhonchi, or rales,     CARDIOVASCULAR:    Normal apical impulse, regular rate and rhythm, normal S1 and S2, no S3 or S4, and no murmur noted    ABDOMEN:    soft, non-distended, non-tender    MUSCULOSKELETAL:    There is no redness, warmth, or swelling of the joints. NEUROLOGIC:    Awake, alert, oriented to name, place and time. SKIN:    No bruising or bleeding. No redness, warmth, or swelling    EXTREMITIES:    Peripheral pulses present. No edema, cyanosis, or swelling.     LINES/CATHETERS     LABORATORY DATA:  CBC with Differential:    Lab Results   Component Value Date/Time    WBC 9.0 11/13/2022 07:44 PM    RBC 4.71 11/13/2022 07:44 PM    HGB 13.0 11/13/2022 07:44 PM    HCT 42.1 11/13/2022 07:44 PM     11/13/2022 07:44 PM    MCV 89.4 11/13/2022 07:44 PM    MCH 27.6 11/13/2022 07:44 PM    MCHC 30.9 11/13/2022 07:44 PM    RDW 13.9 11/13/2022 07:44 PM    SEGSPCT 61 09/01/2012 08:15 AM    LYMPHOPCT 11.3 11/13/2022 07:44 PM    MONOPCT 9.1 11/13/2022 07:44 PM    BASOPCT 0.1 11/13/2022 07:44 PM    MONOSABS 0.81 11/13/2022 07:44 PM    LYMPHSABS 1.01 11/13/2022 07:44 PM    EOSABS 0.00 11/13/2022 07:44 PM    BASOSABS 0.01 11/13/2022 07:44 PM     CMP:    Lab Results   Component Value Date/Time     11/13/2022 07:44 PM    K 4.8 11/13/2022 07:44 PM    K 4.6 10/16/2022 01:40 PM     11/13/2022 07:44 PM    CO2 22 11/13/2022 07:44 PM    BUN 50 11/13/2022 07:44 PM    CREATININE 1.1 11/13/2022 07:44 PM    GFRAA >60 10/17/2022 05:17 AM    LABGLOM 53 11/13/2022 07:44 PM    GLUCOSE 193 11/13/2022 07:44 PM    PROT 5.7 10/21/2022 09:20 AM    LABALBU 2.6 10/21/2022 09:20 AM    CALCIUM 9.7 11/13/2022 07:44 PM    BILITOT 0.4 10/21/2022 09:20 AM    ALKPHOS 119 10/21/2022 09:20 AM    AST 32 10/21/2022 09:20 AM    ALT 25 10/21/2022 09:20 AM       ASSESSMENT/PLAN:  Acute metabolic encephalopathy with moderate to severe underlying dementia,   COVID19  UTI  rounded opacity in the right lung base-follow-up imaging to evaluate for resolution and underlying mass presence   Non-insulin-dependent diabetes mellitus type 2  essential hypertension  Hyperlipidemia  History of uterine and cervical cancer with prior radiation and hysterectomy  History of tobacco abuse       Patient presented with altered mental status on top of her baseline dementia. Patient is awake and alert but verbally nonresponsive. Patient found to have COVID-19. We will continue to monitor pulse ox. She appears to be dehydrated. Will give IV fluids. It appears that she has been having issues with oral intake/dysphagia. She will be kept n.p.o. for now.     Axel Regalado [address] [place] [address]  9:32 PM  11/13/2022    Electronically signed by Axel Regalado DO on 11/13/22 at 9:32 PM EST

## 2022-11-14 NOTE — PROGRESS NOTES
Pharmacy Consultation Note    Consult date: 11/14/2022  Physician/provider: RADHA Whitney  Pharmacy has been consulted to evaluate criteria for Baricitinib, Remdesivir, or Tocilizumab therapy. The patient DOES NOT currently meet [facility] P[employer] approved Covid-19 treatment criteria for Baricitinib, Remdesivir, or Tocilizumab due to oxygen requirements. Please re-consult if the clinical condition changes and patient meets criteria for initiation based on [facility] P[facility] approved criteria for use.     Thank you for the consult,  Leonard Peterson [facility]

## 2022-11-14 NOTE — PROGRESS NOTES
Internal Medicine Progress Note    [facility]    Ro Sanderson. Shayne Daily., F.A.C.O.I. Del De La Rosa D.O., CAPRICEOJAROCHO Sanford, MSN, APRN, NP-C  Mackenzie Arreguin. Ajay Pleitez, MSN, APRN-CNP     Primary Care Physician: Ana Preciado DO   Admitting Physician:  Lacy Dong DO  Admission date and time: 11/13/2022  6:47 PM    Room:  10/10  Admitting diagnosis: 2019 novel coronavirus disease (COVID-19) [U07.1]    Patient Name: Reinier Mayer  MRN: [id number]    Date of Service: 11/14/2022     Subjective:    Lelia Mcburney is a 67 y.o. female who was seen and examined today,11/14/2022, at the bedside. She was cared for under our service recently discharged to skilled nursing facility. Please refer to the previous hospitalization regarding her presenting condition and deplorable home conditions. She has had recurrence of altered mental status and has returned positive for COVID-19 as well. Unfortunately, the patient is altered and difficult to obtain history from. Extensive EMR review has been undertaken in addition to my own familiarity with the patient's case. Unfortunately, no family present during my examination. Review of System: Unable to be reliably obtained from the patient secondary to altered mental status/acuity    Physical Exam:  No intake/output data recorded. No intake or output data in the 24 hours ending 11/14/22 0752No intake/output data recorded. Patient Vitals for the past 96 hrs (Last 3 readings):   Weight   11/13/22 1834 115 lb (52.2 kg)     Vital Signs:   Blood pressure (!) 168/93, pulse 96, temperature 99.1 °F (37.3 °C), temperature source Bladder, resp. rate 23, height 5' 3\" (1.6 m), weight 115 lb (52.2 kg), SpO2 94 %, not currently breastfeeding. General appearance:  Lethargic, arouses to verbal and noxious stimuli. Acute on chronic ill appearance without distress. Head:  Normocephalic.  No masses, lesions or tenderness. Eyes:  PERRLA. EOMI. Sclera clear. Buccal mucosa dry  ENT:  Ears normal. Mucosa normal.  Neck:    Supple. Trachea midline. No thyromegaly. No JVD. No bruits. Heart:    Rhythm regular. Rate controlled. S1 and S2. Systolic murmur  Lungs:    Symmetrical.  Shallow and tachypneic pattern of respiration, diminished aeration throughout. Abdomen:   Soft. Non-tender. Non-distended. Bowel sounds positive. No organomegaly or masses. No pain on palpation. Extremities:    Peripheral pulses present. No significant pitting peripheral edema. No ulcers. No cyanosis. No clubbing. Neurologic:    Lethargic, arouses to verbal and noxious stimuli. Acute on chronic ill appearance without distress. Generally weak without focal component focal deficit. Psych:   Behavior is without agitation. Musculoskeletal:   No unilateral joint edema, erythema or warmth. Gait not assessed. Integumentary:  Refer to clinical documentation regarding this wounds of present, otherwise no rashes  Skin normal color and texture.   Genitalia/Breast:  Deferred    Medication:  Scheduled Meds:   sodium chloride  1,000 mL IntraVENous Once    cefTRIAXone (ROCEPHIN) IV  1,000 mg IntraVENous Q24H    insulin lispro  0-4 Units SubCUTAneous TID WC    insulin lispro  0-4 Units SubCUTAneous Nightly    vitamin C  1,000 mg Oral Daily    vitamin D  2,000 Units Oral Daily    zinc gluconate  50 mg Oral Daily    metoprolol succinate  25 mg Oral Daily    sodium chloride flush  5-40 mL IntraVENous 2 times per day    aspirin  81 mg Oral Daily    clopidogrel  75 mg Oral Daily    atorvastatin  20 mg Oral Nightly    donepezil  10 mg Oral Nightly    memantine  5 mg Oral BID    enoxaparin  30 mg SubCUTAneous BID     Continuous Infusions:   dextrose      sodium chloride         Objective Data:  CBC with Differential:    Lab Results   Component Value Date/Time    WBC 6.1 11/14/2022 06:05 AM    RBC 4.15 11/14/2022 06:05 AM    HGB 11.7 11/14/2022 06:05 AM    HCT 37.6 11/14/2022 06:05 AM     11/14/2022 06:05 AM    MCV 90.6 11/14/2022 06:05 AM    MCH 28.2 11/14/2022 06:05 AM    MCHC 31.1 11/14/2022 06:05 AM    RDW 14.1 11/14/2022 06:05 AM    SEGSPCT 61 09/01/2012 08:15 AM    LYMPHOPCT 9.5 11/14/2022 06:05 AM    MONOPCT 3.3 11/14/2022 06:05 AM    BASOPCT 0.0 11/14/2022 06:05 AM    MONOSABS 0.20 11/14/2022 06:05 AM    LYMPHSABS 0.58 11/14/2022 06:05 AM    EOSABS 0.00 11/14/2022 06:05 AM    BASOSABS 0.00 11/14/2022 06:05 AM     BMP:    Lab Results   Component Value Date/Time     11/14/2022 06:05 AM    K 4.8 11/14/2022 06:05 AM    K 4.6 10/16/2022 01:40 PM     11/14/2022 06:05 AM    CO2 17 11/14/2022 06:05 AM    BUN 48 11/14/2022 06:05 AM    LABALBU 3.1 11/14/2022 06:05 AM    CREATININE 0.9 11/14/2022 06:05 AM    CALCIUM 9.2 11/14/2022 06:05 AM    GFRAA >60 10/17/2022 05:17 AM    LABGLOM >60 11/14/2022 06:05 AM    GLUCOSE 268 11/14/2022 06:05 AM       COVID inflammatory markers  Recent Labs     11/14/22  0605   DDIMER 2263     No results for input(s): FERRITIN in the last 72 hours. No results for input(s): CRP in the last 72 hours.     Assessment:  Acute COVID-19 infection with failure to thrive symptomatology   Acute metabolic encephalopathy with moderate to severe underlying dementia  Recent right thalamic lacunar stroke with small nonhemorrhagic penumbra  Sepsis (POA with AMS, fever and HR) with urinary tract infection and COVID-19 infection  Recent CT findings of 1.5 x 2.9 cm rounded opacity in the right lung base-follow-up imaging to evaluate for resolution and underlying mass presence   Poorly controlled non-insulin-dependent diabetes mellitus type 2  Poorly controlled essential hypertension  History of uterine and cervical cancer with prior radiation    Plan:   Nba Camara is a 79-year-old female who returned to the emergency department from skilled nursing facility with recurrence of acute metabolic encephalopathy with known underlying moderate to severe dementia. During her last hospitalization, presentation was similar however she arrived from home in deplorable conditions. That was secondary to, amongst other things, stroke and urinary tract infection. Present work-up has yielded COVID-19 infection as well as UTI. Empiric antimicrobials are being employed and maximal COVID protocol is being implemented. Presently she does not require oxygen and chest imaging does not reveal pneumonia. During her last hospitalization and a lung mass versus atypical opacity was identified with a need for follow-up imaging. We will repeat contrasted CT scan of the chest to further evaluate and rule out underlying occult pneumonia. She does have dysphagia related to recent stroke and we will implement modified consistency diet continue to follow the patient with PT, OT and speech. Respiratory supportive measures and pulmonary hygiene will be implemented. If continues to have difficulty expectorating, may need to consider chest vest mucolytic's. Will evaluate for clearing of sensorium with treatment of the underlying processes as blood and urine cultures are pending and COVID inflammatory/biomarkers are being monitored. Clinical pharmacy has been asked to consider remdesivir versus baricitinib versus tocilizumab however she will likely not meet criteria for protocol driven medications secondary to lack of need for oxygen. Laboratory values and vital signs are being monitored and addressed accordingly. We will continue to address underlying comorbidities during the hospitalization. Given the patient's underlying disease burden, advanced dementia baseline and recurrent hospitalizations, the palliative care team will provide consultation for their assistance and determination of CODE STATUS and goals of care moving forward. Continue current therapy. Discharge disposition will be to skilled nursing facility once clinically appropriate.   See orders for further plan of care.    Greater than 40 minutes of critical care time was spent with the patient. This time included chart review, , and discussion with those consultants involved in the patient's care. Due to extremely high hospital inpatient census and bed limitations, along with staff shortages, we have had a atif discussion with the patient/family regarding the likelihood of prolonged ER boarding status and potential delays/disruptions in care related to this. They understand and agree to maintain hospitalization at this facility while accepting these risks. We have made every attempt to coordinate care with the ER nursing staff as well to avoid any disruptions in care. More than 50% of my  time was spent at the bedside counseling/coordinating care with the patient and/or family with face to face contact. This time was spent reviewing notes and laboratory data as well as instructing and counseling the patient. Time I spent with the family or surrogate(s) is included only if the patient was incapable of providing the necessary information or participating in medical decisions. I also discussed the differential diagnosis and all of the proposed management plans with the patient and individuals accompanying the patient. Nba Higginbotham requires this high level of physician care and nursing on the IMC/Telemetry unit due the complexity of decision management and chance of rapid decline or death. Continued cardiac monitoring and higher level of nursing are required. I am readily available for any further decision-making and intervention.      ELIO Joiner - CNP  11/14/2022  7:52 AM

## 2022-11-14 NOTE — CONSULTS
IVPB  9 mmol IntraVENous PRN ELIO Hameed CNP        Or    sodium phosphate 15 mmol in sodium chloride 0.9 % 250 mL IVPB  15 mmol IntraVENous PRN ELIO Hameed CNP        potassium chloride (KLOR-CON M) extended release tablet 40 mEq  40 mEq Oral PRN ELIO Hameed CNP        Or    potassium bicarb-citric acid (EFFER-K) effervescent tablet 40 mEq  40 mEq Oral PRN ELIO Hameed CNP        Or    potassium chloride 10 mEq/100 mL IVPB (Peripheral Line)  10 mEq IntraVENous PRN ELIO Hameed CNP        benzonatate (TESSALON) capsule 200 mg  200 mg Oral TID PRN ELIO Hameed CNP        ascorbic acid (VITAMIN C) tablet 1,000 mg  1,000 mg Oral Daily ELIO Hameed CNP        Vitamin D (CHOLECALCIFEROL) tablet 2,000 Units  2,000 Units Oral Daily ELIO Hameed CNP        zinc sulfate (ZINCATE) capsule 50 mg  50 mg Oral Daily ELIO Hameed CNP        metoprolol succinate (TOPROL XL) extended release tablet 25 mg  25 mg Oral Daily ELIO Hameed CNP        potassium chloride 40 mEq, sodium bicarbonate 50 mEq in sodium chloride 0.45 % 1,000 mL infusion   IntraVENous Continuous ELIO Hameed CNP 75 mL/hr at 11/14/22 0940 New Bag at 11/14/22 0940    glucose chewable tablet 16 g  4 tablet Oral PRN Lily Sims,         dextrose bolus 10% 125 mL  125 mL IntraVENous PRN Lily Sims DO        Or    dextrose bolus 10% 250 mL  250 mL IntraVENous PRN Lily Sims DO        glucagon (rDNA) injection 1 mg  1 mg SubCUTAneous PRN Lily Sims DO        dextrose 10 % infusion   IntraVENous Continuous PRN Lily Sims DO        sodium chloride flush 0.9 % injection 5-40 mL  5-40 mL IntraVENous 2 times per day Lily Sims DO   10 mL at 11/14/22 0945    sodium chloride flush 0.9 % injection 5-40 mL  5-40 mL IntraVENous PRN Lily Sims, DO        0.9 % sodium chloride infusion   IntraVENous PRN Lily Sims DO polyethylene glycol (GLYCOLAX) packet 17 g  17 g Oral Daily PRN [employer], DO        acetaminophen (TYLENOL) tablet 650 mg  650 mg Oral Q6H PRN [employer], DO        Or    acetaminophen (TYLENOL) suppository 650 mg  650 mg Rectal Q6H PRN [employer], DO        aspirin chewable tablet 81 mg  81 mg Oral Daily [employer], DO        clopidogrel (PLAVIX) tablet 75 mg  75 mg Oral Daily [employer], DO        atorvastatin (LIPITOR) tablet 20 mg  20 mg Oral Nightly Ismail U [place], DO        donepezil (ARICEPT) tablet 10 mg  10 mg Oral Nightly [employer], DO        memantine (NAMENDA) tablet 5 mg  5 mg Oral BID [employer], DO        enoxaparin Sodium (LOVENOX) injection 30 mg  30 mg SubCUTAneous BID [employer], DO   30 mg at 11/14/22 6259     Current Outpatient Medications   Medication Sig Dispense Refill    aspirin 81 MG chewable tablet Take 1 tablet by mouth daily 30 tablet 3    menthol-zinc oxide (CALMOSEPTINE) 0.44-20.6 % OINT ointment Apply topically 2 times daily as needed (irritation) Max 30 ml per day. Apply to buttocks sacral area  .   4    polyethylene glycol (GLYCOLAX) 17 g packet Take 17 g by mouth daily as needed for Constipation 527 g 1    clopidogrel (PLAVIX) 75 MG tablet Take 1 tablet by mouth daily 30 tablet 3    memantine (NAMENDA) 5 MG tablet Take 1 tablet by mouth 2 times daily 60 tablet 3    donepezil (ARICEPT) 10 MG tablet Take 1 tablet by mouth nightly 30 tablet 3    atorvastatin (LIPITOR) 20 MG tablet TAKE 1 TABLET BY MOUTH EVERY DAY 90 tablet 1    metFORMIN (GLUCOPHAGE) 1000 MG tablet TAKE 1 TABLET BY MOUTH TWICE A DAY WITH MEALS 180 tablet 1    diclofenac sodium (VOLTAREN) 1 % GEL Apply 4 g topically 4 times daily 100 g 3    insulin lispro (HUMALOG) 100 UNIT/ML injection vial Inject 0-6 Units into the skin 3 times daily (with meals) 16 mL 1       Social History     Socioeconomic History    Marital status:      Spouse name: Not on file    Number of children: Not on file    Years of education: Not on file    Highest education level: Not on file   Occupational History    Not on file   Tobacco Use    Smoking status: Former     Packs/day: 1.00     Years: 44.00     Pack years: 44.00     Types: Cigarettes     Start date: 1965     Quit date: 2009     Years since quittin.9    Smokeless tobacco: Never   Vaping Use    Vaping Use: Never used   Substance and Sexual Activity    Alcohol use: Yes     Comment: seldom    Drug use: No    Sexual activity: Not on file   Other Topics Concern    Not on file   Social History Narrative    Not on file     Social Determinants of Health     Financial Resource Strain: Not on file   Food Insecurity: Not on file   Transportation Needs: Not on file   Physical Activity: Not on file   Stress: Not on file   Social Connections: Not on file   Intimate Partner Violence: Not on file   Housing Stability: Not on file       Family History   Problem Relation Age of Onset    Heart Disease Mother     No Known Problems Father     Other Sister     Other Brother     Other Other      Review of Systems:   Unable to elicit. Physical Exam   BP (!) 147/82   Pulse 98   Temp 99.3 °F (37.4 °C) (Bladder)   Resp 21   Ht 5' 3\" (1.6 m)   Wt 115 lb (52.2 kg)   LMP  (LMP Unknown)   SpO2 95%   BMI 20.37 kg/m²     COVID 19.      CBC:   Lab Results   Component Value Date/Time    WBC 6.1 2022 06:05 AM    RBC 4.15 2022 06:05 AM    HGB 11.7 2022 06:05 AM    HCT 37.6 2022 06:05 AM    MCV 90.6 2022 06:05 AM    MCH 28.2 2022 06:05 AM    MCHC 31.1 2022 06:05 AM    RDW 14.1 2022 06:05 AM     2022 06:05 AM    MPV 10.1 2022 06:05 AM     BMP:   Lab Results   Component Value Date/Time     2022 06:05 AM    K 4.8 2022 06:05 AM    K 4.6 10/16/2022 01:40 PM     2022 06:05 AM    CO2 17 2022 06:05 AM    BUN 48 2022 06:05 AM    LABALBU 3.1 2022 06:05 AM CREATININE 0.9 11/14/2022 06:05 AM    CALCIUM 9.2 11/14/2022 06:05 AM    GFRAA >60 10/17/2022 05:17 AM    LABGLOM >60 11/14/2022 06:05 AM     Magnesium:    Lab Results   Component Value Date/Time    MG 1.9 11/14/2022 06:05 AM     Cardiac Enzymes:   Lab Results   Component Value Date    CKTOTAL 167 10/16/2022    TROPHS 63 (H) 11/14/2022    TROPHS 66 (H) 11/13/2022    TROPHS 33 (H) 10/16/2022      PT/INR:    Lab Results   Component Value Date/Time    PROTIME 13.6 11/14/2022 06:05 AM    INR 1.2 11/14/2022 06:05 AM     TSH:    Lab Results   Component Value Date/Time    TSH 0.144 10/17/2022 05:17 AM     Rhythm Strip: normal sinus rhythm. EKG:  nonspecific ST and T waves changes, sinus tachycardia, iRBBB. Echo Summary 10/19/2022:   Left ventricular size is grossly normal.   Hyperdynamic left ventricular systolic function. Ejection fraction is visually estimated at 70%. The left atrium is mildly dilated. Physiologic and/or trace mitral regurgitation is present. No evidence of mitral valve stenosis. Mitral annular calcification is present. Physiologic and/or trace tricuspid regurgitation. Regular rhythm. ASSESSMENT AND PLAN:  Patient Active Problem List   Diagnosis    Sepsis secondary to UTI ([address]    Post-operative pain    H/O total hysterectomy    Essential hypertension    Type 2 diabetes mellitus with hyperglycemia, without long-term current use of insulin (HCC)    Other hyperlipidemia    Sacral wound    Sacral wound, initial encounter    Acute cystitis with hematuria    Severe protein-calorie malnutrition ([address]    UTI (urinary tract infection)    Acute CVA (cerebrovascular accident) ([address]    2019 novel coronavirus disease (COVID-19)    COVID-19     1. Elevated troponin/NSTEMI:    Chart/labs/EKG/recent echo reviewed. Medically manage for now. ASA/plavix/statin/BB. 2. COVID 19: Per primary service. 3. HTN: Observe. 4. DM: Per primary service. 5. Lipids: Statin.      6. Hx of CVA: ASA/plavix/statin. Yesenia Loo D.O.   Cardiologist  Cardiology, [facility]

## 2022-11-14 NOTE — CONSULTS
Palliative Care Department  [phone]  Palliative Care Initial Consult  Provider Valerie Quiroga  [phone]  Hospital Day: 2  Date of Initial Consult: 11/13/2022  Referring Provider: Dr. Christopher Boland was consulted for assistance with: Goals of care, CODE STATUS discussion    HPI:   Alfred Gamble is a 67 y.o. with a medical history of dementia, cancer, DM II, HTN, HLD, CVA who was admitted on 11/13/2022 from nursing facility with a CHIEF COMPLAINT of increased altered mental status. Prior to arrival to the ED, patient became nonverbal and would not make eye contact to her name. Patient was found to be positive for COVID-19 infection as well as a UTI. CT head suspicious for NPH. CT chest showed mild groundglass opacities in the lungs favoring atelectasis. There is an age indeterminant 8 mm groundglass pulmonary nodule in the left lower lobe. Patient had a recent admission for UTI and stroke. ASSESSMENT/PLAN:     Pertinent Hospital Diagnoses     Acute COVID-19 infection  Acute metabolic encephalopathy with moderate to severe underlying dementia  Recent right thalamic lacunar infarct with small nonhemorrhagic penumbra  Sepsis with acute UTI and COVID-19 infection      Palliative Care Encounter / Counseling Regarding Goals of Care  Please see detailed goals of care discussion as below  At this time, Alfred Gamble, Does Not have capacity for medical decision-making.   Capacity is time limited and situation/question specific  During encounter Romeo Rivas and Ben Flores was surrogate medical decision-maker  Outcome of goals of care meeting:   Continue current medical management  Family to discuss goals of care and CODE STATUS  Code status Full Code  Advanced Directives: no POA or living will in [facility]  Surrogate/Legal NOK:  Mayra Good (spouse) [phone]  Elisabethdavid Walsh (child) [phone]  Gilberto Little (niece) [phone]    Spiritual assessment: no spiritual distress identified  Bereavement and grief: to be determined  Referrals to: none today  SUBJECTIVE:     Current medical issues leading to Palliative Medicine involvement include   Active Hospital Problems    Diagnosis Date Noted    COVID-19 [U07.1] 11/14/2022     Priority: Medium    2019 novel coronavirus disease (COVID-19) [U07.1] 11/13/2022     Priority: Medium       Details of Conversation:    Chart reviewed. Patient seen in bed, nonverbal and staring, not following commands. Call placed to , Frank Seat, to discuss goals of care and CODE STATUS. Per Frank Seat, he would like all measures to be done if the patients heart or breathing stops. Patient seemed mildly disoriented on the phone. Call placed to nieceDharmesh, and she states that Frank Seat does have dementia. She states that Celia Back, and herself make decisions on behalf of the patient. Updated Dharmesh Juarez on patient's current medical status. Discussed different CODE STATUSES with Dharmesh Juarez. She states that Marianna Erickson is at work at this time but they will all discuss the goals of care and [address] for the patient's best interest.  Patient remains a full code at this time until family meeting and decision is made. Discussed patient with bedside nurse. Emotional support provided. Continue current medical management and palliative medicine to follow.     OBJECTIVE:   Prognosis: depends upon goals and Guarded    Physical Exam:  BP (!) 147/82   Pulse 98   Temp 99.3 °F (37.4 °C) (Bladder)   Resp 21   Ht 5' 3\" (1.6 m)   Wt 115 lb (52.2 kg)   LMP  (LMP Unknown)   SpO2 95%   BMI 20.37 kg/m²   Constitutional: Chronically ill, thin, nonverbal  Eyes: no scleral icterus, normal lids, no discharge  ENMT:  Normocephalic, atraumatic, mucosa moist, EOMI  Neck:  trachea midline, no JVD  Lungs: Diminished in the BL bases, respirations unlabored  Heart[de-identified]  RRR  Abd:  Soft, non tender, non distended, bowel sounds present  Ext:  pulses present  Skin:  Warm and dry, no rashes on visible skin  Psych: Flat affect  Neuro: Nonverbal, withdraws to painful stimuli    Objective data reviewed: labs, images, records, medication use, vitals, and chart    Discussed patient and the plan of care with the other IDT members: Palliative Medicine IDT Team, Floor Nurse, and Family    Time/Communication  Greater than 50% of time spent, total 30 minutes in counseling and coordination of care at the bedside regarding goals of care and diagnosis and prognosis. Thank you for allowing Palliative Medicine to participate in the care of Callie Turner.

## 2022-11-14 NOTE — ED NOTES
Per dr Shanita Arreola, this RN is to hold oral medication for patient due to difficulty swallowing     Tre Coreas, RN  11/14/22 0045

## 2022-11-15 ENCOUNTER — APPOINTMENT (OUTPATIENT)
Dept: GENERAL RADIOLOGY | Age: 72
DRG: 871 | End: 2022-11-15
Payer: MEDICARE

## 2022-11-15 PROBLEM — Z51.5 PALLIATIVE CARE BY SPECIALIST: Status: ACTIVE | Noted: 2022-11-15

## 2022-11-15 LAB
ACINETOBACTER CALCOAC BAUMANNII COMPLEX BY PCR: NOT DETECTED
ALBUMIN SERPL-MCNC: 3.3 G/DL (ref 3.5–5.2)
ALP BLD-CCNC: 156 U/L (ref 35–104)
ALT SERPL-CCNC: 50 U/L (ref 0–32)
ANION GAP SERPL CALCULATED.3IONS-SCNC: 17 MMOL/L (ref 7–16)
AST SERPL-CCNC: 48 U/L (ref 0–31)
B.E.: -3.3 MMOL/L (ref -3–3)
BACTEROIDES FRAGILIS BY PCR: NOT DETECTED
BASOPHILS ABSOLUTE: 0.03 E9/L (ref 0–0.2)
BASOPHILS RELATIVE PERCENT: 0.2 % (ref 0–2)
BILIRUB SERPL-MCNC: 0.6 MG/DL (ref 0–1.2)
BOTTLE TYPE: ABNORMAL
BUN BLDV-MCNC: 41 MG/DL (ref 6–23)
C-REACTIVE PROTEIN: 3.5 MG/DL (ref 0–0.4)
CALCIUM SERPL-MCNC: 9.4 MG/DL (ref 8.6–10.2)
CANDIDA ALBICANS BY PCR: NOT DETECTED
CANDIDA AURIS BY PCR: NOT DETECTED
CANDIDA GLABRATA BY PCR: NOT DETECTED
CANDIDA KRUSEI BY PCR: NOT DETECTED
CANDIDA PARAPSILOSIS BY PCR: NOT DETECTED
CANDIDA TROPICALIS BY PCR: NOT DETECTED
CHLORIDE BLD-SCNC: 112 MMOL/L (ref 98–107)
CO2: 17 MMOL/L (ref 22–29)
CREAT SERPL-MCNC: 0.7 MG/DL (ref 0.5–1)
CRYPTOCOCCUS NEOFORMANS/GATTII BY PCR: NOT DETECTED
D DIMER: 1987 NG/ML DDU
DEVICE: ABNORMAL
EKG ATRIAL RATE: 105 BPM
EKG P AXIS: 68 DEGREES
EKG P-R INTERVAL: 128 MS
EKG Q-T INTERVAL: 374 MS
EKG QRS DURATION: 100 MS
EKG QTC CALCULATION (BAZETT): 494 MS
EKG R AXIS: 130 DEGREES
EKG T AXIS: 33 DEGREES
EKG VENTRICULAR RATE: 105 BPM
ENTEROBACTER CLOACAE COMPLEX BY PCR: NOT DETECTED
ENTEROBACTERALES BY PCR: NOT DETECTED
ENTEROCOCCUS FAECALIS BY PCR: NOT DETECTED
ENTEROCOCCUS FAECIUM BY PCR: NOT DETECTED
EOSINOPHILS ABSOLUTE: 0 E9/L (ref 0.05–0.5)
EOSINOPHILS RELATIVE PERCENT: 0 % (ref 0–6)
ESCHERICHIA COLI BY PCR: NOT DETECTED
GFR SERPL CREATININE-BSD FRML MDRD: >60 ML/MIN/1.73
GLUCOSE BLD-MCNC: 189 MG/DL (ref 74–99)
HAEMOPHILUS INFLUENZAE BY PCR: NOT DETECTED
HCO3: 20.9 MMOL/L (ref 22–26)
HCT VFR BLD CALC: 41.2 % (ref 34–48)
HEMOGLOBIN: 12.5 G/DL (ref 11.5–15.5)
IMMATURE GRANULOCYTES #: 0.06 E9/L
IMMATURE GRANULOCYTES %: 0.4 % (ref 0–5)
KLEBSIELLA AEROGENES BY PCR: NOT DETECTED
KLEBSIELLA OXYTOCA BY PCR: NOT DETECTED
KLEBSIELLA PNEUMONIAE GROUP BY PCR: NOT DETECTED
L. PNEUMOPHILA SEROGP 1 UR AG: NORMAL
LISTERIA MONOCYTOGENES BY PCR: NOT DETECTED
LYMPHOCYTES ABSOLUTE: 1.31 E9/L (ref 1.5–4)
LYMPHOCYTES RELATIVE PERCENT: 8.5 % (ref 20–42)
MAGNESIUM: 2 MG/DL (ref 1.6–2.6)
MCH RBC QN AUTO: 28 PG (ref 26–35)
MCHC RBC AUTO-ENTMCNC: 30.3 % (ref 32–34.5)
MCV RBC AUTO: 92.4 FL (ref 80–99.9)
METER GLUCOSE: 153 MG/DL (ref 74–99)
METER GLUCOSE: 170 MG/DL (ref 74–99)
METER GLUCOSE: 189 MG/DL (ref 74–99)
METER GLUCOSE: 280 MG/DL (ref 74–99)
MONOCYTES ABSOLUTE: 1.12 E9/L (ref 0.1–0.95)
MONOCYTES RELATIVE PERCENT: 7.3 % (ref 2–12)
NEISSERIA MENINGITIDIS BY PCR: NOT DETECTED
NEUTROPHILS ABSOLUTE: 12.9 E9/L (ref 1.8–7.3)
NEUTROPHILS RELATIVE PERCENT: 83.6 % (ref 43–80)
O2 SATURATION: 99.6 % (ref 92–98.5)
OPERATOR ID: 1119
ORDER NUMBER: ABNORMAL
PCO2 37: 33.7 MMHG (ref 35–45)
PDW BLD-RTO: 14.3 FL (ref 11.5–15)
PH 37: 7.4 (ref 7.35–7.45)
PHOSPHORUS: 2.7 MG/DL (ref 2.5–4.5)
PLATELET # BLD: 339 E9/L (ref 130–450)
PMV BLD AUTO: 10.2 FL (ref 7–12)
PO2 37: 174.1 MMHG (ref 60–80)
POC SOURCE: ABNORMAL
POTASSIUM SERPL-SCNC: 5.2 MMOL/L (ref 3.5–5)
PROCALCITONIN: 0.26 NG/ML (ref 0–0.08)
PROTEUS SPECIES BY PCR: NOT DETECTED
PSEUDOMONAS AERUGINOSA BY PCR: NOT DETECTED
RBC # BLD: 4.46 E12/L (ref 3.5–5.5)
REASON FOR REJECTION: NORMAL
REJECTED TEST: NORMAL
SALMONELLA SPECIES BY PCR: NOT DETECTED
SERRATIA MARCESCENS BY PCR: NOT DETECTED
SODIUM BLD-SCNC: 146 MMOL/L (ref 132–146)
SOURCE OF BLOOD CULTURE: ABNORMAL
STAPHYLOCOCCUS AUREUS BY PCR: NOT DETECTED
STAPHYLOCOCCUS EPIDERMIDIS BY PCR: NOT DETECTED
STAPHYLOCOCCUS LUGDUNENSIS BY PCR: NOT DETECTED
STAPHYLOCOCCUS SPECIES BY PCR: DETECTED
STENOTROPHOMONAS MALTOPHILIA BY PCR: NOT DETECTED
STREP PNEUMONIAE ANTIGEN, URINE: NORMAL
STREPTOCOCCUS AGALACTIAE BY PCR: NOT DETECTED
STREPTOCOCCUS PNEUMONIAE BY PCR: NOT DETECTED
STREPTOCOCCUS PYOGENES  BY PCR: NOT DETECTED
STREPTOCOCCUS SPECIES BY PCR: NOT DETECTED
TOTAL PROTEIN: 7 G/DL (ref 6.4–8.3)
WBC # BLD: 15.4 E9/L (ref 4.5–11.5)

## 2022-11-15 PROCEDURE — 36415 COLL VENOUS BLD VENIPUNCTURE: CPT

## 2022-11-15 PROCEDURE — 84100 ASSAY OF PHOSPHORUS: CPT

## 2022-11-15 PROCEDURE — 74018 RADEX ABDOMEN 1 VIEW: CPT

## 2022-11-15 PROCEDURE — 99233 SBSQ HOSP IP/OBS HIGH 50: CPT | Performed by: INTERNAL MEDICINE

## 2022-11-15 PROCEDURE — 2580000003 HC RX 258: Performed by: INTERNAL MEDICINE

## 2022-11-15 PROCEDURE — 2500000003 HC RX 250 WO HCPCS: Performed by: STUDENT IN AN ORGANIZED HEALTH CARE EDUCATION/TRAINING PROGRAM

## 2022-11-15 PROCEDURE — 6360000002 HC RX W HCPCS: Performed by: INTERNAL MEDICINE

## 2022-11-15 PROCEDURE — 99233 SBSQ HOSP IP/OBS HIGH 50: CPT

## 2022-11-15 PROCEDURE — 76937 US GUIDE VASCULAR ACCESS: CPT

## 2022-11-15 PROCEDURE — 36600 WITHDRAWAL OF ARTERIAL BLOOD: CPT

## 2022-11-15 PROCEDURE — 2580000003 HC RX 258: Performed by: SPECIALIST

## 2022-11-15 PROCEDURE — 71045 X-RAY EXAM CHEST 1 VIEW: CPT

## 2022-11-15 PROCEDURE — 2060000000 HC ICU INTERMEDIATE R&B

## 2022-11-15 PROCEDURE — 6360000002 HC RX W HCPCS: Performed by: SPECIALIST

## 2022-11-15 PROCEDURE — 85378 FIBRIN DEGRADE SEMIQUANT: CPT

## 2022-11-15 PROCEDURE — 99291 CRITICAL CARE FIRST HOUR: CPT | Performed by: STUDENT IN AN ORGANIZED HEALTH CARE EDUCATION/TRAINING PROGRAM

## 2022-11-15 PROCEDURE — 85025 COMPLETE CBC W/AUTO DIFF WBC: CPT

## 2022-11-15 PROCEDURE — 6360000002 HC RX W HCPCS

## 2022-11-15 PROCEDURE — 83735 ASSAY OF MAGNESIUM: CPT

## 2022-11-15 PROCEDURE — 82803 BLOOD GASES ANY COMBINATION: CPT

## 2022-11-15 PROCEDURE — 2700000000 HC OXYGEN THERAPY PER DAY

## 2022-11-15 PROCEDURE — 84145 PROCALCITONIN (PCT): CPT

## 2022-11-15 PROCEDURE — 86140 C-REACTIVE PROTEIN: CPT

## 2022-11-15 PROCEDURE — 80053 COMPREHEN METABOLIC PANEL: CPT

## 2022-11-15 PROCEDURE — 93010 ELECTROCARDIOGRAM REPORT: CPT | Performed by: INTERNAL MEDICINE

## 2022-11-15 PROCEDURE — 6360000002 HC RX W HCPCS: Performed by: STUDENT IN AN ORGANIZED HEALTH CARE EDUCATION/TRAINING PROGRAM

## 2022-11-15 PROCEDURE — 82962 GLUCOSE BLOOD TEST: CPT

## 2022-11-15 PROCEDURE — 6370000000 HC RX 637 (ALT 250 FOR IP): Performed by: INTERNAL MEDICINE

## 2022-11-15 RX ORDER — ACETYLCYSTEINE 100 MG/ML
4 SOLUTION ORAL; RESPIRATORY (INHALATION) 2 TIMES DAILY
Status: DISCONTINUED | OUTPATIENT
Start: 2022-11-15 | End: 2022-11-18 | Stop reason: HOSPADM

## 2022-11-15 RX ORDER — IPRATROPIUM BROMIDE AND ALBUTEROL SULFATE 2.5; .5 MG/3ML; MG/3ML
1 SOLUTION RESPIRATORY (INHALATION)
Status: DISCONTINUED | OUTPATIENT
Start: 2022-11-15 | End: 2022-11-17

## 2022-11-15 RX ORDER — ACETYLCYSTEINE 100 MG/ML
4 SOLUTION ORAL; RESPIRATORY (INHALATION) EVERY 4 HOURS
Status: DISCONTINUED | OUTPATIENT
Start: 2022-11-15 | End: 2022-11-15

## 2022-11-15 RX ORDER — METHYLPREDNISOLONE SODIUM SUCCINATE 125 MG/2ML
125 INJECTION, POWDER, LYOPHILIZED, FOR SOLUTION INTRAMUSCULAR; INTRAVENOUS
Status: ACTIVE | OUTPATIENT
Start: 2022-11-15 | End: 2022-11-16

## 2022-11-15 RX ORDER — METRONIDAZOLE 500 MG/100ML
500 INJECTION, SOLUTION INTRAVENOUS EVERY 8 HOURS
Status: DISCONTINUED | OUTPATIENT
Start: 2022-11-15 | End: 2022-11-18

## 2022-11-15 RX ORDER — BUDESONIDE 0.5 MG/2ML
500 INHALANT ORAL 2 TIMES DAILY
Status: DISCONTINUED | OUTPATIENT
Start: 2022-11-15 | End: 2022-11-18 | Stop reason: HOSPADM

## 2022-11-15 RX ORDER — DIPHENHYDRAMINE HYDROCHLORIDE 50 MG/ML
25 INJECTION INTRAMUSCULAR; INTRAVENOUS ONCE
Status: COMPLETED | OUTPATIENT
Start: 2022-11-15 | End: 2022-11-15

## 2022-11-15 RX ORDER — FUROSEMIDE 10 MG/ML
INJECTION INTRAMUSCULAR; INTRAVENOUS
Status: COMPLETED
Start: 2022-11-15 | End: 2022-11-15

## 2022-11-15 RX ORDER — IPRATROPIUM BROMIDE AND ALBUTEROL SULFATE 2.5; .5 MG/3ML; MG/3ML
1 SOLUTION RESPIRATORY (INHALATION) EVERY 4 HOURS
Status: DISCONTINUED | OUTPATIENT
Start: 2022-11-15 | End: 2022-11-15

## 2022-11-15 RX ORDER — FUROSEMIDE 10 MG/ML
40 INJECTION INTRAMUSCULAR; INTRAVENOUS ONCE
Status: COMPLETED | OUTPATIENT
Start: 2022-11-15 | End: 2022-11-15

## 2022-11-15 RX ORDER — DIPHENHYDRAMINE HYDROCHLORIDE 50 MG/ML
25 INJECTION INTRAMUSCULAR; INTRAVENOUS ONCE
Status: DISCONTINUED | OUTPATIENT
Start: 2022-11-15 | End: 2022-11-15

## 2022-11-15 RX ORDER — EPINEPHRINE 1 MG/ML
0.3 INJECTION, SOLUTION, CONCENTRATE INTRAVENOUS
Status: ACTIVE | OUTPATIENT
Start: 2022-11-15 | End: 2022-11-16

## 2022-11-15 RX ADMIN — FUROSEMIDE 40 MG: 10 INJECTION, SOLUTION INTRAMUSCULAR; INTRAVENOUS at 06:50

## 2022-11-15 RX ADMIN — MEROPENEM 1000 MG: 1 INJECTION, POWDER, FOR SOLUTION INTRAVENOUS at 16:12

## 2022-11-15 RX ADMIN — FUROSEMIDE: 10 INJECTION INTRAMUSCULAR; INTRAVENOUS at 09:32

## 2022-11-15 RX ADMIN — CEFTRIAXONE SODIUM 1000 MG: 1 INJECTION, POWDER, FOR SOLUTION INTRAMUSCULAR; INTRAVENOUS at 00:39

## 2022-11-15 RX ADMIN — VANCOMYCIN HYDROCHLORIDE 1250 MG: 10 INJECTION, POWDER, LYOPHILIZED, FOR SOLUTION INTRAVENOUS at 09:45

## 2022-11-15 RX ADMIN — INSULIN LISPRO 2 UNITS: 100 INJECTION, SOLUTION INTRAVENOUS; SUBCUTANEOUS at 12:08

## 2022-11-15 RX ADMIN — TOCILIZUMAB 400 MG: 20 INJECTION, SOLUTION, CONCENTRATE INTRAVENOUS at 20:15

## 2022-11-15 RX ADMIN — ENOXAPARIN SODIUM 30 MG: 100 INJECTION SUBCUTANEOUS at 09:43

## 2022-11-15 RX ADMIN — METRONIDAZOLE 500 MG: 500 INJECTION, SOLUTION INTRAVENOUS at 23:19

## 2022-11-15 RX ADMIN — DIPHENHYDRAMINE HYDROCHLORIDE 25 MG: 50 INJECTION INTRAMUSCULAR; INTRAVENOUS at 20:11

## 2022-11-15 RX ADMIN — ENOXAPARIN SODIUM 30 MG: 100 INJECTION SUBCUTANEOUS at 00:39

## 2022-11-15 RX ADMIN — Medication 10 ML: at 00:38

## 2022-11-15 RX ADMIN — METRONIDAZOLE 500 MG: 500 INJECTION, SOLUTION INTRAVENOUS at 07:06

## 2022-11-15 RX ADMIN — Medication 10 ML: at 09:56

## 2022-11-15 RX ADMIN — ENOXAPARIN SODIUM 30 MG: 100 INJECTION SUBCUTANEOUS at 20:18

## 2022-11-15 RX ADMIN — METRONIDAZOLE 500 MG: 500 INJECTION, SOLUTION INTRAVENOUS at 15:49

## 2022-11-15 RX ADMIN — Medication 10 ML: at 20:11

## 2022-11-15 ASSESSMENT — PAIN SCALES - WONG BAKER
WONGBAKER_NUMERICALRESPONSE: 0

## 2022-11-15 ASSESSMENT — PAIN SCALES - GENERAL
PAINLEVEL_OUTOF10: 0

## 2022-11-15 NOTE — PROGRESS NOTES
Excessive leaking from hassan. Attempted to irrigate with leakage noted. Inserted 20 fr with no change.  Re=Removed at this time

## 2022-11-15 NOTE — CONSULTS
Pulmonary/Critical Care Consult Note    CHIEF COMPLAINT: hypoxemia     ISSUES:    Aspiration event  Likely responsible for transient hypoxemia  Consistent with chemical pneumonitis rather than actual infection  CXR / CT clean  Wean FiO2    COVID-19  2019 novel coronavirus disease (COVID-19)  Unremarkable  Found by screening at SNF      Goals of Care 11/15/22  Met with son, and  (with dementia)  Explained situation / events  States clearly that would not pt to be intubated or undergo CPR  Therefore pt is  DNR-CCA DNI    All questions answered       __________________________________________________    Referring MD: Dr. Key Tuttle    Reason for Consult: hypoxemia      HISTORY OF PRESENT ILLNESS:   Alfonso Palafox is a 67 y.o. female with hx of Dementia, DM II, recent CVA, HTN,  presents with altered mental status. Had waht appears to be   aspiration event earlier today with hypoxemia and resultant RRT.   CXR / CT normal   O2 needs rapidly decreasing    Reviewed goals of care with son (and  who has dementia) - see above    ALLERGY:  Tetracyclines & related    FAMILY HISTORY:  Family History   Problem Relation Age of Onset    Heart Disease Mother     No Known Problems Father     Other Sister     Other Brother     Other Other        SOCIAL HISTORY:  Social History     Socioeconomic History    Marital status:      Spouse name: Not on file    Number of children: Not on file    Years of education: Not on file    Highest education level: Not on file   Occupational History    Not on file   Tobacco Use    Smoking status: Former     Packs/day: 1.00     Years: 44.00     Pack years: 44.00     Types: Cigarettes     Start date: 1965     Quit date: 2009     Years since quittin.9    Smokeless tobacco: Never   Vaping Use    Vaping Use: Never used   Substance and Sexual Activity    Alcohol use: Yes     Comment: seldom    Drug use: No    Sexual activity: Not on file   Other Topics Concern    Not on file Social History Narrative    Not on file     Social Determinants of Health     Financial Resource Strain: Not on file   Food Insecurity: Not on file   Transportation Needs: Not on file   Physical Activity: Not on file   Stress: Not on file   Social Connections: Not on file   Intimate Partner Violence: Not on file   Housing Stability: Not on file       MEDICAL HISTORY:  Past Medical History:   Diagnosis Date    Arthritis     right hand    Cancer ([facility] [address] 1985    uterus and cervix had radiation and implant    [facility]     Hyperlipidemia     Hypertension     Radiation cystitis     Urinary incontinence     complication due to uterine cancer    Urinary retention     caused by complications from uterine cancer       MEDICATIONS:   metroNIDAZOLE  500 mg IntraVENous Q8H    acetylcysteine  4 mL Inhalation BID    budesonide  500 mcg Nebulization BID    ipratropium-albuterol  1 ampule Inhalation Q4H WA    cefTRIAXone (ROCEPHIN) IV  1,000 mg IntraVENous Q24H    insulin lispro  0-4 Units SubCUTAneous TID WC    insulin lispro  0-4 Units SubCUTAneous Nightly    vitamin C  1,000 mg Oral Daily    Vitamin D  2,000 Units Oral Daily    zinc sulfate  50 mg Oral Daily    metoprolol succinate  25 mg Oral Daily    atorvastatin  80 mg Oral Nightly    sodium chloride flush  5-40 mL IntraVENous 2 times per day    aspirin  81 mg Oral Daily    clopidogrel  75 mg Oral Daily    donepezil  10 mg Oral Nightly    memantine  5 mg Oral BID    enoxaparin  30 mg SubCUTAneous BID      IV infusion builder 75 mL/hr at 11/14/22 2300    dextrose      sodium chloride       magnesium sulfate, sodium phosphate IVPB **OR** sodium phosphate IVPB, potassium chloride **OR** potassium alternative oral replacement **OR** potassium chloride, benzonatate, glucose, dextrose bolus **OR** dextrose bolus, glucagon (rDNA), dextrose, sodium chloride flush, sodium chloride, polyethylene glycol, acetaminophen **OR** acetaminophen    REVIEW OF SYSTEMS:  Unable to provide    PHYSICAL EXAM:  Vitals:    11/15/22 0932   BP: 139/88   Pulse: (!) 110   Resp: 20   Temp: 97.5 °F (36.4 °C)   SpO2:         O2 Flow Rate (L/min): 5 L/min  O2 Device: Nasal cannula    Due to the current efforts to prevent transmission of COVID-19 and also the need to preserve PPE for other caregivers, a face-to-face encounter with the patient was not performed. That being said, all relevant records and diagnostic tests were reviewed, including laboratory results and imaging. Please reference any relevant documentation elsewhere. Care will be coordinated with the primary service.       LABS:  WBC   Date Value Ref Range Status   11/15/2022 15.4 (H) 4.5 - 11.5 E9/L Final   11/14/2022 6.1 4.5 - 11.5 E9/L Final   11/13/2022 9.0 4.5 - 11.5 E9/L Final     Hemoglobin   Date Value Ref Range Status   11/15/2022 12.5 11.5 - 15.5 g/dL Final   11/14/2022 11.7 11.5 - 15.5 g/dL Final   11/13/2022 13.0 11.5 - 15.5 g/dL Final     Hematocrit   Date Value Ref Range Status   11/15/2022 41.2 34.0 - 48.0 % Final   11/14/2022 37.6 34.0 - 48.0 % Final   11/13/2022 42.1 34.0 - 48.0 % Final     MCV   Date Value Ref Range Status   11/15/2022 92.4 80.0 - 99.9 fL Final   11/14/2022 90.6 80.0 - 99.9 fL Final   11/13/2022 89.4 80.0 - 99.9 fL Final     Platelets   Date Value Ref Range Status   11/15/2022 339 130 - 450 E9/L Final   11/14/2022 267 130 - 450 E9/L Final   11/13/2022 314 130 - 450 E9/L Final     Sodium   Date Value Ref Range Status   11/15/2022 146 132 - 146 mmol/L Final   11/14/2022 145 132 - 146 mmol/L Final   11/13/2022 141 132 - 146 mmol/L Final     Potassium   Date Value Ref Range Status   11/15/2022 5.2 (H) 3.5 - 5.0 mmol/L Final   11/14/2022 4.8 3.5 - 5.0 mmol/L Final   11/13/2022 4.8 3.5 - 5.0 mmol/L Final     Potassium reflex Magnesium   Date Value Ref Range Status   10/16/2022 4.6 3.5 - 5.0 mmol/L Final   08/02/2021 4.9 3.5 - 5.0 mmol/L Final     Chloride   Date Value Ref Range Status   11/15/2022 112 (H) 98 - 107 mmol/L Final   11/14/2022 111 (H) 98 - 107 mmol/L Final   11/13/2022 105 98 - 107 mmol/L Final     CO2   Date Value Ref Range Status   11/15/2022 17 (L) 22 - 29 mmol/L Final   11/14/2022 17 (L) 22 - 29 mmol/L Final   11/13/2022 22 22 - 29 mmol/L Final     BUN   Date Value Ref Range Status   11/15/2022 41 (H) 6 - 23 mg/dL Final   11/14/2022 48 (H) 6 - 23 mg/dL Final   11/13/2022 50 (H) 6 - 23 mg/dL Final     Creatinine   Date Value Ref Range Status   11/15/2022 0.7 0.5 - 1.0 mg/dL Final   11/14/2022 0.9 0.5 - 1.0 mg/dL Final   11/13/2022 1.1 (H) 0.5 - 1.0 mg/dL Final     Glucose   Date Value Ref Range Status   11/15/2022 189 (H) 74 - 99 mg/dL Final   11/14/2022 202 mg/dL Final   11/14/2022 268 (H) 74 - 99 mg/dL Final     Calcium   Date Value Ref Range Status   11/15/2022 9.4 8.6 - 10.2 mg/dL Final   11/14/2022 9.2 8.6 - 10.2 mg/dL Final   11/13/2022 9.7 8.6 - 10.2 mg/dL Final     Total Protein   Date Value Ref Range Status   11/15/2022 7.0 6.4 - 8.3 g/dL Final   11/14/2022 6.6 6.4 - 8.3 g/dL Final   10/21/2022 5.7 (L) 6.4 - 8.3 g/dL Final     Albumin   Date Value Ref Range Status   11/15/2022 3.3 (L) 3.5 - 5.2 g/dL Final   11/14/2022 3.1 (L) 3.5 - 5.2 g/dL Final   10/21/2022 2.6 (L) 3.5 - 5.2 g/dL Final     Total Bilirubin   Date Value Ref Range Status   11/15/2022 0.6 0.0 - 1.2 mg/dL Final   11/14/2022 0.6 0.0 - 1.2 mg/dL Final   10/21/2022 0.4 0.0 - 1.2 mg/dL Final     Alkaline Phosphatase   Date Value Ref Range Status   11/15/2022 156 (H) 35 - 104 U/L Final   11/14/2022 150 (H) 35 - 104 U/L Final   10/21/2022 119 (H) 35 - 104 U/L Final     AST   Date Value Ref Range Status   11/15/2022 48 (H) 0 - 31 U/L Final   11/14/2022 42 (H) 0 - 31 U/L Final   10/21/2022 32 (H) 0 - 31 U/L Final     ALT   Date Value Ref Range Status   11/15/2022 50 (H) 0 - 32 U/L Final   11/14/2022 54 (H) 0 - 32 U/L Final   10/21/2022 25 0 - 32 U/L Final     Est, Glom Filt Rate   Date Value Ref Range Status   11/15/2022 >60 >=60 mL/min/1.73 Final     Comment:     Pediatric calculator link  [employer]. [email or web address]_calculatorped  Effective Oct 3, 2022  These results are not intended for use in patients  <25years of age. eGFR results are calculated without  a race factor using the 2021 CKD-EPI equation. Careful  clinical correlation is recommended, particularly when  comparing to results calculated using previous equations. The CKD-EPI equation is less accurate in patients with  extremes of muscle mass, extra-renal metabolism of  creatinine, excessive creatinine ingestion, or following  therapy that affects renal tubular secretion. 11/14/2022 >60 >=60 mL/min/1.73 Final     Comment:     Pediatric calculator link  [employer]. [email or web address]_calculatorped  Effective Oct 3, 2022  These results are not intended for use in patients  <25years of age. eGFR results are calculated without  a race factor using the 2021 CKD-EPI equation. Careful  clinical correlation is recommended, particularly when  comparing to results calculated using previous equations. The CKD-EPI equation is less accurate in patients with  extremes of muscle mass, extra-renal metabolism of  creatinine, excessive creatinine ingestion, or following  therapy that affects renal tubular secretion. 11/13/2022 53 >=60 mL/min/1.73 Final     Comment:     Pediatric calculator link  [employer]. [email or web address]/[email or web address]_calculatorped  Effective Oct 3, 2022  These results are not intended for use in patients  <25years of age. eGFR results are calculated without  a race factor using the 2021 CKD-EPI equation. Careful  clinical correlation is recommended, particularly when  comparing to results calculated using previous equations.   The CKD-EPI equation is less accurate in patients with  extremes of muscle mass, extra-renal metabolism of  creatinine, excessive creatinine ingestion, or following  therapy that affects renal tubular secretion. GFR    Date Value Ref Range Status   10/17/2022 >60  Final   10/16/2022 45  Final   08/05/2021 >60  Final     Magnesium   Date Value Ref Range Status   11/15/2022 2.0 1.6 - 2.6 mg/dL Final   11/14/2022 1.9 1.6 - 2.6 mg/dL Final   10/21/2022 1.5 (L) 1.6 - 2.6 mg/dL Final     Phosphorus   Date Value Ref Range Status   11/15/2022 2.7 2.5 - 4.5 mg/dL Final   11/14/2022 3.5 2.5 - 4.5 mg/dL Final   10/21/2022 2.2 (L) 2.5 - 4.5 mg/dL Final     Recent Labs     11/15/22  0655   HCO3 20.9*   BE -3.3*   O2SAT 99.6*       RADIOLOGY:  XR CHEST PORTABLE   Final Result   No acute process. CT CHEST W CONTRAST   Final Result   Mild ground-glass opacities in the lungs favoring atelectasis. There is an   age-indeterminate 8 mm ground-glass pulmonary nodule in the left lower lobe. This could be related to bronchiolitis or atelectasis, however see below for   follow-up recommendations. Prominent secretions within the left mainstem bronchus extending towards the   lower lobe segments. Otherwise no acute findings. RECOMMENDATIONS:   Recommend a non-contrast Chest CT at 3-6 months to confirm persistence. If   unchanged and the solid component remains < 6 mm, an annual non-contrast   Chest CT should be performed for 5 years. For persistent suspicious   (lobulated, spiculated, or containing cystic areas or solid component) part   solid nodules: If the solid component is 6-8 mm on follow-up, recommend   biopsy or resection; if the solid component is > 8 mm on follow-up, recommend   PET/CT, biopsy or resection. These guidelines do not apply to immunocompromised patients and patients with   cancer. Follow up in patients with significant comorbidities as clinically   warranted. For lung cancer screening, adhere to Lung-RADS guidelines. Reference: Radiology. 2017; 284(1):228-43. Pathology: 1.3 cm incidental right thyroid nodule. No follow-up imaging is recommended. Reference: J Am Fatuma Radiol. 2015 Feb;12(2): 143-50         XR CHEST PORTABLE   Final Result   No acute process. CT HEAD WO CONTRAST   Final Result   1. No acute intracranial abnormality. 2. Chronic ischemic changes. 3. Suspicious for normal pressure hydrocephalus.            (Independently reviewed by me)          Bjorn Fitzgerald M.D  Pulmonary & Critical Care  11/15/2022 12:44 PM

## 2022-11-15 NOTE — ACP (ADVANCE CARE PLANNING)
Advance Care Planning     Advance Care Planning Activator (Inpatient)  Conversation Note      Date of ACP Conversation: 11/15/2022     Conversation Conducted with: with patient's son Reno Butcher/primary hc decision maker    ACP Activator: Arielle Victor RN        Health Care Decision Maker:     Current Designated Health Care Decision Maker:     Primary Decision Maker: Josiah Butcher - Child - [phone]    Secondary Decision Maker: Edward Calderon - Spouse - [phone]    Supplemental (Other) Decision Maker: Taya Locke - Niece/Nephew - [phone]  Click here to complete Healthcare Decision Makers including section of the Healthcare Decision Maker Relationship (ie \"Primary\")      Care Preferences    Ventilation: \"If you were in your present state of health and suddenly became very ill and were unable to breathe on your own, what would your preference be about the use of a ventilator (breathing machine) if it were available to you? \"      Would the patient desire the use of ventilator (breathing machine)?: yes    \"If your health worsens and it becomes clear that your chance of recovery is unlikely, what would your preference be about the use of a ventilator (breathing machine) if it were available to you? \"     Would the patient desire the use of ventilator (breathing machine)?: No      Resuscitation  \"CPR works best to restart the heart when there is a sudden event, like a heart attack, in someone who is otherwise healthy. Unfortunately, CPR does not typically restart the heart for people who have serious health conditions or who are very sick. \"    \"In the event your heart stopped as a result of an underlying serious health condition, would you want attempts to be made to restart your heart (answer \"yes\" for attempt to resuscitate) or would you prefer a natural death (answer \"no\" for do not attempt to resuscitate)? \" yes       [] Yes   [x] No   Educated Patient / Reyes Milton regarding differences between Advance Directives and portable DNR orders.     Length of ACP Conversation in minutes:  5 minutes    Conversation Outcomes:  [x] ACP discussion completed  [] Existing advance directive reviewed with patient; no changes to patient's previously recorded wishes  [] New Advance Directive completed  [] Portable Do Not Rescitate prepared for Provider review and signature  [] POLST/POST/MOLST/MOST prepared for Provider review and signature      Follow-up plan:    [] Schedule follow-up conversation to continue planning  [] Referred individual to Provider for additional questions/concerns   [] Advised patient/agent/surrogate to review completed ACP document and update if needed with changes in condition, patient preferences or care setting    [x] This note routed to one or more involved healthcare providers

## 2022-11-15 NOTE — PROGRESS NOTES
Pharmacy Consultation Note    Consult date: 11/15/2022  Physician/provider: Dr. Rosamaria Costa has been consulted to evaluate criteria for COVID therapy. Based on the algorithm, the patient DOES currently meet [facility] P&T approved Covid-19 treatment criteria for Tocilizumab.     Thank you for the consult,  Reilly Garcia, NavD, BCPS 11/15/2022 4:51 PM   [phone]

## 2022-11-15 NOTE — PROGRESS NOTES
On AM assessment, pt was experiencing upper respiratory gurgling with RR=38. Continuous PIV fluids were stopped and VS revealed an SpO2 of 68% on room air. Suction was obtained and a RRT was called. Pt began vomiting during suctioning. Aspiration is suspected.

## 2022-11-15 NOTE — PROGRESS NOTES
Internal Medicine Progress Note    [facility]    Renae Perry. Marysol Hudson., CAPRICEOLONDON Bingham D.O., NOLA Bower D.O. Melvina Lane, MSN, APRN, NP-C  Gilbert Bailey. Otilio Aguirre, MSN, APRN-CNP     Primary Care Physician: Del De La Torre DO   Admitting Physician:  Rivas Pompa DO  Admission date and time: 11/13/2022  6:47 PM    Room:  [address] [place] [address]  Admitting diagnosis: Somnolence [R40.0]  Sepsis without acute organ dysfunction, due to unspecified organism [facility]) [A41.9]  2019 novel coronavirus disease (COVID-19) [U07.1]  COVID-19 virus infection [U07.1]    Patient Name: Neal Pike  MRN: [id number]    Date of Service: 11/15/2022     Subjective:  Phil De Leon is a 67 y.o. female who was seen and examined today,11/15/2022, at the bedside. RRT was called earlier this morning in the setting of respiratory distress. Substantial suctioning was necessary and her respiratory status has improved. She remains lethargic and awakens to verbal and painful stimulus but falls back to sleep. We discussed transferring to a higher level of care. The palliative care team provided consultation yesterday and the patient's  continues to request all aggressive intervention. Family meeting is being held. She did not do well with the speech therapy team yesterday and is currently n.p.o. She is maintained on IV fluid resuscitation. Review of System:   Unable to be reliably obtained from the patient secondary to altered mental status/acuity    Physical Exam:  No intake/output data recorded. Intake/Output Summary (Last 24 hours) at 11/15/2022 0806  Last data filed at 11/15/2022 0038  Gross per 24 hour   Intake 20 ml   Output 625 ml   Net -605 ml   I/O last 3 completed shifts:   In: 20 [I.V.:20]  Out: [place] 1960  Patient Vitals for the past 96 hrs (Last 3 readings):   Weight   11/14/22 1827 115 lb (52.2 kg)   11/13/22 1977 115 lb (52.2 kg)     Vital Signs: Blood pressure (!) 172/73, pulse 100, temperature 97.8 °F (36.6 °C), temperature source Axillary, resp. rate 20, height 5' 3\" (1.6 m), weight 115 lb (52.2 kg), SpO2 95 %, not currently breastfeeding. General appearance:  Ongoing lethargy. She awakens briefly but falls back to sleep. Head:  Normocephalic. No masses, lesions or tenderness. Eyes:  PERRLA. EOMI. Sclera clear. Buccal mucosa dry  ENT:  Ears normal. Mucosa normal.  Nasal cannula oxygen is in place. Extremely dry mucous membranes. Neck:    Supple. Trachea midline. No thyromegaly. No JVD. No bruits. Heart:    Rhythm regular. Rate controlled. S1 and S2. Systolic murmur  Lungs:    Symmetrical.  Shallow and tachypneic pattern of respiration, diminished aeration throughout. Abdomen:   Soft. Non-tender. Non-distended. Bowel sounds positive. No organomegaly or masses. No pain on palpation. Extremities:    Peripheral pulses present. No significant pitting peripheral edema. No ulcers. No cyanosis. No clubbing. Neurologic:    Lethargic, arouses to verbal and noxious stimuli. Acute on chronic ill appearance without distress. Generally weak without focal component focal deficit. Psych:   Behavior is without agitation. Musculoskeletal:   No unilateral joint edema, erythema or warmth. Gait not assessed. Integumentary:  Refer to clinical documentation regarding this wounds of present, otherwise no rashes  Skin normal color and texture.   Genitalia/Breast:  Deferred    Medication:  Scheduled Meds:   metroNIDAZOLE  500 mg IntraVENous Q8H    furosemide        acetylcysteine  4 mL Inhalation BID    budesonide  500 mcg Nebulization BID    ipratropium-albuterol  1 ampule Inhalation Q4H WA    cefTRIAXone (ROCEPHIN) IV  1,000 mg IntraVENous Q24H    insulin lispro  0-4 Units SubCUTAneous TID WC    insulin lispro  0-4 Units SubCUTAneous Nightly    vitamin C  1,000 mg Oral Daily    Vitamin D  2,000 Units Oral Daily    zinc sulfate  50 mg Oral Daily metoprolol succinate  25 mg Oral Daily    atorvastatin  80 mg Oral Nightly    sodium chloride flush  5-40 mL IntraVENous 2 times per day    aspirin  81 mg Oral Daily    clopidogrel  75 mg Oral Daily    donepezil  10 mg Oral Nightly    memantine  5 mg Oral BID    enoxaparin  30 mg SubCUTAneous BID     Continuous Infusions:   IV infusion builder 75 mL/hr at 11/14/22 2300    dextrose      sodium chloride         Objective Data:  CBC with Differential:    Lab Results   Component Value Date/Time    WBC 6.1 11/14/2022 06:05 AM    RBC 4.15 11/14/2022 06:05 AM    HGB 11.7 11/14/2022 06:05 AM    HCT 37.6 11/14/2022 06:05 AM     11/14/2022 06:05 AM    MCV 90.6 11/14/2022 06:05 AM    MCH 28.2 11/14/2022 06:05 AM    MCHC 31.1 11/14/2022 06:05 AM    RDW 14.1 11/14/2022 06:05 AM    SEGSPCT 61 09/01/2012 08:15 AM    LYMPHOPCT 9.5 11/14/2022 06:05 AM    MONOPCT 3.3 11/14/2022 06:05 AM    BASOPCT 0.0 11/14/2022 06:05 AM    MONOSABS 0.20 11/14/2022 06:05 AM    LYMPHSABS 0.58 11/14/2022 06:05 AM    EOSABS 0.00 11/14/2022 06:05 AM    BASOSABS 0.00 11/14/2022 06:05 AM     BMP:    Lab Results   Component Value Date/Time     11/14/2022 06:05 AM    K 4.8 11/14/2022 06:05 AM    K 4.6 10/16/2022 01:40 PM     11/14/2022 06:05 AM    CO2 17 11/14/2022 06:05 AM    BUN 48 11/14/2022 06:05 AM    LABALBU 3.1 11/14/2022 06:05 AM    CREATININE 0.9 11/14/2022 06:05 AM    CALCIUM 9.2 11/14/2022 06:05 AM    GFRAA >60 10/17/2022 05:17 AM    LABGLOM >60 11/14/2022 06:05 AM    GLUCOSE 202 11/14/2022 03:33 PM       COVID inflammatory markers  Recent Labs     11/14/22  0605   DDIMER 2353     Recent Labs     11/14/22  0605   FERRITIN 463     Recent Labs     11/14/22  0605   CRP 6.2*       Assessment:  Acute COVID-19 infection with failure to thrive symptomatology   Acute metabolic encephalopathy with moderate to severe underlying dementia  Recent right thalamic lacunar stroke with small nonhemorrhagic penumbra  Sepsis (POA with AMS, fever and HR) with urinary tract infection and COVID-19 infection  Recent CT findings of 1.5 x 2.9 cm rounded opacity in the right lung base-follow-up imaging to evaluate for resolution and underlying mass presence   Suspected aspiration pneumonia  Poorly controlled non-insulin-dependent diabetes mellitus type 2  Poorly controlled essential hypertension  History of uterine and cervical cancer with prior radiation    Plan:   Rapid response was called early this morning in the setting of respiratory lability. She had a difficult time maintaining secretions and required substantial suctioning. This has resulted in improvement in her respiratory status and arterial blood gases have been reviewed. Mucolytic therapy has been added. Nebulized respiratory medications and maximal COVID protocol is being employed. With concern for aspiration, antibiotics have been broadened as we await final culture results. We appreciate the input from the palliative care team we have discussed CODE STATUS with the patient's  and family. Additional conversations will be had moving forward. She will continue working with the therapy teams and she is currently NPO. Fluid resuscitation is being undertaken. Her condition remains guarded and her long-term prognosis remains poor. We will transfer the patient to a higher level of care onto the [facility] floor. More than 50% of my  time was spent at the bedside counseling/coordinating care with the patient and/or family with face to face contact. This time was spent reviewing notes and laboratory data as well as instructing and counseling the patient. Time I spent with the family or surrogate(s) is included only if the patient was incapable of providing the necessary information or participating in medical decisions. I also discussed the differential diagnosis and all of the proposed management plans with the patient and individuals accompanying the patient.     Lum Must requires this high level of physician care and nursing on the IMC/Telemetry unit due the complexity of decision management and chance of rapid decline or death. Continued cardiac monitoring and higher level of nursing are required. I am readily available for any further decision-making and intervention.      Titus Robbins DO  11/15/2022  8:06 AM

## 2022-11-15 NOTE — CONSULTS
[address] [place] [address], [address] [place] [address]  Phone [phone]   [phone]      Admit Date: 2022  6:47 PM  Pt Name: Perlita Goldman  MRN: [id number]  : 1950  Reason for Consult:    Chief Complaint   Patient presents with    Altered Mental Status     Pt arrives via EMT with c/o worsening AMS. Pt has baseline of A &O x's 1 and per nursing home report she is now not responding to herself. Pt's LSN was at 1630 per report. Requesting Physician:  Venu Reaves DO  PCP: Nandini Dhaliwal DO  History Obtained From:  patient, chart   ID consulted for Somnolence [R40.0]  Sepsis without acute organ dysfunction, due to unspecified organism [facility]) [A41.9]  2019 novel coronavirus disease (COVID-19) [U07.1]  COVID-19 virus infection [U07.1]  COVID-19 [U07.1]  on hospital day 0  CHIEF COMPLAINT       Chief Complaint   Patient presents with    Altered Mental Status     Pt arrives via EMT with c/o worsening AMS. Pt has baseline of A &O x's 1 and per nursing home report she is now not responding to herself. Pt's LSN was at 1630 per report. HISTORYOF PRESENT ILLNESS   Perlita Goldman is a 67 y.o. female who presents with   has a past medical history of Arthritis, Cancer ([address] Diabetes mellitus ([address] Hyperlipidemia, Hypertension, Radiation cystitis, Urinary incontinence, and Urinary retention.    ED TRIAGEVITALS  BP: 139/88, Temp: 97.5 °F (36.4 °C), Heart Rate: (!) 110, Resp: 20, SpO2: 95 %  HPI:  ID was consulted on 11/15/22 for infection management  Pt presented to ER on 2022 with diagnosis of  Somnolence [R40.0]  Sepsis without acute organ dysfunction, due to unspecified organism ([facility] [address] [A41.9]  2019 novel coronavirus disease (COVID-19) [U07.1]  COVID-19 virus infection [U07.1]  COVID-19 [U07.1]    S/p RRT was gurgle had emesis placced on O2  Husban/son/grand nephew present and updated   Currently on  metronidazole (FLAGYL) 500 mg in 0.9% NaCl 100 mL IVPB premix, Q8H  acetylcysteine (MUCOMYST) 10 % solution 400 mg, BID  budesonide (PULMICORT) nebulizer suspension 500 mcg, BID  ipratropium-albuterol (DUONEB) nebulizer solution 1 ampule, Q4H WA  cefTRIAXone (ROCEPHIN) 1,000 mg in sterile water 10 mL IV syringe, Q24H  insulin lispro (HUMALOG) injection vial 0-4 Units, TID WC  insulin lispro (HUMALOG) injection vial 0-4 Units, Nightly  magnesium sulfate 1000 mg in dextrose 5% 100 mL IVPB, PRN  sodium phosphate 9 mmol in sodium chloride 0.9 % 250 mL IVPB, PRN   Or  sodium phosphate 15 mmol in sodium chloride 0.9 % 250 mL IVPB, PRN  potassium chloride (KLOR-CON M) extended release tablet 40 mEq, PRN   Or  potassium bicarb-citric acid (EFFER-K) effervescent tablet 40 mEq, PRN   Or  potassium chloride 10 mEq/100 mL IVPB (Peripheral Line), PRN  benzonatate (TESSALON) capsule 200 mg, TID PRN  ascorbic acid (VITAMIN C) tablet 1,000 mg, Daily  Vitamin D (CHOLECALCIFEROL) tablet 2,000 Units, Daily  zinc sulfate (ZINCATE) capsule 50 mg, Daily  metoprolol succinate (TOPROL XL) extended release tablet 25 mg, Daily  potassium chloride 40 mEq, sodium bicarbonate 50 mEq in sodium chloride 0.45 % 1,000 mL infusion, Continuous  atorvastatin (LIPITOR) tablet 80 mg, Nightly  glucose chewable tablet 16 g, PRN  dextrose bolus 10% 125 mL, PRN   Or  dextrose bolus 10% 250 mL, PRN  glucagon (rDNA) injection 1 mg, PRN  dextrose 10 % infusion, Continuous PRN  sodium chloride flush 0.9 % injection 5-40 mL, 2 times per day  sodium chloride flush 0.9 % injection 5-40 mL, PRN  0.9 % sodium chloride infusion, PRN  polyethylene glycol (GLYCOLAX) packet 17 g, Daily PRN  acetaminophen (TYLENOL) tablet 650 mg, Q6H PRN   Or  acetaminophen (TYLENOL) suppository 650 mg, Q6H PRN  aspirin chewable tablet 81 mg, Daily  clopidogrel (PLAVIX) tablet 75 mg, Daily  donepezil (ARICEPT) tablet 10 mg, Nightly  memantine (NAMENDA) tablet 5 mg, BID  enoxaparin Sodium (LOVENOX) injection 30 mg, BID        REVIEW OF SYSTEMS     CONSTITUTIONAL:   Poor historian    Medications Prior to Admission: Multiple Vitamins-Minerals (THERAPEUTIC MULTIVITAMIN-MINERALS) tablet, Take 1 tablet by mouth daily  vitamin C (ASCORBIC ACID) 500 MG tablet, Take 500 mg by mouth 2 times daily  vitamin D (CHOLECALCIFEROL) 25 MCG (1000 UT) TABS tablet, Take 1,000 Units by mouth daily  zinc sulfate (ZINCATE) 220 (50 Zn) MG capsule, Take 50 mg by mouth daily  atorvastatin (LIPITOR) 80 MG tablet, Take 80 mg by mouth nightly  aspirin 81 MG EC tablet, Take 81 mg by mouth daily  menthol-zinc oxide (CALMOSEPTINE) 0.44-20.6 % OINT ointment, Apply topically 2 times daily as needed (irritation) Max 30 ml per day. Apply to buttocks sacral area .   polyethylene glycol (GLYCOLAX) 17 g packet, Take 17 g by mouth daily as needed for Constipation  clopidogrel (PLAVIX) 75 MG tablet, Take 1 tablet by mouth daily  memantine (NAMENDA) 5 MG tablet, Take 1 tablet by mouth 2 times daily  donepezil (ARICEPT) 10 MG tablet, Take 1 tablet by mouth nightly  metFORMIN (GLUCOPHAGE) 1000 MG tablet, TAKE 1 TABLET BY MOUTH TWICE A DAY WITH MEALS  diclofenac sodium (VOLTAREN) 1 % GEL, Apply 4 g topically 4 times daily  insulin lispro (HUMALOG) 100 UNIT/ML injection vial, Inject 0-6 Units into the skin 3 times daily (with meals)  CURRENT MEDICATIONS     Current Facility-Administered Medications:     metronidazole (FLAGYL) 500 mg in 0.9% NaCl 100 mL IVPB premix, 500 mg, IntraVENous, Q8H, Narciso Julian MD, Stopped at 11/15/22 5925    acetylcysteine (MUCOMYST) 10 % solution 400 mg, 4 mL, Inhalation, BID, Dawson Torrez DO    budesonide (PULMICORT) nebulizer suspension 500 mcg, 500 mcg, Nebulization, BID, Lizy Horowitz DO    ipratropium-albuterol (DUONEB) nebulizer solution 1 ampule, 1 ampule, Inhalation, Q4H Temo DUARTE DO    cefTRIAXone (ROCEPHIN) 1,000 mg in sterile water 10 mL IV syringe, 1,000 mg, IntraVENous, Q24H, Ismail CHILO Slade DO, 1,000 mg at 11/15/22 0039    insulin lispro (HUMALOG) injection vial 0-4 Units, 0-4 Units, SubCUTAneous, TID CJ, Heidy Patel, DO, 2 Units at 11/15/22 1208    insulin lispro (HUMALOG) injection vial 0-4 Units, 0-4 Units, SubCUTAneous, Nightly, Ismail U Berlin, DO    magnesium sulfate 1000 mg in dextrose 5% 100 mL IVPB, 1,000 mg, IntraVENous, PRN, Allan Sony APRN - CNP    sodium phosphate 9 mmol in sodium chloride 0.9 % 250 mL IVPB, 9 mmol, IntraVENous, PRN **OR** sodium phosphate 15 mmol in sodium chloride 0.9 % 250 mL IVPB, 15 mmol, IntraVENous, PRN, Leiter Sony, APRN - CNP    potassium chloride (KLOR-CON M) extended release tablet 40 mEq, 40 mEq, Oral, PRN **OR** potassium bicarb-citric acid (EFFER-K) effervescent tablet 40 mEq, 40 mEq, Oral, PRN **OR** potassium chloride 10 mEq/100 mL IVPB (Peripheral Line), 10 mEq, IntraVENous, PRN, Allan HELENA CardozaN - CNP    benzonatate (TESSALON) capsule 200 mg, 200 mg, Oral, TID PRN, Allan HELENA CardozaN - CNP    ascorbic acid (VITAMIN C) tablet 1,000 mg, 1,000 mg, Oral, Daily, ELIO Minor - CNP    Vitamin D (CHOLECALCIFEROL) tablet 2,000 Units, 2,000 Units, Oral, Daily, Allan Cardoza APRN - CNP    zinc sulfate (ZINCATE) capsule 50 mg, 50 mg, Oral, Daily, Allan Cardoza APRN - CNP    metoprolol succinate (TOPROL XL) extended release tablet 25 mg, 25 mg, Oral, Daily, ELIO Minor - CNP    potassium chloride 40 mEq, sodium bicarbonate 50 mEq in sodium chloride 0.45 % 1,000 mL infusion, , IntraVENous, Continuous, ELIO Minor - CNP, Last Rate: 75 mL/hr at 11/14/22 2300, New Bag at 11/14/22 2300    atorvastatin (LIPITOR) tablet 80 mg, 80 mg, Oral, Nightly, Ismail U Berlin, DO    glucose chewable tablet 16 g, 4 tablet, Oral, PRN, Ismail U Berlin, DO    dextrose bolus 10% 125 mL, 125 mL, IntraVENous, PRN **OR** dextrose bolus 10% 250 mL, 250 mL, IntraVENous, PRN, Ismail U Berlin, DO    glucagon (rDNA) injection 1 mg, 1 mg, SubCUTAneous, PRN, Ismail U Berlin, DO    dextrose 10 % infusion, , IntraVENous, Continuous PRN, Berdine Minium U Berlin, DO    sodium chloride flush 0.9 % injection 5-40 mL, 5-40 mL, IntraVENous, 2 times per day, Lorrin Elders, DO, 10 mL at 11/15/22 0956    sodium chloride flush 0.9 % injection 5-40 mL, 5-40 mL, IntraVENous, PRN, Lorrin Elders, DO    0.9 % sodium chloride infusion, , IntraVENous, PRN, Ismail U Berlin, DO    polyethylene glycol (GLYCOLAX) packet 17 g, 17 g, Oral, Daily PRN, Lorrin Elders, DO    acetaminophen (TYLENOL) tablet 650 mg, 650 mg, Oral, Q6H PRN **OR** acetaminophen (TYLENOL) suppository 650 mg, 650 mg, Rectal, Q6H PRN, Lorrin Juanitos, DO    aspirin chewable tablet 81 mg, 81 mg, Oral, Daily, Ismail U Berlin, DO    clopidogrel (PLAVIX) tablet 75 mg, 75 mg, Oral, Daily, Ismail U Berlin, DO    donepezil (ARICEPT) tablet 10 mg, 10 mg, Oral, Nightly, Ismail U Berlin, DO    memantine (NAMENDA) tablet 5 mg, 5 mg, Oral, BID, Ismail U Berlin, DO    enoxaparin Sodium (LOVENOX) injection 30 mg, 30 mg, SubCUTAneous, BID, Ismail U Berlin, DO, 30 mg at 11/15/22 4674  ALLERGIES     Tetracyclines & related  Immunization History   Administered Date(s) Administered    COVID-19, PFIZER PURPLE top, DILUTE for use, (age 15 y+), 30mcg/0.3mL 05/20/2021, 06/10/2021    Influenza A (V2N5-58) Vaccine PF IM 12/03/2009    Influenza, High Dose (Fluzone 65 yrs and older) 10/14/2015, 11/07/2016, 10/31/2017, 09/23/2018, 10/11/2019, 11/05/2020    Pneumococcal Conjugate 13-valent Edwyna Ao) 11/07/2016, 06/07/2017    Pneumococcal Polysaccharide ([id number]) 12/02/2019      Internal Administration   First Dose COVID-19, PFIZER PURPLE top, DILUTE for use, (age 15 y+), 30mcg/0.3mL  05/20/2021   Second Dose COVID-19, PFIZER PURPLE top, DILUTE for use, (age 15 y+), 30mcg/0.3mL   06/10/2021       Last COVID Lab SARS-CoV-2, NAAT (no units)   Date Value   11/13/2022 DETECTED (A)          PAST MEDICAL HISTORY     Past Medical History:   Diagnosis Date    Arthritis     right hand    Cancer ([facility] [address] 1985    uterus and cervix had radiation and implant    Diabetes mellitus ([facility] [address]     Hyperlipidemia     Hypertension     Radiation cystitis     Urinary incontinence     complication due to uterine cancer    Urinary retention     caused by complications from uterine cancer     SURGICAL HISTORY       Past Surgical History:   Procedure Laterality Date    CYSTOSCOPY N/A 2019    CYSTOSCOPY BOTOX  INJECTION 200 UNITS performed by Grayson Russell DO at [address] [facility] removed    HYSTERECTOMY (CERVIX STATUS UNKNOWN)  1985    [place] CYSTOURETHROSCOPY INJ CHEMODENERVATION BLADDER N/A 2018    CYSTOSCOPY  BOTOX INJECTION (200 UNITS) performed by Josselyn Bahena MD at [facility]       Family History   Problem Relation Age of Onset    Heart Disease Mother     No Known Problems Father     Other Sister     Other Brother     Other Other      SOCIAL HISTORY       Social History     Socioeconomic History    Marital status:      Spouse name: None    Number of children: None    Years of education: None    Highest education level: None   Tobacco Use    Smoking status: Former     Packs/day: 1.00     Years: 44.00     Pack years: 44.00     Types: Cigarettes     Start date: 1965     Quit date: 2009     Years since quittin.9    Smokeless tobacco: Never   Vaping Use    Vaping Use: Never used   Substance and Sexual Activity    Alcohol use: Yes     Comment: seldom    Drug use: No     PHYSICAL EXAM        Vitals:    Vitals:    11/15/22 0645 11/15/22 0727 11/15/22 0745 11/15/22 0932   BP: (!) 158/72  (!) 172/73 139/88   Pulse: (!) 103  100 (!) 110   Resp:   20 20   Temp:   97.8 °F (36.6 °C) 97.5 °F (36.4 °C)   TempSrc:   Axillary Axillary   SpO2: 100% 99% 95%    Weight:       Height:            CONSTITUTIONAL:  thin pale no apparent distress, and appears stated age  ENT:  Normocephalic, without obvious abnormality, atraumatic,   NECK:  Supple, symmetrical, trachea midline  LUNGS:  No increased work of breathing, good air exchange, dec o auscultation bilaterally, no crackles or wheezing  CARDIOVASCULAR:  Normal apical impulse, regular rate and rhythm, normal S1 and S2, no S3 or S4, and no murmur noted  ABDOMEN:  No scars, normal bowel sounds, soft, non-distended, non-tender, no masses palpated   CHEST/BREASTS:  Breasts symmetrical   GENITAL/URINARY:  hassan   MUSCULOSKELETAL:  There is redness, warmth right knee, atrophy   NEUROLOGIC:  Arouses  SKIN:  no bruising or bleeding and normal skin color, texture, turgor       Peripheral Intravenous Line:  Peripheral IV 11/15/22 Left; Anterior Basilic (Active)   Site Assessment Clean, dry & intact 11/15/22 1042   Line Status Infusing;Blood return noted;Brisk blood return;Flushed;Normal saline locked 11/15/22 1042   Phlebitis Assessment No symptoms 11/15/22 1042   Infiltration Assessment 0 11/15/22 1042   Dressing Status New dressing applied 11/15/22 1042   Dressing Type Transparent 11/15/22 1042   Dressing Intervention New 11/15/22 1042        Drain(s):  Urinary Catheter 11/13/22 Hassan-Temperature (Active)   $ Urethral catheter insertion $ Not inserted for procedure 11/14/22 2300   Catheter Indications Prolonged immobilization (e.g. unstable thoracic or lumbar spine, multiple traumatic injuries such as pelvic fractures) 11/14/22 2300   Site Assessment Moist;[place] 11/14/22 2300   Urine Color Nina 11/14/22 2300   Urine Appearance Cloudy 11/14/22 2300   Collection Container Standard 11/14/22 2300   Securement Method Other (Comment) 11/14/22 2300   Catheter Care Completed Yes 11/14/22 2300   Catheter Best Practices  Drainage tube clipped to bed;Catheter secured to thigh; Tamper seal intact; Bag below bladder;Bag not on floor; Lack of dependent loop in tubing;Drainage bag less than half full 11/14/22 2300   Status Draining;Patent 11/14/22 2300   Output (mL) 200 mL 11/15/22 0808          DIAGNOSTIC RESULTS   RADIOLOGY:   XR HIP LEFT (2-3 VIEWS)    Result Date: 10/16/2022  EXAMINATION: TWO XRAY VIEWS OF THE LEFT HIP 10/16/2022 3:29 pm COMPARISON: None. HISTORY: ORDERING SYSTEM PROVIDED HISTORY: ams TECHNOLOGIST PROVIDED HISTORY: Reason for exam:->ams FINDINGS: Osteopenia is present. No acute fracture or subluxation identified. There is subchondral sclerosis of the left femoral head which could represent AVN. No evidence of collapse of the femoral head articular margin. Pelvic ring structure appears intact. Mild degenerative changes of the right hip. Sclerosis was noted in the femoral heads on a prior CT dated 08/02/2021     1. No acute fracture or subluxation. 2.  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 CT HEAD WO CONTRAST    Result Date: 11/13/2022  EXAMINATION: CT OF THE HEAD WITHOUT CONTRAST  11/13/2022 7:21 pm TECHNIQUE: CT of the head was performed without the administration of intravenous contrast. Automated exposure control, iterative reconstruction, and/or weight based adjustment of the mA/kV was utilized to reduce the radiation dose to as low as reasonably achievable. COMPARISON: 10-22 HISTORY: ORDERING SYSTEM PROVIDED HISTORY: Evaluate intracranial abnormality TECHNOLOGIST PROVIDED HISTORY: Has a \"code stroke\" or \"stroke alert\" been called? ->No Reason for exam:->Evaluate intracranial abnormality Decision Support Exception - unselect if not a suspected or confirmed emergency medical condition->Emergency Medical Condition (MA) FINDINGS: BRAIN/VENTRICLES: There is no acute intracranial hemorrhage, mass effect or midline shift. No abnormal extra-axial fluid collection. The gray-white differentiation is maintained without evidence of an acute infarct. Ventricular system is distended. There is moderate to severe chronic small vessel ischemic white matter disease. There are remote basal ganglia lacunar infarcts, recent at the thalamus is smaller size.   Ventricular system distension again noted suggesting normal pressure hydrocephalus ORBITS: The visualized portion images are provided for review. Automated exposure control, iterative reconstruction, and/or weight based adjustment of the mA/kV was utilized to reduce the radiation dose to as low as reasonably achievable. COMPARISON: Chest x-ray from November 13, 2022 HISTORY: ORDERING SYSTEM PROVIDED HISTORY: Recent mass identified with need for follow-up imaging, further characterization. Current COVID-19 infection with need to evaluate for underlying occult pneumonia TECHNOLOGIST PROVIDED HISTORY: Reason for exam:->Recent mass identified with need for follow-up imaging, further characterization. Current COVID-19 infection with need to evaluate for underlying occult pneumonia FINDINGS: Mediastinum: The heart is not enlarged. No pericardial effusion. Normal caliber thoracic aorta. 13 mm inferior right thyroid nodule. Additional smaller nodules or cysts in the right thyroid. No lymphadenopathy identified. Lungs/pleura: Mild right basilar atelectasis. Subtle interstitial lung markings. Pulmonary nodule which is sub solid in the left lower lobe measures 8 mm. No pleural effusion, pneumothorax or focal consolidation otherwise. Trachea is clear. Secretions in the left mainstem bronchus extending towards the left lower lobe segments. Upper Abdomen: No acute findings within the visualized upper abdomen. Soft Tissues/Bones: No acute findings within the soft tissues or osseous structures. Mild ground-glass opacities in the lungs favoring atelectasis. There is an age-indeterminate 8 mm ground-glass pulmonary nodule in the left lower lobe. This could be related to bronchiolitis or atelectasis, however see below for follow-up recommendations. Prominent secretions within the left mainstem bronchus extending towards the lower lobe segments. Otherwise no acute findings. RECOMMENDATIONS: Recommend a non-contrast Chest CT at 3-6 months to confirm persistence.  If unchanged and the solid component remains < 6 mm, an annual non-contrast Chest CT should be performed for 5 years. For persistent suspicious (lobulated, spiculated, or containing cystic areas or solid component) part solid nodules: If the solid component is 6-8 mm on follow-up, recommend biopsy or resection; if the solid component is > 8 mm on follow-up, recommend PET/CT, biopsy or resection. These guidelines do not apply to immunocompromised patients and patients with cancer. Follow up in patients with significant comorbidities as clinically warranted. For lung cancer screening, adhere to Lung-RADS guidelines. Reference: Radiology. 2017; 284(1):228-43. Pathology: 1.3 cm incidental right thyroid nodule. No follow-up imaging is recommended. Reference: J Am Fatuma Radiol. 2015 Feb;12(2): 143-50     CT ABDOMEN PELVIS W IV CONTRAST Additional Contrast? Oral    Result Date: 10/18/2022  EXAMINATION: CT OF THE ABDOMEN AND PELVIS WITH CONTRAST 10/18/2022 12:46 pm TECHNIQUE: CT of the abdomen and pelvis was performed with the administration of intravenous contrast. Multiplanar reformatted images are provided for review. Automated exposure control, iterative reconstruction, and/or weight based adjustment of the mA/kV was utilized to reduce the radiation dose to as low as reasonably achievable. COMPARISON: None. HISTORY: ORDERING SYSTEM PROVIDED HISTORY: Known OBGYN cancer - assess progression TECHNOLOGIST PROVIDED HISTORY: Reason for exam:->Known OBGYN cancer - assess progression Additional Contrast?->Oral FINDINGS: Lower Chest: A rounded opacity which is pleural based is noted in the right lung base measuring 1.5 by 2.9 cm. No pneumothorax. No pleural effusion. No pericardial effusion. Organs: No abnormal enhancement of the liver and spleen. Portal vein is patent. Pancreas enhances as expected. No peripancreatic fluid or fat stranding is noted. The gallbladder is normal size. The adrenal glands normal in appearance. The kidneys enhance as expected.  There is been interval decrease in the degree of calyceal distension within the right kidney. The left-sided hydronephrosis has resolved. Atheromatous plaque is noted which has progressed within the aorta compared to the prior study. Reference image 48 of the axial images. The origin of the superior mesenteric artery, celiac artery and splenic artery are patent. GI/Bowel: The large and small bowel caliber is within normal limits. No abscess or free air is noted. Subcutaneous air within the left lower abdominal wall noted compatible with injection. Pelvis: Hyperdensities layering within the gallbladder compatible with multiple bladder stones, reference image 154. Stool is noted in the rectum. Slight thickening of the distal rectosigmoid colon is noted. No evidence for abscess or free air. No pathologically enlarged retroperitoneal lymphadenopathy. No inguinal lymphadenopathy noted. Curvature of the lumbar spine with concavity to the right centered at the L2 level is noted. Straightening of the natural lumbar lordosis on the lateral view is noted. No evidence for facet dislocation. The bones of the sacrum are in anatomic alignment. 1.  Rounded opacity in the right lung base measuring 1.5 x 2.9 cm. Follow-up to resolution is recommended. 2.  Progression of aortic atheromatous disease, as referenced above. The aorta remains patent. 3.  No evidence for intra-abdominal ascites. No abnormal enhancing masses are identified. 4.  There has been improvement of the left-sided hydronephrosis a partial improvement of the right-sided hydronephrosis. 5.  Bladder calculi. XR CHEST PORTABLE    Result Date: 11/15/2022  EXAMINATION: ONE XRAY VIEW OF THE CHEST 11/15/2022 6:10 am COMPARISON: 13 November 2022 HISTORY: ORDERING SYSTEM PROVIDED HISTORY: aspiration TECHNOLOGIST PROVIDED HISTORY: Reason for exam:->aspiration FINDINGS: The lungs are without acute focal process. There is no effusion or pneumothorax.  The cardiomediastinal silhouette is without acute process. The osseous structures are without acute process. No acute process. XR CHEST PORTABLE    Result Date: 11/13/2022  EXAMINATION: ONE XRAY VIEW OF THE CHEST 11/13/2022 7:32 pm COMPARISON: 10/16/2022 HISTORY: ORDERING SYSTEM PROVIDED HISTORY: AMS TECHNOLOGIST PROVIDED HISTORY: Reason for exam:->AMS FINDINGS: The lungs are adequately inflated. There is no focal consolidation, pleural effusion or pneumothorax. The heart and mediastinal contours are within normal limits. No acute bony findings are identified. No acute process. XR CHEST 1 VIEW    Result Date: 10/16/2022  EXAMINATION: ONE XRAY VIEW OF THE CHEST 10/16/2022 1:29 pm COMPARISON: 05/04/2018 HISTORY: ORDERING SYSTEM PROVIDED HISTORY: altered mental status TECHNOLOGIST PROVIDED HISTORY: Reason for exam:->altered mental status FINDINGS: The lungs are without acute focal process. There is no effusion or pneumothorax. The cardiomediastinal silhouette is without acute process. The osseous structures are without acute process. No acute process. CTA NECK W CONTRAST    Addendum Date: 10/16/2022    ADDENDUM: CT angiogram head/neck and CT perfusion findings were verbally communicated by Dr. Niya Riley to Dr. Matthew Blood on 10/16/2022 at 6:45 p.m. Result Date: 10/16/2022  EXAMINATION: CTA OF THE HEAD WITH CONTRAST; CTA OF THE NECK; CTA OF THE HEAD WITH CONTRAST WITH PERFUSION 10/16/2022 6:18 pm: TECHNIQUE: CTA of the head/brain was performed with the administration of intravenous contrast. Multiplanar reformatted images are provided for review. MIP images are provided for review. Automated exposure control, iterative reconstruction, and/or weight based adjustment of the mA/kV was utilized to reduce the radiation dose to as low as reasonably achievable.; CTA of the neck was performed with the administration of intravenous contrast. Multiplanar reformatted images are provided for review.   MIP images are provided for review. Stenosis of the internal carotid arteries measured using NASCET criteria. Automated exposure control, iterative reconstruction, and/or weight based adjustment of the mA/kV was utilized to reduce the radiation dose to as low as reasonably achievable. COMPARISON: Noncontrast CT head done same day. HISTORY: ORDERING SYSTEM PROVIDED HISTORY: left sided weakness TECHNOLOGIST PROVIDED HISTORY: Reason for exam:->left sided weakness Has a \"code stroke\" or \"stroke alert\" been called? ->No FINDINGS: CTA NECK: AORTIC ARCH/ARCH VESSELS: No dissection or arterial injury. No significant stenosis of the brachiocephalic or subclavian arteries. Atherosclerosis in the visualized thoracic aorta and proximal right subclavian artery. CAROTID ARTERIES: No dissection, arterial injury, or hemodynamically significant stenosis by NASCET criteria. Bilateral carotid bulb atherosclerotic plaque. VERTEBRAL ARTERIES: No dissection, arterial injury, or significant stenosis. SOFT TISSUES: The lung apices are clear. No cervical or superior mediastinal lymphadenopathy. The larynx and pharynx are unremarkable. No acute abnormality of the salivary and thyroid glands. BONES: No acute osseous abnormality. Multilevel degenerative changes in the visualized spine. CTA HEAD: Image quality is degraded by patient motion artifact. ANTERIOR CIRCULATION: No obvious high-grade stenosis or occlusion of the intracranial internal carotid, anterior cerebral, or middle cerebral arteries. No aneurysm. Mild atherosclerosis in the bilateral intracranial internal carotid arteries without hemodynamically significant stenosis. POSTERIOR CIRCULATION: No obvious high-grade stenosis or occlusion of the vertebral, basilar, or posterior cerebral arteries. No aneurysm. OTHER: No dural venous sinus thrombosis on this non-dedicated study. BRAIN: Generalized cerebral and cerebellar volume loss. Chronic small vessel ischemic disease.   Bilateral periventricular lacunar type infarcts. Right thalamic lacunar type infarct may be acute or subacute. CT PERFUSION: EXAM QUALITY: The examination is diagnostic with appropriate arterial inflow and venous outflow curves, and diagnostic perfusion maps. CORE INFARCT: The total area of ischemic core is 0 mL (CBF<30% volume). TOTAL HYPOPERFUSION: The total area of hypoperfusion is 3 mL (Tmax>6s volume). PENUMBRA: The penumbra (mismatch) volume is 3 mL and is located in the left posterior temporal/occipital region. The mismatch ratio is n/a.     1. Perfusion images demonstrate a 3 mL area of penumbra in the left posterior temporal/occipital region which could relate to acute ischemia or artifact. 2. No obvious high-grade stenosis or occlusion of the proximal large intracranial arteries given patient motion artifact. If high clinical concern persists then short-term follow-up CTA or MRA head may be helpful for further evaluation as warranted. 3. No hemodynamically significant stenosis in the bilateral cervical internal carotid arteries per NASCET criteria. Bilateral carotid bulb atherosclerotic plaque. 4. Patent bilateral vertebral arteries. 5. Right thalamic lacunar type infarct may be acute or subacute. Follow-up brain MRI may be helpful for further evaluation. The findings were sent to the Radiology Results [id number] at 7:01 pm on 10/16/2022 to be communicated to a licensed caregiver. CT BRAIN PERFUSION    Addendum Date: 10/16/2022    ADDENDUM: CT angiogram head/neck and CT perfusion findings were verbally communicated by Dr. Niya Riley to Dr. Kristy Germain on 10/16/2022 at 6:45 p.m. Result Date: 10/16/2022  EXAMINATION: CTA OF THE HEAD WITH CONTRAST; CTA OF THE NECK; CTA OF THE HEAD WITH CONTRAST WITH PERFUSION 10/16/2022 6:18 pm: TECHNIQUE: CTA of the head/brain was performed with the administration of intravenous contrast. Multiplanar reformatted images are provided for review.   MIP images are provided for review. Automated exposure control, iterative reconstruction, and/or weight based adjustment of the mA/kV was utilized to reduce the radiation dose to as low as reasonably achievable.; CTA of the neck was performed with the administration of intravenous contrast. Multiplanar reformatted images are provided for review. MIP images are provided for review. Stenosis of the internal carotid arteries measured using NASCET criteria. Automated exposure control, iterative reconstruction, and/or weight based adjustment of the mA/kV was utilized to reduce the radiation dose to as low as reasonably achievable. COMPARISON: Noncontrast CT head done same day. HISTORY: ORDERING SYSTEM PROVIDED HISTORY: left sided weakness TECHNOLOGIST PROVIDED HISTORY: Reason for exam:->left sided weakness Has a \"code stroke\" or \"stroke alert\" been called? ->No FINDINGS: CTA NECK: AORTIC ARCH/ARCH VESSELS: No dissection or arterial injury. No significant stenosis of the brachiocephalic or subclavian arteries. Atherosclerosis in the visualized thoracic aorta and proximal right subclavian artery. CAROTID ARTERIES: No dissection, arterial injury, or hemodynamically significant stenosis by NASCET criteria. Bilateral carotid bulb atherosclerotic plaque. VERTEBRAL ARTERIES: No dissection, arterial injury, or significant stenosis. SOFT TISSUES: The lung apices are clear. No cervical or superior mediastinal lymphadenopathy. The larynx and pharynx are unremarkable. No acute abnormality of the salivary and thyroid glands. BONES: No acute osseous abnormality. Multilevel degenerative changes in the visualized spine. CTA HEAD: Image quality is degraded by patient motion artifact. ANTERIOR CIRCULATION: No obvious high-grade stenosis or occlusion of the intracranial internal carotid, anterior cerebral, or middle cerebral arteries. No aneurysm.   Mild atherosclerosis in the bilateral intracranial internal carotid arteries without hemodynamically significant stenosis. POSTERIOR CIRCULATION: No obvious high-grade stenosis or occlusion of the vertebral, basilar, or posterior cerebral arteries. No aneurysm. OTHER: No dural venous sinus thrombosis on this non-dedicated study. BRAIN: Generalized cerebral and cerebellar volume loss. Chronic small vessel ischemic disease. Bilateral periventricular lacunar type infarcts. Right thalamic lacunar type infarct may be acute or subacute. CT PERFUSION: EXAM QUALITY: The examination is diagnostic with appropriate arterial inflow and venous outflow curves, and diagnostic perfusion maps. CORE INFARCT: The total area of ischemic core is 0 mL (CBF<30% volume). TOTAL HYPOPERFUSION: The total area of hypoperfusion is 3 mL (Tmax>6s volume). PENUMBRA: The penumbra (mismatch) volume is 3 mL and is located in the left posterior temporal/occipital region. The mismatch ratio is n/a.     1. Perfusion images demonstrate a 3 mL area of penumbra in the left posterior temporal/occipital region which could relate to acute ischemia or artifact. 2. No obvious high-grade stenosis or occlusion of the proximal large intracranial arteries given patient motion artifact. If high clinical concern persists then short-term follow-up CTA or MRA head may be helpful for further evaluation as warranted. 3. No hemodynamically significant stenosis in the bilateral cervical internal carotid arteries per NASCET criteria. Bilateral carotid bulb atherosclerotic plaque. 4. Patent bilateral vertebral arteries. 5. Right thalamic lacunar type infarct may be acute or subacute. Follow-up brain MRI may be helpful for further evaluation. The findings were sent to the Radiology Results [id number] at 7:01 pm on 10/16/2022 to be communicated to a licensed caregiver.      CTA HEAD W CONTRAST    Addendum Date: 10/16/2022    ADDENDUM: CT angiogram head/neck and CT perfusion findings were verbally communicated by Dr. Niya Riley to  Penn Laird Ards on 10/16/2022 at 6:45 p.m. Result Date: 10/16/2022  EXAMINATION: CTA OF THE HEAD WITH CONTRAST; CTA OF THE NECK; CTA OF THE HEAD WITH CONTRAST WITH PERFUSION 10/16/2022 6:18 pm: TECHNIQUE: CTA of the head/brain was performed with the administration of intravenous contrast. Multiplanar reformatted images are provided for review. MIP images are provided for review. Automated exposure control, iterative reconstruction, and/or weight based adjustment of the mA/kV was utilized to reduce the radiation dose to as low as reasonably achievable.; CTA of the neck was performed with the administration of intravenous contrast. Multiplanar reformatted images are provided for review. MIP images are provided for review. Stenosis of the internal carotid arteries measured using NASCET criteria. Automated exposure control, iterative reconstruction, and/or weight based adjustment of the mA/kV was utilized to reduce the radiation dose to as low as reasonably achievable. COMPARISON: Noncontrast CT head done same day. HISTORY: ORDERING SYSTEM PROVIDED HISTORY: left sided weakness TECHNOLOGIST PROVIDED HISTORY: Reason for exam:->left sided weakness Has a \"code stroke\" or \"stroke alert\" been called? ->No FINDINGS: CTA NECK: AORTIC ARCH/ARCH VESSELS: No dissection or arterial injury. No significant stenosis of the brachiocephalic or subclavian arteries. Atherosclerosis in the visualized thoracic aorta and proximal right subclavian artery. CAROTID ARTERIES: No dissection, arterial injury, or hemodynamically significant stenosis by NASCET criteria. Bilateral carotid bulb atherosclerotic plaque. VERTEBRAL ARTERIES: No dissection, arterial injury, or significant stenosis. SOFT TISSUES: The lung apices are clear. No cervical or superior mediastinal lymphadenopathy. The larynx and pharynx are unremarkable. No acute abnormality of the salivary and thyroid glands. BONES: No acute osseous abnormality.   Multilevel degenerative changes in the visualized spine. CTA HEAD: Image quality is degraded by patient motion artifact. ANTERIOR CIRCULATION: No obvious high-grade stenosis or occlusion of the intracranial internal carotid, anterior cerebral, or middle cerebral arteries. No aneurysm. Mild atherosclerosis in the bilateral intracranial internal carotid arteries without hemodynamically significant stenosis. POSTERIOR CIRCULATION: No obvious high-grade stenosis or occlusion of the vertebral, basilar, or posterior cerebral arteries. No aneurysm. OTHER: No dural venous sinus thrombosis on this non-dedicated study. BRAIN: Generalized cerebral and cerebellar volume loss. Chronic small vessel ischemic disease. Bilateral periventricular lacunar type infarcts. Right thalamic lacunar type infarct may be acute or subacute. CT PERFUSION: EXAM QUALITY: The examination is diagnostic with appropriate arterial inflow and venous outflow curves, and diagnostic perfusion maps. CORE INFARCT: The total area of ischemic core is 0 mL (CBF<30% volume). TOTAL HYPOPERFUSION: The total area of hypoperfusion is 3 mL (Tmax>6s volume). PENUMBRA: The penumbra (mismatch) volume is 3 mL and is located in the left posterior temporal/occipital region. The mismatch ratio is n/a.     1. Perfusion images demonstrate a 3 mL area of penumbra in the left posterior temporal/occipital region which could relate to acute ischemia or artifact. 2. No obvious high-grade stenosis or occlusion of the proximal large intracranial arteries given patient motion artifact. If high clinical concern persists then short-term follow-up CTA or MRA head may be helpful for further evaluation as warranted. 3. No hemodynamically significant stenosis in the bilateral cervical internal carotid arteries per NASCET criteria. Bilateral carotid bulb atherosclerotic plaque. 4. Patent bilateral vertebral arteries. 5. Right thalamic lacunar type infarct may be acute or subacute.   Follow-up brain MRI may be helpful for further evaluation. The findings were sent to the Radiology Results [id number] at 7:01 pm on 10/16/2022 to be communicated to a licensed caregiver. MRI BRAIN W WO CONTRAST    Result Date: 10/17/2022  EXAMINATION: MRI OF THE BRAIN WITHOUT AND WITH CONTRAST  10/17/2022 5:22 pm TECHNIQUE: Multiplanar multisequence MRI of the head/brain was performed without and with the administration of intravenous contrast. COMPARISON: CT head without contrast, CTA of the head and CT perfusion, 10/16/2022 HISTORY: ORDERING SYSTEM PROVIDED HISTORY: cva vs tia with AMS, history of gyn malignancy, rule out mass/mets TECHNOLOGIST PROVIDED HISTORY: Reason for exam:->cva vs tia with AMS, history of gyn malignancy, rule out mass/mets What is the sedation requirement?->None FINDINGS: INTRACRANIAL STRUCTURES/VENTRICLES:  1.5 x 0.9 cm focus of acute or early subacute infarction is seen in the right thalamus. No mass, mass effect, hemorrhage or midline shift is seen in the brain. No abnormal enhancement. Moderate cerebral volume loss and moderate chronic microvascular ischemic changes are noted. Ventricular dilatation appears somewhat out of proportion to the extent of cerebral atrophy. Clinical correlation for normal pressure hydrocephalus is recommended. ORBITS: The visualized portion of the orbits demonstrate no acute abnormality. SINUSES: The visualized paranasal sinuses and mastoid air cells demonstrate no acute abnormality. BONES/SOFT TISSUES: The bone marrow signal intensity appears normal. The soft tissues demonstrate no acute abnormality. 1. 1.5 x 0.9 cm focus of acute or early subacute infarction in the right thalamus. 2. No mass, mass effect, hemorrhage or midline shift is seen in the brain. 3. Ventricular dilation concerning for normal pressure hydrocephalus. Clinical correlation is needed.  RECOMMENDATIONS: Unavailable     US CAROTID ARTERY BILATERAL    Result Date: 10/19/2022  EXAMINATION: ULTRASOUND EVALUATION OF THE CAROTID ARTERIES 10/19/2022 TECHNIQUE: Duplex ultrasound using B-mode/gray scaled imaging, Doppler spectral analysis and color flow Doppler was obtained of the carotid arteries. COMPARISON: None. HISTORY: ORDERING SYSTEM PROVIDED HISTORY: Stroke TECHNOLOGIST PROVIDED HISTORY: Reason for exam:->Stroke What reading provider will be dictating this exam?->CRC FINDINGS: RIGHT: The right common carotid artery demonstrates peak systolic velocities of  353 and 58.1 cm/sec in the proximal and distal segments respectively. The right internal carotid artery demonstrates the systolic velocities of 79.2, 65.3 and 61.8  cm/sec in the proximal, mid and distal segments respectively. The external carotid artery is patent. The vertebral artery demonstrates normal antegrade flow. Mild to moderate calcified carotid bulb atherosclerotic plaque. ICA/CCA ratio of  1.1 LEFT: The left common carotid artery demonstrates peak systolic velocities of 12.5 and 61.3 cm/sec in the proximal and distal segments respectively. The left internal carotid artery demonstrates the systolic velocities of 95.1, 104.2 and 118.6 cm/sec in the proximal, mid and distal segments respectively. The external carotid artery is patent. The vertebral artery demonstrates normal antegrade flow. Mild to moderate calcified carotid bulb atherosclerotic plaque. ICA/CCA ratio of 1.9     The right internal carotid artery demonstrates 0-50% stenosis. The left internal carotid artery demonstrates 0-50% stenosis. Bilateral vertebral arteries are patent with flow in the normal direction. Mild-to-moderate bilateral calcified carotid bulb atherosclerotic plaque.      LABS  Recent Labs     11/13/22 1944 11/14/22  0605 11/15/22  0906   WBC 9.0 6.1 15.4*   HGB 13.0 11.7 12.5   HCT 42.1 37.6 41.2   MCV 89.4 90.6 92.4    267 339     Recent Labs     11/13/22 1944 11/14/22  0247 11/14/22  0605 11/14/22  1533 11/15/22  0652     --  145  --  146 K 4.8  --  4.8  --  5.2*     --  111*  --  112*   CO2 22  --  17*  --  17*   BUN 50*  --  48*  --  41*   CREATININE 1.1*  --  0.9  --  0.7   LABGLOM 53  --  >60  --  >60   GLUCOSE 193*   < > 268* 202 189*   PROT  --   --  6.6  --  7.0   LABALBU  --   --  3.1*  --  3.3*   CALCIUM 9.7  --  9.2  --  9.4   BILITOT  --   --  0.6  --  0.6   ALKPHOS  --   --  150*  --  156*   AST  --   --  42*  --  48*   ALT  --   --  54*  --  50*    < > = values in this interval not displayed.      Recent Labs     11/14/22  0136 11/14/22  0605 11/15/22  0652   PROCAL 0.35* 0.41* 0.26*     Lab Results   Component Value Date    CRP 3.5 (H) 11/15/2022    CRP 6.2 (H) 11/14/2022    CRP 0.5 (H) 08/03/2021     Lab Results   Component Value Date    SEDRATE 46 (H) 08/03/2021       Lab Results   Component Value Date/Time    COVID19 DETECTED 11/13/2022 07:24 PM    FLUBPCR Not Detected 11/13/2022 07:24 PM        Lab Results   Component Value Date/Time    COVID19 DETECTED 11/13/2022 07:24 PM     COVID-19/IRMA-COV2 LABS  Recent Labs     11/14/22  0136 11/14/22  0605 11/15/22  0652   CRP  --  6.2* 3.5*   PROCAL 0.35* 0.41* 0.26*   FERRITIN  --  463  --    LDH  --  236*  --    DDIMER  --  2353 1987   FIBRINOGEN  --  542*  --    INR  --  1.2  --    PROTIME  --  13.6*  --    AST  --  42* 48*   ALT  --  54* 50*     Lab Results   Component Value Date/Time    CHOL 199 10/17/2022 05:17 AM    TRIG 144 10/17/2022 05:17 AM    HDL 31 10/17/2022 05:17 AM    LDLCALC 139 10/17/2022 05:17 AM    LABVLDL 29 10/17/2022 05:17 AM         Lab Results   Component Value Date    HEPAIGM Non-Reactive 03/05/2021    HEPBIGM Non-Reactive 03/05/2021    HCVABI Non-Reactive 03/05/2021     Hep C Ab Interp   Date Value Ref Range Status   03/05/2021 Non-Reactive NON REACT Final       MICROBIOLOGY:     Cultures :   Recent Labs     11/13/22  1930 11/13/22  1946   BC 24 Hours no growth Gram stain performed from blood culture bottle media  Gram positive cocci in pairs  *     No results for input(s): Pineda Lennox in the last 72 hours. Recent Labs     11/13/22  2326   ORG Gram negative peggy*  Gram negative peggy*     Recent Labs     11/14/22  1002   STREPNEUMAGU Presumptive negative- suggests no current or recent  pneumococcal infection. Infection due to Strep pneumoniae cannot be  ruled out since the antigen present in the sample  may be below the detection limit of the test.  Normal Range:Presumptive Negative       Recent Labs     11/14/22  1002   LP1UAG Presumptive Negative -suggesting no recent or current infections  with Legionella pneumophila serogroup 1. Infection to Legionella cannot be ruled out since other serogroups  and species may cause infection, antigen may not be present in  early infection, or level of antigen may be below the  detection limit. Normal Range: Presumptive Negative       No results for input(s): ASO in the last 72 hours. Recent Labs     11/13/22  2015   CULTRESP Oral Pharyngeal Liv present  Additional growth present, also evaluating for;  Staph aureus         WOUND/ABSCESS   Date Value Ref Range Status   08/02/2021   Final    Mixed liv isolated. Further workup and sensitivity testing  is not routinely indicated and will not be performed.   Mixed liv isolated includes:  Mixed Gram negative rods  Beta hemolytic Strep species (Group \"B\")         Smear, Respiratory   Date Value Ref Range Status   11/13/2022   Final    Rare Polymorphonuclear leukocytes  Epithelial cells not seen  Moderate Mixed Bacterial Morphotypes       No results found for: MPNEUMO, CLAMYDCU, LABLEGI, AFBCX, FUNGSM, LABFUNG  CULTURE, RESPIRATORY   Date Value Ref Range Status   11/13/2022   Preliminary    Oral Pharyngeal Liv present  Additional growth present, also evaluating for;  Staph aureus         Urine Culture, Routine   Date Value Ref Range Status   11/13/2022   Preliminary    >100,000 CFU/ml  Identification and sensitivity to follow     11/13/2022   Preliminary    >100,000 CFU/ml  Identification and sensitivity to follow     10/16/2022 >100,000 CFU/ml  Final       FINAL IMPRESSION    Patient is a 67 y.o. female who presented with   Chief Complaint   Patient presents with    Altered Mental Status     Pt arrives via EMT with c/o worsening AMS. Pt has baseline of A &O x's 1 and per nursing home report she is now not responding to herself. Pt's LSN was at 1630 per report. and admitted for Somnolence [R40.0]  Sepsis without acute organ dysfunction, due to unspecified organism ([facility] [address] [A41.9]  2019 novel coronavirus disease (COVID-19) [U07.1]  COVID-19 virus infection [U07.1]  COVID-19 [U07.1]  Gnr  uti cover esbl/mdro  Cons bacteremia   Fevers better   Right knee warm     metronidazole (FLAGYL) 500 mg in 0.9% NaCl 100 mL IVPB premix, Q8H  cefTRIAXone (ROCEPHIN) 1,000 mg in sterile water 10 mL IV syringe, Q24H switch to meropenem  Vanco x1   Respiratory support    Available labs, imaging studies, microbiologic studies have been reviewed. The patient/FAMILY  was educated about the diagnosis, prognosis, indications, risks and benefits of treatment. An opportunity to ask questions was given to the patient/FAMILY and questions were answered. Thank you for involving me in the care of Minus Milder. Please do not hesitate to call [phone]  for any questions or concerns.          Electronically signed by Priscilla Decker MD on 11/15/2022 at 1:23 PM

## 2022-11-15 NOTE — CARE COORDINATION
Copy of IMM letter was emailed to libertad Melendez at [email or web address].  Electronically signed by Dash Arambula on 11/15/2022 at 11:29 AM

## 2022-11-15 NOTE — PROGRESS NOTES
Palliative Care Department  [phone]  Palliative Care Progress Note  Provider Juanda Fothergill, APRN - CNP  Derrek Prader  [id number]  Hospital Day: 3  Date of Initial Consult: 11/13/2022  Referring Provider: Dr. Niya Honeycutt was consulted for assistance with: Goals of care, CODE STATUS discussion    HPI:   Derrek Prader is a 67 y.o. with a medical history of dementia, cancer, DM II, HTN, HLD, CVA who was admitted on 11/13/2022 from nursing facility with a CHIEF COMPLAINT of increased altered mental status. Prior to arrival to the ED, patient became nonverbal and would not make eye contact to her name. Patient was found to be positive for COVID-19 infection as well as a UTI. CT head suspicious for NPH. CT chest showed mild groundglass opacities in the lungs favoring atelectasis. There is an age indeterminant 8 mm groundglass pulmonary nodule in the left lower lobe. Patient had a recent admission for UTI and stroke. ASSESSMENT/PLAN:     Pertinent Hospital Diagnoses     Acute COVID-19 infection  Acute metabolic encephalopathy with moderate to severe underlying dementia  Recent right thalamic lacunar infarct with small nonhemorrhagic penumbra  Sepsis with acute UTI and COVID-19 infection      Palliative Care Encounter / Counseling Regarding Goals of Care  Please see detailed goals of care discussion as below  At this time, Derrek Prader, Does Not have capacity for medical decision-making. Capacity is time limited and situation/question specific  During encounter Franny Ruy Hernandez was surrogate medical decision-maker  Outcome of goals of care meeting:   Continue current medical management  Family still having ongoing discussions about CODE STATUS, DNR CCA, and limited code explained, they are having active discussion, and will notify PM of the decision.   Hospice was introduced, questions were answered    Code status Full Code  I reviewed with the family that given multiple medical co-morbidities, current severe acute illness, and advanced age, in the event of cardiac arrest, the chances of surviving may likely be low. It was also reviewed that if they survived a cardiac arrest, a return to prior level of function also may be low. Advanced Directives: no POA or living will in [facility]  Surrogate/Legal NOK:  Daniel Guerrero (spouse) [phone]  Dorothea Givens (child) [phone]  Chelle Sanches (niece) [phone]    Spiritual assessment: no spiritual distress identified  Bereavement and grief: to be determined  Referrals to: none today  SUBJECTIVE:     Current medical issues leading to Palliative Medicine involvement include   Active Hospital Problems    Diagnosis Date Noted    COVID-19 [U07.1] 11/14/2022     Priority: Medium    Palliative care encounter [Z51.5] 11/14/2022     Priority: Medium    2019 novel coronavirus disease (COVID-19) [U07.1] 11/13/2022     Priority: Medium       Details of Conversation:      Chart reviewed. Got updated from bedside nurse, patient remains nonverbal, lethargic, does respond to voice. Met with patient's son, Sheree Whiting, in the waiting room. Patient  was visiting the patient. We discussed goal of care, Sheree Whiting stated that his father also has a mild dementia, however he knows what is going on with the patient, they have been  for 55 years, and have been inseparable since. Patient was recently discharged a month ago after being treated for a stroke and UTI, to a rehab facility, where she had required COVID. Family aware of RRT this morning. Per Sheree Whiting, family had a discussion this morning regarding goal of care and CODE STATUS, per , he stated that she would not have wanted to suffer. CODE STATUS discussed in details, especially DNR CCA, but also limited code was explained, and DNR CC was introduced.   Sheree Whiting wants to discuss DNR CCA with no intubation with his father, and believes his father is on the same page about DO NOT RESUSCITATE and no intubation. Hospice was introduced, questions were answered. Mariluz Reveles is aware the patient will remain a full code, with full resuscitative efforts to be provided, until the family make a decision. Comfort support was provided. We will continue to follow. OBJECTIVE:   Prognosis: depends upon goals and Guarded    Physical Exam:  /88   Pulse (!) 110   Temp 97.5 °F (36.4 °C) (Axillary)   Resp 20   Ht 5' 3\" (1.6 m)   Wt 115 lb (52.2 kg)   LMP  (LMP Unknown)   SpO2 95%   BMI 20.37 kg/m²     Due to the current efforts to prevent transmission of COVID-19 and also the need to preserve PPE for other caregivers, a face-to-face encounter with the patient was not performed. That being said, all relevant records and diagnostic tests were reviewed, including laboratory results and imaging. Please reference any relevant documentation elsewhere. Care will be coordinated with the primary service. Objective data reviewed: labs, images, records, medication use, vitals, and chart    Discussed patient and the plan of care with the other IDT members: Palliative Medicine IDT Team, Floor Nurse, and Family    Time/Communication  Greater than 50% of time spent, total 35 minutes in counseling and coordination of care at the bedside regarding goals of care and diagnosis and prognosis. Thank you for allowing Palliative Medicine to participate in the care of Joanne Combs.

## 2022-11-15 NOTE — PROGRESS NOTES
Pharmacy Consultation Note  (Antibiotic Dosing and Monitoring)    Initial consult date: 11/15/22  Consulting physician/provider: Eulogio  Drug: Vancomycin  Indication: aspiration pneumonia    Age/  Gender Height Weight IBW  Allergy Information   72 y.o./female 5' 3\" (160 cm) 115 lb (52.2 kg)     Ideal body weight: 52.4 kg (115 lb 8.3 oz)   Tetracyclines & related      Renal Function:  Recent Labs     11/13/22  1944 11/14/22  0605 11/15/22  0652   BUN 50* 48* 41*   CREATININE 1.1* 0.9 0.7       Intake/Output Summary (Last 24 hours) at 11/15/2022 0834  Last data filed at 11/15/2022 3113  Gross per 24 hour   Intake 20 ml   Output 1025 ml   Net -1005 ml       Vancomycin Monitoring:  Trough:  No results for input(s): VANCOTROUGH in the last 72 hours. Random:  No results for input(s): VANCORANDOM in the last 72 hours. No results for input(s): Femi Tangela in the last 72 hours. Historical Cultures:  Organism   Date Value Ref Range Status   10/16/2022 Escherichia coli (A)  Final     No results for input(s): BC in the last 72 hours. Vancomycin Administration Times:  Recent vancomycin administrations        No vancomycin IV orders with administrations found. Orders not given:            vancomycin (VANCOCIN) 1,250 mg in dextrose 5 % 250 mL IVPB                    Assessment:  Patient is a 67 y.o. female who has been initiated on vancomycin  Estimated Creatinine Clearance: 59 mL/min (based on SCr of 0.7 mg/dL). Baseline SCr~0.9 mg/dL.   To dose vancomycin, pharmacy will be utilizing [employer] calculation software for goal AUC/CESAR 400-600 mg/L-hr      Plan:  Vancomycin 1250 mg IV once  Will check vancomycin levels when appropriate  Will continue to monitor renal function   Pharmacy to follow      Jolanta [facility] 11/15/2022 8:34 AM

## 2022-11-15 NOTE — SIGNIFICANT EVENT
[address] [place], [place] [address]ist RRT/Code Blue Note    Subjective:    Called to bedside increased oxygen demand. Patient is here for increased encephalopathy and COVID-19. Patient was on room air through admission. On encounter, patient was requiring nonrebreather and satting in  62s on room air. Patient was not on steroids or breathing treatments. Patient was nauseous and vomiting prior to the RRT. Patient was having upper respiratory tract gurgling and rhonchi. It is likely patient aspirated and is fluid overloaded secondary to IV fluid resuscitation throughout admission. At this time We DC'd fluids and given one-time 40 of Lasix. Patient O2 sats are within normal limits on nonrebreather. Patient is protecting her airway and has an intact gag reflex. CBC, BMP, and blood gas were ordered. Chest x-ray was ordered. Pulmonology was consulted. A full code.        metroNIDAZOLE  500 mg IntraVENous Q8H    furosemide        acetylcysteine  4 mL Inhalation Q4H    ipratropium-albuterol  1 ampule Inhalation Q4H    cefTRIAXone (ROCEPHIN) IV  1,000 mg IntraVENous Q24H    insulin lispro  0-4 Units SubCUTAneous TID WC    insulin lispro  0-4 Units SubCUTAneous Nightly    vitamin C  1,000 mg Oral Daily    Vitamin D  2,000 Units Oral Daily    zinc sulfate  50 mg Oral Daily    metoprolol succinate  25 mg Oral Daily    atorvastatin  80 mg Oral Nightly    sodium chloride flush  5-40 mL IntraVENous 2 times per day    aspirin  81 mg Oral Daily    clopidogrel  75 mg Oral Daily    donepezil  10 mg Oral Nightly    memantine  5 mg Oral BID    enoxaparin  30 mg SubCUTAneous BID     magnesium sulfate, 1,000 mg, PRN  sodium phosphate IVPB, 9 mmol, PRN   Or  sodium phosphate IVPB, 15 mmol, PRN  potassium chloride, 40 mEq, PRN   Or  potassium alternative oral replacement, 40 mEq, PRN   Or  potassium chloride, 10 mEq, PRN  benzonatate, 200 mg, TID PRN  glucose, 4 tablet, PRN  dextrose bolus, 125 mL, PRN Or  dextrose bolus, 250 mL, PRN  glucagon (rDNA), 1 mg, PRN  dextrose, , Continuous PRN  sodium chloride flush, 5-40 mL, PRN  sodium chloride, , PRN  polyethylene glycol, 17 g, Daily PRN  acetaminophen, 650 mg, Q6H PRN   Or  acetaminophen, 650 mg, Q6H PRN         Objective:    BP (!) 158/72   Pulse (!) 103   Temp 98.8 °F (37.1 °C) (Axillary)   Resp 18   Ht 5' 3\" (1.6 m)   Wt 115 lb (52.2 kg)   LMP  (LMP Unknown)   SpO2 100%   BMI 20.37 kg/m²   Pulmonary/Chest: Pulse and rhonchi pulmonary apex      Recent Labs     11/13/22 1944 11/14/22 0247 11/14/22  0605 11/14/22  1533     --  145  --    K 4.8  --  4.8  --      --  111*  --    CO2 22  --  17*  --    BUN 50*  --  48*  --    CREATININE 1.1*  --  0.9  --    GLUCOSE 193* 234 268* 202   CALCIUM 9.7  --  9.2  --        Recent Labs     11/13/22  1944 11/14/22 0605   WBC 9.0 6.1   RBC 4.71 4.15   HGB 13.0 11.7   HCT 42.1 37.6   MCV 89.4 90.6   MCH 27.6 28.2   MCHC 30.9* 31.1*   RDW 13.9 14.1    267   MPV 10.0 10.1       CBC:   Lab Results   Component Value Date/Time    WBC 6.1 11/14/2022 06:05 AM    RBC 4.15 11/14/2022 06:05 AM    HGB 11.7 11/14/2022 06:05 AM    HCT 37.6 11/14/2022 06:05 AM    MCV 90.6 11/14/2022 06:05 AM    MCH 28.2 11/14/2022 06:05 AM    MCHC 31.1 11/14/2022 06:05 AM    RDW 14.1 11/14/2022 06:05 AM     11/14/2022 06:05 AM    MPV 10.1 11/14/2022 06:05 AM        I/O last 3 completed shifts: In: 20 [I.V.:20]  Out: 625 [Urine:625]  No intake/output data recorded. Radiology:     Assessment:    Principal Problem:    COVID-19  Active Problems:    2019 novel coronavirus disease (COVID-19)    Palliative care encounter  Resolved Problems:    * No resolved hospital problems. *      Plan:  You Hiprex respiratory failure-continue nonrebreather as needed. Continue bronchodilation and mucus mobilization. Monitor CBC and BMP. Pending chest x-ray. Pulmonology consultation. Patient has an intact gag reflex.       Critical care time 50 minutes not including procedures. NOTE: This report was transcribed using voice recognition software. Every effort was made to ensure accuracy; however, inadvertent computerized transcription errors may be present.      Electronically signed by Talisha Nielsen MD on 11/15/2022 at 7:02 AM

## 2022-11-15 NOTE — PLAN OF CARE
Problem: Pain  Goal: Verbalizes/displays adequate comfort level or baseline comfort level  Outcome: Progressing  Flowsheets (Taken 11/15/2022 0630 by Quynh Arrington RN)  Verbalizes/displays adequate comfort level or baseline comfort level:   Encourage patient to monitor pain and request assistance   Assess pain using appropriate pain scale   Implement non-pharmacological measures as appropriate and evaluate response   Administer analgesics based on type and severity of pain and evaluate response   Consider cultural and social influences on pain and pain management   Notify Licensed Independent Practitioner if interventions unsuccessful or patient reports new pain     Problem: Safety - Adult  Goal: Free from fall injury  Outcome: Progressing     Problem: ABCDS Injury Assessment  Goal: Absence of physical injury  Outcome: Progressing     Problem: Skin/Tissue Integrity  Goal: Absence of new skin breakdown  Description: 1. Monitor for areas of redness and/or skin breakdown  2. Assess vascular access sites hourly  3. Every 4-6 hours minimum:  Change oxygen saturation probe site  4. Every 4-6 hours:  If on nasal continuous positive airway pressure, respiratory therapy assess nares and determine need for appliance change or resting period.   Outcome: Progressing     Problem: Chronic Conditions and Co-morbidities  Goal: Patient's chronic conditions and co-morbidity symptoms are monitored and maintained or improved  Outcome: Progressing     Problem: Discharge Planning  Goal: Discharge to home or other facility with appropriate resources  Outcome: Progressing

## 2022-11-15 NOTE — PROGRESS NOTES
CHIEF COMPLAINT: Elevated troponin/NSTEMI/COVID 19/Change of MS    HISTORY OF PRESENT ILLNESS: Patient is a 67 y.o. female seen at the request of Ana Preciado DO and not followed by cardiology. Patient presented with change of MS. Evaluation revealed patient to be COVID 19 positive, but also noted to have elevated troponin.      Past Medical History:   Diagnosis Date    Arthritis     right hand    Cancer ([address] 1985    uterus and cervix had radiation and implant    Diabetes mellitus ([address]     Hyperlipidemia     Hypertension     Radiation cystitis     Urinary incontinence     complication due to uterine cancer    Urinary retention     caused by complications from uterine cancer       Patient Active Problem List   Diagnosis    Sepsis secondary to UTI ([address]    Post-operative pain    H/O total hysterectomy    Essential hypertension    Type 2 diabetes mellitus with hyperglycemia, without long-term current use of insulin ([address]    Other hyperlipidemia    Sacral wound    Sacral wound, initial encounter    Acute cystitis with hematuria    Severe protein-calorie malnutrition ([address]    UTI (urinary tract infection)    Acute CVA (cerebrovascular accident) ([address]    2019 novel coronavirus disease (COVID-19)    COVID-19    Palliative care encounter       Allergies   Allergen Reactions    Tetracyclines & Related        Current Facility-Administered Medications   Medication Dose Route Frequency Provider Last Rate Last Admin    metronidazole (FLAGYL) 500 mg in 0.9% NaCl 100 mL IVPB premix  500 mg IntraVENous Q8H Rhae Boas,  mL/hr at 11/15/22 0706 500 mg at 11/15/22 0706    furosemide (LASIX) 10 MG/ML injection             acetylcysteine (MUCOMYST) 10 % solution 400 mg  4 mL Inhalation BID Mely Milan DO        budesonide (PULMICORT) nebulizer suspension 500 mcg  500 mcg Nebulization BID Mely Milan DO        ipratropium-albuterol (DUONEB) nebulizer solution 1 ampule  1 ampule Inhalation Q4H WA Mely Milan DO        cefTRIAXone (ROCEPHIN) 1,000 mg in sterile water 10 mL IV syringe  1,000 mg IntraVENous Q24H Yumiko Mcgee, DO   1,000 mg at 11/15/22 0039    insulin lispro (HUMALOG) injection vial 0-4 Units  0-4 Units SubCUTAneous TID WC Ismail U Berlin, DO        insulin lispro (HUMALOG) injection vial 0-4 Units  0-4 Units SubCUTAneous Nightly Ismail U Berlin, DO        magnesium sulfate 1000 mg in dextrose 5% 100 mL IVPB  1,000 mg IntraVENous PRN Kalani Math, APRN - CNP        sodium phosphate 9 mmol in sodium chloride 0.9 % 250 mL IVPB  9 mmol IntraVENous PRN Kalani Math, APRN - CNP        Or    sodium phosphate 15 mmol in sodium chloride 0.9 % 250 mL IVPB  15 mmol IntraVENous PRN Kalani Math, APRN - CNP        potassium chloride (KLOR-CON M) extended release tablet 40 mEq  40 mEq Oral PRN Kalani Math, APRN - CNP        Or    potassium bicarb-citric acid (EFFER-K) effervescent tablet 40 mEq  40 mEq Oral PRN Kalani Math, APRN - CNP        Or    potassium chloride 10 mEq/100 mL IVPB (Peripheral Line)  10 mEq IntraVENous PRN Kalani Math, APRN - CNP        benzonatate (TESSALON) capsule 200 mg  200 mg Oral TID PRN Kalani Math, APRN - CNP        ascorbic acid (VITAMIN C) tablet 1,000 mg  1,000 mg Oral Daily Kalani Math, APRN - CNP        Vitamin D (CHOLECALCIFEROL) tablet 2,000 Units  2,000 Units Oral Daily Kalani Math, APRN - CNP        zinc sulfate (ZINCATE) capsule 50 mg  50 mg Oral Daily Kalani Math, APRN - CNP        metoprolol succinate (TOPROL XL) extended release tablet 25 mg  25 mg Oral Daily Kalani Math, APRN - CNP        potassium chloride 40 mEq, sodium bicarbonate 50 mEq in sodium chloride 0.45 % 1,000 mL infusion   IntraVENous Continuous Kalani Math, APRN - CNP 75 mL/hr at 11/14/22 2300 New Bag at 11/14/22 2300    atorvastatin (LIPITOR) tablet 80 mg  80 mg Oral Nightly Ismail U Berlin, DO        glucose chewable tablet 16 g  4 tablet Oral PRN Ismail U Berlin, DO        dextrose bolus 10% 125 mL  125 mL IntraVENous PRN Yumiko Mcgee, DO        Or    dextrose bolus 10% 250 mL  250 mL IntraVENous PRN Yumiko Mcgee, DO        glucagon (rDNA) injection 1 mg  1 mg SubCUTAneous PRN Yumiko Mcgee, DO        dextrose 10 % infusion   IntraVENous Continuous PRN Yumiko Mcgee, DO        sodium chloride flush 0.9 % injection 5-40 mL  5-40 mL IntraVENous 2 times per day Yumiko Mcgee, DO   10 mL at 11/15/22 0038    sodium chloride flush 0.9 % injection 5-40 mL  5-40 mL IntraVENous PRN Yumiko Mcgee, DO        0.9 % sodium chloride infusion   IntraVENous PRN Yumiko Mcgee, DO        polyethylene glycol (GLYCOLAX) packet 17 g  17 g Oral Daily PRN Yumiko Mcgee, DO        acetaminophen (TYLENOL) tablet 650 mg  650 mg Oral Q6H PRN Yumiko Mcgee, DO        Or    acetaminophen (TYLENOL) suppository 650 mg  650 mg Rectal Q6H PRN Yumiko Mcgee, DO        aspirin chewable tablet 81 mg  81 mg Oral Daily Yumiko Mcgee, DO        clopidogrel (PLAVIX) tablet 75 mg  75 mg Oral Daily Ismail U Berlin, DO        donepezil (ARICEPT) tablet 10 mg  10 mg Oral Nightly Yumiko Mcgee, DO        memantine (NAMENDA) tablet 5 mg  5 mg Oral BID Ismail U Berlin, DO        enoxaparin Sodium (LOVENOX) injection 30 mg  30 mg SubCUTAneous BID Yumiko Mcgee, DO   30 mg at 11/15/22 0039       Social History     Socioeconomic History    Marital status:      Spouse name: Not on file    Number of children: Not on file    Years of education: Not on file    Highest education level: Not on file   Occupational History    Not on file   Tobacco Use    Smoking status: Former     Packs/day: 1.00     Years: 44.00     Pack years: 44.00     Types: Cigarettes     Start date: 1965     Quit date: 2009     Years since quittin.9    Smokeless tobacco: Never   Vaping Use    Vaping Use: Never used   Substance and Sexual Activity    Alcohol use: Yes     Comment: seldom    Drug use: No    Sexual activity: Not on file Other Topics Concern    Not on file   Social History Narrative    Not on file     Social Determinants of Health     Financial Resource Strain: Not on file   Food Insecurity: Not on file   Transportation Needs: Not on file   Physical Activity: Not on file   Stress: Not on file   Social Connections: Not on file   Intimate Partner Violence: Not on file   Housing Stability: Not on file       Family History   Problem Relation Age of Onset    Heart Disease Mother     No Known Problems Father     Other Sister     Other Brother     Other Other      Review of Systems:   Unable to elicit. Physical Exam   BP (!) 172/73   Pulse 100   Temp 97.8 °F (36.6 °C) (Axillary)   Resp 20   Ht 5' 3\" (1.6 m)   Wt 115 lb (52.2 kg)   LMP  (LMP Unknown)   SpO2 95%   BMI 20.37 kg/m²     COVID 19.      CBC:   Lab Results   Component Value Date/Time    WBC 6.1 11/14/2022 06:05 AM    RBC 4.15 11/14/2022 06:05 AM    HGB 11.7 11/14/2022 06:05 AM    HCT 37.6 11/14/2022 06:05 AM    MCV 90.6 11/14/2022 06:05 AM    MCH 28.2 11/14/2022 06:05 AM    MCHC 31.1 11/14/2022 06:05 AM    RDW 14.1 11/14/2022 06:05 AM     11/14/2022 06:05 AM    MPV 10.1 11/14/2022 06:05 AM     BMP:   Lab Results   Component Value Date/Time     11/14/2022 06:05 AM    K 4.8 11/14/2022 06:05 AM    K 4.6 10/16/2022 01:40 PM     11/14/2022 06:05 AM    CO2 17 11/14/2022 06:05 AM    BUN 48 11/14/2022 06:05 AM    LABALBU 3.1 11/14/2022 06:05 AM    CREATININE 0.9 11/14/2022 06:05 AM    CALCIUM 9.2 11/14/2022 06:05 AM    GFRAA >60 10/17/2022 05:17 AM    LABGLOM >60 11/14/2022 06:05 AM     Magnesium:    Lab Results   Component Value Date/Time    MG 1.9 11/14/2022 06:05 AM     Cardiac Enzymes:   Lab Results   Component Value Date    CKTOTAL 167 10/16/2022    TROPHS 63 (H) 11/14/2022    TROPHS 66 (H) 11/13/2022    TROPHS 33 (H) 10/16/2022      PT/INR:    Lab Results   Component Value Date/Time    PROTIME 13.6 11/14/2022 06:05 AM    INR 1.2 11/14/2022 06:05 AM TSH:    Lab Results   Component Value Date/Time    TSH 0.144 10/17/2022 05:17 AM     Rhythm Strip: normal sinus rhythm. EKG:  nonspecific ST and T waves changes, sinus tachycardia, iRBBB. Echo Summary 10/19/2022:   Left ventricular size is grossly normal.   Hyperdynamic left ventricular systolic function. Ejection fraction is visually estimated at 70%. The left atrium is mildly dilated. Physiologic and/or trace mitral regurgitation is present. No evidence of mitral valve stenosis. Mitral annular calcification is present. Physiologic and/or trace tricuspid regurgitation. Regular rhythm. ASSESSMENT AND PLAN:  Patient Active Problem List   Diagnosis    Sepsis secondary to UTI ([facility]    Post-operative pain    H/O total hysterectomy    Essential hypertension    Type 2 diabetes mellitus with hyperglycemia, without long-term current use of insulin ([facility])    Other hyperlipidemia    Sacral wound    Sacral wound, initial encounter    Acute cystitis with hematuria    Severe protein-calorie malnutrition ([facility]    UTI (urinary tract infection)    Acute CVA (cerebrovascular accident) ([facility]    2019 novel coronavirus disease (COVID-19)    COVID-19    Palliative care encounter     1. Elevated troponin/NSTEMI:    Chart/labs/EKG/recent echo reviewed. Medically manage for now. ASA/plavix/statin/BB. 2. COVID 19: Per primary service. 3. HTN: Observe. 4. DM: Per primary service. 5. Lipids: Statin. 6. Hx of CVA: ASA/plavix/statin. America Benson D.O.   Cardiologist  Cardiology, [facility]

## 2022-11-15 NOTE — PROGRESS NOTES
Occupational Therapy  OT SESSION ATTEMPT     Date:11/15/2022  Patient Name: Kaushik Hollingsworth  MRN: [id number]  : 1950  Room: [address] [place] [address]     Attempted OT session this date:    [] unavailable due to other medical staff currently with pt   [] on hold per nursing staff   [] on hold per nursing staff secondary to lab / radiology results    [] declined treatment  this date due to ____. Benefits of participation in therapy reviewed with pt.    [] off unit   [x] Other: RRT early this AM for respiratory distress. Pt being transferred to [facility]/higher level of care    Will reattempt OT eval at a later time.     [employer] OTR/L #[id number]

## 2022-11-15 NOTE — CARE COORDINATION
11/15/2022 1038 CM note: POSITIVE COVID 11/13/22. Pt was RRT this am and transferred to [facility]. Pt presents from [facility](was +covid there) and has been private pay since 11/12/22. Per liaison Shirley, pt can return to facility upon d/c. CM spoke with pt's son Dana Pillai [facility]. Decision maker regarding transfer and  ACP completed. Keaton Argueta requested that Palliative Care call him regarding code status-vm message left for Palliative Care. Keaton Argueta will keep his father(pt's spouse) updated as his father has memory issues. Keaton Argueta plans for pt to return to [place] at d/c and will speak with facility about applying for Medicaid. At this time, pt would return to SNF private pay.  Clarissa SABILLON

## 2022-11-16 ENCOUNTER — APPOINTMENT (OUTPATIENT)
Dept: GENERAL RADIOLOGY | Age: 72
DRG: 871 | End: 2022-11-16
Payer: MEDICARE

## 2022-11-16 PROBLEM — N39.0 UTI (URINARY TRACT INFECTION): Status: RESOLVED | Noted: 2022-10-16 | Resolved: 2022-11-16

## 2022-11-16 LAB
ALBUMIN SERPL-MCNC: 3.2 G/DL (ref 3.5–5.2)
ALP BLD-CCNC: 144 U/L (ref 35–104)
ALT SERPL-CCNC: 38 U/L (ref 0–32)
ANION GAP SERPL CALCULATED.3IONS-SCNC: 12 MMOL/L (ref 7–16)
AST SERPL-CCNC: 33 U/L (ref 0–31)
BASOPHILS ABSOLUTE: 0.01 E9/L (ref 0–0.2)
BASOPHILS RELATIVE PERCENT: 0.1 % (ref 0–2)
BILIRUB SERPL-MCNC: 0.5 MG/DL (ref 0–1.2)
BUN BLDV-MCNC: 39 MG/DL (ref 6–23)
C-REACTIVE PROTEIN: 6.7 MG/DL (ref 0–0.4)
CALCIUM SERPL-MCNC: 9.1 MG/DL (ref 8.6–10.2)
CHLORIDE BLD-SCNC: 116 MMOL/L (ref 98–107)
CO2: 24 MMOL/L (ref 22–29)
CREAT SERPL-MCNC: 0.8 MG/DL (ref 0.5–1)
D DIMER: 962 NG/ML DDU
EOSINOPHILS ABSOLUTE: 0.01 E9/L (ref 0.05–0.5)
EOSINOPHILS RELATIVE PERCENT: 0.1 % (ref 0–6)
FERRITIN: 490 NG/ML
FIBRINOGEN: 471 MG/DL (ref 200–400)
GFR SERPL CREATININE-BSD FRML MDRD: >60 ML/MIN/1.73
GLUCOSE BLD-MCNC: 150 MG/DL (ref 74–99)
HCT VFR BLD CALC: 35.9 % (ref 34–48)
HEMOGLOBIN: 10.9 G/DL (ref 11.5–15.5)
IMMATURE GRANULOCYTES #: 0.05 E9/L
IMMATURE GRANULOCYTES %: 0.5 % (ref 0–5)
INR BLD: 1.3
LACTATE DEHYDROGENASE: 275 U/L (ref 135–214)
LACTIC ACID: 1.1 MMOL/L (ref 0.5–2.2)
LYMPHOCYTES ABSOLUTE: 1.07 E9/L (ref 1.5–4)
LYMPHOCYTES RELATIVE PERCENT: 11.6 % (ref 20–42)
MAGNESIUM: 1.8 MG/DL (ref 1.6–2.6)
MCH RBC QN AUTO: 27.7 PG (ref 26–35)
MCHC RBC AUTO-ENTMCNC: 30.4 % (ref 32–34.5)
MCV RBC AUTO: 91.3 FL (ref 80–99.9)
METER GLUCOSE: 137 MG/DL (ref 74–99)
METER GLUCOSE: 143 MG/DL (ref 74–99)
METER GLUCOSE: 243 MG/DL (ref 74–99)
METER GLUCOSE: 246 MG/DL (ref 74–99)
MONOCYTES ABSOLUTE: 0.56 E9/L (ref 0.1–0.95)
MONOCYTES RELATIVE PERCENT: 6.1 % (ref 2–12)
NEUTROPHILS ABSOLUTE: 7.52 E9/L (ref 1.8–7.3)
NEUTROPHILS RELATIVE PERCENT: 81.6 % (ref 43–80)
PDW BLD-RTO: 14.3 FL (ref 11.5–15)
PHOSPHORUS: 2.5 MG/DL (ref 2.5–4.5)
PLATELET # BLD: 225 E9/L (ref 130–450)
PMV BLD AUTO: 10.2 FL (ref 7–12)
POTASSIUM SERPL-SCNC: 3.9 MMOL/L (ref 3.5–5)
PRO-BNP: 1475 PG/ML (ref 0–125)
PROCALCITONIN: 0.36 NG/ML (ref 0–0.08)
PROCALCITONIN: 0.36 NG/ML (ref 0–0.08)
PROTHROMBIN TIME: 14.4 SEC (ref 9.3–12.4)
RBC # BLD: 3.93 E12/L (ref 3.5–5.5)
SEDIMENTATION RATE, ERYTHROCYTE: 85 MM/HR (ref 0–20)
SODIUM BLD-SCNC: 152 MMOL/L (ref 132–146)
TOTAL CK: 199 U/L (ref 20–180)
TOTAL PROTEIN: 6.3 G/DL (ref 6.4–8.3)
VANCOMYCIN RANDOM: 11.6 MCG/ML (ref 5–40)
WBC # BLD: 9.2 E9/L (ref 4.5–11.5)

## 2022-11-16 PROCEDURE — 6360000002 HC RX W HCPCS: Performed by: SPECIALIST

## 2022-11-16 PROCEDURE — 82728 ASSAY OF FERRITIN: CPT

## 2022-11-16 PROCEDURE — 84100 ASSAY OF PHOSPHORUS: CPT

## 2022-11-16 PROCEDURE — 2580000003 HC RX 258: Performed by: INTERNAL MEDICINE

## 2022-11-16 PROCEDURE — 82550 ASSAY OF CK (CPK): CPT

## 2022-11-16 PROCEDURE — 85025 COMPLETE CBC W/AUTO DIFF WBC: CPT

## 2022-11-16 PROCEDURE — 85378 FIBRIN DEGRADE SEMIQUANT: CPT

## 2022-11-16 PROCEDURE — 2500000003 HC RX 250 WO HCPCS: Performed by: NURSE PRACTITIONER

## 2022-11-16 PROCEDURE — 36415 COLL VENOUS BLD VENIPUNCTURE: CPT

## 2022-11-16 PROCEDURE — 84145 PROCALCITONIN (PCT): CPT

## 2022-11-16 PROCEDURE — 85651 RBC SED RATE NONAUTOMATED: CPT

## 2022-11-16 PROCEDURE — 6370000000 HC RX 637 (ALT 250 FOR IP): Performed by: INTERNAL MEDICINE

## 2022-11-16 PROCEDURE — 83735 ASSAY OF MAGNESIUM: CPT

## 2022-11-16 PROCEDURE — 2580000003 HC RX 258: Performed by: NURSE PRACTITIONER

## 2022-11-16 PROCEDURE — 97530 THERAPEUTIC ACTIVITIES: CPT | Performed by: OCCUPATIONAL THERAPIST

## 2022-11-16 PROCEDURE — 92526 ORAL FUNCTION THERAPY: CPT | Performed by: SPEECH-LANGUAGE PATHOLOGIST

## 2022-11-16 PROCEDURE — 97165 OT EVAL LOW COMPLEX 30 MIN: CPT | Performed by: OCCUPATIONAL THERAPIST

## 2022-11-16 PROCEDURE — 74230 X-RAY XM SWLNG FUNCJ C+: CPT

## 2022-11-16 PROCEDURE — 85384 FIBRINOGEN ACTIVITY: CPT

## 2022-11-16 PROCEDURE — 99232 SBSQ HOSP IP/OBS MODERATE 35: CPT | Performed by: INTERNAL MEDICINE

## 2022-11-16 PROCEDURE — 2060000000 HC ICU INTERMEDIATE R&B

## 2022-11-16 PROCEDURE — 92611 MOTION FLUOROSCOPY/SWALLOW: CPT | Performed by: SPEECH-LANGUAGE PATHOLOGIST

## 2022-11-16 PROCEDURE — 80202 ASSAY OF VANCOMYCIN: CPT

## 2022-11-16 PROCEDURE — 80053 COMPREHEN METABOLIC PANEL: CPT

## 2022-11-16 PROCEDURE — 2580000003 HC RX 258: Performed by: SPECIALIST

## 2022-11-16 PROCEDURE — 2700000000 HC OXYGEN THERAPY PER DAY

## 2022-11-16 PROCEDURE — 6360000002 HC RX W HCPCS: Performed by: NURSE PRACTITIONER

## 2022-11-16 PROCEDURE — 6360000002 HC RX W HCPCS: Performed by: INTERNAL MEDICINE

## 2022-11-16 PROCEDURE — 97535 SELF CARE MNGMENT TRAINING: CPT | Performed by: OCCUPATIONAL THERAPIST

## 2022-11-16 PROCEDURE — 83605 ASSAY OF LACTIC ACID: CPT

## 2022-11-16 PROCEDURE — 83615 LACTATE (LD) (LDH) ENZYME: CPT

## 2022-11-16 PROCEDURE — 2500000003 HC RX 250 WO HCPCS: Performed by: STUDENT IN AN ORGANIZED HEALTH CARE EDUCATION/TRAINING PROGRAM

## 2022-11-16 PROCEDURE — 83880 ASSAY OF NATRIURETIC PEPTIDE: CPT

## 2022-11-16 PROCEDURE — 86140 C-REACTIVE PROTEIN: CPT

## 2022-11-16 PROCEDURE — 99231 SBSQ HOSP IP/OBS SF/LOW 25: CPT | Performed by: NURSE PRACTITIONER

## 2022-11-16 PROCEDURE — 82962 GLUCOSE BLOOD TEST: CPT

## 2022-11-16 PROCEDURE — 85610 PROTHROMBIN TIME: CPT

## 2022-11-16 RX ADMIN — METRONIDAZOLE 500 MG: 500 INJECTION, SOLUTION INTRAVENOUS at 09:12

## 2022-11-16 RX ADMIN — INSULIN LISPRO 1 UNITS: 100 INJECTION, SOLUTION INTRAVENOUS; SUBCUTANEOUS at 12:22

## 2022-11-16 RX ADMIN — BARIUM SULFATE 45 G: 0.81 POWDER, FOR SUSPENSION ORAL at 14:39

## 2022-11-16 RX ADMIN — ATORVASTATIN CALCIUM 80 MG: 40 TABLET, FILM COATED ORAL at 20:16

## 2022-11-16 RX ADMIN — Medication 10 ML: at 20:16

## 2022-11-16 RX ADMIN — MEMANTINE HYDROCHLORIDE 5 MG: 5 TABLET ORAL at 20:16

## 2022-11-16 RX ADMIN — BARIUM SULFATE 45 ML: 400 SUSPENSION ORAL at 14:39

## 2022-11-16 RX ADMIN — Medication: at 11:25

## 2022-11-16 RX ADMIN — METRONIDAZOLE 500 MG: 500 INJECTION, SOLUTION INTRAVENOUS at 22:12

## 2022-11-16 RX ADMIN — ENOXAPARIN SODIUM 30 MG: 100 INJECTION SUBCUTANEOUS at 20:16

## 2022-11-16 RX ADMIN — VANCOMYCIN HYDROCHLORIDE 750 MG: 5 INJECTION, POWDER, LYOPHILIZED, FOR SOLUTION INTRAVENOUS at 20:24

## 2022-11-16 RX ADMIN — VANCOMYCIN HYDROCHLORIDE 750 MG: 5 INJECTION, POWDER, LYOPHILIZED, FOR SOLUTION INTRAVENOUS at 10:48

## 2022-11-16 RX ADMIN — BARIUM SULFATE 45 ML: 400 PASTE ORAL at 14:38

## 2022-11-16 RX ADMIN — MEROPENEM 1000 MG: 1 INJECTION, POWDER, FOR SOLUTION INTRAVENOUS at 05:00

## 2022-11-16 RX ADMIN — DONEPEZIL HYDROCHLORIDE 10 MG: 5 TABLET, FILM COATED ORAL at 20:16

## 2022-11-16 RX ADMIN — METRONIDAZOLE 500 MG: 500 INJECTION, SOLUTION INTRAVENOUS at 16:15

## 2022-11-16 RX ADMIN — ENOXAPARIN SODIUM 30 MG: 100 INJECTION SUBCUTANEOUS at 10:42

## 2022-11-16 RX ADMIN — Medication 10 ML: at 09:16

## 2022-11-16 RX ADMIN — MEROPENEM 1000 MG: 1 INJECTION, POWDER, FOR SOLUTION INTRAVENOUS at 23:50

## 2022-11-16 RX ADMIN — MEROPENEM 1000 MG: 1 INJECTION, POWDER, FOR SOLUTION INTRAVENOUS at 18:50

## 2022-11-16 ASSESSMENT — PAIN SCALES - WONG BAKER: WONGBAKER_NUMERICALRESPONSE: 0

## 2022-11-16 NOTE — PROGRESS NOTES
Palliative Care Department  [phone]  Palliative Care Progress Note  Provider Abhi Bolden, APRN - [address] [facility]  [id number]  Hospital Day: 4  Date of Initial Consult: 11/13/2022  Referring Provider: Dr. Michael Davidson was consulted for assistance with: Goals of care, CODE STATUS discussion    HPI:   Siena Walker is a 67 y.o. with a medical history of dementia, cancer, DM II, HTN, HLD, CVA who was admitted on 11/13/2022 from nursing facility with a CHIEF COMPLAINT of increased altered mental status. Prior to arrival to the ED, patient became nonverbal and would not make eye contact to her name. Patient was found to be positive for COVID-19 infection as well as a UTI. CT head suspicious for NPH. CT chest showed mild groundglass opacities in the lungs favoring atelectasis. There is an age indeterminant 8 mm groundglass pulmonary nodule in the left lower lobe. Patient had a recent admission for UTI and stroke. ASSESSMENT/PLAN:     Pertinent Hospital Diagnoses     Acute COVID-19 infection  Acute metabolic encephalopathy with moderate to severe underlying dementia  Recent right thalamic lacunar infarct with small nonhemorrhagic penumbra  Sepsis with acute UTI and COVID-19 infection      Palliative Care Encounter / Counseling Regarding Goals of Care  Please see detailed goals of care discussion as below  At this time, Siena Walker, Does Not have capacity for medical decision-making.   Capacity is time limited and situation/question specific  During encounter Jean Hagan and Richar Aric was surrogate medical decision-maker  Outcome of goals of care meeting:   Continue current medical management  DNRCCA/DNI          Code status Limited DNI  Advanced Directives: no POA or living will in [facility]  Surrogate/Legal NOK:  Chivo Angry (spouse) [phone] (has dementia)  Adrien De Anda (child) [phone]  Jonny Stain (niece) [phone]    Spiritual assessment: no spiritual distress identified  Bereavement and grief: to be determined  Referrals to: none today  SUBJECTIVE:     Current medical issues leading to Palliative Medicine involvement include   Active Hospital Problems    Diagnosis Date Noted    Palliative care by specialist [Z51.5] 11/15/2022     Priority: Medium    COVID-19 [U07.1] 11/14/2022     Priority: Medium    Palliative care encounter [Z51.5] 11/14/2022     Priority: Medium    2019 novel coronavirus disease (COVID-19) [U07.1] 11/13/2022     Priority: Medium       Details of Conversation:      Chart reviewed. Patient seen at the bedside, alert, minimal verbal response. Family present at bedside. Stating patient is more alert and able to converse with them minimally. Call placed to son, Mariluz Reveles, with no answer and no voicemail set up. , Roseanne Keaneite, and niece, Ivy Adams, are with the patient at the bedside. Patient is currently a limited code/DNI. Family would like to hold off on hospice at this time as she is more alert and responding better. Discussed with SLP, patient will need a video swallow performed to assess dysphagia. Patient remains on 5 L O2 via nasal cannula and looks to be comfortable. Emotional support provided. Palliative medicine to follow. OBJECTIVE:   Prognosis: Guarded    Physical Exam:  BP (!) 145/85   Pulse (!) 108   Temp 98.6 °F (37 °C)   Resp 20   Ht 5' 3\" (1.6 m)   Wt 115 lb (52.2 kg)   LMP  (LMP Unknown)   SpO2 95%   BMI 20.37 kg/m²     Objective data reviewed: labs, images, records, medication use, vitals, and chart    Discussed patient and the plan of care with the other IDT members: Palliative Medicine IDT Team, Floor Nurse, and Family    Time/Communication  Greater than 50% of time spent, total 15 minutes in counseling and coordination of care at the bedside regarding goals of care and diagnosis and prognosis. Thank you for allowing Palliative Medicine to participate in the care of Joanne Combs.

## 2022-11-16 NOTE — PROGRESS NOTES
See note from speech therapy on swallowing study - Dr Lewis Auguste notified and placed patient on diet- pureed.  Assist feed

## 2022-11-16 NOTE — PROGRESS NOTES
Pulmonary/Critical Care Progress Note    CHIEF COMPLAINT: hypoxemia     ISSUES:    Aspiration event  Likely responsible for transient hypoxemia  Consistent with chemical pneumonitis rather than actual infection  CXR / CT clean  Weaning FiO2    COVID-19  2019 novel coronavirus disease (COVID-19)  Unremarkable  Found by screening at SNF      Goals of Care 11/15/22  Met with son, and  (with dementia)  Explained situation / events  States clearly that would not pt to be intubated or undergo CPR  Therefore pt is  DNR-CCA DNI    All questions answered     22  On less O2  Goals of care conversation ongoing  Still with altered mental status     Call with questions      __________________________________________________    Referring MD: Dr. Yamilet Baez    Reason for Consult: hypoxemia      HISTORY OF PRESENT ILLNESS:   Randy Yanez is a 67 y.o. female with hx of Dementia, DM II, recent CVA, HTN,  presents with altered mental status. Had waht appears to be   aspiration event earlier today with hypoxemia and resultant RRT.   CXR / CT normal   O2 needs rapidly decreasing    Reviewed goals of care with son (and  who has dementia) - see above    ALLERGY:  Tetracyclines & related    FAMILY HISTORY:  Family History   Problem Relation Age of Onset    Heart Disease Mother     No Known Problems Father     Other Sister     Other Brother     Other Other        SOCIAL HISTORY:  Social History     Socioeconomic History    Marital status:      Spouse name: Not on file    Number of children: Not on file    Years of education: Not on file    Highest education level: Not on file   Occupational History    Not on file   Tobacco Use    Smoking status: Former     Packs/day: 1.00     Years: 44.00     Pack years: 44.00     Types: Cigarettes     Start date: 1965     Quit date: 2009     Years since quittin.9    Smokeless tobacco: Never   Vaping Use    Vaping Use: Never used   Substance and Sexual Activity Alcohol use: Yes     Comment: seldom    Drug use: No    Sexual activity: Not on file   Other Topics Concern    Not on file   Social History Narrative    Not on file     Social Determinants of Health     Financial Resource Strain: Not on file   Food Insecurity: Not on file   Transportation Needs: Not on file   Physical Activity: Not on file   Stress: Not on file   Social Connections: Not on file   Intimate Partner Violence: Not on file   Housing Stability: Not on file       MEDICAL HISTORY:  Past Medical History:   Diagnosis Date    Arthritis     right hand    Cancer ([facility] 1985    uterus and cervix had radiation and implant    Diabetes mellitus ([facility]     Hyperlipidemia     Hypertension     Radiation cystitis     Urinary incontinence     complication due to uterine cancer    Urinary retention     caused by complications from uterine cancer       MEDICATIONS:   vancomycin  750 mg IntraVENous Q12H    metroNIDAZOLE  500 mg IntraVENous Q8H    acetylcysteine  4 mL Inhalation BID    budesonide  500 mcg Nebulization BID    ipratropium-albuterol  1 ampule Inhalation Q4H WA    meropenem  1,000 mg IntraVENous Q12H    insulin lispro  0-4 Units SubCUTAneous TID WC    insulin lispro  0-4 Units SubCUTAneous Nightly    vitamin C  1,000 mg Oral Daily    Vitamin D  2,000 Units Oral Daily    zinc sulfate  50 mg Oral Daily    metoprolol succinate  25 mg Oral Daily    atorvastatin  80 mg Oral Nightly    sodium chloride flush  5-40 mL IntraVENous 2 times per day    aspirin  81 mg Oral Daily    clopidogrel  75 mg Oral Daily    donepezil  10 mg Oral Nightly    memantine  5 mg Oral BID    enoxaparin  30 mg SubCUTAneous BID      IV infusion builder 75 mL/hr at 11/16/22 1125    dextrose      sodium chloride       methylPREDNISolone, EPINEPHrine, magnesium sulfate, sodium phosphate IVPB **OR** sodium phosphate IVPB, potassium chloride **OR** potassium alternative oral replacement **OR** potassium chloride, benzonatate, glucose, dextrose bolus **OR** dextrose bolus, glucagon (rDNA), dextrose, sodium chloride flush, sodium chloride, polyethylene glycol, acetaminophen **OR** acetaminophen    REVIEW OF SYSTEMS:  Unable to provide    PHYSICAL EXAM:  Vitals:    11/16/22 0900   BP: (!) 145/85   Pulse: (!) 108   Resp: 20   Temp: 98.6 °F (37 °C)   SpO2: 95%        O2 Flow Rate (L/min): 5 L/min  O2 Device: Nasal cannula    Due to the current efforts to prevent transmission of COVID-19 and also the need to preserve PPE for other caregivers, a face-to-face encounter with the patient was not performed. That being said, all relevant records and diagnostic tests were reviewed, including laboratory results and imaging. Please reference any relevant documentation elsewhere. Care will be coordinated with the primary service.       LABS:  WBC   Date Value Ref Range Status   11/16/2022 9.2 4.5 - 11.5 E9/L Final   11/15/2022 15.4 (H) 4.5 - 11.5 E9/L Final   11/14/2022 6.1 4.5 - 11.5 E9/L Final     Hemoglobin   Date Value Ref Range Status   11/16/2022 10.9 (L) 11.5 - 15.5 g/dL Final   11/15/2022 12.5 11.5 - 15.5 g/dL Final   11/14/2022 11.7 11.5 - 15.5 g/dL Final     Hematocrit   Date Value Ref Range Status   11/16/2022 35.9 34.0 - 48.0 % Final   11/15/2022 41.2 34.0 - 48.0 % Final   11/14/2022 37.6 34.0 - 48.0 % Final     MCV   Date Value Ref Range Status   11/16/2022 91.3 80.0 - 99.9 fL Final   11/15/2022 92.4 80.0 - 99.9 fL Final   11/14/2022 90.6 80.0 - 99.9 fL Final     Platelets   Date Value Ref Range Status   11/16/2022 225 130 - 450 E9/L Final   11/15/2022 339 130 - 450 E9/L Final   11/14/2022 267 130 - 450 E9/L Final     Sodium   Date Value Ref Range Status   11/16/2022 152 (H) 132 - 146 mmol/L Final   11/15/2022 146 132 - 146 mmol/L Final   11/14/2022 145 132 - 146 mmol/L Final     Potassium   Date Value Ref Range Status   11/16/2022 3.9 3.5 - 5.0 mmol/L Final   11/15/2022 5.2 (H) 3.5 - 5.0 mmol/L Final   11/14/2022 4.8 3.5 - 5.0 mmol/L Final     Potassium reflex Magnesium   Date Value Ref Range Status   10/16/2022 4.6 3.5 - 5.0 mmol/L Final   08/02/2021 4.9 3.5 - 5.0 mmol/L Final     Chloride   Date Value Ref Range Status   11/16/2022 116 (H) 98 - 107 mmol/L Final   11/15/2022 112 (H) 98 - 107 mmol/L Final   11/14/2022 111 (H) 98 - 107 mmol/L Final     CO2   Date Value Ref Range Status   11/16/2022 24 22 - 29 mmol/L Final   11/15/2022 17 (L) 22 - 29 mmol/L Final   11/14/2022 17 (L) 22 - 29 mmol/L Final     BUN   Date Value Ref Range Status   11/16/2022 39 (H) 6 - 23 mg/dL Final   11/15/2022 41 (H) 6 - 23 mg/dL Final   11/14/2022 48 (H) 6 - 23 mg/dL Final     Creatinine   Date Value Ref Range Status   11/16/2022 0.8 0.5 - 1.0 mg/dL Final   11/15/2022 0.7 0.5 - 1.0 mg/dL Final   11/14/2022 0.9 0.5 - 1.0 mg/dL Final     Glucose   Date Value Ref Range Status   11/16/2022 150 (H) 74 - 99 mg/dL Final   11/15/2022 189 (H) 74 - 99 mg/dL Final   11/14/2022 202 mg/dL Final     Calcium   Date Value Ref Range Status   11/16/2022 9.1 8.6 - 10.2 mg/dL Final   11/15/2022 9.4 8.6 - 10.2 mg/dL Final   11/14/2022 9.2 8.6 - 10.2 mg/dL Final     Total Protein   Date Value Ref Range Status   11/16/2022 6.3 (L) 6.4 - 8.3 g/dL Final   11/15/2022 7.0 6.4 - 8.3 g/dL Final   11/14/2022 6.6 6.4 - 8.3 g/dL Final     Albumin   Date Value Ref Range Status   11/16/2022 3.2 (L) 3.5 - 5.2 g/dL Final   11/15/2022 3.3 (L) 3.5 - 5.2 g/dL Final   11/14/2022 3.1 (L) 3.5 - 5.2 g/dL Final     Total Bilirubin   Date Value Ref Range Status   11/16/2022 0.5 0.0 - 1.2 mg/dL Final   11/15/2022 0.6 0.0 - 1.2 mg/dL Final   11/14/2022 0.6 0.0 - 1.2 mg/dL Final     Alkaline Phosphatase   Date Value Ref Range Status   11/16/2022 144 (H) 35 - 104 U/L Final   11/15/2022 156 (H) 35 - 104 U/L Final   11/14/2022 150 (H) 35 - 104 U/L Final     AST   Date Value Ref Range Status   11/16/2022 33 (H) 0 - 31 U/L Final   11/15/2022 48 (H) 0 - 31 U/L Final   11/14/2022 42 (H) 0 - 31 U/L Final     ALT   Date Value Ref Range Status 11/16/2022 38 (H) 0 - 32 U/L Final   11/15/2022 50 (H) 0 - 32 U/L Final   11/14/2022 54 (H) 0 - 32 U/L Final     Est, Glom Filt Rate   Date Value Ref Range Status   11/16/2022 >60 >=60 mL/min/1.73 Final     Comment:     Pediatric calculator link  [email or web address]_[email or web address]  Effective Oct 3, 2022  These results are not intended for use in patients  <25years of age. eGFR results are calculated without  a race factor using the 2021 CKD-EPI equation. Careful  clinical correlation is recommended, particularly when  comparing to results calculated using previous equations. The CKD-EPI equation is less accurate in patients with  extremes of muscle mass, extra-renal metabolism of  creatinine, excessive creatinine ingestion, or following  therapy that affects renal tubular secretion. 11/15/2022 >60 >=60 mL/min/1.73 Final     Comment:     Pediatric calculator link  [email or web address]_[email or web address]  Effective Oct 3, 2022  These results are not intended for use in patients  <25years of age. eGFR results are calculated without  a race factor using the 2021 CKD-EPI equation. Careful  clinical correlation is recommended, particularly when  comparing to results calculated using previous equations. The CKD-EPI equation is less accurate in patients with  extremes of muscle mass, extra-renal metabolism of  creatinine, excessive creatinine ingestion, or following  therapy that affects renal tubular secretion. 11/14/2022 >60 >=60 mL/min/1.73 Final     Comment:     Pediatric calculator link  [email or web address]_[email or web address]  Effective Oct 3, 2022  These results are not intended for use in patients  <25years of age. eGFR results are calculated without  a race factor using the 2021 CKD-EPI equation. Careful  clinical correlation is recommended, particularly when  comparing to results calculated using previous equations.   The CKD-EPI equation is less accurate in patients with  extremes of muscle mass, extra-renal metabolism of  creatinine, excessive creatinine ingestion, or following  therapy that affects renal tubular secretion. GFR    Date Value Ref Range Status   10/17/2022 >60  Final   10/16/2022 45  Final   08/05/2021 >60  Final     Magnesium   Date Value Ref Range Status   11/16/2022 1.8 1.6 - 2.6 mg/dL Final   11/15/2022 2.0 1.6 - 2.6 mg/dL Final   11/14/2022 1.9 1.6 - 2.6 mg/dL Final     Phosphorus   Date Value Ref Range Status   11/16/2022 2.5 2.5 - 4.5 mg/dL Final   11/15/2022 2.7 2.5 - 4.5 mg/dL Final   11/14/2022 3.5 2.5 - 4.5 mg/dL Final     Recent Labs     11/15/22  0655   HCO3 20.9*   BE -3.3*   O2SAT 99.6*         RADIOLOGY:  XR ABDOMEN (KUB) (SINGLE AP VIEW)   Final Result   No active process. See above. XR CHEST PORTABLE   Final Result   No acute process. CT CHEST W CONTRAST   Final Result   Mild ground-glass opacities in the lungs favoring atelectasis. There is an   age-indeterminate 8 mm ground-glass pulmonary nodule in the left lower lobe. This could be related to bronchiolitis or atelectasis, however see below for   follow-up recommendations. Prominent secretions within the left mainstem bronchus extending towards the   lower lobe segments. Otherwise no acute findings. RECOMMENDATIONS:   Recommend a non-contrast Chest CT at 3-6 months to confirm persistence. If   unchanged and the solid component remains < 6 mm, an annual non-contrast   Chest CT should be performed for 5 years. For persistent suspicious   (lobulated, spiculated, or containing cystic areas or solid component) part   solid nodules: If the solid component is 6-8 mm on follow-up, recommend   biopsy or resection; if the solid component is > 8 mm on follow-up, recommend   PET/CT, biopsy or resection. These guidelines do not apply to immunocompromised patients and patients with   cancer.  Follow up in patients with significant comorbidities as clinically   warranted. For lung cancer screening, adhere to Lung-RADS guidelines. Reference: Radiology. 2017; 284(1):228-43. Pathology: 1.3 cm incidental right thyroid nodule. No follow-up imaging is recommended. Reference: J Am Fatuma Radiol. 2015 Feb;12(2): 143-50         XR CHEST PORTABLE   Final Result   No acute process. CT HEAD WO CONTRAST   Final Result   1. No acute intracranial abnormality. 2. Chronic ischemic changes. 3. Suspicious for normal pressure hydrocephalus.          Fluoroscopy modified barium swallow with video    (Results Pending)     (Independently reviewed by me)          Gudelia Delgado M.D  Pulmonary & Critical Care  11/16/2022 1:41 PM

## 2022-11-16 NOTE — PROGRESS NOTES
CHIEF COMPLAINT: Elevated troponin/NSTEMI/COVID 19/Change of MS    HISTORY OF PRESENT ILLNESS: Patient is a 67 y.o. female seen at the request of Darnell Guardado DO and not followed by cardiology. Patient presented with change of MS. Evaluation revealed patient to be COVID 19 positive, but also noted to have elevated troponin.      Past Medical History:   Diagnosis Date    Arthritis     right hand    Cancer ([address] 1985    uterus and cervix had radiation and implant    Diabetes mellitus ([address]     Hyperlipidemia     Hypertension     Radiation cystitis     Urinary incontinence     complication due to uterine cancer    Urinary retention     caused by complications from uterine cancer       Patient Active Problem List   Diagnosis    Sepsis secondary to UTI ([address]    Post-operative pain    H/O total hysterectomy    Essential hypertension    Type 2 diabetes mellitus with hyperglycemia, without long-term current use of insulin ([address]    Other hyperlipidemia    Sacral wound    Sacral wound, initial encounter    Acute cystitis with hematuria    Severe protein-calorie malnutrition ([address]    UTI (urinary tract infection)    Acute CVA (cerebrovascular accident) ([address]    2019 novel coronavirus disease (COVID-19)    COVID-19    Palliative care encounter    Palliative care by specialist       Allergies   Allergen Reactions    Tetracyclines & Related        Current Facility-Administered Medications   Medication Dose Route Frequency Provider Last Rate Last Admin    metronidazole (FLAGYL) 500 mg in 0.9% NaCl 100 mL IVPB premix  500 mg IntraVENous Q8H Torrey Alejandra MD   Stopped at 11/16/22 0020    acetylcysteine (MUCOMYST) 10 % solution 400 mg  4 mL Inhalation BID Liban Chalo, DO        budesonide (PULMICORT) nebulizer suspension 500 mcg  500 mcg Nebulization BID Liban Chalo, DO        ipratropium-albuterol (DUONEB) nebulizer solution 1 ampule  1 ampule Inhalation Q4H FELICIA Torrez DO        meropenem (MERREM) 1,000 mg in sodium chloride 0.9 % 100 mL IVPB ([id number])  1,000 mg IntraVENous Q12H Priscilla Decker MD 33.3 mL/hr at 11/16/22 0500 1,000 mg at 11/16/22 0500    methylPREDNISolone sodium (SOLU-MEDROL) injection 125 mg  125 mg IntraVENous Once PRN Priscilla Decker MD        EPINEPHrine PF 1 MG/ML injection (Anaphylaxis) 0.3 mg  0.3 mg IntraMUSCular Once PRN Priscilla Decker MD        insulin lispro (HUMALOG) injection vial 0-4 Units  0-4 Units SubCUTAneous TID CJ RamirezgennaroDO   2 Units at 11/15/22 1208    insulin lispro (HUMALOG) injection vial 0-4 Units  0-4 Units SubCUTAneous Nightly Ismail U [place], DO        magnesium sulfate 1000 mg in dextrose 5% 100 mL IVPB  1,000 mg IntraVENous PRN Clearence Garbe, APRN - CNP        sodium phosphate 9 mmol in sodium chloride 0.9 % 250 mL IVPB  9 mmol IntraVENous PRN Clearence Garbe, APRN - CNP        Or    sodium phosphate 15 mmol in sodium chloride 0.9 % 250 mL IVPB  15 mmol IntraVENous PRN Clearence Garbe, APRN - CNP        potassium chloride (KLOR-CON M) extended release tablet 40 mEq  40 mEq Oral PRN Clearence Garbe, APRN - CNP        Or    potassium bicarb-citric acid (EFFER-K) effervescent tablet 40 mEq  40 mEq Oral PRN Clearence Garbe, APRN - CNP        Or    potassium chloride 10 mEq/100 mL IVPB (Peripheral Line)  10 mEq IntraVENous PRN Clearence Garbe, APRN - CNP        benzonatate (TESSALON) capsule 200 mg  200 mg Oral TID PRN Clearence Garbe, APRN - CNP        ascorbic acid (VITAMIN C) tablet 1,000 mg  1,000 mg Oral Daily Clearence Garbe, APRN - CNP        Vitamin D (CHOLECALCIFEROL) tablet 2,000 Units  2,000 Units Oral Daily Clearence Garbe, APRN - CNP        zinc sulfate (ZINCATE) capsule 50 mg  50 mg Oral Daily Clearence Garbe, APRN - CNP        metoprolol succinate (TOPROL XL) extended release tablet 25 mg  25 mg Oral Daily Clearence Garbe, APRN - CNP        potassium chloride 40 mEq, sodium bicarbonate 50 mEq in sodium chloride 0.45 % 1,000 mL infusion   IntraVENous Continuous Rico Parrish Otf Borregoil, APRN - CNP 75 mL/hr at 11/14/22 2300 New Bag at 11/14/22 2300    atorvastatin (LIPITOR) tablet 80 mg  80 mg Oral Nightly Ismail U [place], DO        glucose chewable tablet 16 g  4 tablet Oral PRN Cynda Stall, DO        dextrose bolus 10% 125 mL  125 mL IntraVENous PRN Cynda Stall, DO        Or    dextrose bolus 10% 250 mL  250 mL IntraVENous PRN Cynda Stall, DO        glucagon (rDNA) injection 1 mg  1 mg SubCUTAneous PRN Cynda Stall, DO        dextrose 10 % infusion   IntraVENous Continuous PRN Cynda Stall, DO        sodium chloride flush 0.9 % injection 5-40 mL  5-40 mL IntraVENous 2 times per day Cynda Stall, DO   10 mL at 11/15/22 2011    sodium chloride flush 0.9 % injection 5-40 mL  5-40 mL IntraVENous PRN Cynda Stall, DO        0.9 % sodium chloride infusion   IntraVENous PRN Cynda Stall, DO        polyethylene glycol (GLYCOLAX) packet 17 g  17 g Oral Daily PRN Cynda Stall, DO        acetaminophen (TYLENOL) tablet 650 mg  650 mg Oral Q6H PRN Cynda Stall, DO        Or    acetaminophen (TYLENOL) suppository 650 mg  650 mg Rectal Q6H PRN Cynda Stall, DO        aspirin chewable tablet 81 mg  81 mg Oral Daily Cynda Stall, DO        clopidogrel (PLAVIX) tablet 75 mg  75 mg Oral Daily Ismail U [place], DO        donepezil (ARICEPT) tablet 10 mg  10 mg Oral Nightly Cynda Stall, DO        memantine (NAMENDA) tablet 5 mg  5 mg Oral BID Cynda Stall, DO        enoxaparin Sodium (LOVENOX) injection 30 mg  30 mg SubCUTAneous BID Cynda Stall, DO   30 mg at 11/15/22 2018       Social History     Socioeconomic History    Marital status:      Spouse name: Not on file    Number of children: Not on file    Years of education: Not on file    Highest education level: Not on file   Occupational History    Not on file   Tobacco Use    Smoking status: Former     Packs/day: 1.00     Years: 44.00     Pack years: 44.00     Types: Cigarettes     Start date: 1/1/1965 Quit date: 2009     Years since quittin.9    Smokeless tobacco: Never   Vaping Use    Vaping Use: Never used   Substance and Sexual Activity    Alcohol use: Yes     Comment: seldom    Drug use: No    Sexual activity: Not on file   Other Topics Concern    Not on file   Social History Narrative    Not on file     Social Determinants of Health     Financial Resource Strain: Not on file   Food Insecurity: Not on file   Transportation Needs: Not on file   Physical Activity: Not on file   Stress: Not on file   Social Connections: Not on file   Intimate Partner Violence: Not on file   Housing Stability: Not on file       Family History   Problem Relation Age of Onset    Heart Disease Mother     No Known Problems Father     Other Sister     Other Brother     Other Other      Review of Systems:   Unable to elicit. Physical Exam   BP (!) 146/74   Pulse (!) 102   Temp 99 °F (37.2 °C) (Axillary)   Resp 20   Ht 5' 3\" (1.6 m)   Wt 115 lb (52.2 kg)   LMP  (LMP Unknown)   SpO2 92%   BMI 20.37 kg/m²     COVID 19.      CBC:   Lab Results   Component Value Date/Time    WBC 9.2 2022 05:25 AM    RBC 3.93 2022 05:25 AM    HGB 10.9 2022 05:25 AM    HCT 35.9 2022 05:25 AM    MCV 91.3 2022 05:25 AM    MCH 27.7 2022 05:25 AM    MCHC 30.4 2022 05:25 AM    RDW 14.3 2022 05:25 AM     2022 05:25 AM    MPV 10.2 2022 05:25 AM     BMP:   Lab Results   Component Value Date/Time     2022 05:25 AM    K 3.9 2022 05:25 AM    K 4.6 10/16/2022 01:40 PM     2022 05:25 AM    CO2 24 2022 05:25 AM    BUN 39 2022 05:25 AM    LABALBU 3.2 2022 05:25 AM    CREATININE 0.8 2022 05:25 AM    CALCIUM 9.1 2022 05:25 AM    GFRAA >60 10/17/2022 05:17 AM    LABGLOM >60 2022 05:25 AM     Magnesium:    Lab Results   Component Value Date/Time    MG 1.8 2022 05:25 AM     Cardiac Enzymes:   Lab Results   Component Value Date    CKTOTAL 199 (H) 11/16/2022    CKTOTAL 167 10/16/2022    TROPHS 63 (H) 11/14/2022    TROPHS 66 (H) 11/13/2022    TROPHS 33 (H) 10/16/2022      PT/INR:    Lab Results   Component Value Date/Time    PROTIME 14.4 11/16/2022 05:25 AM    INR 1.3 11/16/2022 05:25 AM     TSH:    Lab Results   Component Value Date/Time    TSH 0.144 10/17/2022 05:17 AM     Rhythm Strip: normal sinus rhythm. EKG:  nonspecific ST and T waves changes, sinus tachycardia, iRBBB. Echo Summary 10/19/2022:   Left ventricular size is grossly normal.   Hyperdynamic left ventricular systolic function. Ejection fraction is visually estimated at 70%. The left atrium is mildly dilated. Physiologic and/or trace mitral regurgitation is present. No evidence of mitral valve stenosis. Mitral annular calcification is present. Physiologic and/or trace tricuspid regurgitation. Regular rhythm. ASSESSMENT AND PLAN:  Patient Active Problem List   Diagnosis    Sepsis secondary to UTI ([address]    Post-operative pain    H/O total hysterectomy    Essential hypertension    Type 2 diabetes mellitus with hyperglycemia, without long-term current use of insulin ([facility])    Other hyperlipidemia    Sacral wound    Sacral wound, initial encounter    Acute cystitis with hematuria    Severe protein-calorie malnutrition ([address]    UTI (urinary tract infection)    Acute CVA (cerebrovascular accident) ([address]    2019 novel coronavirus disease (COVID-19)    COVID-19    Palliative care encounter    Palliative care by specialist     1. Elevated troponin/NSTEMI:    Chart/labs/EKG/recent echo reviewed. Medically manage for now. ASA/plavix/statin/BB. 2. COVID 19: Per primary service. 3. HTN: Observe. 4. DM: Per primary service. 5. Lipids: Statin. 6. Hx of CVA: ASA/plavix/statin. Al Dietrich D.O.   Cardiologist  Cardiology, [address]

## 2022-11-16 NOTE — PROGRESS NOTES
Internal Medicine Progress Note    [facility]    Ro Sanderson. Shayne Daily., F.A.C.OAnniaI. Del De La Rosa D.O., CAPRICEOJAROCHO Sanford, MSN, APRN, NP-C  Mackenzie Arreguin. Ajay Pleitez, MSN, APRN-CNP     Primary Care Physician: Ana Preciado DO   Admitting Physician:  Lacy Dong DO  Admission date and time: 11/13/2022  6:47 PM    Room:  [address] [place] [address]  Admitting diagnosis: Somnolence [R40.0]  Sepsis without acute organ dysfunction, due to unspecified organism [facility]) [A41.9]  2019 novel coronavirus disease (COVID-19) [U07.1]  COVID-19 virus infection [U07.1]  COVID-19 [U07.1]    Patient Name: Reinier Mayer  MRN: [id number]    Date of Service: 11/16/2022     Subjective:  Lelia Mcburney is a 67 y.o. female who was seen and examined today,11/16/2022, at the bedside. Family is present at the bedside. The state there is been a drastic difference from yesterday to today. Status 19. Patient is talking. She will say sentences. She does answer questions. They report that speech therapy was at the bedside made recommendation with the patient have formal swallowing study. States patient seems to have difficulty swallowing both liquids and solids. Patient states he is tired today. Admits to weakness.  and daughter present at the bedside. ROS: Review of rest of 12 systems negative except as mentioned above-subjective section    Physical Exam:  I/O this shift: In: 969.8 [IV Piggyback:969.8]  Out: -     Intake/Output Summary (Last 24 hours) at 11/16/2022 1341  Last data filed at 11/16/2022 1335  Gross per 24 hour   Intake 969.78 ml   Output --   Net 969.78 ml   I/O last 3 completed shifts: In: 10 [I.V.:10]  Out: 825 [Urine:825]  Patient Vitals for the past 96 hrs (Last 3 readings):   Weight   11/14/22 1827 115 lb (52.2 kg)   11/13/22 1834 115 lb (52.2 kg)     Vital Signs:   Blood pressure (!) 145/85, pulse (!) 108, temperature 98.6 °F (37 °C), resp.  rate 20, height 5' 3\" (1.6 m), weight 115 lb (52.2 kg), SpO2 95 %, not currently breastfeeding. General appearance:  Awake and alert. Oriented person. No acute distress. Head:  Normocephalic. No masses, lesions or tenderness. Eyes:  PERRLA. EOMI. Sclera clear. ENT:  Ears normal.  Nasal cannula oxygen is in place. Extremely dry mucous membranes. Neck:    Supple. Trachea midline. No thyromegaly. No JVD. No bruits. Heart:    Rhythm regular. Rate controlled. S1 and S2. Systolic murmur  Lungs:    Symmetrical.  Shallow and tachypneic pattern of respiration, diminished aeration throughout. Abdomen:   Soft. Non-tender. Non-distended. Bowel sounds positive. No organomegaly or masses. No pain on palpation. Extremities:    Peripheral pulses present. No significant pitting peripheral edema. No ulcers. No cyanosis. No clubbing. Neurologic:    Awake and alert. Patient flaccid on the left side both upper and lower extremities. Acute on chronic ill appearance without distress. Psych:   Behavior is normal.  Speech is slow/deliberate. Musculoskeletal:   No unilateral joint edema, erythema or warmth. Gait not assessed. Integumentary:  Refer to clinical documentation regarding this wounds of present, otherwise no rashes  Skin normal color and texture.   Genitalia/Breast:  Deferred    Medication:  Scheduled Meds:   vancomycin  750 mg IntraVENous Q12H    metroNIDAZOLE  500 mg IntraVENous Q8H    acetylcysteine  4 mL Inhalation BID    budesonide  500 mcg Nebulization BID    ipratropium-albuterol  1 ampule Inhalation Q4H WA    meropenem  1,000 mg IntraVENous Q12H    insulin lispro  0-4 Units SubCUTAneous TID WC    insulin lispro  0-4 Units SubCUTAneous Nightly    vitamin C  1,000 mg Oral Daily    Vitamin D  2,000 Units Oral Daily    zinc sulfate  50 mg Oral Daily    metoprolol succinate  25 mg Oral Daily    atorvastatin  80 mg Oral Nightly    sodium chloride flush  5-40 mL IntraVENous 2 times per day    aspirin  81 mg Oral Daily    clopidogrel  75 mg Oral Daily    donepezil  10 mg Oral Nightly    memantine  5 mg Oral BID    enoxaparin  30 mg SubCUTAneous BID     Continuous Infusions:   IV infusion builder 75 mL/hr at 11/16/22 1125    dextrose      sodium chloride         Objective Data:  CBC with Differential:    Lab Results   Component Value Date/Time    WBC 9.2 11/16/2022 05:25 AM    RBC 3.93 11/16/2022 05:25 AM    HGB 10.9 11/16/2022 05:25 AM    HCT 35.9 11/16/2022 05:25 AM     11/16/2022 05:25 AM    MCV 91.3 11/16/2022 05:25 AM    MCH 27.7 11/16/2022 05:25 AM    MCHC 30.4 11/16/2022 05:25 AM    RDW 14.3 11/16/2022 05:25 AM    SEGSPCT 61 09/01/2012 08:15 AM    LYMPHOPCT 11.6 11/16/2022 05:25 AM    MONOPCT 6.1 11/16/2022 05:25 AM    BASOPCT 0.1 11/16/2022 05:25 AM    MONOSABS 0.56 11/16/2022 05:25 AM    LYMPHSABS 1.07 11/16/2022 05:25 AM    EOSABS 0.01 11/16/2022 05:25 AM    BASOSABS 0.01 11/16/2022 05:25 AM     BMP:    Lab Results   Component Value Date/Time     11/16/2022 05:25 AM    K 3.9 11/16/2022 05:25 AM    K 4.6 10/16/2022 01:40 PM     11/16/2022 05:25 AM    CO2 24 11/16/2022 05:25 AM    BUN 39 11/16/2022 05:25 AM    LABALBU 3.2 11/16/2022 05:25 AM    CREATININE 0.8 11/16/2022 05:25 AM    CALCIUM 9.1 11/16/2022 05:25 AM    GFRAA >60 10/17/2022 05:17 AM    LABGLOM >60 11/16/2022 05:25 AM    GLUCOSE 150 11/16/2022 05:25 AM       COVID inflammatory markers  Recent Labs     11/16/22  0525   DDIMER 962     Recent Labs     11/16/22  0525   FERRITIN 56     Recent Labs     11/16/22  0525   CRP 6.7*       Assessment:  Acute COVID-19 infection with failure to thrive symptomatology   Acute metabolic encephalopathy with moderate to severe underlying dementia  Recent right thalamic lacunar stroke with small nonhemorrhagic penumbra  Sepsis (POA with AMS, fever and HR) with urinary tract infection and COVID-19 infection  Recent CT findings of 1.5 x 2.9 cm rounded opacity in the right lung base-follow-up imaging to evaluate for resolution and underlying mass presence   Suspected aspiration pneumonia with chemical pneumonitis versus infection. Poorly controlled non-insulin-dependent diabetes mellitus type 2  Poorly controlled essential hypertension  History of uterine and cervical cancer with prior radiation    Plan:   Family reports that the patient is much improved today. She is conversing and interactive with them. They are concerned about the patient's aspiration. They would like the patient undergo a formal swallowing study. Also interested in hospice consult to discuss goals of care   Respirations are easy. Continue nebulizer respiratory medications. maximal COVID protocol is being employed. Continue IV fluids for gentle hydration. Fluid resuscitation is being undertaken. Her condition remains guarded and her long-term prognosis remains poor. Family is aware and would like to speak to hospice. More than 50% of my  time was spent at the bedside counseling/coordinating care with the patient and/or family with face to face contact. This time was spent reviewing notes and laboratory data as well as instructing and counseling the patient. Time I spent with the family or surrogate(s) is included only if the patient was incapable of providing the necessary information or participating in medical decisions. I also discussed the differential diagnosis and all of the proposed management plans with the patient and individuals accompanying the patient. Susan Oviedo requires this high level of physician care and nursing on the IMC/Telemetry unit due the complexity of decision management and chance of rapid decline or death. Continued cardiac monitoring and higher level of nursing are required. I am readily available for any further decision-making and intervention. The patient was seen, examined and then discussed with Dr. Ramya Bolton.        Mony Watters, ELIO - CNP  11/16/2022  1:41 PM        I saw and evaluated the patient. I agree with the findings and the plan of care as documented in Mino BURNETTC's  note.     Gurdeep Urban DO, D.O., FACOI  3:11 PM  11/16/2022

## 2022-11-16 NOTE — DISCHARGE INSTR - COC
Continuity of Care Form    Patient Name: Juan Stephens   :  1950  MRN:  [id number]    Admit date:  2022  Discharge date:  ***    Code Status Order: Limited   Advance Directives:     Admitting Physician:  Dameon Fall DO  PCP: Lora Reyes DO    Discharging Nurse: [facility] Unit/Room#: 1370/4747-23  Discharging Unit Phone Number: ***    Emergency Contact:   Extended Emergency Contact Information  Primary Emergency Contact: David Butcher  Address: [place] [address], [address] Phone: [phone]  Mobile Phone: [phone]  Relation: Spouse   needed? No  Secondary Emergency Contact: Josiah Butcher   [address] Phone: [phone]  Mobile Phone: [phone]  Relation: Child   needed?  No    Past Surgical History:  Past Surgical History:   Procedure Laterality Date    CYSTOSCOPY N/A 2019    CYSTOSCOPY BOTOX  INJECTION 200 UNITS performed by Anup Russell DO at [facility] removed    HYSTERECTOMY (CERVIX STATUS UNKNOWN)  1985    MA CYSTOURETHROSCOPY INJ CHEMODENERVATION BLADDER N/A 2018    CYSTOSCOPY  BOTOX INJECTION (200 UNITS) performed by Adilene Chamberlain MD at [address]       Immunization History:   Immunization History   Administered Date(s) Administered    COVID-19, PFIZER PURPLE top, DILUTE for use, (age 15 y+), 30mcg/0.3mL 2021, 06/10/2021    Influenza A ([id number]) Vaccine PF IM 2009    Influenza, High Dose (Fluzone 65 yrs and older) 10/14/2015, 2016, 10/31/2017, 2018, 10/11/2019, 2020    Pneumococcal Conjugate 13-valent (Lucy Alberts) 2016, 2017    Pneumococcal Polysaccharide ([id number]) 2019       Active Problems:  Patient Active Problem List   Diagnosis Code    Sepsis secondary to UTI ([facility] A41.9    Post-operative pain G89.18    H/O total hysterectomy Z90.710    Essential hypertension I10 Type 2 diabetes mellitus with hyperglycemia, without long-term current use of insulin (HCC) E11.65    Other hyperlipidemia E78.49    Sacral wound S31.000A    Sacral wound, initial encounter S31.000A    Acute cystitis with hematuria N30.01    Severe protein-calorie malnutrition ([facility] E43    UTI (urinary tract infection) N39.0    Acute CVA (cerebrovascular accident) ([facility] [address] I63.9    2019 novel coronavirus disease (COVID-19) U07.1    COVID-19 U07.1    Palliative care encounter Z51.5    Palliative care by specialist Z51.5       Isolation/Infection:   Isolation            Droplet Plus          Patient Infection Status       Infection Onset Added Last Indicated Last Indicated By Review Planned Expiration Resolved Resolved By    COVID-19 22 COVID-19, Rapid 22      Resolved    COVID-19 (Rule Out) 22 COVID-19, Rapid (Ordered)   22 Rule-Out Test Resulted    ESBL (Extended Spectrum Beta Lactamase) 19 Urine Culture   20 Fariha Stark RN    E Coli Urine     ESBL (Extended Spectrum Beta Lactamase) 19 Urine Culture   07/15/19 Fariha Stark RN    Resolved- no growth-19  E Coli Urine 19            Nurse Assessment:  Last Vital Signs: BP (!) 145/85   Pulse (!) 108   Temp 98.6 °F (37 °C)   Resp 20   Ht 5' 3\" (1.6 m)   Wt 115 lb (52.2 kg)   LMP  (LMP Unknown)   SpO2 95%   BMI 20.37 kg/m²     Last documented pain score (0-10 scale): Pain Level: 0  Last Weight:   Wt Readings from Last 1 Encounters:   22 115 lb (52.2 kg)     Mental Status:  {IP PT MENTAL STATUS:}    IV Access:  {[facility] IV ACCESS:168800720}    Nursing Mobility/ADLs:  Walking   {[facility] DME SZN}  Transfer  {[facility] DME IUUV:218284338}  Bathing  {[facility] DME CLRY:822947796}  Dressing  {[facility] DME BGJD:814898906}  Toileting  {[facility] DME ZDFO:561366256}  Feeding  {[facility] DME YQIR:094071200}  Med Admin  {[facility] DME [facility]:520516709}  Med Delivery   [employer] MED Delivery:346264370}    Wound Care Documentation and Therapy:  Wound 21 Buttocks Inner; Lower;Right red, raw swollen unmeasurable area; skin dry flaking blanchable (Active)   Number of days: 470       Wound 21 Groin Inner;Left;Right red rash groin extending to sacral area, unmeasurable swollen (Active)   Number of days: 470       Wound 21 Coccyx (Active)   Number of days: 469        Elimination:  Continence: Bowel: {YES / RU:64772}  Bladder: {YES / HF:10770}  Urinary Catheter: {Urinary Catheter:223217899}   Colostomy/Ileostomy/Ileal Conduit: {YES / VT:57660}       Date of Last BM: ***    Intake/Output Summary (Last 24 hours) at 2022 1011  Last data filed at 2022 0758  Gross per 24 hour   Intake 0 ml   Output --   Net 0 ml     I/O last 3 completed shifts:   In: 10 [I.V.:10]  Out: 825 [Urine:825]    Safety Concerns:     [employer] Safety Concerns:146050028}    Impairments/Disabilities:      [employer] Impairments/Disabilities:416682398}    Nutrition Therapy:  Current Nutrition Therapy:   [employer] Diet List:498504677}    Routes of Feeding: {[facility] DME Other Feedings:304057100}  Liquids: {Slp liquid thickness:35833}  Daily Fluid Restriction: {[facility] DME Yes amt example:529014558}  Last Modified Barium Swallow with Video (Video Swallowing Test): {Done Not Done CFE}    Treatments at the Time of Hospital Discharge:   Respiratory Treatments: ***  Oxygen Therapy:  {Therapy; copd oxygen:00883}  Ventilator:    { CC Vent WWWP:651669679}    Rehab Therapies: {THERAPEUTIC INTERVENTION:2885991322}  Weight Bearing Status/Restrictions: [facility] Weight Bearin}  Other Medical Equipment (for information only, NOT a DME order):  {EQUIPMENT:063992807}  Other Treatments: ***    Patient's personal belongings (please select all that are sent with patient):  {[facility] DME Belongings:163265406}    RN SIGNATURE:  {Esignature:751159670}    CASE MANAGEMENT/SOCIAL WORK SECTION    Inpatient Status Date: 11/15/22    Readmission Risk Assessment Score:  Readmission Risk              Risk of Unplanned Readmission:  21           Discharging to Facility/ Agency   Name: Jonathan Antonio [phone], fax [phone],    Address:  Phone:  Fax:    Dialysis Facility (if applicable)   Name:  Address:  Dialysis Schedule:  Phone:  Fax:    / signature: Electronically signed by DALI Pike on 11/16/22 at 10:11 AM EST    PHYSICIAN SECTION    Prognosis: {Prognosis:7932358275}    Condition at Discharge: [address] Olivera Patient Condition:303652234}    Rehab Potential (if transferring to Rehab): {Prognosis:0495530457}    Recommended Labs or Other Treatments After Discharge: ***    Physician Certification: I certify the above information and transfer of Reinier Mayer  is necessary for the continuing treatment of the diagnosis listed and that she requires [place] for less 30 days.      Update Admission H&P: {[facility] DME Changes in DUFKF:502013929}    PHYSICIAN SIGNATURE:  {Esignature:801119870}

## 2022-11-16 NOTE — PROGRESS NOTES
Pharmacy Consultation Note  (Antibiotic Dosing and Monitoring)    Initial consult date: 11/15/22  Consulting physician/provider: Eulogio  Drug: Vancomycin  Indication: aspiration pneumonia    Age/  Gender Height Weight IBW  Allergy Information   72 y.o./female 5' 3\" (160 cm) 115 lb (52.2 kg)     Ideal body weight: 52.4 kg (115 lb 8.3 oz)   Tetracyclines & related      Renal Function:  Recent Labs     11/14/22  0605 11/15/22  0652 11/16/22  0525   BUN 48* 41* 39*   CREATININE 0.9 0.7 0.8         Intake/Output Summary (Last 24 hours) at 11/16/2022 0858  Last data filed at 11/16/2022 0758  Gross per 24 hour   Intake 0 ml   Output --   Net 0 ml         Vancomycin Monitoring:  Trough:  No results for input(s): VANCOTROUGH in the last 72 hours. Random:    Recent Labs     11/16/22  0525   VANCORANDOM 11.6       No results for input(s): Rex Aleksandra in the last 72 hours. Historical Cultures:  Organism   Date Value Ref Range Status   11/13/2022 Gram negative peggy (A)  Preliminary   11/13/2022 Gram negative peggy (A)  Preliminary     Recent Labs     11/13/22  1946   BC Gram stain performed from blood culture bottle media  Gram positive cocci in pairs  *     Recent vancomycin administrations                     vancomycin (VANCOCIN) 1,250 mg in dextrose 5 % 250 mL IVPB (mg) 1,250 mg New Bag 11/15/22 0945                          Assessment:  Patient is a 67 y.o. female who has been initiated on vancomycin  Estimated Creatinine Clearance: 52 mL/min (based on SCr of 0.8 mg/dL). Baseline SCr~0.9 mg/dL.   To dose vancomycin, pharmacy will be utilizing [employer] calculation software for goal AUC/CESAR 400-600 mg/L-hr  11/15: SCr 0.7, COVID+, received tocilizumab  11/16: SCr 0.8, random level 11.6 @ 0525      Plan:  Trial vancomycin 750mg Q12h, AUC/CESAR~425mg/L.h  Will check vancomycin levels when appropriate  Will continue to monitor renal function   Pharmacy to follow      Nata Zavala [facility] 11/16/2022 8:58 AM

## 2022-11-16 NOTE — PROGRESS NOTES
Pharmacist Review and Automatic Dose Adjustment of Extended Antibiotic Infusions    The reviewing pharmacist has made an adjustment to the ordered meropenem dose per the approved [facility] protocol and table as identified below. Dr. Lalitha Moon DO, Jonathon Cobb is a 67 y.o. female. Renal Function Assessment:    Date Body Weight IBW  Adjusted BW SCr  CrCl Dialysis status   11/16/2022 115 lb (52.2 kg)  Ideal body weight: 52.4 kg (115 lb 8.3 oz) Serum creatinine: 0.8 mg/dL 11/16/22 0525  Estimated creatinine clearance: 52 mL/min N/a     Estimated Creatinine Clearance: 52 mL/min (based on SCr of 0.8 mg/dL). Recent Labs     11/14/22  0605 11/15/22  0652 11/16/22  0525   CREATININE 0.9 0.7 0.8       Height:   Ht Readings from Last 1 Encounters:   11/14/22 5' 3\" (1.6 m)     Weight:  Wt Readings from Last 1 Encounters:   11/14/22 115 lb (52.2 kg)           Plan: Based upon the patient's weight and renal function, the ordered meropenem dose of 1000 mg every 12 hours has been changed/converted to 1000 mg every 8 hours.       Thank you,  Hortencia Leonardo, [facility]  11/16/2022   3:27 PM

## 2022-11-16 NOTE — PROGRESS NOTES
Occupational Therapy  OCCUPATIONAL THERAPY INITIAL EVALUATION     Adeline Covacsis [facility]  [facility]         Date:2022                                                   Patient Name: Siena Walker     MRN: [id number]     : 1950     Room: [address] [place] [address]       Evaluating OT: Joy Tillman OTR/L; [id number]       Referring Provider and Orders/Date:    OT eval and treat  Start:  22,   End:  22,   ONE TIME,   Standing Count:  1 Occurrences,   R         Augustin Babinski, DO        Diagnosis:   1. Sepsis without acute organ dysfunction, due to unspecified organism ([address]    2. COVID-19 virus infection    3.  Somnolence         Surgery: none      Pertinent Medical History: CVA       Past Medical History:   Diagnosis Date    Arthritis     right hand    Cancer ([address]     uterus and cervix had radiation and implant    Diabetes mellitus ([address]     Hyperlipidemia     Hypertension     Radiation cystitis     Urinary incontinence     complication due to uterine cancer    Urinary retention     caused by complications from uterine cancer          Past Surgical History:   Procedure Laterality Date    CYSTOSCOPY N/A 2019    CYSTOSCOPY BOTOX  INJECTION 200 UNITS performed by Anthony Russell DO at [facility] removed    HYSTERECTOMY (CERVIX STATUS UNKNOWN)      ME CYSTOURETHROSCOPY INJ CHEMODENERVATION BLADDER N/A 2018    CYSTOSCOPY  BOTOX INJECTION (200 UNITS) performed by Adelaida Vallecillo MD at [facility] OR       Precautions:  Fall Risk, covid+ with droplet iso, hassan    Recommended placement: subacute    Assessment of current deficits     [x] Functional mobility  [x]ADLs  [x] Strength               [x]Cognition     [x] Functional transfers   [x] IADLs         [x] Safety Awareness   [x]Endurance     [x] Fine Coordination              [x] Balance      [] Vision/perception   []Sensation      [x]Gross Motor Coordination  [] ROM  [] Delirium                   [] Motor Control     OT PLAN OF CARE   OT POC based on physician orders, patient diagnosis and results of clinical assessment    Frequency/Duration 1-3 days/wk for 2 weeks PRN   Specific OT Treatment Interventions to include:   * Instruction/training on adapted ADL techniques and AE recommendations to increase functional independence within precautions       * Training on energy conservation strategies, correct breathing pattern and techniques to improve independence/tolerance for self-care routine  * Functional transfer/mobility training/DME recommendations for increased independence, safety, and fall prevention  * Patient/Family education to increase follow through with safety techniques and functional independence  * Recommendation of environmental modifications for increased safety with functional transfers/mobility and ADLs  * Cognitive retraining/development of therapeutic activities to improve problem solving, judgement, memory, and attention for increased safety/participation in ADL/IADL tasks  * Therapeutic exercise to improve motor endurance, ROM, and functional strength for ADLs/functional transfers  * Therapeutic activities to facilitate/challenge dynamic balance, stand tolerance for increased safety and independence with ADLs  * Therapeutic activities to facilitate gross/fine motor skills for increased independence with ADLs  * Neuro-muscular re-education: facilitation of righting/equilibrium reactions, midline orientation, scapular stability/mobility, normalization of muscle tone, and facilitation of volitional active controled movement  * Positioning to improve skin integrity, interaction with environment and functional independence     Recommended Adaptive Equipment/DME:  TBD      Home Living: with family : spouse of 50+ years (Dementia); single family home, 2 story, bedroom and bathroom on 2nd floor, one flight to second floor, tub shower.  Niece also assists often. Equipment owned: wheeled walker, wheelchair, shower chair     Prior Level of Function: needs assistance with ADLs , needs assistance with IADLs; ambulated wheeled walker or uses the wheelchair, pt states her spouse assists with everything including transfers. Driving: no   Occupation: retired   Enjoys: watching TV    Pain Level: no pain reported, but pt yelling out when fearful or saying \"ouch\" during times not associated with pain  Cognition: A&O: 1/4 to person only; Follows 1-2 step directions; spoke in short sentences to take to family, express interests and discomfort; Mostly accurate with yes/no during session   Memory:  poor+   Sequencing:  poor+ with delayed processing and motor planning   Problem solving:  poor   Judgement/safety:  poor    AM-[facility] Daily Activity Inpatient   How much help for putting on and taking off regular lower body clothing?: Total  How much help for Bathing?: Total  How much help for Toileting?: Total  How much help for putting on and taking off regular upper body clothing?: Total  How much help for taking care of personal grooming?: A Lot  How much help for eating meals?: Total  AM-PAC Inpatient Daily Activity Raw Score: 7  AM-PAC Inpatient ADL T-Scale Score : 20.13  ADL Inpatient CMS 0-100% Score: 92.44  ADL Inpatient CMS G-Code Modifier : CM    Functional Assessment:     Initial Eval Status  Date: 11/16/2022   Treatment Status  Date: STGs = LTGs  Time frame: 10-14 days   Feeding Dependent with NPO on eval  Mod A if appropriate/safe   Grooming Maximal Assist with oral care, hand wash, face wash and hair comb  Moderate Assist    UB Dressing Dependent with gown management from supine  Maximal Assist    LB Dressing Dependent with socks from supine level  Maximal Assist    Bathing Dependent from supine level with pt reporting pain with tactile sensation of wash cloth.    Maximal Assist    Toileting Dependent with pure wick from supine  Maximal Assist    Bed Mobility  Rolling in bed with maxA-dep and poor tolerance to positioning interventions with wedges and sitting upright in bed  Supine to sit: Moderate Assist   Sit to supine: Moderate Assist    Functional Transfers  NT due to pt overall debility, decreased activity tolerance, balance deficits, safety and fall risk. Maximal Assist    Functional Mobility  Not appropriate at time of eval. To re-assess at at later time if appropriate. Not appropriate at time of eval. To re-assess at at later time if appropriate. Balance Sitting:     Static:  NT    Dynamic:NT  Standing: NT  Sitting:     Static:  poor+    Dynamic:poor  Standing: poor   Activity Tolerance Vitals with activity:5L with 92-97% dependent on positioning from supine to upright sitting. Poor+ tolerance to session overall  Increase sitting tolerance for >15min with stable vital signs for carry over into toileting, functional tranfers and indep in ADLs   Visual/  Perceptual Glasses: Present; WFL    Reports change in vision since admission: No     NA     Hand Dominance  [x] Right   [] Left     AROM (PROM) Strength Additional Info:  Goal:   RUE  Pt resistive overall with PROM, but demonstrated AROM around 45% function. Limited with shoulder planes 3-/5 Fair-  and fair- FMC/dexterity noted during ADL tasks  Opposition [] Intact [x] Impaired  Finger to nose [] Intact [x] Impaired 4-/5MMT generally for carry over into self care, functional transfers and functional mobility with AD. LUE Pt resistive overall with PROM, but demonstrated AROM around 45% function. Limited with shoulder planes 3-/5 Fair-  and poor+ FMC/dexterity noted during ADL tasks  Opposition [] Intact [x] Impaired  Finger to nose [] Intact [x] Impaired 4-/5MMT generally for carry over into self care, functional transfers and functional mobility with AD.        Hearing: WFL   Sensation:  No c/o numbness or tingling   Tone: WFL   Edema: none    Comments: Upon arrival patient supine. Pt required dep A for most UB ADLs and  dep A LB ADLs tasks. The biggest barriers reflect that of functional transfers, functional mobility, UB/LB ADLs, cognition, activity tolerance, balance, safety and strengthening. At end of session, patient supine with call light and phone within reach, all lines and tubes intact. Overall patient demonstrated decreased independence and safety during completion of ADL/functional transfer/mobility tasks compared to PLOF. Nursing updated on pt position and status following OT eval. Pt would benefit from continued skilled OT to increase safety and independence with completion of ADL/IADL tasks for functional independence and quality of life. Treatment: OT treatment provided this date includes:  Instruction, education and training on safe facilitation and adapted techniques for completion of ADLs. These include ADLs, sitting up in bed, use of wedges for pressure relief and proper positioning/alignment to improve interaction with environment and overall function and on adapted techniques/work simplification for completion of ADLs. Education provided on hand/feet placement with bed rails and body mechanics for fall prevention. Discussed Dc plan with hospice vs subacute. Extended time to complete all tasks, including skilled monitoring of patient's response during treatment session and vital signs. Prior to and at the end of session, environmental modifications / line management completed for patients safety and efficiency of treatment session. See above for further details. Rehab Potential: Good for established goals     Patient / Family Goal: Pain and O2 management      Patient and/or family were instructed on functional diagnosis, prognosis/goals and OT plan of care. Demonstrated fair understanding.      Eval Complexity: Low  History: Brief review of medical records and additional review of physical, cognitive, or psychosocial history related to current functional performance  Exam: 3+ performance deficits  Assistance/Modification: Max assistance or modifications required to perform tasks. May have comorbidities that affect occupational performance. Time In: 1001  Time Out: 1045  Total Treatment Time: 24    Min Units   OT Eval Low 97165  x  1   OT Eval Medium 67838      OT Eval High 26321      OT Re-Eval [id number]       Therapeutic Ex 03045       Therapeutic Activities 68184  11 1    ADL/Self Care 89129  13 1    Orthotic Management 53187       Manual [id number]     Neuro Re-Ed 35461       Non-Billable Time          Evaluation Time additionally includes thorough review of current medical information, gathering information on past medical history/social history and prior level of function, interpretation of standardized testing/informal observation of tasks, assessment of data and development of plan of care and goals.             Dave Carbajal OTR/L; [id number]

## 2022-11-16 NOTE — PROGRESS NOTES
SPEECH/LANGUAGE PATHOLOGY  VIDEOFLUOROSCOPIC STUDY OF SWALLOWING (MBS)   and PLAN OF CARE    PATIENT NAME:  Siena Walker  (female)     MRN:  [id number]    :  1950  (67 y.o.)  STATUS:  Inpatient: Room 0633/0633-01    TODAY'S DATE:  2022  REFERRING PROVIDER:    SLP video swallow  Start:  22 1345,   End:  22 1345,   ONE TIME,   Standing Count:  1 Occurrences,   R         Graham ELIO Minaya CNP    REASON FOR REFERRAL: dysphagia    EVALUATING THERAPIST: FERMIN Hernandez      RESULTS:      DYSPHAGIA DIAGNOSIS:  mild  oropharyngeal phase dysphagia     DIET RECOMMENDATIONS:  Pureed consistency solids (IDDSI level 4) with  thin liquids (IDDSI level 0), MEDICATION ADMINISTRATION, and Administer medication crushed, as able, with pudding/applesauce     FEEDING RECOMMENDATIONS:    Assistance level:  Full assistance is needed during all oral intake, Encourage self-feeding     Compensatory strategies recommended: Small bites/sips and Alternate solids and liquids may use straw      Discussed recommendations with nursing and/or faxed report to referring provider: Yes    Laryngeal Penetration and Aspiration:  Penetration WITH aspiration was observed in today's study with  thin liquid (very shallow)    SPEECH THERAPY  PLAN OF CARE   The dysphagia POC is established based on physician order and dysphagia diagnosis    Skilled SLP intervention for dysphagia management up to 5x per week until goals met, pt plateaus in function and/or discharged from hospital      Conditions Requiring Skilled Therapeutic Intervention for dysphagia:    Oral motor strength/coordination impairment  Reduced laryngeal closure resulting in penetration    SPECIFIC DYSPHAGIA INTERVENTIONS TO INCLUDE:     Training in positioning for improved integrity of swallow  Compensatory strategy training   Trials of upgraded diet/liquid     Specific instructions for next treatment:  development and training of compensatory swallow strategies to improve airway protection and swallow function and therapeutic po trial to determine safety of advanced diet textures and consistencies  Treatment Goals:    Short Term Goals:  Pt will implement identified compensatory swallowing strategies on 90% of opportunities or greater to improve airway protection and swallow function. Pt will participate in ongoing mealtime assessment to provide diet modification and compensatory strategy implementation to minimize risk of aspiration associated with PO intake  Pt will complete PO trials of upgraded diet textures with SLP only to determine the least restrictive PO diet to maintain adequate nutrition/hydration with no more than 1 overt s/s of pen/asp. Long Term Goals:   Pt will improve oropharyngeal swallow function to ensure airway protection during PO intake to maintain adequate nutrition/hydration and decrease signs/symptoms of aspiration to less than 1 x/day.       Patient/family Goal:    Patient not able to accurately state due to impaired cognition and/or communication at time of eval     Plan of care discussed with Patient   The Patient did not demonstrate complete understanding of the diagnosis, prognosis and plan of care     Rehabilitation Potential/Prognosis: good                      ADMITTING DIAGNOSIS: Somnolence [R40.0]  Sepsis without acute organ dysfunction, due to unspecified organism ([facility] [A41.9]  2019 novel coronavirus disease (COVID-19) [U07.1]  COVID-19 virus infection [U07.1]  COVID-19 [U07.1]     VISIT DIAGNOSIS:   Visit Diagnoses         Codes    COVID-19 virus infection     U07.1    Somnolence     R40.0                PATIENT REPORT/COMPLAINT: patient not able to accurately report    PRIOR LEVEL OF SWALLOW FUNCTION:    Past History of Dysphagia?:  none reported    Home diet: Diet information not available     Current Diet Order:  Diet NPO    PROCEDURE:  Consistencies Administered During the Evaluation   Liquids: thin liquid and nectar thick liquid   Solids:  pureed foods      Method of Intake:   cup, straw, spoon  Fed by clinician      Position:   Seated, upright, Lateral plane    INSTRUMENTAL ASSESSMENT:      MBSImP Results:   Lip closure for intraoral bolus containment resulted in no labial escape. Tongue control during bolus hold maintained a cohesive bolus held between tongue to palate seal. Bolus preparation and mastication solids not given unclear if on solid food previously. Bolus transport/lingual motion was with repetitive tongue motion. Oral residue was not observed. Initiation of the pharyngeal swallow occurred as the bolus head reached the pyriform sinus. Soft palate elevation resulted in no bolus between the soft palate and the pharyngeal wall. Laryngeal elevation demonstrated complete superior movement of the thyroid cartilage with complete approximation of the arytenoids to the epiglottic petiole. Anterior hyoid excursion demonstrated complete anterior movement. Epiglottic movement resulted in complete inversion. Laryngeal vestibule closure was incomplete, as indicated by a trace column of contrast within the laryngeal vestibule at the height of the swallow only during sequential straw swallows. Pharyngeal stripping wave was present and complete. Pharyngeal contraction could not be determined due to logistical reasons not related to physiologic impairment. Pharyngoesophageal segment opening was completely distended for complete duration with no obstruction of bolus flow. Tongue base retraction allowed no contrast between the retracted tongue base and the posterior pharyngeal wall. Pharyngeal residue was not present. Esophageal clearance in the upright position could not be assessed due to logistical reasons not related to physiologic impairment.        PENETRATION-ASPIRATION SCALE (PAS):  THIN 2 = Material enters the airway, remains above vocal folds, and is ejected from the airway   MILDLY THICK 1 = Material does not enter the airway  MODERATELY THICK item not administered  PUREE 1 = Material does not enter the airway  HARD SOLID item not administered       COMPENSATORY STRATEGIES    Compensatory strategies were not attempted      STRUCTURAL/FUNCTIONAL ANOMALIES   No structural/functional anomalies were noted    CERVICAL ESOPHAGEAL STAGE :     The cervical esophagus appeared adequate          ___________    Cognition:   Within functional limits for this exam and Follows 1 - step directions appropriate for this assessment responding verbally to some very simple questions. Oral Peripheral Examination   Generalized oral weakness    Current Respiratory Status   8 liters high flow nasal cannula     Parameters of Speech Production  Respiration:  Adequate for speech production  Quality:   Within functional limits  Intensity: Within functional limits    Pain: No pain reported. EDUCATION:   The Speech Language Pathologist (SLP) completed education regarding results of evaluation and that intervention is warranted at this time. Learner: Patient  Education: Reviewed results and recommendations of this evaluation  Evaluation of Education:  No evidence of learning    This plan may be re-evaluated and revised as warranted. Evaluation Time includes thorough review of current medical information, gathering information on past medical history/social history and prior level of function, completion of standardized testing/informal observation of tasks, assessment of data and education on plan of care and goals. [x]The admitting diagnosis and active problem list, have been reviewed prior to initiation of this evaluation.     CPT Code: 31604  dysphagia study    ACTIVE PROBLEM LIST:   Patient Active Problem List   Diagnosis    Sepsis secondary to UTI ([facility]    Post-operative pain    H/O total hysterectomy    Essential hypertension    Type 2 diabetes mellitus with hyperglycemia, without long-term current use of insulin ([facility]    Other hyperlipidemia    Sacral wound    Sacral wound, initial encounter    Acute cystitis with hematuria    Severe protein-calorie malnutrition ([address]    UTI (urinary tract infection)    Acute CVA (cerebrovascular accident) ([address]    2019 novel coronavirus disease (COVID-19)    COVID-19    Palliative care encounter    Palliative care by specialist       Jolanta Mitchell MSCCC/SLP  Speech Language Pathologist  [phone]

## 2022-11-16 NOTE — CARE COORDINATION
SOCIAL WORK / DISCHARGE PLANNING:   COVID positive 11/13 at [facility], [address] [facility], fax [phone]. Pt was private pay and the plan was to return private pay. Pt had RRT, transfer to [facility] met with son yesterday to discuss code status. Pt's spouse reported to have memory issues. Pt currently NPO. IV Vanco, IV Flagyl, and IV Merrem. HERLINDA will need signed by physician.              Electronically signed by DALI Ott on 11/16/2022 at 10:10 AM

## 2022-11-16 NOTE — PROGRESS NOTES
NAME: Dami Marin  MR:  [id number]  :   1950  Admit Date:  2022      This is a face to face encounter with Dami Marin 67 y.o. female on 22  Elements of this note, including Diagnosis,  Interval History, Past Medical/Surgical/Family/Social Histories, ROS, physical exam, and Assessment and Plan were copied and pasted from Previous. Updates have been made where noted and reflect current exam and medical decision making from the DOS of this encounter. CHIEF COMPLAINT     ID following for   Chief Complaint   Patient presents with    Altered Mental Status     Pt arrives via EMT with c/o worsening AMS. Pt has baseline of A &O x's 1 and per nursing home report she is now not responding to herself. Pt's LSN was at 1630 per report. HISTORY OF PRESENT ILLNESS     Daim Marin is a 67 y.o. female who presents with   Chief Complaint   Patient presents with    Altered Mental Status     Pt arrives via EMT with c/o worsening AMS. Pt has baseline of A &O x's 1 and per nursing home report she is now not responding to herself. Pt's LSN was at 1630 per report. and has  has a past medical history of Arthritis, Cancer ([facility] Diabetes mellitus ([facility] [address] Hyperlipidemia, Hypertension, Radiation cystitis, Urinary incontinence, and Urinary retention.      Assessment & Plan   Somnolence [R40.0]  Sepsis without acute organ dysfunction, due to unspecified organism ([facility] [address] [A41.9]  2019 novel coronavirus disease (COVID-19) [U07.1]  COVID-19 virus infection [U07.1]  COVID-19 [U07.1] s/p TOCI  Uti   Cons bacteremia contaminant      vancomycin (VANCOCIN) 750 mg in dextrose 5 % 250 mL IVPB, Q12H  metronidazole (FLAGYL) 500 mg in 0.9% NaCl 100 mL IVPB premix, Q8H  meropenem (MERREM) 1,000 mg in sodium chloride 0.9 % 100 mL IVPB ([id number]), Q12H     Cont atbx await final cx  Prob swicth to po bactrim  Procal 0.36      Pt seen and examined  In bed more awake husabnd present   He had no concerns   Patient is tolerating medications. No reported adverse drug reactions. Microbiology:   Recent Labs     11/13/22 1924   COVID19 DETECTED*     Lab Results   Component Value Date/Time    [facility]  11/13/2022 07:46 PM     Gram stain performed from blood culture bottle media  Gram positive cocci in pairs      [facility]  11/13/2022 07:46 PM     This organism was isolated in one set. Susceptibility testing is not routinely done as this  organism frequently represents skin contamination. Additional testing can be ordered by calling the  Microbiology Department. BC 24 Hours no growth 11/13/2022 07:30 PM    BLOODCULT2 5 Days no growth 10/17/2022 09:24 AM    BLOODCULT2 5 Days no growth 08/02/2021 10:00 PM    BLOODCULT2 5 Days- no growth 05/04/2018 03:30 PM    ORG Escherichia coli 11/13/2022 11:26 PM    ORG Enterobacter cloacae complex 11/13/2022 11:26 PM    ORG Staphylococcus aureus 11/13/2022 08:15 PM     WOUND/ABSCESS   Date Value Ref Range Status   08/02/2021   Final    Mixed liv isolated. Further workup and sensitivity testing  is not routinely indicated and will not be performed.   Mixed liv isolated includes:  Mixed Gram negative rods  Beta hemolytic Strep species (Group \"B\")       Smear, Respiratory   Date Value Ref Range Status   11/13/2022   Final    Rare Polymorphonuclear leukocytes  Epithelial cells not seen  Moderate Mixed Bacterial Morphotypes          CULTURE, RESPIRATORY   Date Value Ref Range Status   11/13/2022 (A)  Preliminary    Oral Pharyngeal Liv present  Additional growth present, also evaluating for;  Staph aureus  Oral Pharyngeal Liv present     11/13/2022 Moderate growth  Sensitivity to follow    Preliminary          Recent Labs     11/13/22 1946 11/13/22 2015 11/13/22 2326   Chari Schultz  --   --  Additional growth present, also evaluating for;  Nonhemolytic Strep species  *  >100,000 CFU/ml  Identification and sensitivity to follow    >100,000 CFU/ml  Identification and sensitivity to follow     ORG Staphylococcus coagulase-negative* Staphylococcus aureus* Escherichia coli*  Enterobacter cloacae complex*      Recent Labs     11/14/22  0605 11/15/22  0906 11/16/22  0525   WBC 6.1 15.4* 9.2   RBC 4.15 4.46 3.93   HGB 11.7 12.5 10.9*   HCT 37.6 41.2 35.9   MCV 90.6 92.4 91.3   MCH 28.2 28.0 27.7   MCHC 31.1* 30.3* 30.4*   RDW 14.1 14.3 14.3    339 225   MPV 10.1 10.2 10.2     Recent Labs     11/14/22  0605 11/14/22  1533 11/15/22  0652 11/16/22  0525     --  146 152*   K 4.8  --  5.2* 3.9   *  --  112* 116*   CO2 17*  --  17* 24   BUN 48*  --  41* 39*   CREATININE 0.9  --  0.7 0.8   GLUCOSE 268* 202 189* 150*   PROT 6.6  --  7.0 6.3*   LABALBU 3.1*  --  3.3* 3.2*   CALCIUM 9.2  --  9.4 9.1   BILITOT 0.6  --  0.6 0.5   ALKPHOS 150*  --  156* 144*   AST 42*  --  48* 33*   ALT 54*  --  50* 38*     Lab Results   Component Value Date    CRP 6.7 (H) 11/16/2022    CRP 3.5 (H) 11/15/2022    CRP 6.2 (H) 11/14/2022     Lab Results   Component Value Date    SEDRATE 85 (H) 11/16/2022    SEDRATE 46 (H) 08/03/2021     Recent Labs     11/14/22  0605 11/15/22  0652 11/16/22  0525   CRP 6.2* 3.5* 6.7*   PROCAL 0.41* 0.26* 0.36*  0.36*   FERRITIN 463  --  490   *  --  275*   DDIMER 2353 1987 962   FIBRINOGEN 542*  --  471*   INR 1.2  --  1.3   PROTIME 13.6*  --  14.4*   AST 42* 48* 33*   ALT 54* 50* 38*     Lab Results   Component Value Date/Time    CHOL 199 10/17/2022 05:17 AM    TRIG 144 10/17/2022 05:17 AM    HDL 31 10/17/2022 05:17 AM    LDLCALC 139 10/17/2022 05:17 AM    LABVLDL 29 10/17/2022 05:17 AM     Lab Results   Component Value Date/Time    VITD25 48 11/14/2022 06:05 AM       REVIEW OF SYSTEMS     As stated above in the chief complaint, otherwise negative.   CURRENT MEDICATIONS     Current Facility-Administered Medications:     vancomycin (VANCOCIN) 750 mg in dextrose 5 % 250 mL IVPB, 750 mg, IntraVENous, Q12H, Jenny Henderson DO, Stopped at 11/16/22 1203    metronidazole (FLAGYL) 500 mg in 0.9% NaCl 100 mL IVPB premix, 500 mg, IntraVENous, Q8H, Lena Mendoza MD, Stopped at 11/16/22 1013    acetylcysteine (MUCOMYST) 10 % solution 400 mg, 4 mL, Inhalation, BID, Baltazar Torrez DO    budesonide (PULMICORT) nebulizer suspension 500 mcg, 500 mcg, Nebulization, BID, Chacorta Rosenthal,     ipratropium-albuterol (DUONEB) nebulizer solution 1 ampule, 1 ampule, Inhalation, Q4H Temo DUARTE DO    meropenem (MERREM) 1,000 mg in sodium chloride 0.9 % 100 mL IVPB ([id number]), 1,000 mg, IntraVENous, Q12H, Sirena Diamond MD, Stopped at 11/16/22 0752    methylPREDNISolone sodium (SOLU-MEDROL) injection 125 mg, 125 mg, IntraVENous, Once PRN, Sirena Diamond MD    EPINEPHrine PF 1 MG/ML injection (Anaphylaxis) 0.3 mg, 0.3 mg, IntraMUSCular, Once PRN, Sirena Diamond MD    insulin lispro (HUMALOG) injection vial 0-4 Units, 0-4 Units, SubCUTAneous, TID WC, Tauna Friendly, DO, 2 Units at 11/15/22 1208    insulin lispro (HUMALOG) injection vial 0-4 Units, 0-4 Units, SubCUTAneous, Nightly, Ismail U Berlin, DO    magnesium sulfate 1000 mg in dextrose 5% 100 mL IVPB, 1,000 mg, IntraVENous, PRN, Victor Camp, APRN - CNP    sodium phosphate 9 mmol in sodium chloride 0.9 % 250 mL IVPB, 9 mmol, IntraVENous, PRN **OR** sodium phosphate 15 mmol in sodium chloride 0.9 % 250 mL IVPB, 15 mmol, IntraVENous, PRN, Victor Camp, APRN - CNP    potassium chloride (KLOR-CON M) extended release tablet 40 mEq, 40 mEq, Oral, PRN **OR** potassium bicarb-citric acid (EFFER-K) effervescent tablet 40 mEq, 40 mEq, Oral, PRN **OR** potassium chloride 10 mEq/100 mL IVPB (Peripheral Line), 10 mEq, IntraVENous, PRN, Victor Camp, APRN - CNP    benzonatate (TESSALON) capsule 200 mg, 200 mg, Oral, TID PRN, ELIO Sapp CNP    ascorbic acid (VITAMIN C) tablet 1,000 mg, 1,000 mg, Oral, Daily, ELIO Sapp CNP    Vitamin D (CHOLECALCIFEROL) tablet 2,000 Units, 2,000 Units, Oral, Daily, ELIO Sapp CNP    zinc sulfate (ZINCATE) capsule 50 mg, 50 mg, Oral, Daily, ELIO Camarillo CNP    metoprolol succinate (TOPROL XL) extended release tablet 25 mg, 25 mg, Oral, Daily, ELIO Camarillo CNP    potassium chloride 40 mEq, sodium bicarbonate 50 mEq in sodium chloride 0.45 % 1,000 mL infusion, , IntraVENous, Continuous, ELIO Camarillo CNP, Last Rate: 75 mL/hr at 11/16/22 1125, New Bag at 11/16/22 1125    atorvastatin (LIPITOR) tablet 80 mg, 80 mg, Oral, Nightly, Ismail U Berlin, DO    glucose chewable tablet 16 g, 4 tablet, Oral, PRN, Ismail U Berlin, DO    dextrose bolus 10% 125 mL, 125 mL, IntraVENous, PRN **OR** dextrose bolus 10% 250 mL, 250 mL, IntraVENous, PRN, Ismail U Berlin, DO    glucagon (rDNA) injection 1 mg, 1 mg, SubCUTAneous, PRN, Ismail U Berlin, DO    dextrose 10 % infusion, , IntraVENous, Continuous PRN, Ismail U Berlin, DO    sodium chloride flush 0.9 % injection 5-40 mL, 5-40 mL, IntraVENous, 2 times per day, Beula Coca, DO, 10 mL at 11/16/22 0916    sodium chloride flush 0.9 % injection 5-40 mL, 5-40 mL, IntraVENous, PRN, Ismail U Berlin, DO    0.9 % sodium chloride infusion, , IntraVENous, PRN, Ismail U Berlin, DO    polyethylene glycol (GLYCOLAX) packet 17 g, 17 g, Oral, Daily PRN, Beula Coca, DO    acetaminophen (TYLENOL) tablet 650 mg, 650 mg, Oral, Q6H PRN **OR** acetaminophen (TYLENOL) suppository 650 mg, 650 mg, Rectal, Q6H PRN, Beula Coca, DO    aspirin chewable tablet 81 mg, 81 mg, Oral, Daily, Ismail U Berlin, DO    clopidogrel (PLAVIX) tablet 75 mg, 75 mg, Oral, Daily, Ismail U Berlin, DO    donepezil (ARICEPT) tablet 10 mg, 10 mg, Oral, Nightly, Ismail U Berlin, DO    memantine (NAMENDA) tablet 5 mg, 5 mg, Oral, BID, Ismail U Berlin, DO    enoxaparin Sodium (LOVENOX) injection 30 mg, 30 mg, SubCUTAneous, BID, Ismail U Berlin, DO, 30 mg at 11/16/22 1042  DIAGNOSTIC RESULTS   Radiology:  XR ABDOMEN (KUB) (SINGLE AP VIEW)    Result Date: 11/15/2022  EXAMINATION: ONE SUPINE XRAY VIEW(S) OF THE ABDOMEN acute abnormality. SINUSES: This be noise sinus leftward there is mild dependent opacification may represent small amount of fluid on image 19 SOFT TISSUES/SKULL:  No acute abnormality of the visualized skull or soft tissues. 1. No acute intracranial abnormality. 2. Chronic ischemic changes. 3. Suspicious for normal pressure hydrocephalus. CT CHEST W CONTRAST    Result Date: 11/14/2022  EXAMINATION: CT OF THE CHEST WITH CONTRAST 11/14/2022 11:28 am TECHNIQUE: CT of the chest was performed with the administration of intravenous contrast. Multiplanar reformatted images are provided for review. Automated exposure control, iterative reconstruction, and/or weight based adjustment of the mA/kV was utilized to reduce the radiation dose to as low as reasonably achievable. COMPARISON: Chest x-ray from November 13, 2022 HISTORY: ORDERING SYSTEM PROVIDED HISTORY: Recent mass identified with need for follow-up imaging, further characterization. Current COVID-19 infection with need to evaluate for underlying occult pneumonia TECHNOLOGIST PROVIDED HISTORY: Reason for exam:->Recent mass identified with need for follow-up imaging, further characterization. Current COVID-19 infection with need to evaluate for underlying occult pneumonia FINDINGS: Mediastinum: The heart is not enlarged. No pericardial effusion. Normal caliber thoracic aorta. 13 mm inferior right thyroid nodule. Additional smaller nodules or cysts in the right thyroid. No lymphadenopathy identified. Lungs/pleura: Mild right basilar atelectasis. Subtle interstitial lung markings. Pulmonary nodule which is sub solid in the left lower lobe measures 8 mm. No pleural effusion, pneumothorax or focal consolidation otherwise. Trachea is clear. Secretions in the left mainstem bronchus extending towards the left lower lobe segments. Upper Abdomen: No acute findings within the visualized upper abdomen.  Soft Tissues/Bones: No acute findings within the soft tissues or osseous structures. Mild ground-glass opacities in the lungs favoring atelectasis. There is an age-indeterminate 8 mm ground-glass pulmonary nodule in the left lower lobe. This could be related to bronchiolitis or atelectasis, however see below for follow-up recommendations. Prominent secretions within the left mainstem bronchus extending towards the lower lobe segments. Otherwise no acute findings. RECOMMENDATIONS: Recommend a non-contrast Chest CT at 3-6 months to confirm persistence. If unchanged and the solid component remains < 6 mm, an annual non-contrast Chest CT should be performed for 5 years. For persistent suspicious (lobulated, spiculated, or containing cystic areas or solid component) part solid nodules: If the solid component is 6-8 mm on follow-up, recommend biopsy or resection; if the solid component is > 8 mm on follow-up, recommend PET/CT, biopsy or resection. These guidelines do not apply to immunocompromised patients and patients with cancer. Follow up in patients with significant comorbidities as clinically warranted. For lung cancer screening, adhere to Lung-RADS guidelines. Reference: Radiology. 2017; 284(1):228-43. Pathology: 1.3 cm incidental right thyroid nodule. No follow-up imaging is recommended. Reference: J Am Fatuma Radiol. 2015 Feb;12(2): 143-50     CT ABDOMEN PELVIS W IV CONTRAST Additional Contrast? Oral    Result Date: 10/18/2022  EXAMINATION: CT OF THE ABDOMEN AND PELVIS WITH CONTRAST 10/18/2022 12:46 pm TECHNIQUE: CT of the abdomen and pelvis was performed with the administration of intravenous contrast. Multiplanar reformatted images are provided for review. Automated exposure control, iterative reconstruction, and/or weight based adjustment of the mA/kV was utilized to reduce the radiation dose to as low as reasonably achievable. COMPARISON: None.  HISTORY: ORDERING SYSTEM PROVIDED HISTORY: Known OBGYN cancer - assess progression TECHNOLOGIST PROVIDED HISTORY: Reason for exam:->Known OBGYN cancer - assess progression Additional Contrast?->Oral FINDINGS: Lower Chest: A rounded opacity which is pleural based is noted in the right lung base measuring 1.5 by 2.9 cm. No pneumothorax. No pleural effusion. No pericardial effusion. Organs: No abnormal enhancement of the liver and spleen. Portal vein is patent. Pancreas enhances as expected. No peripancreatic fluid or fat stranding is noted. The gallbladder is normal size. The adrenal glands normal in appearance. The kidneys enhance as expected. There is been interval decrease in the degree of calyceal distension within the right kidney. The left-sided hydronephrosis has resolved. Atheromatous plaque is noted which has progressed within the aorta compared to the prior study. Reference image 48 of the axial images. The origin of the superior mesenteric artery, celiac artery and splenic artery are patent. GI/Bowel: The large and small bowel caliber is within normal limits. No abscess or free air is noted. Subcutaneous air within the left lower abdominal wall noted compatible with injection. Pelvis: Hyperdensities layering within the gallbladder compatible with multiple bladder stones, reference image 154. Stool is noted in the rectum. Slight thickening of the distal rectosigmoid colon is noted. No evidence for abscess or free air. No pathologically enlarged retroperitoneal lymphadenopathy. No inguinal lymphadenopathy noted. Curvature of the lumbar spine with concavity to the right centered at the L2 level is noted. Straightening of the natural lumbar lordosis on the lateral view is noted. No evidence for facet dislocation. The bones of the sacrum are in anatomic alignment. 1.  Rounded opacity in the right lung base measuring 1.5 x 2.9 cm. Follow-up to resolution is recommended. 2.  Progression of aortic atheromatous disease, as referenced above. The aorta remains patent.  3.  No evidence for intra-abdominal ascites. No abnormal enhancing masses are identified. 4.  There has been improvement of the left-sided hydronephrosis a partial improvement of the right-sided hydronephrosis. 5.  Bladder calculi. XR CHEST PORTABLE    Result Date: 11/15/2022  EXAMINATION: ONE XRAY VIEW OF THE CHEST 11/15/2022 6:10 am COMPARISON: 13 November 2022 HISTORY: ORDERING SYSTEM PROVIDED HISTORY: aspiration TECHNOLOGIST PROVIDED HISTORY: Reason for exam:->aspiration FINDINGS: The lungs are without acute focal process. There is no effusion or pneumothorax. The cardiomediastinal silhouette is without acute process. The osseous structures are without acute process. No acute process. XR CHEST PORTABLE    Result Date: 11/13/2022  EXAMINATION: ONE XRAY VIEW OF THE CHEST 11/13/2022 7:32 pm COMPARISON: 10/16/2022 HISTORY: ORDERING SYSTEM PROVIDED HISTORY: AMS TECHNOLOGIST PROVIDED HISTORY: Reason for exam:->AMS FINDINGS: The lungs are adequately inflated. There is no focal consolidation, pleural effusion or pneumothorax. The heart and mediastinal contours are within normal limits. No acute bony findings are identified. No acute process. MRI BRAIN W WO CONTRAST    Result Date: 10/17/2022  EXAMINATION: MRI OF THE BRAIN WITHOUT AND WITH CONTRAST  10/17/2022 5:22 pm TECHNIQUE: Multiplanar multisequence MRI of the head/brain was performed without and with the administration of intravenous contrast. COMPARISON: CT head without contrast, CTA of the head and CT perfusion, 10/16/2022 HISTORY: ORDERING SYSTEM PROVIDED HISTORY: cva vs tia with AMS, history of gyn malignancy, rule out mass/mets TECHNOLOGIST PROVIDED HISTORY: Reason for exam:->cva vs tia with AMS, history of gyn malignancy, rule out mass/mets What is the sedation requirement?->None FINDINGS: INTRACRANIAL STRUCTURES/VENTRICLES:  1.5 x 0.9 cm focus of acute or early subacute infarction is seen in the right thalamus.   No mass, mass effect, hemorrhage or midline shift is seen in the brain. No abnormal enhancement. Moderate cerebral volume loss and moderate chronic microvascular ischemic changes are noted. Ventricular dilatation appears somewhat out of proportion to the extent of cerebral atrophy. Clinical correlation for normal pressure hydrocephalus is recommended. ORBITS: The visualized portion of the orbits demonstrate no acute abnormality. SINUSES: The visualized paranasal sinuses and mastoid air cells demonstrate no acute abnormality. BONES/SOFT TISSUES: The bone marrow signal intensity appears normal. The soft tissues demonstrate no acute abnormality. 1. 1.5 x 0.9 cm focus of acute or early subacute infarction in the right thalamus. 2. No mass, mass effect, hemorrhage or midline shift is seen in the brain. 3. Ventricular dilation concerning for normal pressure hydrocephalus. Clinical correlation is needed. RECOMMENDATIONS: Unavailable     Fluoroscopy modified barium swallow with video    Result Date: 11/16/2022  EXAMINATION: MODIFIED BARIUM SWALLOW WAS PERFORMED IN CONJUNCTION WITH SPEECH PATHOLOGY SERVICES TECHNIQUE: Under fluoroscopic evaluation cineradiography/videoradiography recordings were performed in conjunction with the speech-language pathologist (SLP). Various liquid, solid and/or semi-solid barium preparations were used to assess swallowing function. FLUOROSCOPY DOSE AND TYPE OR TIME AND EXPOSURES: 1.5 minutes of fluoro time with exposure of 2.28 mGy. Greater than 100 images obtain. COMPARISON: None HISTORY: ORDERING SYSTEM PROVIDED HISTORY: dysphagia/aspiration TECHNOLOGIST PROVIDED HISTORY: Reason for exam:->dysphagia/aspiration FINDINGS: Oral phase of swallowing was grossly within normal limits. There is minimal penetration without evidence of aspiration. Swallowing mechanism grossly within normal limits without evidence of aspiration. Please see separate speech pathology report for full discussion of findings and recommendations.      US CAROTID ARTERY BILATERAL    Result Date: 10/19/2022  EXAMINATION: ULTRASOUND EVALUATION OF THE CAROTID ARTERIES 10/19/2022 TECHNIQUE: Duplex ultrasound using B-mode/gray scaled imaging, Doppler spectral analysis and color flow Doppler was obtained of the carotid arteries. COMPARISON: None. HISTORY: ORDERING SYSTEM PROVIDED HISTORY: Stroke TECHNOLOGIST PROVIDED HISTORY: Reason for exam:->Stroke What reading provider will be dictating this exam?->CRC FINDINGS: RIGHT: The right common carotid artery demonstrates peak systolic velocities of  282 and 58.1 cm/sec in the proximal and distal segments respectively. The right internal carotid artery demonstrates the systolic velocities of 49.4, 65.3 and 61.8  cm/sec in the proximal, mid and distal segments respectively. The external carotid artery is patent. The vertebral artery demonstrates normal antegrade flow. Mild to moderate calcified carotid bulb atherosclerotic plaque. ICA/CCA ratio of  1.1 LEFT: The left common carotid artery demonstrates peak systolic velocities of 54.2 and 61.3 cm/sec in the proximal and distal segments respectively. The left internal carotid artery demonstrates the systolic velocities of 83.6, 104.2 and 118.6 cm/sec in the proximal, mid and distal segments respectively. The external carotid artery is patent. The vertebral artery demonstrates normal antegrade flow. Mild to moderate calcified carotid bulb atherosclerotic plaque. ICA/CCA ratio of 1.9     The right internal carotid artery demonstrates 0-50% stenosis. The left internal carotid artery demonstrates 0-50% stenosis. Bilateral vertebral arteries are patent with flow in the normal direction. Mild-to-moderate bilateral calcified carotid bulb atherosclerotic plaque.      PHYSICAL EXAM     BP (!) 145/85   Pulse (!) 108   Temp 98.6 °F (37 °C)   Resp 20   Ht 5' 3\" (1.6 m)   Wt 115 lb (52.2 kg)   LMP  (LMP Unknown)   SpO2 95%   BMI 20.37 kg/m²   Temp  Av.8 °F (37.1 °C)  Min: 98.6 °F (37 °C) Max: 99 °F (37.2 °C)  CONSTITUTIONAL:  no apparent distress, and appears stated age  ENT:  Normocephalic, atraumatic,    LUNGS:  No increased work of breathing, clear to auscultation bilaterally, no crackles or wheezing on o2  CARDIOVASCULAR:  Normal apical impulse, regular rate and rhythm, normal S1 and S2,  no murmur noted  ABDOMEN:  normal bowel sounds, soft, non-distended, non-tender  MUSCULOSKELETAL:  There is no redness, warmth, or swelling  BLE. Full range of motion     NEUROLOGIC:  Awake, alert,    SKIN:  normal skin color, texture, turgor and no rashes       Peripheral Intravenous Line:  Peripheral IV 11/15/22 Left; Anterior Basilic (Active)   Site Assessment Clean, dry & intact 11/16/22 0800   Line Status Infusing;Blood return noted;Brisk blood return;Flushed;Normal saline locked 11/16/22 0800   Line Care Connections checked and tightened 11/16/22 0800   Phlebitis Assessment No symptoms 11/16/22 0800   Infiltration Assessment 0 11/16/22 0800   Alcohol Cap Used Yes 11/16/22 0800   Dressing Status New dressing applied 11/16/22 0800   Dressing Type Transparent 11/16/22 0800   Dressing Intervention Other (Comment) 11/16/22 0800     Imaging and labs were reviewed per medical records. POC was discussed with Neal Pike. An opportunity to ask questions was given to the patient/FAMILY. Thank you for involving me in the care of Neal Pike I will continue to follow. Please do not hesitate to call [phone] for any questions or concerns.     Electronically signed by Gaby Krishna MD on 11/16/2022 at 3:25 PM

## 2022-11-16 NOTE — PROGRESS NOTES
Speech Language Pathology      NAME:  Enoc Ames  :  1950  DATE: 2022  ROOM:  [id number]/7562-62    Patient seen for dysphagia therapy 15 minutes. Patient alert and responding verbally to some questions. Patient with moist cough inconsistently present after presentations of puree, honey thick, nectar thick and thin. Family present. Recommend MBSS be completed discussed with nursing during session. Continue NPO until MBSS can be completed.        Somnolence [R40.0]  Sepsis without acute organ dysfunction, due to unspecified organism [facility]) [A41.9]  2019 novel coronavirus disease (COVID-19) [U07.1]  COVID-19 virus infection [U07.1]  COVID-19 [U07.1]    [id number]  dysphagia tx    Brianna Vargas MSCCC/SLP  Speech Language Pathologist  [phone]

## 2022-11-17 LAB
ALBUMIN SERPL-MCNC: 2.6 G/DL (ref 3.5–5.2)
ALP BLD-CCNC: 114 U/L (ref 35–104)
ALT SERPL-CCNC: 30 U/L (ref 0–32)
ANION GAP SERPL CALCULATED.3IONS-SCNC: 9 MMOL/L (ref 7–16)
AST SERPL-CCNC: 27 U/L (ref 0–31)
BASOPHILS ABSOLUTE: 0 E9/L (ref 0–0.2)
BASOPHILS RELATIVE PERCENT: 0.2 % (ref 0–2)
BILIRUB SERPL-MCNC: 0.5 MG/DL (ref 0–1.2)
BUN BLDV-MCNC: 31 MG/DL (ref 6–23)
BURR CELLS: ABNORMAL
CALCIUM SERPL-MCNC: 8.6 MG/DL (ref 8.6–10.2)
CHLORIDE BLD-SCNC: 115 MMOL/L (ref 98–107)
CO2: 25 MMOL/L (ref 22–29)
CREAT SERPL-MCNC: 0.7 MG/DL (ref 0.5–1)
CULTURE, RESPIRATORY: ABNORMAL
CULTURE, RESPIRATORY: ABNORMAL
D DIMER: 846 NG/ML DDU
EOSINOPHILS ABSOLUTE: 0 E9/L (ref 0.05–0.5)
EOSINOPHILS RELATIVE PERCENT: 0.4 % (ref 0–6)
FERRITIN: 549 NG/ML
GFR SERPL CREATININE-BSD FRML MDRD: >60 ML/MIN/1.73
GLUCOSE BLD-MCNC: 189 MG/DL (ref 74–99)
HCT VFR BLD CALC: 32.5 % (ref 34–48)
HEMOGLOBIN: 10 G/DL (ref 11.5–15.5)
LYMPHOCYTES ABSOLUTE: 0.5 E9/L (ref 1.5–4)
LYMPHOCYTES RELATIVE PERCENT: 4.3 % (ref 20–42)
MAGNESIUM: 1.7 MG/DL (ref 1.6–2.6)
MCH RBC QN AUTO: 27.9 PG (ref 26–35)
MCHC RBC AUTO-ENTMCNC: 30.8 % (ref 32–34.5)
MCV RBC AUTO: 90.5 FL (ref 80–99.9)
METER GLUCOSE: 122 MG/DL (ref 74–99)
METER GLUCOSE: 173 MG/DL (ref 74–99)
METER GLUCOSE: 199 MG/DL (ref 74–99)
METER GLUCOSE: 273 MG/DL (ref 74–99)
MONOCYTES ABSOLUTE: 0.25 E9/L (ref 0.1–0.95)
MONOCYTES RELATIVE PERCENT: 1.7 % (ref 2–12)
NEUTROPHILS ABSOLUTE: 11.84 E9/L (ref 1.8–7.3)
NEUTROPHILS RELATIVE PERCENT: 93.9 % (ref 43–80)
ORGANISM: ABNORMAL
OVALOCYTES: ABNORMAL
PDW BLD-RTO: 14.1 FL (ref 11.5–15)
PHOSPHORUS: 1.9 MG/DL (ref 2.5–4.5)
PLATELET # BLD: 179 E9/L (ref 130–450)
PMV BLD AUTO: 10.4 FL (ref 7–12)
POIKILOCYTES: ABNORMAL
POTASSIUM SERPL-SCNC: 4 MMOL/L (ref 3.5–5)
RBC # BLD: 3.59 E12/L (ref 3.5–5.5)
SMEAR, RESPIRATORY: ABNORMAL
SODIUM BLD-SCNC: 149 MMOL/L (ref 132–146)
TEAR DROP CELLS: ABNORMAL
TOTAL PROTEIN: 5.4 G/DL (ref 6.4–8.3)
VANCOMYCIN TROUGH: 18.1 MCG/ML (ref 5–16)
WBC # BLD: 12.6 E9/L (ref 4.5–11.5)

## 2022-11-17 PROCEDURE — 2060000000 HC ICU INTERMEDIATE R&B

## 2022-11-17 PROCEDURE — 2500000003 HC RX 250 WO HCPCS: Performed by: STUDENT IN AN ORGANIZED HEALTH CARE EDUCATION/TRAINING PROGRAM

## 2022-11-17 PROCEDURE — 85378 FIBRIN DEGRADE SEMIQUANT: CPT

## 2022-11-17 PROCEDURE — C1751 CATH, INF, PER/CENT/MIDLINE: HCPCS

## 2022-11-17 PROCEDURE — 76937 US GUIDE VASCULAR ACCESS: CPT

## 2022-11-17 PROCEDURE — 6360000002 HC RX W HCPCS: Performed by: SPECIALIST

## 2022-11-17 PROCEDURE — 6370000000 HC RX 637 (ALT 250 FOR IP): Performed by: INTERNAL MEDICINE

## 2022-11-17 PROCEDURE — 85025 COMPLETE CBC W/AUTO DIFF WBC: CPT

## 2022-11-17 PROCEDURE — 2580000003 HC RX 258: Performed by: NURSE PRACTITIONER

## 2022-11-17 PROCEDURE — 97110 THERAPEUTIC EXERCISES: CPT

## 2022-11-17 PROCEDURE — 82962 GLUCOSE BLOOD TEST: CPT

## 2022-11-17 PROCEDURE — 2580000003 HC RX 258: Performed by: SPECIALIST

## 2022-11-17 PROCEDURE — 2580000003 HC RX 258: Performed by: INTERNAL MEDICINE

## 2022-11-17 PROCEDURE — 80053 COMPREHEN METABOLIC PANEL: CPT

## 2022-11-17 PROCEDURE — 6370000000 HC RX 637 (ALT 250 FOR IP): Performed by: NURSE PRACTITIONER

## 2022-11-17 PROCEDURE — 6360000002 HC RX W HCPCS: Performed by: INTERNAL MEDICINE

## 2022-11-17 PROCEDURE — 05HF33Z INSERTION OF INFUSION DEVICE INTO LEFT CEPHALIC VEIN, PERCUTANEOUS APPROACH: ICD-10-PCS | Performed by: INTERNAL MEDICINE

## 2022-11-17 PROCEDURE — 2500000003 HC RX 250 WO HCPCS: Performed by: NURSE PRACTITIONER

## 2022-11-17 PROCEDURE — 6360000002 HC RX W HCPCS: Performed by: NURSE PRACTITIONER

## 2022-11-17 PROCEDURE — 92526 ORAL FUNCTION THERAPY: CPT

## 2022-11-17 PROCEDURE — 36415 COLL VENOUS BLD VENIPUNCTURE: CPT

## 2022-11-17 PROCEDURE — 36410 VNPNXR 3YR/> PHY/QHP DX/THER: CPT

## 2022-11-17 PROCEDURE — 83735 ASSAY OF MAGNESIUM: CPT

## 2022-11-17 PROCEDURE — 99232 SBSQ HOSP IP/OBS MODERATE 35: CPT | Performed by: INTERNAL MEDICINE

## 2022-11-17 PROCEDURE — 84100 ASSAY OF PHOSPHORUS: CPT

## 2022-11-17 PROCEDURE — 82728 ASSAY OF FERRITIN: CPT

## 2022-11-17 PROCEDURE — 80202 ASSAY OF VANCOMYCIN: CPT

## 2022-11-17 PROCEDURE — 2700000000 HC OXYGEN THERAPY PER DAY

## 2022-11-17 RX ORDER — IPRATROPIUM BROMIDE AND ALBUTEROL SULFATE 2.5; .5 MG/3ML; MG/3ML
1 SOLUTION RESPIRATORY (INHALATION) EVERY 4 HOURS
Status: DISCONTINUED | OUTPATIENT
Start: 2022-11-17 | End: 2022-11-18 | Stop reason: HOSPADM

## 2022-11-17 RX ORDER — HEPARIN SODIUM (PORCINE) LOCK FLUSH IV SOLN 100 UNIT/ML 100 UNIT/ML
1 SOLUTION INTRAVENOUS PRN
Status: DISCONTINUED | OUTPATIENT
Start: 2022-11-17 | End: 2022-11-18 | Stop reason: HOSPADM

## 2022-11-17 RX ORDER — HEPARIN SODIUM (PORCINE) LOCK FLUSH IV SOLN 100 UNIT/ML 100 UNIT/ML
1 SOLUTION INTRAVENOUS EVERY 12 HOURS SCHEDULED
Status: DISCONTINUED | OUTPATIENT
Start: 2022-11-17 | End: 2022-11-18 | Stop reason: HOSPADM

## 2022-11-17 RX ORDER — SODIUM CHLORIDE 0.9 % (FLUSH) 0.9 %
5-40 SYRINGE (ML) INJECTION EVERY 12 HOURS SCHEDULED
Status: DISCONTINUED | OUTPATIENT
Start: 2022-11-17 | End: 2022-11-18 | Stop reason: HOSPADM

## 2022-11-17 RX ORDER — SODIUM CHLORIDE 0.9 % (FLUSH) 0.9 %
5-40 SYRINGE (ML) INJECTION PRN
Status: DISCONTINUED | OUTPATIENT
Start: 2022-11-17 | End: 2022-11-18 | Stop reason: HOSPADM

## 2022-11-17 RX ORDER — SODIUM CHLORIDE 9 MG/ML
INJECTION, SOLUTION INTRAVENOUS PRN
Status: DISCONTINUED | OUTPATIENT
Start: 2022-11-17 | End: 2022-11-18 | Stop reason: HOSPADM

## 2022-11-17 RX ADMIN — OXYCODONE HYDROCHLORIDE AND ACETAMINOPHEN 1000 MG: 500 TABLET ORAL at 08:02

## 2022-11-17 RX ADMIN — METRONIDAZOLE 500 MG: 500 INJECTION, SOLUTION INTRAVENOUS at 22:51

## 2022-11-17 RX ADMIN — MEROPENEM 1000 MG: 1 INJECTION, POWDER, FOR SOLUTION INTRAVENOUS at 16:17

## 2022-11-17 RX ADMIN — ENOXAPARIN SODIUM 30 MG: 100 INJECTION SUBCUTANEOUS at 08:00

## 2022-11-17 RX ADMIN — METRONIDAZOLE 500 MG: 500 INJECTION, SOLUTION INTRAVENOUS at 08:13

## 2022-11-17 RX ADMIN — MEROPENEM 1000 MG: 1 INJECTION, POWDER, FOR SOLUTION INTRAVENOUS at 09:27

## 2022-11-17 RX ADMIN — INSULIN LISPRO 2 UNITS: 100 INJECTION, SOLUTION INTRAVENOUS; SUBCUTANEOUS at 12:00

## 2022-11-17 RX ADMIN — Medication 100 UNITS: at 20:50

## 2022-11-17 RX ADMIN — CLOPIDOGREL BISULFATE 75 MG: 75 TABLET ORAL at 08:02

## 2022-11-17 RX ADMIN — ASPIRIN 81 MG 81 MG: 81 TABLET ORAL at 08:02

## 2022-11-17 RX ADMIN — ZINC SULFATE 220 MG (50 MG) CAPSULE 50 MG: CAPSULE at 08:01

## 2022-11-17 RX ADMIN — METOPROLOL SUCCINATE 25 MG: 25 TABLET, EXTENDED RELEASE ORAL at 08:01

## 2022-11-17 RX ADMIN — Medication: at 18:37

## 2022-11-17 RX ADMIN — MEROPENEM 1000 MG: 1 INJECTION, POWDER, FOR SOLUTION INTRAVENOUS at 22:51

## 2022-11-17 RX ADMIN — Medication: at 12:34

## 2022-11-17 RX ADMIN — Medication 10 ML: at 08:01

## 2022-11-17 RX ADMIN — METRONIDAZOLE 500 MG: 500 INJECTION, SOLUTION INTRAVENOUS at 16:13

## 2022-11-17 RX ADMIN — VANCOMYCIN HYDROCHLORIDE 750 MG: 5 INJECTION, POWDER, LYOPHILIZED, FOR SOLUTION INTRAVENOUS at 12:04

## 2022-11-17 RX ADMIN — MEMANTINE HYDROCHLORIDE 5 MG: 5 TABLET ORAL at 08:02

## 2022-11-17 RX ADMIN — Medication 2000 UNITS: at 08:02

## 2022-11-17 ASSESSMENT — PAIN SCALES - WONG BAKER
WONGBAKER_NUMERICALRESPONSE: 0
WONGBAKER_NUMERICALRESPONSE: 0

## 2022-11-17 ASSESSMENT — PAIN SCALES - GENERAL: PAINLEVEL_OUTOF10: 0

## 2022-11-17 NOTE — PROCEDURES
Sl power midline Placement 11/17/2022    Product number: [facility] [id number]   Lot Number: [id number]   Consult: limited access   Ultrasound: yes   Left Cephalic vein:                Upper Arm Circumference: 23cm    Size: 4.5fr x 15cm    Exposed Length: 0    Internal Length: 15cm   Cut: 0   Vein Measurement: 0.45cm    Tolerated well  Blood return noted  Flushed well   Infusing w/o difficulty  RN notified    Natasha Mai RN  11/17/2022  11:04 AM

## 2022-11-17 NOTE — PROGRESS NOTES
Pharmacy Consultation Note  (Antibiotic Dosing and Monitoring)    Initial consult date: 11/15/22  Consulting physician/provider: Eulogio  Drug: Vancomycin  Indication: aspiration pneumonia    Age/  Gender Height Weight IBW  Allergy Information   72 y.o./female 5' 3\" (160 cm) 115 lb (52.2 kg)     Ideal body weight: 52.4 kg (115 lb 8.3 oz)   Tetracyclines & related      Renal Function:  Recent Labs     11/15/22  0652 11/16/22  0525 11/17/22  0640   BUN 41* 39* 31*   CREATININE 0.7 0.8 0.7         Intake/Output Summary (Last 24 hours) at 11/17/2022 1209  Last data filed at 11/17/2022 0751  Gross per 24 hour   Intake 1205.86 ml   Output --   Net 1205.86 ml         Vancomycin Monitoring:  Trough:    Recent Labs     11/17/22  1050   VANCOTROUGH 18.1*     Random:    Recent Labs     11/16/22  0525   VANCORANDOM 11.6         No results for input(s): Jene Sever in the last 72 hours. Historical Cultures:  Organism   Date Value Ref Range Status   11/13/2022 Escherichia coli (A)  Preliminary   11/13/2022 Enterobacter cloacae complex (A)  Preliminary   11/13/2022 Gram positive cocci (A)  Preliminary     No results for input(s): BC in the last 72 hours. Recent vancomycin administrations                     vancomycin (VANCOCIN) 1,250 mg in dextrose 5 % 250 mL IVPB (mg) 1,250 mg New Bag 11/15/22 0945                          Assessment:  Patient is a 67 y.o. female who has been initiated on vancomycin  Estimated Creatinine Clearance: 59 mL/min (based on SCr of 0.7 mg/dL). Baseline SCr~0.9 mg/dL. To dose vancomycin, pharmacy will be utilizing [employer] calculation software for goal AUC/CESAR 400-600 mg/L-hr  11/15: SCr 0.7, COVID+, received tocilizumab  11/16: SCr 0.8, random level 11.6 @ 0525  11/17: SCr 0.7, random level 18.1 @ 1050, meropenem 1000mg Q8h(urine E.Coli)  UOP not measured.       Plan:   Vancomycin 1250 mg Q24h, AUC/CESAR~488 mg/L.h  Will check vancomycin levels when appropriate  Will continue to monitor renal function   Pharmacy to follow      ANASTASIA Darling [facility] 11/17/2022 12:09 PM

## 2022-11-17 NOTE — PROGRESS NOTES
Internal Medicine Progress Note    [facility]    Bull Liu. Jamee Gordon., F.A.C.OAnniaIAnnia Hathaway D.O., CAPRICEOLONDON Paredes D.O. Joselito Springer, MSN, APRN, NP-C  Rosanna Levine. Deb Rosales, MSN, APRN-CNP     Primary Care Physician: Earline Lucia DO   Admitting Physician:  Kemi Mcqueen DO  Admission date and time: 11/13/2022  6:47 PM    Room:  [address] [place] [address]  Admitting diagnosis: Somnolence [R40.0]  Sepsis without acute organ dysfunction, due to unspecified organism [facility]) [A41.9]  2019 novel coronavirus disease (COVID-19) [U07.1]  COVID-19 virus infection [U07.1]  COVID-19 [U07.1]    Patient Name: Dami Marin  MRN: [id number]    Date of Service: 11/17/2022     Subjective:  Benjamen Krabbe is a 67 y.o. female who was seen and examined today,11/17/2022, at the bedside. Family is present at the bedside. State over the day they were assisting the patient to eat and drink. States initially patient did very well but then she started to pocket the food and started coughing. Patient is awake and alert. She does attempt to engage in conversation but does have some difficulty. Does not was answer all questions. Family, including , present at the bedside. States that they are waiting hospice. ROS: Review of rest of 12 systems negative except as mentioned above-subjective section    Physical Exam:  I/O this shift:  In: 120 [P.O.:120]  Out: -     Intake/Output Summary (Last 24 hours) at 11/17/2022 1336  Last data filed at 11/17/2022 0751  Gross per 24 hour   Intake 236.08 ml   Output --   Net 236.08 ml   I/O last 3 completed shifts: In: 1085.9 [P.O.:50; IV Piggyback:1035.9]  Out: -   Patient Vitals for the past 96 hrs (Last 3 readings):   Weight   11/14/22 1827 115 lb (52.2 kg)   11/13/22 1834 115 lb (52.2 kg)     Vital Signs:   Blood pressure (!) 123/55, pulse 92, temperature 97.8 °F (36.6 °C), temperature source Axillary, resp.  rate 16, height 5' 3\" (1.6 m), weight 115 lb (52.2 kg), SpO2 98 %, not currently breastfeeding. General appearance:  Awake and alert. Oriented person. No acute distress. Does follow some commands. Head:  Normocephalic. No masses, lesions or tenderness. HEENT:  Normal to visual inspection. Neck:    Supple. Trachea midline. No thyromegaly. No JVD. No bruits. Heart:    Rhythm regular. Rate controlled. S1 and S2. Systolic murmur  Lungs:    Symmetrical.  Shallow and tachypneic pattern of respiration, diminished aeration throughout. Abdomen:   Soft. Non-tender. Non-distended. Bowel sounds positive. No organomegaly or masses. No pain on palpation. Extremities:    Peripheral pulses present. No significant pitting peripheral edema. No ulcers. No cyanosis. No clubbing. Neurologic:    Awake and alert. Patient flaccid on the left side both upper and lower extremities. Acute on chronic ill appearance without distress. Psych:   Behavior is normal.  Speech is slow/deliberate. Musculoskeletal:   No unilateral joint edema, erythema or warmth. Gait not assessed. Integumentary:  Refer to clinical documentation regarding this wounds of present, otherwise no rashes  Skin normal color and texture.   Genitalia/Breast:  Deferred    Medication:  Scheduled Meds:   lidocaine  5 mL IntraDERmal Once    sodium chloride flush  5-40 mL IntraVENous 2 times per day    heparin flush  1 mL IntraVENous 2 times per day    [START ON 11/18/2022] vancomycin  1,250 mg IntraVENous Q24H    meropenem  1,000 mg IntraVENous Q8H    metroNIDAZOLE  500 mg IntraVENous Q8H    acetylcysteine  4 mL Inhalation BID    budesonide  500 mcg Nebulization BID    ipratropium-albuterol  1 ampule Inhalation Q4H WA    insulin lispro  0-4 Units SubCUTAneous TID WC    insulin lispro  0-4 Units SubCUTAneous Nightly    vitamin C  1,000 mg Oral Daily    Vitamin D  2,000 Units Oral Daily    zinc sulfate  50 mg Oral Daily    metoprolol succinate  25 mg Oral Daily    atorvastatin  80 mg Oral Nightly    sodium chloride flush  5-40 mL IntraVENous 2 times per day    aspirin  81 mg Oral Daily    clopidogrel  75 mg Oral Daily    donepezil  10 mg Oral Nightly    memantine  5 mg Oral BID    enoxaparin  30 mg SubCUTAneous BID     Continuous Infusions:   sodium chloride      IV infusion builder 75 mL/hr at 11/17/22 1234    dextrose      sodium chloride         Objective Data:  CBC with Differential:    Lab Results   Component Value Date/Time    WBC 12.6 11/17/2022 06:40 AM    RBC 3.59 11/17/2022 06:40 AM    HGB 10.0 11/17/2022 06:40 AM    HCT 32.5 11/17/2022 06:40 AM     11/17/2022 06:40 AM    MCV 90.5 11/17/2022 06:40 AM    MCH 27.9 11/17/2022 06:40 AM    MCHC 30.8 11/17/2022 06:40 AM    RDW 14.1 11/17/2022 06:40 AM    SEGSPCT 61 09/01/2012 08:15 AM    LYMPHOPCT 4.3 11/17/2022 06:40 AM    MONOPCT 1.7 11/17/2022 06:40 AM    BASOPCT 0.2 11/17/2022 06:40 AM    MONOSABS 0.25 11/17/2022 06:40 AM    LYMPHSABS 0.50 11/17/2022 06:40 AM    EOSABS 0.00 11/17/2022 06:40 AM    BASOSABS 0.00 11/17/2022 06:40 AM     BMP:    Lab Results   Component Value Date/Time     11/17/2022 06:40 AM    K 4.0 11/17/2022 06:40 AM    K 4.6 10/16/2022 01:40 PM     11/17/2022 06:40 AM    CO2 25 11/17/2022 06:40 AM    BUN 31 11/17/2022 06:40 AM    LABALBU 2.6 11/17/2022 06:40 AM    CREATININE 0.7 11/17/2022 06:40 AM    CALCIUM 8.6 11/17/2022 06:40 AM    GFRAA >60 10/17/2022 05:17 AM    LABGLOM >60 11/17/2022 06:40 AM    GLUCOSE 189 11/17/2022 06:40 AM       COVID inflammatory markers  Recent Labs     11/17/22  0640   DDIMER 846     Recent Labs     11/17/22  0640   FERRITIN 549     Recent Labs     11/16/22  0525   CRP 6.7*       Assessment:  Acute COVID-19 infection with failure to thrive symptomatology   Acute metabolic encephalopathy with moderate to severe underlying dementia  Recent right thalamic lacunar stroke with small nonhemorrhagic penumbra  Sepsis (POA with AMS, fever and HR) with urinary tract infection and COVID-19 infection  Recent CT findings of 1.5 x 2.9 cm rounded opacity in the right lung base-follow-up imaging to evaluate for resolution and underlying mass presence   Suspected aspiration pneumonia with chemical pneumonitis versus infection. Poorly controlled non-insulin-dependent diabetes mellitus type 2  Poorly controlled essential hypertension  History of uterine and cervical cancer with prior radiation    Plan:   Family present( and niece) they have asked for hospice services. At this time the discharge plan is for the patient to be discharged to the skilled nursing facility with hospice. Indicated that the patient at times does continue to have difficulty swallowing foods. They indicate they do not the patient to be n.p.o. and would prefer the patient have pleasure feedings. Continue nebulizer respiratory medications. Monitor blood glucose levels and treat accordingly. Continue antibiotic therapy as prescribed. Sodium level has trended downward. Inflammatory markers have trended upward. maximal COVID protocol is being employed. Continue IV fluids for gentle hydration. Her long-term prognosis remains poor. Family is aware and awaiting hospice consult.  assisting with discharge planning. As above patient go back to skilled nursing facility with hospice. More than 50% of my  time was spent at the bedside counseling/coordinating care with the patient and/or family with face to face contact. This time was spent reviewing notes and laboratory data as well as instructing and counseling the patient. Time I spent with the family or surrogate(s) is included only if the patient was incapable of providing the necessary information or participating in medical decisions. I also discussed the differential diagnosis and all of the proposed management plans with the patient and individuals accompanying the patient.     Mayra Lane requires this high level of physician care and nursing on the IMC/Telemetry unit due the complexity of decision management and chance of rapid decline or death. Continued cardiac monitoring and higher level of nursing are required. I am readily available for any further decision-making and intervention. The patient was seen, examined and then discussed with Dr. Pam Saavedra. Lilliana Alberto, ELIO - CNP  11/17/2022  1:36 PM        I saw and evaluated the patient. I agree with the findings and the plan of care as documented in Lilliana Alberto NP-C's  note.     Nelida Martinez DO, D.O., FACOI  2:35 PM  11/17/2022

## 2022-11-17 NOTE — PROGRESS NOTES
CHIEF COMPLAINT: Elevated troponin/NSTEMI/COVID 19/Change of MS    HISTORY OF PRESENT ILLNESS: Patient is a 67 y.o. female seen at the request of Gypsy Galdamez DO and not followed by cardiology. Patient presented with change of MS. Evaluation revealed patient to be COVID 19 positive, but also noted to have elevated troponin.      Past Medical History:   Diagnosis Date    Arthritis     right hand    Cancer ([address] 1985    uterus and cervix had radiation and implant    Diabetes mellitus ([address]     Hyperlipidemia     Hypertension     Radiation cystitis     Urinary incontinence     complication due to uterine cancer    Urinary retention     caused by complications from uterine cancer       Patient Active Problem List   Diagnosis    Sepsis secondary to UTI ([address]    Post-operative pain    H/O total hysterectomy    Essential hypertension    Type 2 diabetes mellitus with hyperglycemia, without long-term current use of insulin (HCC)    Other hyperlipidemia    Sacral wound    Sacral wound, initial encounter    Acute cystitis with hematuria    Severe protein-calorie malnutrition ([address]    Acute CVA (cerebrovascular accident) ([address]    2019 novel coronavirus disease (COVID-19)    COVID-19    Palliative care encounter    Palliative care by specialist       Allergies   Allergen Reactions    Tetracyclines & Related        Current Facility-Administered Medications   Medication Dose Route Frequency Provider Last Rate Last Admin    lidocaine 1 % injection 5 mL  5 mL IntraDERmal Once ELIO Thompson - CNP        sodium chloride flush 0.9 % injection 5-40 mL  5-40 mL IntraVENous 2 times per day ELIO Thompson - CNP        sodium chloride flush 0.9 % injection 5-40 mL  5-40 mL IntraVENous PRN ELIO Thompson - CNP        0.9 % sodium chloride infusion   IntraVENous PRN ELIO Thompson CNP        heparin flush 100 UNIT/ML injection 100 Units  1 mL IntraVENous 2 times per day ELIO Thompson CNP heparin flush 100 UNIT/ML injection 100 Units  1 mL IntraCATHeter PRN ELIO Lundberg - CNP        vancomycin (VANCOCIN) 750 mg in dextrose 5 % 250 mL IVPB  750 mg IntraVENous Q12H Maurilio Holiday, DO   Stopped at 11/16/22 2211    meropenem (MERREM) 1,000 mg in sodium chloride 0.9 % 100 mL IVPB ([id number])  1,000 mg IntraVENous Q8H Cristian Rossi MD 33.3 mL/hr at 11/17/22 0927 1,000 mg at 11/17/22 5650    metronidazole (FLAGYL) 500 mg in 0.9% NaCl 100 mL IVPB premix  500 mg IntraVENous Q8H Ale Anthony MD   Stopped at 11/17/22 0915    acetylcysteine (MUCOMYST) 10 % solution 400 mg  4 mL Inhalation BID Maurilio Holiday, DO        budesonide (PULMICORT) nebulizer suspension 500 mcg  500 mcg Nebulization BID Maurilio Holiday, DO        ipratropium-albuterol (DUONEB) nebulizer solution 1 ampule  1 ampule Inhalation Q4H FELICIA Torrez, DO        insulin lispro (HUMALOG) injection vial 0-4 Units  0-4 Units SubCUTAneous TID  Andrew Carreno, DO   1 Units at 11/16/22 1222    insulin lispro (HUMALOG) injection vial 0-4 Units  0-4 Units SubCUTAneous Nightly Ismail U Berlin, DO        magnesium sulfate 1000 mg in dextrose 5% 100 mL IVPB  1,000 mg IntraVENous PRN Leo Grime, APRN - CNP        sodium phosphate 9 mmol in sodium chloride 0.9 % 250 mL IVPB  9 mmol IntraVENous PRN Leo Grime, APRN - CNP        Or    sodium phosphate 15 mmol in sodium chloride 0.9 % 250 mL IVPB  15 mmol IntraVENous PRN Leo Grime, APRN - CNP        potassium chloride (KLOR-CON M) extended release tablet 40 mEq  40 mEq Oral PRN Leo Grime, APRN - CNP        Or    potassium bicarb-citric acid (EFFER-K) effervescent tablet 40 mEq  40 mEq Oral PRN Leo Grime, APRN - CNP        Or    potassium chloride 10 mEq/100 mL IVPB (Peripheral Line)  10 mEq IntraVENous PRN Leo Grime, APRN - CNP        benzonatate (TESSALON) capsule 200 mg  200 mg Oral TID PRN Leo Grime, APRN - ALCIDES        ascorbic acid (VITAMIN C) tablet 1,000 mg  1,000 mg Oral Daily [facility], APRN - CNP   1,000 mg at 11/17/22 9054    Vitamin D (CHOLECALCIFEROL) tablet 2,000 Units  2,000 Units Oral Daily [facility], APRN - CNP   2,000 Units at 11/17/22 0802    zinc sulfate (ZINCATE) capsule 50 mg  50 mg Oral Daily [facility], APRN - CNP   50 mg at 11/17/22 0801    metoprolol succinate (TOPROL XL) extended release tablet 25 mg  25 mg Oral Daily [facility], APRN - CNP   25 mg at 11/17/22 0801    potassium chloride 40 mEq, sodium bicarbonate 50 mEq in sodium chloride 0.45 % 1,000 mL infusion   IntraVENous Continuous [facility], APRN - CNP 75 mL/hr at 11/17/22 0300 Restarted at 11/17/22 0300    atorvastatin (LIPITOR) tablet 80 mg  80 mg Oral Nightly Yumiko Mcgee, DO   80 mg at 11/16/22 2016    glucose chewable tablet 16 g  4 tablet Oral PRN Yumiko Mcgee, DO        dextrose bolus 10% 125 mL  125 mL IntraVENous PRN Yumiko Mcgee DO        Or    dextrose bolus 10% 250 mL  250 mL IntraVENous PRN Yumiko Mcgee, DO        glucagon (rDNA) injection 1 mg  1 mg SubCUTAneous PRN Yumiko Mcgee, DO        dextrose 10 % infusion   IntraVENous Continuous PRN Yumiko Mcgee, DO        sodium chloride flush 0.9 % injection 5-40 mL  5-40 mL IntraVENous 2 times per day Yumiko Mcgee, DO   10 mL at 11/17/22 0801    sodium chloride flush 0.9 % injection 5-40 mL  5-40 mL IntraVENous PRN Yumiko Mcgee, DO        0.9 % sodium chloride infusion   IntraVENous PRN Yumiko Mcgee, DO        polyethylene glycol (GLYCOLAX) packet 17 g  17 g Oral Daily PRN Yumiko Mcgee, DO        acetaminophen (TYLENOL) tablet 650 mg  650 mg Oral Q6H PRN Yumiko Mcgee DO        Or    acetaminophen (TYLENOL) suppository 650 mg  650 mg Rectal Q6H PRN Yumiko Mcgee, DO        aspirin chewable tablet 81 mg  81 mg Oral Daily Yumiko Mcgee, DO   81 mg at 11/17/22 0802    clopidogrel (PLAVIX) tablet 75 mg  75 mg Oral Daily Yumiko Mcgee, DO   75 mg at 11/17/22 0802    donepezil (ARICEPT) tablet 10 mg  10 mg Oral Nightly Cynda Stall, DO   10 mg at 22    memantine (NAMENDA) tablet 5 mg  5 mg Oral BID Cynda Stall, DO   5 mg at 22 0802    enoxaparin Sodium (LOVENOX) injection 30 mg  30 mg SubCUTAneous BID Cynda Stall, DO   30 mg at 22 0800       Social History     Socioeconomic History    Marital status:      Spouse name: Not on file    Number of children: Not on file    Years of education: Not on file    Highest education level: Not on file   Occupational History    Not on file   Tobacco Use    Smoking status: Former     Packs/day: 1.00     Years: 44.00     Pack years: 44.00     Types: Cigarettes     Start date: 1965     Quit date: 2009     Years since quittin.9    Smokeless tobacco: Never   Vaping Use    Vaping Use: Never used   Substance and Sexual Activity    Alcohol use: Yes     Comment: seldom    Drug use: No    Sexual activity: Not on file   Other Topics Concern    Not on file   Social History Narrative    Not on file     Social Determinants of Health     Financial Resource Strain: Not on file   Food Insecurity: Not on file   Transportation Needs: Not on file   Physical Activity: Not on file   Stress: Not on file   Social Connections: Not on file   Intimate Partner Violence: Not on file   Housing Stability: Not on file       Family History   Problem Relation Age of Onset    Heart Disease Mother     No Known Problems Father     Other Sister     Other Brother     Other Other      Review of Systems:   Unable to elicit. Physical Exam   BP (!) 123/55   Pulse 92   Temp 97.8 °F (36.6 °C) (Axillary)   Resp 16   Ht 5' 3\" (1.6 m)   Wt 115 lb (52.2 kg)   LMP  (LMP Unknown)   SpO2 98%   BMI 20.37 kg/m²     COVID 19.      CBC:   Lab Results   Component Value Date/Time    WBC 12.6 2022 06:40 AM    RBC 3.59 2022 06:40 AM    HGB 10.0 2022 06:40 AM    HCT 32.5 2022 06:40 AM    MCV 90.5 2022 06:40 AM    MCH 27.9 2022 06:40 AM MCHC 30.8 11/17/2022 06:40 AM    RDW 14.1 11/17/2022 06:40 AM     11/17/2022 06:40 AM    MPV 10.4 11/17/2022 06:40 AM     BMP:   Lab Results   Component Value Date/Time     11/17/2022 06:40 AM    K 4.0 11/17/2022 06:40 AM    K 4.6 10/16/2022 01:40 PM     11/17/2022 06:40 AM    CO2 25 11/17/2022 06:40 AM    BUN 31 11/17/2022 06:40 AM    LABALBU 2.6 11/17/2022 06:40 AM    CREATININE 0.7 11/17/2022 06:40 AM    CALCIUM 8.6 11/17/2022 06:40 AM    GFRAA >60 10/17/2022 05:17 AM    LABGLOM >60 11/17/2022 06:40 AM     Magnesium:    Lab Results   Component Value Date/Time    MG 1.7 11/17/2022 06:40 AM     Cardiac Enzymes:   Lab Results   Component Value Date    CKTOTAL 199 (H) 11/16/2022    CKTOTAL 167 10/16/2022    TROPHS 63 (H) 11/14/2022    TROPHS 66 (H) 11/13/2022    TROPHS 33 (H) 10/16/2022      PT/INR:    Lab Results   Component Value Date/Time    PROTIME 14.4 11/16/2022 05:25 AM    INR 1.3 11/16/2022 05:25 AM     TSH:    Lab Results   Component Value Date/Time    TSH 0.144 10/17/2022 05:17 AM     Rhythm Strip: normal sinus rhythm. EKG:  nonspecific ST and T waves changes, sinus tachycardia, iRBBB. Echo Summary 10/19/2022:   Left ventricular size is grossly normal.   Hyperdynamic left ventricular systolic function. Ejection fraction is visually estimated at 70%. The left atrium is mildly dilated. Physiologic and/or trace mitral regurgitation is present. No evidence of mitral valve stenosis. Mitral annular calcification is present. Physiologic and/or trace tricuspid regurgitation. Regular rhythm.     ASSESSMENT AND PLAN:  Patient Active Problem List   Diagnosis    Sepsis secondary to UTI ([address]    Post-operative pain    H/O total hysterectomy    Essential hypertension    Type 2 diabetes mellitus with hyperglycemia, without long-term current use of insulin (HCC)    Other hyperlipidemia    Sacral wound    Sacral wound, initial encounter    Acute cystitis with hematuria    Severe protein-calorie malnutrition ([facility]    Acute CVA (cerebrovascular accident) ([facility]    2019 novel coronavirus disease (COVID-19)    COVID-19    Palliative care encounter    Palliative care by specialist     1. Elevated troponin/NSTEMI:    Chart/labs/EKG/recent echo reviewed. Medically manage for now. ASA/plavix/statin/BB. 2. COVID 19: Per primary service. 3. HTN: Observe. 4. DM: Per primary service. 5. Lipids: Statin. 6. Hx of CVA: ASA/plavix/statin. Sugey hCristensen D.O.   Cardiologist  Cardiology, [facility]

## 2022-11-17 NOTE — PROGRESS NOTES
SPEECH LANGUAGE PATHOLOGY  DAILY PROGRESS NOTE        PATIENT NAME:  Heladio Handy      :  1950          TODAY'S DATE:  2022 ROOM:  [address] [place] [address]    Patient seen for f/u for dysphagia mgmt. Per nursing staff patient exhibiting increased s/s of pen/aspiration at bedside w/ thin liquids. Downgraded to nectar thick liquids per nursing judgment. SLP administered trials of thin liquids via cup w/ immediate coughing noted in 2/2 trials. SLP administered nectar thick liquid trials w/ no overt clinical indicators of pen/aspiration noted. MBSS completed on 22 and nectar thick liquids were administered w/ no penetration or aspiration noted. SLP recommends continuing w/ puree textures and nectar thick liquids at this time. Will cont w/ POC.        CPT code(s) 58562  dysphagia tx  Total minutes :  15 minutes    Mallory Sung M.S., CCC-SLP  Speech-Language Pathologist  Ping 90. [phone]

## 2022-11-17 NOTE — PLAN OF CARE
Problem: Pain  Goal: Verbalizes/displays adequate comfort level or baseline comfort level  11/17/2022 1000 by Walt Sal RN  Outcome: Progressing  12/64/4980 1880 by Loreto Sharma RN  Outcome: Progressing     Problem: Safety - Adult  Goal: Free from fall injury  11/17/2022 1000 by Walt Sal RN  Outcome: Progressing  46/07/3813 5140 by Loreto Sharma RN  Outcome: Progressing     Problem: ABCDS Injury Assessment  Goal: Absence of physical injury  11/17/2022 1000 by Walt Sal RN  Outcome: Progressing  97/04/0988 8743 by Loreto Sharma RN  Outcome: Progressing     Problem: Skin/Tissue Integrity  Goal: Absence of new skin breakdown  Description: 1. Monitor for areas of redness and/or skin breakdown  2. Assess vascular access sites hourly  3. Every 4-6 hours minimum:  Change oxygen saturation probe site  4. Every 4-6 hours:  If on nasal continuous positive airway pressure, respiratory therapy assess nares and determine need for appliance change or resting period.   11/17/2022 1000 by Walt Sal RN  Outcome: Progressing  36/99/7840 5273 by Loreto Sharma RN  Outcome: Progressing     Problem: Chronic Conditions and Co-morbidities  Goal: Patient's chronic conditions and co-morbidity symptoms are monitored and maintained or improved  11/17/2022 1000 by Walt Sal RN  Outcome: Progressing  81/23/8123 8915 by Loreto Sharma RN  Outcome: Progressing     Problem: Discharge Planning  Goal: Discharge to home or other facility with appropriate resources  11/17/2022 1000 by Walt Sal RN  Outcome: Progressing  29/85/0131 3205 by Loreto Sharma RN  Outcome: Progressing

## 2022-11-17 NOTE — PROGRESS NOTES
NAME: Mahin Cabrera  MR:  [id number]  :   1950  Admit Date:  2022      This is a face to face encounter with Mahin Cabrera 67 y.o. female on 22  Elements of this note, including Diagnosis,  Interval History, Past Medical/Surgical/Family/Social Histories, ROS, physical exam, and Assessment and Plan were copied and pasted from Previous. Updates have been made where noted and reflect current exam and medical decision making from the DOS of this encounter. CHIEF COMPLAINT     ID following for   Chief Complaint   Patient presents with    Altered Mental Status     Pt arrives via EMT with c/o worsening AMS. Pt has baseline of A &O x's 1 and per nursing home report she is now not responding to herself. Pt's LSN was at 1630 per report. HISTORY OF PRESENT ILLNESS     Mahin Cabrera is a 67 y.o. female who presents with   Chief Complaint   Patient presents with    Altered Mental Status     Pt arrives via EMT with c/o worsening AMS. Pt has baseline of A &O x's 1 and per nursing home report she is now not responding to herself. Pt's LSN was at 1630 per report. and has  has a past medical history of Arthritis, Cancer ([facility] [address] Diabetes mellitus ([address] Hyperlipidemia, Hypertension, Radiation cystitis, Urinary incontinence, and Urinary retention.      Assessment & Plan   Somnolence [R40.0]  Sepsis without acute organ dysfunction, due to unspecified organism ([facility] [address] [A41.9]  2019 novel coronavirus disease (COVID-19) [U07.1]  COVID-19 virus infection [U07.1]  COVID-19 [U07.1] s/p TOCI now on RA  Uti   Cons bacteremia contaminant      vancomycin (VANCOCIN) 750 mg in dextrose 5 % 250 mL IVPB, Q12H  metronidazole (FLAGYL) 500 mg in 0.9% NaCl 100 mL IVPB premix, Q8H  meropenem (MERREM) 1,000 mg in sodium chloride 0.9 % 100 mL IVPB (Ptef2Cea), Q12H        Prob swicth to po bactrim await final cx  Procal 0.36      Pt seen and examined  In bed more awake  present   He had no concerns   Wants her better to go home   Patient is tolerating medications. No reported adverse drug reactions. Microbiology:   No results for input(s): COVID19 in the last 72 hours. Lab Results   Component Value Date/Time    [facility]  11/13/2022 07:46 PM     Gram stain performed from blood culture bottle media  Gram positive cocci in pairs      [facility]  11/13/2022 07:46 PM     This organism was isolated in one set. Susceptibility testing is not routinely done as this  organism frequently represents skin contamination. Additional testing can be ordered by calling the  Microbiology Department. BC 24 Hours no growth 11/13/2022 07:30 PM    BLOODCULT2 5 Days no growth 10/17/2022 09:24 AM    BLOODCULT2 5 Days no growth 08/02/2021 10:00 PM    BLOODCULT2 5 Days- no growth 05/04/2018 03:30 PM    ORG Escherichia coli 11/13/2022 11:26 PM    ORG Enterobacter cloacae complex 11/13/2022 11:26 PM    ORG Gram positive cocci 11/13/2022 11:26 PM     WOUND/ABSCESS   Date Value Ref Range Status   08/02/2021   Final    Mixed liv isolated. Further workup and sensitivity testing  is not routinely indicated and will not be performed.   Mixed liv isolated includes:  Mixed Gram negative rods  Beta hemolytic Strep species (Group \"B\")       Smear, Respiratory   Date Value Ref Range Status   11/13/2022   Final    Rare Polymorphonuclear leukocytes  Epithelial cells not seen  Moderate Mixed Bacterial Morphotypes          CULTURE, RESPIRATORY   Date Value Ref Range Status   11/13/2022 Oral Pharyngeal Liv present (A)  Final   11/13/2022 Moderate growth  Final         Recent Labs     11/15/22  0906 11/16/22  0525 11/17/22  0640   WBC 15.4* 9.2 12.6*   RBC 4.46 3.93 3.59   HGB 12.5 10.9* 10.0*   HCT 41.2 35.9 32.5*   MCV 92.4 91.3 90.5   MCH 28.0 27.7 27.9   MCHC 30.3* 30.4* 30.8*   RDW 14.3 14.3 14.1    225 179   MPV 10.2 10.2 10.4       Recent Labs     11/15/22  0652 11/16/22  0525 11/17/22  0640    152* 149*   K 5.2* 3.9 4.0   * 116* 115*   CO2 17* 24 25 BUN 41* 39* 31*   CREATININE 0.7 0.8 0.7   GLUCOSE 189* 150* 189*   PROT 7.0 6.3* 5.4*   LABALBU 3.3* 3.2* 2.6*   CALCIUM 9.4 9.1 8.6   BILITOT 0.6 0.5 0.5   ALKPHOS 156* 144* 114*   AST 48* 33* 27   ALT 50* 38* 30       Lab Results   Component Value Date    CRP 6.7 (H) 11/16/2022    CRP 3.5 (H) 11/15/2022    CRP 6.2 (H) 11/14/2022     Lab Results   Component Value Date    SEDRATE 85 (H) 11/16/2022    SEDRATE 46 (H) 08/03/2021     Recent Labs     11/15/22  0652 11/16/22  0525 11/17/22  0640   CRP 3.5* 6.7*  --    PROCAL 0.26* 0.36*  0.36*  --    FERRITIN  --  490 549   LDH  --  275*  --    DDIMER 1987 [id number]   FIBRINOGEN  --  471*  --    INR  --  1.3  --    PROTIME  --  14.4*  --    AST 48* 33* 27   ALT 50* 38* 30       Lab Results   Component Value Date/Time    CHOL 199 10/17/2022 05:17 AM    TRIG 144 10/17/2022 05:17 AM    HDL 31 10/17/2022 05:17 AM    LDLCALC 139 10/17/2022 05:17 AM    LABVLDL 29 10/17/2022 05:17 AM     Lab Results   Component Value Date/Time    VITD25 48 11/14/2022 06:05 AM       REVIEW OF SYSTEMS     As stated above in the chief complaint, otherwise negative.   CURRENT MEDICATIONS     Current Facility-Administered Medications:     lidocaine 1 % injection 5 mL, 5 mL, IntraDERmal, Once, ELIO Lua CNP    sodium chloride flush 0.9 % injection 5-40 mL, 5-40 mL, IntraVENous, 2 times per day, ELIO Lua CNP    sodium chloride flush 0.9 % injection 5-40 mL, 5-40 mL, IntraVENous, PRN, ELIO Lua CNP    0.9 % sodium chloride infusion, , IntraVENous, PRN, ELIO Lua CNP    heparin flush 100 UNIT/ML injection 100 Units, 1 mL, IntraVENous, 2 times per day, ELIO Lua CNP    heparin flush 100 UNIT/ML injection 100 Units, 1 mL, IntraCATHeter, PRN, ELIO Lua CNP    [START ON 11/18/2022] vancomycin (VANCOCIN) 1,250 mg in dextrose 5 % 250 mL IVPB, 1,250 mg, IntraVENous, Q24H, Moreno Varela DO    meropenem (MERREM) 1,000 mg in sodium chloride 0.9 % 100 mL IVPB ([id number]), 1,000 mg, IntraVENous, Q8H, Sheila Arizmendi MD, Stopped at 11/17/22 1226    metronidazole (FLAGYL) 500 mg in 0.9% NaCl 100 mL IVPB premix, 500 mg, IntraVENous, Q8H, Rafael Carias MD, Stopped at 11/17/22 0915    acetylcysteine (MUCOMYST) 10 % solution 400 mg, 4 mL, Inhalation, BID, Elis Calderon, DO    budesonide (PULMICORT) nebulizer suspension 500 mcg, 500 mcg, Nebulization, BID, Elis Bethanys, DO    ipratropium-albuterol (DUONEB) nebulizer solution 1 ampule, 1 ampule, Inhalation, Q4H Temo DUARTE DO    insulin lispro (HUMALOG) injection vial 0-4 Units, 0-4 Units, SubCUTAneous, TID WC, Ismail U Berlin, DO, 2 Units at 11/17/22 1200    insulin lispro (HUMALOG) injection vial 0-4 Units, 0-4 Units, SubCUTAneous, Nightly, Ismail U Berlin, DO    magnesium sulfate 1000 mg in dextrose 5% 100 mL IVPB, 1,000 mg, IntraVENous, PRN, Windy Winchester, APRN - CNP    sodium phosphate 9 mmol in sodium chloride 0.9 % 250 mL IVPB, 9 mmol, IntraVENous, PRN **OR** sodium phosphate 15 mmol in sodium chloride 0.9 % 250 mL IVPB, 15 mmol, IntraVENous, PRN, Windy Winchester, APRN - CNP    potassium chloride (KLOR-CON M) extended release tablet 40 mEq, 40 mEq, Oral, PRN **OR** potassium bicarb-citric acid (EFFER-K) effervescent tablet 40 mEq, 40 mEq, Oral, PRN **OR** potassium chloride 10 mEq/100 mL IVPB (Peripheral Line), 10 mEq, IntraVENous, PRN, Windy Winchester, APRN - CNP    benzonatate (TESSALON) capsule 200 mg, 200 mg, Oral, TID PRN, Windy Winchester, APRN - CNP    ascorbic acid (VITAMIN C) tablet 1,000 mg, 1,000 mg, Oral, Daily, Windjoan LesterWinchester, APRN - CNP, 1,000 mg at 11/17/22 0802    Vitamin D (CHOLECALCIFEROL) tablet 2,000 Units, 2,000 Units, Oral, Daily, ELIO Stiles - CNP, 2,000 Units at 11/17/22 0802    zinc sulfate (ZINCATE) capsule 50 mg, 50 mg, Oral, Daily, ELIO Stiles - CNP, 50 mg at 11/17/22 0801    metoprolol succinate (TOPROL XL) extended release tablet 25 mg, 25 mg, Oral, Daily, Macrina Brooke, APRN - CNP, 25 mg at 11/17/22 0801    potassium chloride 40 mEq, sodium bicarbonate 50 mEq in sodium chloride 0.45 % 1,000 mL infusion, , IntraVENous, Continuous, Emerson Osgood, APRN - CNP, Last Rate: 75 mL/hr at 11/17/22 1234, New Bag at 11/17/22 1234    atorvastatin (LIPITOR) tablet 80 mg, 80 mg, Oral, Nightly, Toni Hernandezs, DO, 80 mg at 11/16/22 2016    glucose chewable tablet 16 g, 4 tablet, Oral, PRN, Ismail U Berlin, DO    dextrose bolus 10% 125 mL, 125 mL, IntraVENous, PRN **OR** dextrose bolus 10% 250 mL, 250 mL, IntraVENous, PRN, Ismail U Berlin, DO    glucagon (rDNA) injection 1 mg, 1 mg, SubCUTAneous, PRN, Ismail U Berlin, DO    dextrose 10 % infusion, , IntraVENous, Continuous PRN, Ismail U Berlin, DO    sodium chloride flush 0.9 % injection 5-40 mL, 5-40 mL, IntraVENous, 2 times per day, Ismail U Berlin, DO, 10 mL at 11/17/22 0801    sodium chloride flush 0.9 % injection 5-40 mL, 5-40 mL, IntraVENous, PRN, Ismail U Berlin, DO    0.9 % sodium chloride infusion, , IntraVENous, PRN, Ismail U Berlin, DO    polyethylene glycol (GLYCOLAX) packet 17 g, 17 g, Oral, Daily PRN, Toni Quezadaeens, DO    acetaminophen (TYLENOL) tablet 650 mg, 650 mg, Oral, Q6H PRN **OR** acetaminophen (TYLENOL) suppository 650 mg, 650 mg, Rectal, Q6H PRN, Toni Quezadaeens, DO    aspirin chewable tablet 81 mg, 81 mg, Oral, Daily, Toni Hernandezs, DO, 81 mg at 11/17/22 0802    clopidogrel (PLAVIX) tablet 75 mg, 75 mg, Oral, Daily, Toni Hernandezs, DO, 75 mg at 11/17/22 0802    donepezil (ARICEPT) tablet 10 mg, 10 mg, Oral, Nightly, Ismail U Berlin, DO, 10 mg at 11/16/22 2016    memantine (NAMENDA) tablet 5 mg, 5 mg, Oral, BID, Ismail U Berlin, DO, 5 mg at 11/17/22 0802    enoxaparin Sodium (LOVENOX) injection 30 mg, 30 mg, SubCUTAneous, BID, Ismail U Berlin, DO, 30 mg at 11/17/22 0800  DIAGNOSTIC RESULTS   Radiology:  XR ABDOMEN (KUB) (SINGLE AP VIEW)    Result Date: 11/15/2022  EXAMINATION: ONE SUPINE XRAY VIEW(S) OF THE ABDOMEN 11/15/2022 12:09 pm COMPARISON: None. HISTORY: ORDERING SYSTEM PROVIDED HISTORY: Abdominal bloating and distention with concern for bowel obstruction PORTABLE TECHNOLOGIST PROVIDED HISTORY: Reason for exam:->Abdominal bloating and distention with concern for bowel obstruction PORTABLE FINDINGS: No free air, bowel wall pneumatosis or dilated bowel. No abnormal abdominopelvic calcifications aside from scant phleboliths. No abnormal accumulation of fecal material within the lower GI tract. A bladder catheter is present. Incidental note is made of a mild lumbar levoscoliosis. No active process. See above. CT HEAD WO CONTRAST    Result Date: 11/13/2022  EXAMINATION: CT OF THE HEAD WITHOUT CONTRAST  11/13/2022 7:21 pm TECHNIQUE: CT of the head was performed without the administration of intravenous contrast. Automated exposure control, iterative reconstruction, and/or weight based adjustment of the mA/kV was utilized to reduce the radiation dose to as low as reasonably achievable. COMPARISON: 10-22 HISTORY: ORDERING SYSTEM PROVIDED HISTORY: Evaluate intracranial abnormality TECHNOLOGIST PROVIDED HISTORY: Has a \"code stroke\" or \"stroke alert\" been called? ->No Reason for exam:->Evaluate intracranial abnormality Decision Support Exception - unselect if not a suspected or confirmed emergency medical condition->Emergency Medical Condition (MA) FINDINGS: BRAIN/VENTRICLES: There is no acute intracranial hemorrhage, mass effect or midline shift. No abnormal extra-axial fluid collection. The gray-white differentiation is maintained without evidence of an acute infarct. Ventricular system is distended. There is moderate to severe chronic small vessel ischemic white matter disease. There are remote basal ganglia lacunar infarcts, recent at the thalamus is smaller size.   Ventricular system distension again noted suggesting normal pressure hydrocephalus ORBITS: The visualized portion of the orbits demonstrate no acute abnormality. SINUSES: This be noise sinus leftward there is mild dependent opacification may represent small amount of fluid on image 19 SOFT TISSUES/SKULL:  No acute abnormality of the visualized skull or soft tissues. 1. No acute intracranial abnormality. 2. Chronic ischemic changes. 3. Suspicious for normal pressure hydrocephalus. CT CHEST W CONTRAST    Result Date: 11/14/2022  EXAMINATION: CT OF THE CHEST WITH CONTRAST 11/14/2022 11:28 am TECHNIQUE: CT of the chest was performed with the administration of intravenous contrast. Multiplanar reformatted images are provided for review. Automated exposure control, iterative reconstruction, and/or weight based adjustment of the mA/kV was utilized to reduce the radiation dose to as low as reasonably achievable. COMPARISON: Chest x-ray from November 13, 2022 HISTORY: ORDERING SYSTEM PROVIDED HISTORY: Recent mass identified with need for follow-up imaging, further characterization. Current COVID-19 infection with need to evaluate for underlying occult pneumonia TECHNOLOGIST PROVIDED HISTORY: Reason for exam:->Recent mass identified with need for follow-up imaging, further characterization. Current COVID-19 infection with need to evaluate for underlying occult pneumonia FINDINGS: Mediastinum: The heart is not enlarged. No pericardial effusion. Normal caliber thoracic aorta. 13 mm inferior right thyroid nodule. Additional smaller nodules or cysts in the right thyroid. No lymphadenopathy identified. Lungs/pleura: Mild right basilar atelectasis. Subtle interstitial lung markings. Pulmonary nodule which is sub solid in the left lower lobe measures 8 mm. No pleural effusion, pneumothorax or focal consolidation otherwise. Trachea is clear. Secretions in the left mainstem bronchus extending towards the left lower lobe segments. Upper Abdomen: No acute findings within the visualized upper abdomen.  Soft Tissues/Bones: No acute findings within the soft tissues or osseous structures. Mild ground-glass opacities in the lungs favoring atelectasis. There is an age-indeterminate 8 mm ground-glass pulmonary nodule in the left lower lobe. This could be related to bronchiolitis or atelectasis, however see below for follow-up recommendations. Prominent secretions within the left mainstem bronchus extending towards the lower lobe segments. Otherwise no acute findings. RECOMMENDATIONS: Recommend a non-contrast Chest CT at 3-6 months to confirm persistence. If unchanged and the solid component remains < 6 mm, an annual non-contrast Chest CT should be performed for 5 years. For persistent suspicious (lobulated, spiculated, or containing cystic areas or solid component) part solid nodules: If the solid component is 6-8 mm on follow-up, recommend biopsy or resection; if the solid component is > 8 mm on follow-up, recommend PET/CT, biopsy or resection. These guidelines do not apply to immunocompromised patients and patients with cancer. Follow up in patients with significant comorbidities as clinically warranted. For lung cancer screening, adhere to Lung-RADS guidelines. Reference: Radiology. 2017; 284(1):228-43. Pathology: 1.3 cm incidental right thyroid nodule. No follow-up imaging is recommended. Reference: J Am Fatuma Radiol. 2015 Feb;12(2): 143-50     CT ABDOMEN PELVIS W IV CONTRAST Additional Contrast? Oral    Result Date: 10/18/2022  EXAMINATION: CT OF THE ABDOMEN AND PELVIS WITH CONTRAST 10/18/2022 12:46 pm TECHNIQUE: CT of the abdomen and pelvis was performed with the administration of intravenous contrast. Multiplanar reformatted images are provided for review. Automated exposure control, iterative reconstruction, and/or weight based adjustment of the mA/kV was utilized to reduce the radiation dose to as low as reasonably achievable. COMPARISON: None.  HISTORY: ORDERING SYSTEM PROVIDED HISTORY: Known OBGYN cancer - assess progression TECHNOLOGIST PROVIDED HISTORY: Reason for exam:->Known OBGYN cancer - assess progression Additional Contrast?->Oral FINDINGS: Lower Chest: A rounded opacity which is pleural based is noted in the right lung base measuring 1.5 by 2.9 cm. No pneumothorax. No pleural effusion. No pericardial effusion. Organs: No abnormal enhancement of the liver and spleen. Portal vein is patent. Pancreas enhances as expected. No peripancreatic fluid or fat stranding is noted. The gallbladder is normal size. The adrenal glands normal in appearance. The kidneys enhance as expected. There is been interval decrease in the degree of calyceal distension within the right kidney. The left-sided hydronephrosis has resolved. Atheromatous plaque is noted which has progressed within the aorta compared to the prior study. Reference image 48 of the axial images. The origin of the superior mesenteric artery, celiac artery and splenic artery are patent. GI/Bowel: The large and small bowel caliber is within normal limits. No abscess or free air is noted. Subcutaneous air within the left lower abdominal wall noted compatible with injection. Pelvis: Hyperdensities layering within the gallbladder compatible with multiple bladder stones, reference image 154. Stool is noted in the rectum. Slight thickening of the distal rectosigmoid colon is noted. No evidence for abscess or free air. No pathologically enlarged retroperitoneal lymphadenopathy. No inguinal lymphadenopathy noted. Curvature of the lumbar spine with concavity to the right centered at the L2 level is noted. Straightening of the natural lumbar lordosis on the lateral view is noted. No evidence for facet dislocation. The bones of the sacrum are in anatomic alignment. 1.  Rounded opacity in the right lung base measuring 1.5 x 2.9 cm. Follow-up to resolution is recommended. 2.  Progression of aortic atheromatous disease, as referenced above. The aorta remains patent.  3.  No evidence for intra-abdominal ascites. No abnormal enhancing masses are identified. 4.  There has been improvement of the left-sided hydronephrosis a partial improvement of the right-sided hydronephrosis. 5.  Bladder calculi. XR CHEST PORTABLE    Result Date: 11/15/2022  EXAMINATION: ONE XRAY VIEW OF THE CHEST 11/15/2022 6:10 am COMPARISON: 13 November 2022 HISTORY: ORDERING SYSTEM PROVIDED HISTORY: aspiration TECHNOLOGIST PROVIDED HISTORY: Reason for exam:->aspiration FINDINGS: The lungs are without acute focal process. There is no effusion or pneumothorax. The cardiomediastinal silhouette is without acute process. The osseous structures are without acute process. No acute process. XR CHEST PORTABLE    Result Date: 11/13/2022  EXAMINATION: ONE XRAY VIEW OF THE CHEST 11/13/2022 7:32 pm COMPARISON: 10/16/2022 HISTORY: ORDERING SYSTEM PROVIDED HISTORY: AMS TECHNOLOGIST PROVIDED HISTORY: Reason for exam:->AMS FINDINGS: The lungs are adequately inflated. There is no focal consolidation, pleural effusion or pneumothorax. The heart and mediastinal contours are within normal limits. No acute bony findings are identified. No acute process. MRI BRAIN W WO CONTRAST    Result Date: 10/17/2022  EXAMINATION: MRI OF THE BRAIN WITHOUT AND WITH CONTRAST  10/17/2022 5:22 pm TECHNIQUE: Multiplanar multisequence MRI of the head/brain was performed without and with the administration of intravenous contrast. COMPARISON: CT head without contrast, CTA of the head and CT perfusion, 10/16/2022 HISTORY: ORDERING SYSTEM PROVIDED HISTORY: cva vs tia with AMS, history of gyn malignancy, rule out mass/mets TECHNOLOGIST PROVIDED HISTORY: Reason for exam:->cva vs tia with AMS, history of gyn malignancy, rule out mass/mets What is the sedation requirement?->None FINDINGS: INTRACRANIAL STRUCTURES/VENTRICLES:  1.5 x 0.9 cm focus of acute or early subacute infarction is seen in the right thalamus.   No mass, mass effect, hemorrhage or midline shift is seen in the brain. No abnormal enhancement. Moderate cerebral volume loss and moderate chronic microvascular ischemic changes are noted. Ventricular dilatation appears somewhat out of proportion to the extent of cerebral atrophy. Clinical correlation for normal pressure hydrocephalus is recommended. ORBITS: The visualized portion of the orbits demonstrate no acute abnormality. SINUSES: The visualized paranasal sinuses and mastoid air cells demonstrate no acute abnormality. BONES/SOFT TISSUES: The bone marrow signal intensity appears normal. The soft tissues demonstrate no acute abnormality. 1. 1.5 x 0.9 cm focus of acute or early subacute infarction in the right thalamus. 2. No mass, mass effect, hemorrhage or midline shift is seen in the brain. 3. Ventricular dilation concerning for normal pressure hydrocephalus. Clinical correlation is needed. RECOMMENDATIONS: Unavailable     Fluoroscopy modified barium swallow with video    Result Date: 11/16/2022  EXAMINATION: MODIFIED BARIUM SWALLOW WAS PERFORMED IN CONJUNCTION WITH SPEECH PATHOLOGY SERVICES TECHNIQUE: Under fluoroscopic evaluation cineradiography/videoradiography recordings were performed in conjunction with the speech-language pathologist (SLP). Various liquid, solid and/or semi-solid barium preparations were used to assess swallowing function. FLUOROSCOPY DOSE AND TYPE OR TIME AND EXPOSURES: 1.5 minutes of fluoro time with exposure of 2.28 mGy. Greater than 100 images obtain. COMPARISON: None HISTORY: ORDERING SYSTEM PROVIDED HISTORY: dysphagia/aspiration TECHNOLOGIST PROVIDED HISTORY: Reason for exam:->dysphagia/aspiration FINDINGS: Oral phase of swallowing was grossly within normal limits. There is minimal penetration without evidence of aspiration. Swallowing mechanism grossly within normal limits without evidence of aspiration. Please see separate speech pathology report for full discussion of findings and recommendations.      US CAROTID ARTERY BILATERAL    Result Date: 10/19/2022  EXAMINATION: ULTRASOUND EVALUATION OF THE CAROTID ARTERIES 10/19/2022 TECHNIQUE: Duplex ultrasound using B-mode/gray scaled imaging, Doppler spectral analysis and color flow Doppler was obtained of the carotid arteries. COMPARISON: None. HISTORY: ORDERING SYSTEM PROVIDED HISTORY: Stroke TECHNOLOGIST PROVIDED HISTORY: Reason for exam:->Stroke What reading provider will be dictating this exam?->CRC FINDINGS: RIGHT: The right common carotid artery demonstrates peak systolic velocities of  712 and 58.1 cm/sec in the proximal and distal segments respectively. The right internal carotid artery demonstrates the systolic velocities of 37.7, 65.3 and 61.8  cm/sec in the proximal, mid and distal segments respectively. The external carotid artery is patent. The vertebral artery demonstrates normal antegrade flow. Mild to moderate calcified carotid bulb atherosclerotic plaque. ICA/CCA ratio of  1.1 LEFT: The left common carotid artery demonstrates peak systolic velocities of 19.3 and 61.3 cm/sec in the proximal and distal segments respectively. The left internal carotid artery demonstrates the systolic velocities of 39.0, 104.2 and 118.6 cm/sec in the proximal, mid and distal segments respectively. The external carotid artery is patent. The vertebral artery demonstrates normal antegrade flow. Mild to moderate calcified carotid bulb atherosclerotic plaque. ICA/CCA ratio of 1.9     The right internal carotid artery demonstrates 0-50% stenosis. The left internal carotid artery demonstrates 0-50% stenosis. Bilateral vertebral arteries are patent with flow in the normal direction. Mild-to-moderate bilateral calcified carotid bulb atherosclerotic plaque.      PHYSICAL EXAM     BP (!) 123/55   Pulse 92   Temp 97.8 °F (36.6 °C) (Axillary)   Resp 16   Ht 5' 3\" (1.6 m)   Wt 115 lb (52.2 kg)   LMP  (LMP Unknown)   SpO2 98%   BMI 20.37 kg/m²   Temp  Av.8 °F (36.6 °C)  Min: 97.6 °F (36.4 °C)  Max: 97.9 °F (36.6 °C)  CONSTITUTIONAL:  no apparent distress, and appears stated age  ENT:  Normocephalic, atraumatic,    LUNGS: dec  to auscultation bilaterally, no crackles or wheezing on RA  CARDIOVASCULAR:  Normal apical impulse, regular rate and rhythm, normal S1 and S2,  no murmur noted  ABDOMEN:  normal bowel sounds, soft, non-distended, non-tender  MUSCULOSKELETAL:  There is no redness, warmth, or swelling  BLE. Atrophy   NEUROLOGIC:  Awake,   SKIN:   no rashes       Peripheral Intravenous Line:  Peripheral IV 11/15/22 Left; Anterior Basilic (Active)   Site Assessment Clean, dry & intact 11/16/22 0800   Line Status Infusing;Blood return noted;Brisk blood return;Flushed;Normal saline locked 11/16/22 0800   Line Care Connections checked and tightened 11/16/22 0800   Phlebitis Assessment No symptoms 11/16/22 0800   Infiltration Assessment 0 11/16/22 0800   Alcohol Cap Used Yes 11/16/22 0800   Dressing Status New dressing applied 11/16/22 0800   Dressing Type Transparent 11/16/22 0800   Dressing Intervention Other (Comment) 11/16/22 0800     Imaging and labs were reviewed per medical records. POC was discussed with Randy Yanez. An opportunity to ask questions was given to the patient/FAMILY. Thank you for involving me in the care of Randy Yanez I will continue to follow. Please do not hesitate to call [phone] for any questions or concerns.     Electronically signed by Domonique Pearson MD on 11/17/2022 at 1:10 PM

## 2022-11-17 NOTE — CARE COORDINATION
SOCIAL WORK / DISCHARGE PLANNING:   COVID positive. Donna met with son, Manohar Franco. He said he does not wish for pt to return to [facility] nor does his father. He is interested in speaking with Hospice and then returning pt home with hospice. Donna asked who would be able to be caregiver in home. He said pt's niece, Jennifer Woods would be primary caregiver. He is agreeable to proceed with [facility]. Chris mccurdy followed up with me after her conversation. She was surprised by the change in Hospice location but she states they could arrange dme and start service as soon as needed, awaits direction. Per Franck Verma RN pt was being fed by niece today and was pocketing food but was continuing to be fed. Donna will follow, assist with further dc planning.              Electronically signed by DALI Corbett on 11/17/2022 at 3:38 PM

## 2022-11-17 NOTE — PLAN OF CARE
Problem: Pain  Goal: Verbalizes/displays adequate comfort level or baseline comfort level  62/35/9023 2754 by Abiodun Rondon RN  Outcome: Progressing  11/16/2022 1342 by Aleyda Gordon RN  Outcome: Progressing     Problem: Safety - Adult  Goal: Free from fall injury  79/59/7983 9211 by Abiodun Rondon RN  Outcome: Progressing  11/16/2022 1342 by Aleyda Gordon RN  Outcome: Progressing     Problem: ABCDS Injury Assessment  Goal: Absence of physical injury  27/37/7989 6114 by Abiodun Rondon RN  Outcome: Progressing  11/16/2022 1342 by Aleyda Gordon RN  Outcome: Progressing     Problem: Skin/Tissue Integrity  Goal: Absence of new skin breakdown  Description: 1. Monitor for areas of redness and/or skin breakdown  2. Assess vascular access sites hourly  3. Every 4-6 hours minimum:  Change oxygen saturation probe site  4. Every 4-6 hours:  If on nasal continuous positive airway pressure, respiratory therapy assess nares and determine need for appliance change or resting period.   14/82/8000 5214 by Abiodun Rondon RN  Outcome: Progressing  11/16/2022 1342 by Aleyda Gordon RN  Outcome: Progressing     Problem: Chronic Conditions and Co-morbidities  Goal: Patient's chronic conditions and co-morbidity symptoms are monitored and maintained or improved  85/15/1777 6328 by Abiodun Rondon RN  Outcome: Progressing  11/16/2022 1342 by Aleyda Gordon RN  Outcome: Progressing     Problem: Discharge Planning  Goal: Discharge to home or other facility with appropriate resources  18/65/0986 2191 by Abiodun Rondon RN  Outcome: Progressing  11/16/2022 1342 by Aleyda Gordon RN  Outcome: Progressing

## 2022-11-17 NOTE — PROGRESS NOTES
Physical Therapy    Physical Therapy Treatment Note/Plan of Care    Room #:  7695/9322-33  Patient Name: Heladio Handy  YOB: 1950  MRN: [id number]    Date of Service: 11/17/2022     Tentative placement recommendation: [facility]  Equipment recommendation: Equipment at Nursing Home      Evaluating Physical Therapist: Jaret Gotti  #[id number]      Specific Provider Orders/Date/Referring Provider :  11/13/22 2315    PT eval and treat  Start:  11/13/22 2315,   End:  11/13/22 2315,   ONE TIME,   Standing Count:  1 Occurrences,   R         Ashlee [place], DO     Admitting Diagnosis:   Somnolence [R40.0]  Sepsis without acute organ dysfunction, due to unspecified organism [facility]) [A41.9]  2019 novel coronavirus disease (COVID-19) [U07.1]  COVID-19 virus infection [U07.1]  COVID-19 [U07.1]    Admitted with    altered mental status from skilled nursing facility   Covid +  Surgery: none  Visit Diagnoses         Codes    COVID-19 virus infection     U07.1    Somnolence     R40.0            Patient Active Problem List   Diagnosis    Sepsis secondary to UTI ([address]    Post-operative pain    H/O total hysterectomy    Essential hypertension    Type 2 diabetes mellitus with hyperglycemia, without long-term current use of insulin ([address]    Other hyperlipidemia    Sacral wound    Sacral wound, initial encounter    Acute cystitis with hematuria    Severe protein-calorie malnutrition ([address]    Acute CVA (cerebrovascular accident) ([address]    2019 novel coronavirus disease (COVID-19)    COVID-19    Palliative care encounter    Palliative care by specialist        ASSESSMENT of Current Deficits    Patient exhibits decreased  strength  and range of motion impairing functional mobility and active movement. Patient with confusion, left knee flexion contracture, and verbally expressing pain with any and all movements with left lower/upper extremity.  I attempted to stretch LLE and ROM with LUE but when she yelled out in pain, I quit. Pt with PROM with all exercises on her right side except AROM with finger flexion/extension. Pt able to open her eyes when I called her name but could not answer any of my questions during tx session. Patient requires continued skilled physical therapy to address concerns listed above for increased safety and function at discharge. PHYSICAL THERAPY  PLAN OF CARE       Physical therapy plan of care is established based on physician order,  patient diagnosis and clinical assessment    Current Treatment Recommendations:    -Bed Mobility: Lower extremity exercises , Upper extremity exercises , and Trunk control activities   -Endurance: Utilize Supervised activities to increase level of endurance to allow for safe functional mobility including transfers and gait  and dangle if able to follow directions    PT long term treatment goals are located in below grid    Patient and or family understand(s) diagnosis, prognosis, and plan of care. Frequency of treatments: Patient will be seen  2-3 times/week.          Prior Level of Function: bedbound  Rehab Potential: poor  for baseline    Past medical history:   Past Medical History:   Diagnosis Date    Arthritis     right hand    Cancer ([facility] 1985    uterus and cervix had radiation and implant    Diabetes mellitus ([facility]     Hyperlipidemia     Hypertension     Radiation cystitis     Urinary incontinence     complication due to uterine cancer    Urinary retention     caused by complications from uterine cancer     Past Surgical History:   Procedure Laterality Date    CYSTOSCOPY N/A 7/17/2019    CYSTOSCOPY BOTOX  INJECTION 200 UNITS performed by Estephanie CALLOWAY Drew, DO at [facility] removed    HYSTERECTOMY (CERVIX STATUS UNKNOWN)  1985    AR CYSTOURETHROSCOPY INJ CHEMODENERVATION BLADDER N/A 11/9/2018    CYSTOSCOPY  BOTOX INJECTION (200 UNITS) performed by Yusef Hurtado MD at [address] SUBJECTIVE:    Precautions: Up with assistance, falls, alarm, O2, and Droplet plus/COVID-19 ,      Social history:  Patient lives with spouse in a two story home bedroom and bathroom 2nd floor full flight stairs with Rail   Tub shower however has been at facility since acute care discharge    Equipment owned: Wheelchair, [address], and Shower chair,       [address] [facility]   How much difficulty turning over in bed?: Unable  How much difficulty sitting down on / standing up from a chair with arms?: Unable  How much difficulty moving from lying on back to sitting on side of bed?: Unable  How much help from another person moving to and from a bed to a chair?: Total  How much help from another person needed to walk in hospital room?: Total  How much help from another person for climbing 3-5 steps with a railing?: Total  AM-PAC Inpatient Mobility Raw Score : 6  AM-PAC Inpatient T-Scale Score : 23.55  Mobility Inpatient CMS 0-100% Score: 100  Mobility Inpatient CMS G-Code Modifier : CN    Nursing cleared patient for PT treatment. OBJECTIVE;   Initial Evaluation  Date: 11/14/2022 Treatment Date:  11/17/2022       Short Term/ Long Term   Goals   Was pt agreeable to Eval/treatment?  Yes Patient not able to answer the question yes or no due to confusion To be met in 4 days   Pain level   Grimaces with left upper and lower extremitiy rom    Verbally expresses pain with left upper/ower extremity ROM    Bed Mobility    Rolling: Dependent assist of 1    Supine to sit: Not assessed     Sit to supine: Not assessed     Scooting: Not assessed    Rolling: Not assessed    Supine to sit: Not assessed    Sit to supine: Not assessed    Scooting: Not assessed     Rolling: Maximal assist of 1    Supine to sit: Maximal assist of  2    Sit to supine: Maximal assist of  2    Scooting: Maximal assist of  2     ROM impaired with left knee contracture, cervical spine with excessive lordosis ; positioned to reduce lordosis and place head in more neutral position  Increase range of motion 10% of affected joints    Strength BUE:   0/5  RLE:  0/5  LLE:  0/5  Increase strength in affected mm groups by 1/3 grade   Balance Sitting EOB:  not assessed    Sitting EOB: not assessed      Sitting EOB:  poor        Patient is awake, non verbal and does not follow directions    Sensation:  Patient  not appropriate to assess    Edema:  no   Endurance: poor      Patient education  Patient educated on role of Physical Therapy, risks of immobility, safety and plan of care     Patient response to education:   Pt verbalized understanding Pt demonstrated skill Pt requires further education in this area   No No Yes      Treatment:  Patient practiced and was instructed/facilitated in the following treatment:     Rom all extremities with gentle stretching however poor tolerance to left side. Pt repositioned in the bed; her head was tilted to the right, so I put a wedge and adjusted the pillow to keep her head in mid-line position. Therapeutic Exercises:  ankle pumps, heel slide, hip abduction/adduction, straight leg raise, SAQ, shoulder elevation, elbow flexion/extension, supination/pronation, wrist flexion/extension, and finger flexion/extension , on RLE/RUE x 10 - 15 reps ; attempted stretching on LLE and range of motion on LUE and the patient verbally expressing pain so I stopped. At end of session, patient in bed  with  call light and phone within reach,  all lines and tubes intact, nursing notified. Patient would benefit from continued skilled Physical Therapy to improve functional independence and quality of life. Patient's/ family goals   none stated    Time in 09:37  Time out  09:52    Total Treatment Time  15 minutes        CPT codes:    Therapeutic activities (90835)   2 minutes  0 unit(s)  Therapeutic exercises (23602)   13 minutes  1 unit(s)    Rafita Marcos  [facility]  LIC # [id number]

## 2022-11-17 NOTE — CARE COORDINATION
SOCIAL WORK / DISCHARGE PLANNING:   COVID positive. Admitted from [facility], [address], fax [phone], previously private pay, plan to return there. Spouse reported to have memory issues. Sw attempted to call son, Segun Vargas, no answer or ability to leave message. Sw spoke with niece, Dharmesh Juarez via phone, she had been present with spouse when Palliative spoke with them yesterday. She states they do still wish to speak with Hospice and son works midnights, will be available after 3pm. Sw reviewed Hospice services. Niece said they chose [facility] who [facility], referral called to [facility], who will follow up with son this afternoon. HERLINDA will need signed by physician.                Electronically signed by DALI Cedeño on 11/17/2022 at 10:50 AM

## 2022-11-17 NOTE — PROGRESS NOTES
Pulmonary/Critical Care Progress Note    We are following patient for hypoxemia, possible aspiration following a stroke, COVID-19 (positive test without evidence of pneumonia)    SUBJECTIVE:  The patient is hemiparetic on the left side but can express herself and is not aphasic. She can squeeze my hand with her right hand but not the left. The patient's oxygen has been weaned from 2L to 1 L and has been off intermittently. We will check a chest x-ray tomorrow to make sure there is no residual infiltrates.     MEDICATIONS:   lidocaine  5 mL IntraDERmal Once    sodium chloride flush  5-40 mL IntraVENous 2 times per day    heparin flush  1 mL IntraVENous 2 times per day    [START ON 11/18/2022] vancomycin  1,250 mg IntraVENous Q24H    ipratropium-albuterol  1 ampule Inhalation Q4H    meropenem  1,000 mg IntraVENous Q8H    metroNIDAZOLE  500 mg IntraVENous Q8H    acetylcysteine  4 mL Inhalation BID    budesonide  500 mcg Nebulization BID    insulin lispro  0-4 Units SubCUTAneous TID WC    insulin lispro  0-4 Units SubCUTAneous Nightly    vitamin C  1,000 mg Oral Daily    Vitamin D  2,000 Units Oral Daily    zinc sulfate  50 mg Oral Daily    metoprolol succinate  25 mg Oral Daily    atorvastatin  80 mg Oral Nightly    sodium chloride flush  5-40 mL IntraVENous 2 times per day    aspirin  81 mg Oral Daily    clopidogrel  75 mg Oral Daily    donepezil  10 mg Oral Nightly    memantine  5 mg Oral BID    enoxaparin  30 mg SubCUTAneous BID      sodium chloride      IV infusion builder 75 mL/hr at 11/17/22 1234    dextrose      sodium chloride       sodium chloride flush, sodium chloride, heparin flush, magnesium sulfate, sodium phosphate IVPB **OR** sodium phosphate IVPB, potassium chloride **OR** potassium alternative oral replacement **OR** potassium chloride, benzonatate, glucose, dextrose bolus **OR** dextrose bolus, glucagon (rDNA), dextrose, sodium chloride flush, sodium chloride, polyethylene glycol, acetaminophen **OR** acetaminophen      REVIEW OF SYSTEMS:  Constitutional: Denies fever, weight loss, night sweats, and fatigue  Skin: Denies pigmentation, dark lesions, and rashes   HEENT: Denies hearing loss, tinnitus, ear drainage, epistaxis, sore throat, and hoarseness. Cardiovascular: Denies palpitations, chest pain, and chest pressure. Respiratory: Denies cough, dyspnea at rest, hemoptysis, apnea, and choking. Gastrointestinal: Denies nausea, vomiting, poor appetite, diarrhea, heartburn or reflux  Genitourinary: Denies dysuria, frequency, urgency or hematuria  Musculoskeletal: Denies myalgias, muscle weakness, and bone pain  Neurological: Denies dizziness, vertigo, headache, but has focal weakness on left side   Psychological: Denies anxiety and depression  Endocrine: Denies heat intolerance and cold intolerance  Hematopoietic/Lymphatic: Denies bleeding problems and blood transfusions    OBJECTIVE:  Vitals:    11/17/22 1428   BP: 122/68   Pulse: 90   Resp: 16   Temp: 98 °F (36.7 °C)   SpO2: 97%        O2 Flow Rate (L/min): 2 L/min  O2 Device: None (Room air)    PHYSICAL EXAM:  Constitutional: No fever, chills, diaphoresis  Skin: No skin rash, no skin breakdown  HEENT: Unremarkable  Neck: No JVD, lymphadenopathy, thyromegaly  Cardiovascular: S1, S2 regular. No S3 or rubs present  Respiratory: Few crackles at bases only.   No wheezes heard  Gastrointestinal: Soft, flat, nontender  Genitourinary: No CVA tenderness  Extremities: No clubbing, cyanosis, or edema  Neurological: Right hemiparesis  Psychological: Difficult to evaluate    LABS:  WBC   Date Value Ref Range Status   11/17/2022 12.6 (H) 4.5 - 11.5 E9/L Final   11/16/2022 9.2 4.5 - 11.5 E9/L Final   11/15/2022 15.4 (H) 4.5 - 11.5 E9/L Final     Hemoglobin   Date Value Ref Range Status   11/17/2022 10.0 (L) 11.5 - 15.5 g/dL Final   11/16/2022 10.9 (L) 11.5 - 15.5 g/dL Final   11/15/2022 12.5 11.5 - 15.5 g/dL Final     Hematocrit   Date Value Ref Range Status 11/17/2022 32.5 (L) 34.0 - 48.0 % Final   11/16/2022 35.9 34.0 - 48.0 % Final   11/15/2022 41.2 34.0 - 48.0 % Final     MCV   Date Value Ref Range Status   11/17/2022 90.5 80.0 - 99.9 fL Final   11/16/2022 91.3 80.0 - 99.9 fL Final   11/15/2022 92.4 80.0 - 99.9 fL Final     Platelets   Date Value Ref Range Status   11/17/2022 179 130 - 450 E9/L Final   11/16/2022 225 130 - 450 E9/L Final   11/15/2022 339 130 - 450 E9/L Final     Sodium   Date Value Ref Range Status   11/17/2022 149 (H) 132 - 146 mmol/L Final   11/16/2022 152 (H) 132 - 146 mmol/L Final   11/15/2022 146 132 - 146 mmol/L Final     Potassium   Date Value Ref Range Status   11/17/2022 4.0 3.5 - 5.0 mmol/L Final   11/16/2022 3.9 3.5 - 5.0 mmol/L Final   11/15/2022 5.2 (H) 3.5 - 5.0 mmol/L Final     Potassium reflex Magnesium   Date Value Ref Range Status   10/16/2022 4.6 3.5 - 5.0 mmol/L Final   08/02/2021 4.9 3.5 - 5.0 mmol/L Final     Chloride   Date Value Ref Range Status   11/17/2022 115 (H) 98 - 107 mmol/L Final   11/16/2022 116 (H) 98 - 107 mmol/L Final   11/15/2022 112 (H) 98 - 107 mmol/L Final     CO2   Date Value Ref Range Status   11/17/2022 25 22 - 29 mmol/L Final   11/16/2022 24 22 - 29 mmol/L Final   11/15/2022 17 (L) 22 - 29 mmol/L Final     BUN   Date Value Ref Range Status   11/17/2022 31 (H) 6 - 23 mg/dL Final   11/16/2022 39 (H) 6 - 23 mg/dL Final   11/15/2022 41 (H) 6 - 23 mg/dL Final     Creatinine   Date Value Ref Range Status   11/17/2022 0.7 0.5 - 1.0 mg/dL Final   11/16/2022 0.8 0.5 - 1.0 mg/dL Final   11/15/2022 0.7 0.5 - 1.0 mg/dL Final     Glucose   Date Value Ref Range Status   11/17/2022 189 (H) 74 - 99 mg/dL Final   11/16/2022 150 (H) 74 - 99 mg/dL Final   11/15/2022 189 (H) 74 - 99 mg/dL Final     Calcium   Date Value Ref Range Status   11/17/2022 8.6 8.6 - 10.2 mg/dL Final   11/16/2022 9.1 8.6 - 10.2 mg/dL Final   11/15/2022 9.4 8.6 - 10.2 mg/dL Final     Total Protein   Date Value Ref Range Status   11/17/2022 5.4 (L) 6.4 - 8.3 g/dL Final   11/16/2022 6.3 (L) 6.4 - 8.3 g/dL Final   11/15/2022 7.0 6.4 - 8.3 g/dL Final     Albumin   Date Value Ref Range Status   11/17/2022 2.6 (L) 3.5 - 5.2 g/dL Final   11/16/2022 3.2 (L) 3.5 - 5.2 g/dL Final   11/15/2022 3.3 (L) 3.5 - 5.2 g/dL Final     Total Bilirubin   Date Value Ref Range Status   11/17/2022 0.5 0.0 - 1.2 mg/dL Final   11/16/2022 0.5 0.0 - 1.2 mg/dL Final   11/15/2022 0.6 0.0 - 1.2 mg/dL Final     Alkaline Phosphatase   Date Value Ref Range Status   11/17/2022 114 (H) 35 - 104 U/L Final   11/16/2022 144 (H) 35 - 104 U/L Final   11/15/2022 156 (H) 35 - 104 U/L Final     AST   Date Value Ref Range Status   11/17/2022 27 0 - 31 U/L Final   11/16/2022 33 (H) 0 - 31 U/L Final   11/15/2022 48 (H) 0 - 31 U/L Final     ALT   Date Value Ref Range Status   11/17/2022 30 0 - 32 U/L Final   11/16/2022 38 (H) 0 - 32 U/L Final   11/15/2022 50 (H) 0 - 32 U/L Final     Est, Glom Filt Rate   Date Value Ref Range Status   11/17/2022 >60 >=60 mL/min/1.73 Final     Comment:     Pediatric calculator link  [facility]at. org/professionals/kdoqi/gfr_calculatorped  Effective Oct 3, 2022  These results are not intended for use in patients  <25years of age. eGFR results are calculated without  a race factor using the 2021 CKD-EPI equation. Careful  clinical correlation is recommended, particularly when  comparing to results calculated using previous equations. The CKD-EPI equation is less accurate in patients with  extremes of muscle mass, extra-renal metabolism of  creatinine, excessive creatinine ingestion, or following  therapy that affects renal tubular secretion. 11/16/2022 >60 >=60 mL/min/1.73 Final     Comment:     Pediatric calculator link  [facility]. org/professionals/kdoqi/gfr_calculatorped  Effective Oct 3, 2022  These results are not intended for use in patients  <25years of age. eGFR results are calculated without  a race factor using the 2021 CKD-EPI equation. Careful  clinical correlation is recommended, particularly when  comparing to results calculated using previous equations. The CKD-EPI equation is less accurate in patients with  extremes of muscle mass, extra-renal metabolism of  creatinine, excessive creatinine ingestion, or following  therapy that affects renal tubular secretion. 11/15/2022 >60 >=60 mL/min/1.73 Final     Comment:     Pediatric calculator link  [facility].[employer]. org/professionals/kdoqi/gfr_calculatorped  Effective Oct 3, 2022  These results are not intended for use in patients  <25years of age. eGFR results are calculated without  a race factor using the 2021 CKD-EPI equation. Careful  clinical correlation is recommended, particularly when  comparing to results calculated using previous equations. The CKD-EPI equation is less accurate in patients with  extremes of muscle mass, extra-renal metabolism of  creatinine, excessive creatinine ingestion, or following  therapy that affects renal tubular secretion. GFR    Date Value Ref Range Status   10/17/2022 >60  Final   10/16/2022 45  Final   08/05/2021 >60  Final     Magnesium   Date Value Ref Range Status   11/17/2022 1.7 1.6 - 2.6 mg/dL Final   11/16/2022 1.8 1.6 - 2.6 mg/dL Final   11/15/2022 2.0 1.6 - 2.6 mg/dL Final     Phosphorus   Date Value Ref Range Status   11/17/2022 1.9 (L) 2.5 - 4.5 mg/dL Final   11/16/2022 2.5 2.5 - 4.5 mg/dL Final   11/15/2022 2.7 2.5 - 4.5 mg/dL Final     Recent Labs     11/15/22  0655   HCO3 20.9*   BE -3.3*   O2SAT 99.6*       RADIOLOGY:  Fluoroscopy modified barium swallow with video   Final Result   Swallowing mechanism grossly within normal limits without evidence of   aspiration. Please see separate speech pathology report for full discussion of findings   and recommendations. XR ABDOMEN (KUB) (SINGLE AP VIEW)   Final Result   No active process. See above. XR CHEST PORTABLE   Final Result   No acute process. CT CHEST W CONTRAST   Final Result   Mild ground-glass opacities in the lungs favoring atelectasis. There is an   age-indeterminate 8 mm ground-glass pulmonary nodule in the left lower lobe. This could be related to bronchiolitis or atelectasis, however see below for   follow-up recommendations. Prominent secretions within the left mainstem bronchus extending towards the   lower lobe segments. Otherwise no acute findings. RECOMMENDATIONS:   Recommend a non-contrast Chest CT at 3-6 months to confirm persistence. If   unchanged and the solid component remains < 6 mm, an annual non-contrast   Chest CT should be performed for 5 years. For persistent suspicious   (lobulated, spiculated, or containing cystic areas or solid component) part   solid nodules: If the solid component is 6-8 mm on follow-up, recommend   biopsy or resection; if the solid component is > 8 mm on follow-up, recommend   PET/CT, biopsy or resection. These guidelines do not apply to immunocompromised patients and patients with   cancer. Follow up in patients with significant comorbidities as clinically   warranted. For lung cancer screening, adhere to Lung-RADS guidelines. Reference: Radiology. 2017; 284(1):228-43. Pathology: 1.3 cm incidental right thyroid nodule. No follow-up imaging is recommended. Reference: J Am Fatuma Radiol. 2015 Feb;12(2): 143-50         XR CHEST PORTABLE   Final Result   No acute process. CT HEAD WO CONTRAST   Final Result   1. No acute intracranial abnormality. 2. Chronic ischemic changes. 3. Suspicious for normal pressure hydrocephalus. XR CHEST PORTABLE    (Results Pending)           PROBLEM LIST:  Principal Problem:    COVID-19  Active Problems:    2019 novel coronavirus disease (COVID-19)    Palliative care encounter    Palliative care by specialist  Resolved Problems:    * No resolved hospital problems.  *      IMPRESSION:  Aspiration with hypoxemia  Right hemispheric stroke  Dysphagia  Positive test for coronavirus COVID-19    PLAN:  Wean O2 as able  Chest x-ray in a.m. Increase aerosol treatments to every 4 hours around-the-clock to help patient with the stroke to clear secretions.         Electronically signed by Sallie Martins MD on 11/17/2022 at 3:08 PM

## 2022-11-18 ENCOUNTER — APPOINTMENT (OUTPATIENT)
Dept: GENERAL RADIOLOGY | Age: 72
DRG: 871 | End: 2022-11-18
Payer: MEDICARE

## 2022-11-18 VITALS
SYSTOLIC BLOOD PRESSURE: 121 MMHG | HEART RATE: 90 BPM | TEMPERATURE: 96.7 F | BODY MASS INDEX: 20.38 KG/M2 | HEIGHT: 63 IN | RESPIRATION RATE: 20 BRPM | WEIGHT: 115 LBS | DIASTOLIC BLOOD PRESSURE: 56 MMHG | OXYGEN SATURATION: 95 %

## 2022-11-18 LAB
METER GLUCOSE: 162 MG/DL (ref 74–99)
METER GLUCOSE: 175 MG/DL (ref 74–99)
ORGANISM: ABNORMAL
URINE CULTURE, ROUTINE: ABNORMAL

## 2022-11-18 PROCEDURE — 6360000002 HC RX W HCPCS: Performed by: SPECIALIST

## 2022-11-18 PROCEDURE — 6370000000 HC RX 637 (ALT 250 FOR IP): Performed by: INTERNAL MEDICINE

## 2022-11-18 PROCEDURE — 6360000002 HC RX W HCPCS: Performed by: INTERNAL MEDICINE

## 2022-11-18 PROCEDURE — 71045 X-RAY EXAM CHEST 1 VIEW: CPT

## 2022-11-18 PROCEDURE — 97530 THERAPEUTIC ACTIVITIES: CPT | Performed by: OCCUPATIONAL THERAPIST

## 2022-11-18 PROCEDURE — 2580000003 HC RX 258: Performed by: INTERNAL MEDICINE

## 2022-11-18 PROCEDURE — 97535 SELF CARE MNGMENT TRAINING: CPT | Performed by: OCCUPATIONAL THERAPIST

## 2022-11-18 PROCEDURE — 99232 SBSQ HOSP IP/OBS MODERATE 35: CPT | Performed by: INTERNAL MEDICINE

## 2022-11-18 PROCEDURE — 82962 GLUCOSE BLOOD TEST: CPT

## 2022-11-18 PROCEDURE — 2500000003 HC RX 250 WO HCPCS: Performed by: STUDENT IN AN ORGANIZED HEALTH CARE EDUCATION/TRAINING PROGRAM

## 2022-11-18 PROCEDURE — 2580000003 HC RX 258: Performed by: NURSE PRACTITIONER

## 2022-11-18 PROCEDURE — 6360000002 HC RX W HCPCS: Performed by: NURSE PRACTITIONER

## 2022-11-18 PROCEDURE — 6370000000 HC RX 637 (ALT 250 FOR IP): Performed by: NURSE PRACTITIONER

## 2022-11-18 PROCEDURE — 2580000003 HC RX 258: Performed by: SPECIALIST

## 2022-11-18 PROCEDURE — 2500000003 HC RX 250 WO HCPCS: Performed by: NURSE PRACTITIONER

## 2022-11-18 RX ORDER — BENZONATATE 200 MG/1
200 CAPSULE ORAL 3 TIMES DAILY PRN
Qty: 30 CAPSULE | Refills: 0 | Status: SHIPPED | OUTPATIENT
Start: 2022-11-18 | End: 2022-11-28

## 2022-11-18 RX ORDER — SULFAMETHOXAZOLE AND TRIMETHOPRIM 800; 160 MG/1; MG/1
1 TABLET ORAL DAILY
Status: DISCONTINUED | OUTPATIENT
Start: 2022-11-18 | End: 2022-11-18 | Stop reason: HOSPADM

## 2022-11-18 RX ORDER — AMOXICILLIN 500 MG/1
500 CAPSULE ORAL EVERY 12 HOURS SCHEDULED
Status: DISCONTINUED | OUTPATIENT
Start: 2022-11-18 | End: 2022-11-18 | Stop reason: HOSPADM

## 2022-11-18 RX ADMIN — CLOPIDOGREL BISULFATE 75 MG: 75 TABLET ORAL at 11:24

## 2022-11-18 RX ADMIN — ASPIRIN 81 MG 81 MG: 81 TABLET ORAL at 11:25

## 2022-11-18 RX ADMIN — Medication 2000 UNITS: at 11:24

## 2022-11-18 RX ADMIN — OXYCODONE HYDROCHLORIDE AND ACETAMINOPHEN 1000 MG: 500 TABLET ORAL at 11:25

## 2022-11-18 RX ADMIN — ZINC SULFATE 220 MG (50 MG) CAPSULE 50 MG: CAPSULE at 11:25

## 2022-11-18 RX ADMIN — Medication 10 ML: at 08:54

## 2022-11-18 RX ADMIN — METOPROLOL SUCCINATE 25 MG: 25 TABLET, EXTENDED RELEASE ORAL at 11:35

## 2022-11-18 RX ADMIN — Medication: at 12:18

## 2022-11-18 RX ADMIN — MEROPENEM 1000 MG: 1 INJECTION, POWDER, FOR SOLUTION INTRAVENOUS at 08:49

## 2022-11-18 RX ADMIN — Medication 100 UNITS: at 08:39

## 2022-11-18 RX ADMIN — MEMANTINE HYDROCHLORIDE 5 MG: 5 TABLET ORAL at 11:24

## 2022-11-18 RX ADMIN — ENOXAPARIN SODIUM 30 MG: 100 INJECTION SUBCUTANEOUS at 08:49

## 2022-11-18 RX ADMIN — SODIUM CHLORIDE, PRESERVATIVE FREE 10 ML: 5 INJECTION INTRAVENOUS at 08:39

## 2022-11-18 RX ADMIN — METRONIDAZOLE 500 MG: 500 INJECTION, SOLUTION INTRAVENOUS at 06:07

## 2022-11-18 ASSESSMENT — PAIN SCALES - PAIN ASSESSMENT IN ADVANCED DEMENTIA (PAINAD)
BREATHING: 0
TOTALSCORE: 0
FACIALEXPRESSION: 0
CONSOLABILITY: 0
BODYLANGUAGE: 0
NEGVOCALIZATION: 0

## 2022-11-18 NOTE — PROGRESS NOTES
CHIEF COMPLAINT: Elevated troponin/NSTEMI/COVID 19/Change of MS    HISTORY OF PRESENT ILLNESS: Patient is a 67 y.o. female seen at the request of Deborah Gutierrez DO and not followed by cardiology. Patient presented with change of MS. Evaluation revealed patient to be COVID 19 positive, but also noted to have elevated troponin.      Past Medical History:   Diagnosis Date    Arthritis     right hand    Cancer ([address] 1985    uterus and cervix had radiation and implant    Diabetes mellitus ([address]     Hyperlipidemia     Hypertension     Radiation cystitis     Urinary incontinence     complication due to uterine cancer    Urinary retention     caused by complications from uterine cancer       Patient Active Problem List   Diagnosis    Sepsis secondary to UTI ([address]    Post-operative pain    H/O total hysterectomy    Essential hypertension    Type 2 diabetes mellitus with hyperglycemia, without long-term current use of insulin (HCC)    Other hyperlipidemia    Sacral wound    Sacral wound, initial encounter    Acute cystitis with hematuria    Severe protein-calorie malnutrition ([address]    Acute CVA (cerebrovascular accident) ([address]    2019 novel coronavirus disease (COVID-19)    COVID-19    Palliative care encounter    Palliative care by specialist       Allergies   Allergen Reactions    Tetracyclines & Related        Current Facility-Administered Medications   Medication Dose Route Frequency Provider Last Rate Last Admin    lidocaine 1 % injection 5 mL  5 mL IntraDERmal Once ELIO Telles CNP        sodium chloride flush 0.9 % injection 5-40 mL  5-40 mL IntraVENous 2 times per day ELIO Telles - CNP   10 mL at 11/18/22 0839    sodium chloride flush 0.9 % injection 5-40 mL  5-40 mL IntraVENous PRN ELIO Telles CNP        0.9 % sodium chloride infusion   IntraVENous PRN ELIO Telles CNP        heparin flush 100 UNIT/ML injection 100 Units  1 mL IntraVENous 2 times per day Dillon Orellana APRN - CNP   100 Units at 11/18/22 0839    heparin flush 100 UNIT/ML injection 100 Units  1 mL IntraCATHeter PRN ELIO Aj CNP        vancomycin (VANCOCIN) 1,250 mg in dextrose 5 % 250 mL IVPB  1,250 mg IntraVENous Q24H Titus Kumart, DO        ipratropium-albuterol (DUONEB) nebulizer solution 1 ampule  1 ampule Inhalation Q4H Anushka Eastman MD        meropenem (MERREM) 1,000 mg in sodium chloride 0.9 % 100 mL IVPB ([id number])  1,000 mg IntraVENous Q8H Alexa Mccurdy MD 33.3 mL/hr at 11/18/22 0849 1,000 mg at 11/18/22 0849    metronidazole (FLAGYL) 500 mg in 0.9% NaCl 100 mL IVPB premix  500 mg IntraVENous Q8H Sondra Yost MD   Stopped at 11/18/22 0707    acetylcysteine (MUCOMYST) 10 % solution 400 mg  4 mL Inhalation BID Titus Kumart, DO        budesonide (PULMICORT) nebulizer suspension 500 mcg  500 mcg Nebulization BID Titus Dart, DO        insulin lispro (HUMALOG) injection vial 0-4 Units  0-4 Units SubCUTAneous TID WC Yumiko Mcgee DO   2 Units at 11/17/22 1200    insulin lispro (HUMALOG) injection vial 0-4 Units  0-4 Units SubCUTAneous Nightly Ismail U [place], DO        magnesium sulfate 1000 mg in dextrose 5% 100 mL IVPB  1,000 mg IntraVENous PRN Kalani Math, APRN - CNP        sodium phosphate 9 mmol in sodium chloride 0.9 % 250 mL IVPB  9 mmol IntraVENous PRN Kalani Math, APRN - CNP        Or    sodium phosphate 15 mmol in sodium chloride 0.9 % 250 mL IVPB  15 mmol IntraVENous PRN Kalani Math, APRN - CNP        potassium chloride (KLOR-CON M) extended release tablet 40 mEq  40 mEq Oral PRN Kalani Math, APRN - CNP        Or    potassium bicarb-citric acid (EFFER-K) effervescent tablet 40 mEq  40 mEq Oral PRN Kalani Math, APRN - CNP        Or    potassium chloride 10 mEq/100 mL IVPB (Peripheral Line)  10 mEq IntraVENous PRN Kalani Math, APRN - CNP        benzonatate (TESSALON) capsule 200 mg  200 mg Oral TID PRN Kalani Math, APRN - CNP        ascorbic acid (VITAMIN C) tablet 1,000 mg  1,000 mg Oral Daily Ashli Pu, APRN - CNP   1,000 mg at 11/17/22 7250    Vitamin D (CHOLECALCIFEROL) tablet 2,000 Units  2,000 Units Oral Daily Aslhi Pu, APRN - CNP   2,000 Units at 11/17/22 0802    zinc sulfate (ZINCATE) capsule 50 mg  50 mg Oral Daily Ashli Pu, APRN - CNP   50 mg at 11/17/22 0801    metoprolol succinate (TOPROL XL) extended release tablet 25 mg  25 mg Oral Daily Ashli Pu, APRN - CNP   25 mg at 11/17/22 0801    potassium chloride 40 mEq, sodium bicarbonate 50 mEq in sodium chloride 0.45 % 1,000 mL infusion   IntraVENous Continuous Ashli Pu, APRN - CNP 75 mL/hr at 11/17/22 1837 New Bag at 11/17/22 1837    atorvastatin (LIPITOR) tablet 80 mg  80 mg Oral Nightly Gaby Matamoros DO   80 mg at 11/16/22 2016    glucose chewable tablet 16 g  4 tablet Oral PRN Gaby Matamoros, DO        dextrose bolus 10% 125 mL  125 mL IntraVENous PRN Gaby Matamoros, DO        Or    dextrose bolus 10% 250 mL  250 mL IntraVENous PRN Gaby Matamoros, DO        glucagon (rDNA) injection 1 mg  1 mg SubCUTAneous PRN Gaby Matamoros, DO        dextrose 10 % infusion   IntraVENous Continuous PRN Gaby Matamoros, DO        sodium chloride flush 0.9 % injection 5-40 mL  5-40 mL IntraVENous 2 times per day Gaby Matamoros DO   10 mL at 11/18/22 0854    sodium chloride flush 0.9 % injection 5-40 mL  5-40 mL IntraVENous PRN Gaby Matamoros, DO        0.9 % sodium chloride infusion   IntraVENous PRN Gaby Matamoros, DO        polyethylene glycol (GLYCOLAX) packet 17 g  17 g Oral Daily PRN Gaby Matamoros, DO        acetaminophen (TYLENOL) tablet 650 mg  650 mg Oral Q6H PRN Gaby Matamoros DO        Or    acetaminophen (TYLENOL) suppository 650 mg  650 mg Rectal Q6H PRN Gaby Matamoros, DO        aspirin chewable tablet 81 mg  81 mg Oral Daily Gaby Matamoros, DO   81 mg at 11/17/22 0802    clopidogrel (PLAVIX) tablet 75 mg  75 mg Oral Daily Gaby Matamoros DO   75 mg at 11/17/22 0802    donepezil (ARICEPT) tablet 10 mg  10 mg Oral Nightly Heidy Patel, DO   10 mg at 22    memantine (NAMENDA) tablet 5 mg  5 mg Oral BID Heidy Patel, DO   5 mg at 22 08    enoxaparin Sodium (LOVENOX) injection 30 mg  30 mg SubCUTAneous BID Heidy Patel, DO   30 mg at 22       Social History     Socioeconomic History    Marital status:      Spouse name: Not on file    Number of children: Not on file    Years of education: Not on file    Highest education level: Not on file   Occupational History    Not on file   Tobacco Use    Smoking status: Former     Packs/day: 1.00     Years: 44.00     Pack years: 44.00     Types: Cigarettes     Start date: 1965     Quit date: 2009     Years since quittin.9    Smokeless tobacco: Never   Vaping Use    Vaping Use: Never used   Substance and Sexual Activity    Alcohol use: Yes     Comment: seldom    Drug use: No    Sexual activity: Not on file   Other Topics Concern    Not on file   Social History Narrative    Not on file     Social Determinants of Health     Financial Resource Strain: Not on file   Food Insecurity: Not on file   Transportation Needs: Not on file   Physical Activity: Not on file   Stress: Not on file   Social Connections: Not on file   Intimate Partner Violence: Not on file   Housing Stability: Not on file       Family History   Problem Relation Age of Onset    Heart Disease Mother     No Known Problems Father     Other Sister     Other Brother     Other Other      Review of Systems:   Unable to elicit. Physical Exam   BP (!) 121/56   Pulse 90   Temp (!) 96.7 °F (35.9 °C) (Axillary)   Resp 20   Ht 5' 3\" (1.6 m)   Wt 115 lb (52.2 kg)   LMP  (LMP Unknown)   SpO2 95%   BMI 20.37 kg/m²     COVID 19.      CBC:   Lab Results   Component Value Date/Time    WBC 12.6 2022 06:40 AM    RBC 3.59 2022 06:40 AM    HGB 10.0 2022 06:40 AM    HCT 32.5 2022 06:40 AM    MCV 90.5 2022 06:40 AM MCH 27.9 11/17/2022 06:40 AM    MCHC 30.8 11/17/2022 06:40 AM    RDW 14.1 11/17/2022 06:40 AM     11/17/2022 06:40 AM    MPV 10.4 11/17/2022 06:40 AM     BMP:   Lab Results   Component Value Date/Time     11/17/2022 06:40 AM    K 4.0 11/17/2022 06:40 AM    K 4.6 10/16/2022 01:40 PM     11/17/2022 06:40 AM    CO2 25 11/17/2022 06:40 AM    BUN 31 11/17/2022 06:40 AM    LABALBU 2.6 11/17/2022 06:40 AM    CREATININE 0.7 11/17/2022 06:40 AM    CALCIUM 8.6 11/17/2022 06:40 AM    GFRAA >60 10/17/2022 05:17 AM    LABGLOM >60 11/17/2022 06:40 AM     Magnesium:    Lab Results   Component Value Date/Time    MG 1.7 11/17/2022 06:40 AM     Cardiac Enzymes:   Lab Results   Component Value Date    CKTOTAL 199 (H) 11/16/2022    CKTOTAL 167 10/16/2022    TROPHS 63 (H) 11/14/2022    TROPHS 66 (H) 11/13/2022    TROPHS 33 (H) 10/16/2022      PT/INR:    Lab Results   Component Value Date/Time    PROTIME 14.4 11/16/2022 05:25 AM    INR 1.3 11/16/2022 05:25 AM     TSH:    Lab Results   Component Value Date/Time    TSH 0.144 10/17/2022 05:17 AM     Rhythm Strip: normal sinus rhythm. EKG:  nonspecific ST and T waves changes, sinus tachycardia, iRBBB. Echo Summary 10/19/2022:   Left ventricular size is grossly normal.   Hyperdynamic left ventricular systolic function. Ejection fraction is visually estimated at 70%. The left atrium is mildly dilated. Physiologic and/or trace mitral regurgitation is present. No evidence of mitral valve stenosis. Mitral annular calcification is present. Physiologic and/or trace tricuspid regurgitation. Regular rhythm.     ASSESSMENT AND PLAN:  Patient Active Problem List   Diagnosis    Sepsis secondary to UTI ([address]    Post-operative pain    H/O total hysterectomy    Essential hypertension    Type 2 diabetes mellitus with hyperglycemia, without long-term current use of insulin ([address]    Other hyperlipidemia    Sacral wound    Sacral wound, initial encounter    Acute cystitis with hematuria    Severe protein-calorie malnutrition [facility]    Acute CVA (cerebrovascular accident) ([facility]    2019 novel coronavirus disease (COVID-19)    COVID-19    Palliative care encounter    Palliative care by specialist     1. Elevated troponin/NSTEMI:    Chart/labs/EKG/recent echo reviewed. Medically manage for now. ASA/plavix/statin/BB. 2. COVID 19: Per primary service. 3. HTN: Observe. 4. DM: Per primary service. 5. Lipids: Statin. 6. Hx of CVA: ASA/plavix/statin. Olegario Laws D.O.   Cardiologist  Cardiology, [id number]

## 2022-11-18 NOTE — DISCHARGE SUMMARY
Internal Medicine Progress Note     [facility]     Renae Perry. Marysol Hudson., CAPRICEOLONDON Bingham D.O., CAPRICEOLONDON Bower D.O. Melvina Lane, MSN, APRN, NP-C  Gilbert Bailey. Rich, MSN, APRN-CNP       Internal Medicine  Discharge Summary    NAME: Neal Pike  :  1950  MRN:  [id number]  PCP:Ravi Alejandre DO  ADMITTED: 2022      DISCHARGED: 22    ADMITTING PHYSICIAN: Renae Murphy DO    CONSULTANT(S):   IP CONSULT TO PALLIATIVE CARE  IP CONSULT TO PHARMACY  IP CONSULT TO CARDIOLOGY  IP CONSULT TO PHARMACY  IP CONSULT TO SOCIAL WORK  IP CONSULT TO CRITICAL CARE  IP CONSULT TO PHARMACY  IP CONSULT TO INFECTIOUS DISEASES  IP CONSULT TO PHARMACY  IP CONSULT TO HOSPICE     ADMITTING DIAGNOSIS:   Somnolence [R40.0]  Sepsis without acute organ dysfunction, due to unspecified organism ([facility] [address] [A41.9]  2019 novel coronavirus disease (COVID-19) [U07.1]  COVID-19 virus infection [U07.1]  COVID-19 [U07.1]     DISCHARGE DIAGNOSES:   Transition to entire comfort measures and discharge home with hospice  Acute COVID-19 infection with failure to thrive symptomatology   Acute metabolic encephalopathy with moderate to severe underlying dementia  Recent right thalamic lacunar stroke with small nonhemorrhagic penumbra  Sepsis (POA with AMS, fever and HR) with urinary tract infection and COVID-19 infection  Recent CT findings of 1.5 x 2.9 cm rounded opacity in the right lung base-follow-up imaging to evaluate for resolution and underlying mass presence   Suspected aspiration pneumonia with chemical pneumonitis versus infection. Poorly controlled non-insulin-dependent diabetes mellitus type 2  Poorly controlled essential hypertension  History of uterine and cervical cancer with prior radiation    BRIEF HISTORY OF PRESENT ILLNESS:   Patient is a 76-year-old female who presented to the ED due to altered mental status.   At baseline patient is alert and oriented x1. However. Today around 5 PM patient did become even more confused and essentially nonverbal.  Patient is awake and alert on exam however he does not follow commands and does not verbalize. According to nursing staff now [facility] yesterday patient appeared more lethargic. It was found that her  had tried to feed her something however apparently she did not swallow it properly. Patient was suctioned and following that patient's mentation did improve. oral     LABS[de-identified]  Lab Results   Component Value Date    WBC 12.6 (H) 11/17/2022    HGB 10.0 (L) 11/17/2022    HCT 32.5 (L) 11/17/2022     11/17/2022     (H) 11/17/2022    K 4.0 11/17/2022     (H) 11/17/2022    CREATININE 0.7 11/17/2022    BUN 31 (H) 11/17/2022    CO2 25 11/17/2022    GLUCOSE 189 (H) 11/17/2022    ALT 30 11/17/2022    AST 27 11/17/2022    INR 1.3 11/16/2022     Lab Results   Component Value Date    INR 1.3 11/16/2022    INR 1.2 11/14/2022    PROTIME 14.4 (H) 11/16/2022    PROTIME 13.6 (H) 11/14/2022      Lab Results   Component Value Date    TSH 0.144 (L) 10/17/2022     Lab Results   Component Value Date    TRIG 144 10/17/2022    TRIG 93 08/03/2021    TRIG 109 03/05/2021     Lab Results   Component Value Date    HDL 31 10/17/2022    HDL 42 08/03/2021    HDL 49 03/05/2021     Lab Results   Component Value Date    LDLCALC 139 (H) 10/17/2022    LDLCALC 53 08/03/2021    LDLCALC 63 03/05/2021     Lab Results   Component Value Date    LABA1C 8.2 (H) 10/17/2022       IMAGING:  XR ABDOMEN (KUB) (SINGLE AP VIEW)    Result Date: 11/15/2022  EXAMINATION: ONE SUPINE XRAY VIEW(S) OF THE ABDOMEN 11/15/2022 12:09 pm COMPARISON: None.  HISTORY: ORDERING SYSTEM PROVIDED HISTORY: Abdominal bloating and distention with concern for bowel obstruction PORTABLE TECHNOLOGIST PROVIDED HISTORY: Reason for exam:->Abdominal bloating and distention with concern for bowel obstruction PORTABLE FINDINGS: No free air, bowel wall pneumatosis or dilated bowel. No abnormal abdominopelvic calcifications aside from scant phleboliths. No abnormal accumulation of fecal material within the lower GI tract. A bladder catheter is present. Incidental note is made of a mild lumbar levoscoliosis. No active process. See above. CT HEAD WO CONTRAST    Result Date: 11/13/2022  EXAMINATION: CT OF THE HEAD WITHOUT CONTRAST  11/13/2022 7:21 pm TECHNIQUE: CT of the head was performed without the administration of intravenous contrast. Automated exposure control, iterative reconstruction, and/or weight based adjustment of the mA/kV was utilized to reduce the radiation dose to as low as reasonably achievable. COMPARISON: 10-22 HISTORY: ORDERING SYSTEM PROVIDED HISTORY: Evaluate intracranial abnormality TECHNOLOGIST PROVIDED HISTORY: Has a \"code stroke\" or \"stroke alert\" been called? ->No Reason for exam:->Evaluate intracranial abnormality Decision Support Exception - unselect if not a suspected or confirmed emergency medical condition->Emergency Medical Condition (MA) FINDINGS: BRAIN/VENTRICLES: There is no acute intracranial hemorrhage, mass effect or midline shift. No abnormal extra-axial fluid collection. The gray-white differentiation is maintained without evidence of an acute infarct. Ventricular system is distended. There is moderate to severe chronic small vessel ischemic white matter disease. There are remote basal ganglia lacunar infarcts, recent at the thalamus is smaller size. Ventricular system distension again noted suggesting normal pressure hydrocephalus ORBITS: The visualized portion of the orbits demonstrate no acute abnormality. SINUSES: This be noise sinus leftward there is mild dependent opacification may represent small amount of fluid on image 19 SOFT TISSUES/SKULL:  No acute abnormality of the visualized skull or soft tissues. 1. No acute intracranial abnormality. 2. Chronic ischemic changes.  3. Suspicious for normal pressure hydrocephalus. CT CHEST W CONTRAST    Result Date: 11/14/2022  EXAMINATION: CT OF THE CHEST WITH CONTRAST 11/14/2022 11:28 am TECHNIQUE: CT of the chest was performed with the administration of intravenous contrast. Multiplanar reformatted images are provided for review. Automated exposure control, iterative reconstruction, and/or weight based adjustment of the mA/kV was utilized to reduce the radiation dose to as low as reasonably achievable. COMPARISON: Chest x-ray from November 13, 2022 HISTORY: ORDERING SYSTEM PROVIDED HISTORY: Recent mass identified with need for follow-up imaging, further characterization. Current COVID-19 infection with need to evaluate for underlying occult pneumonia TECHNOLOGIST PROVIDED HISTORY: Reason for exam:->Recent mass identified with need for follow-up imaging, further characterization. Current COVID-19 infection with need to evaluate for underlying occult pneumonia FINDINGS: Mediastinum: The heart is not enlarged. No pericardial effusion. Normal caliber thoracic aorta. 13 mm inferior right thyroid nodule. Additional smaller nodules or cysts in the right thyroid. No lymphadenopathy identified. Lungs/pleura: Mild right basilar atelectasis. Subtle interstitial lung markings. Pulmonary nodule which is sub solid in the left lower lobe measures 8 mm. No pleural effusion, pneumothorax or focal consolidation otherwise. Trachea is clear. Secretions in the left mainstem bronchus extending towards the left lower lobe segments. Upper Abdomen: No acute findings within the visualized upper abdomen. Soft Tissues/Bones: No acute findings within the soft tissues or osseous structures. Mild ground-glass opacities in the lungs favoring atelectasis. There is an age-indeterminate 8 mm ground-glass pulmonary nodule in the left lower lobe. This could be related to bronchiolitis or atelectasis, however see below for follow-up recommendations.  Prominent secretions within the left mainstem bronchus extending towards the lower lobe segments. Otherwise no acute findings. RECOMMENDATIONS: Recommend a non-contrast Chest CT at 3-6 months to confirm persistence. If unchanged and the solid component remains < 6 mm, an annual non-contrast Chest CT should be performed for 5 years. For persistent suspicious (lobulated, spiculated, or containing cystic areas or solid component) part solid nodules: If the solid component is 6-8 mm on follow-up, recommend biopsy or resection; if the solid component is > 8 mm on follow-up, recommend PET/CT, biopsy or resection. These guidelines do not apply to immunocompromised patients and patients with cancer. Follow up in patients with significant comorbidities as clinically warranted. For lung cancer screening, adhere to Lung-RADS guidelines. Reference: Radiology. 2017; 284(1):228-43. Pathology: 1.3 cm incidental right thyroid nodule. No follow-up imaging is recommended. Reference: J Am Fatuma Radiol. 2015 Feb;12(2): 143-50     XR CHEST PORTABLE    Result Date: 11/18/2022  EXAMINATION: ONE XRAY VIEW OF THE CHEST 11/18/2022 6:28 am COMPARISON: 11/15/2022 HISTORY: ORDERING SYSTEM PROVIDED HISTORY: SOB TECHNOLOGIST PROVIDED HISTORY: Reason for exam:->SOB FINDINGS: Heart size is normal.  There are no infiltrates or effusions. Normal chest     XR CHEST PORTABLE    Result Date: 11/15/2022  EXAMINATION: ONE XRAY VIEW OF THE CHEST 11/15/2022 6:10 am COMPARISON: 13 November 2022 HISTORY: ORDERING SYSTEM PROVIDED HISTORY: aspiration TECHNOLOGIST PROVIDED HISTORY: Reason for exam:->aspiration FINDINGS: The lungs are without acute focal process. There is no effusion or pneumothorax. The cardiomediastinal silhouette is without acute process. The osseous structures are without acute process. No acute process.      XR CHEST PORTABLE    Result Date: 11/13/2022  EXAMINATION: ONE XRAY VIEW OF THE CHEST 11/13/2022 7:32 pm COMPARISON: 10/16/2022 HISTORY: ORDERING SYSTEM PROVIDED HISTORY: AMS TECHNOLOGIST PROVIDED HISTORY: Reason for exam:->AMS FINDINGS: The lungs are adequately inflated. There is no focal consolidation, pleural effusion or pneumothorax. The heart and mediastinal contours are within normal limits. No acute bony findings are identified. No acute process. Fluoroscopy modified barium swallow with video    Result Date: 11/16/2022  EXAMINATION: MODIFIED BARIUM SWALLOW WAS PERFORMED IN CONJUNCTION WITH SPEECH PATHOLOGY SERVICES TECHNIQUE: Under fluoroscopic evaluation cineradiography/videoradiography recordings were performed in conjunction with the speech-language pathologist (SLP). Various liquid, solid and/or semi-solid barium preparations were used to assess swallowing function. FLUOROSCOPY DOSE AND TYPE OR TIME AND EXPOSURES: 1.5 minutes of fluoro time with exposure of 2.28 mGy. Greater than 100 images obtain. COMPARISON: None HISTORY: ORDERING SYSTEM PROVIDED HISTORY: dysphagia/aspiration TECHNOLOGIST PROVIDED HISTORY: Reason for exam:->dysphagia/aspiration FINDINGS: Oral phase of swallowing was grossly within normal limits. There is minimal penetration without evidence of aspiration. Swallowing mechanism grossly within normal limits without evidence of aspiration. Please see separate speech pathology report for full discussion of findings and recommendations. US CAROTID ARTERY BILATERAL    Result Date: 10/19/2022  EXAMINATION: ULTRASOUND EVALUATION OF THE CAROTID ARTERIES 10/19/2022 TECHNIQUE: Duplex ultrasound using B-mode/gray scaled imaging, Doppler spectral analysis and color flow Doppler was obtained of the carotid arteries. COMPARISON: None.  HISTORY: ORDERING SYSTEM PROVIDED HISTORY: Stroke TECHNOLOGIST PROVIDED HISTORY: Reason for exam:->Stroke What reading provider will be dictating this exam?->CRC FINDINGS: RIGHT: The right common carotid artery demonstrates peak systolic velocities of  396 and 58.1 cm/sec in the proximal and distal segments respectively. The right internal carotid artery demonstrates the systolic velocities of 75.4, 65.3 and 61.8  cm/sec in the proximal, mid and distal segments respectively. The external carotid artery is patent. The vertebral artery demonstrates normal antegrade flow. Mild to moderate calcified carotid bulb atherosclerotic plaque. ICA/CCA ratio of  1.1 LEFT: The left common carotid artery demonstrates peak systolic velocities of 45.7 and 61.3 cm/sec in the proximal and distal segments respectively. The left internal carotid artery demonstrates the systolic velocities of 90.2, 104.2 and 118.6 cm/sec in the proximal, mid and distal segments respectively. The external carotid artery is patent. The vertebral artery demonstrates normal antegrade flow. Mild to moderate calcified carotid bulb atherosclerotic plaque. ICA/CCA ratio of 1.9     The right internal carotid artery demonstrates 0-50% stenosis. The left internal carotid artery demonstrates 0-50% stenosis. Bilateral vertebral arteries are patent with flow in the normal direction. Mild-to-moderate bilateral calcified carotid bulb atherosclerotic plaque. HOSPITAL COURSE:   Nba Higginbotham spent an extended period time hospitalized and this will serve as a brief synopsis. She was readmitted to the hospital on November 13 secondary to altered mental status with known underlying advanced dementia. She was noted to be more lethargic at the nursing home as per initial ER report. Multiple issues were identified. From an infectious standpoint, 3 separate bacterial infections were noted in the patient's urine. From a pulmonary standpoint infectious work-up was undertaken given concern for pneumonia and she was identified as having COVID-19 infection. She did not display any overt respiratory symptomatology or GI complaints aside from poor intake did seem to have failure to thrive symptomatology with COVID positivity.   She met criteria for sepsis upon initial admission based upon vital signs and altered mentation. She improved somewhat with treatment administered however continues to do very poorly overall. Multiple subspecialist following consultation and maximal medical therapy were provided. Family opted against further aggressive measures. Goals of care discussion was held and the patient/family ultimately opted for hospice. Hospice was involved in the patient's case and the patient family ultimately decided to return home under the care of hospice. All routine medications and treatments have been discontinued including antibiotics. Dementia medications are continued for comfort. Cough medications are provided for comfort. Chronic morbidities labs vital signs were monitored and addressed accordingly to the hospitalization. Patient is stable at this point with transfer home under the care of hospice. BRIEF PHYSICAL EXAMINATION AND LABORATORIES ON DAY OF DISCHARGE:  VITALS:  BP (!) 121/56   Pulse 90   Temp (!) 96.7 °F (35.9 °C) (Axillary)   Resp 20   Ht 5' 3\" (1.6 m)   Wt 115 lb (52.2 kg)   LMP  (LMP Unknown)   SpO2 95%   BMI 20.37 kg/m²     HEENT:  PERRLA. EOMI. Sclera clear. Buccal mucosa moist.    Neck:  Supple. Trachea midline. No thyromegaly. No JVD. No bruits. Heart:  Rhythm regular, rate controlled. Systolic murmur    Lungs:  Symmetrical.  Slightly shallow and mildly tachypneic pattern without labor. Abdomen: Soft. Non-tender. Non-distended. Bowel sounds positive. No organomegaly or masses. No pain on palpation    Extremities:  Peripheral pulses present. No significant pitting peripheral edema. No ulcers. Neurologic: Somewhat lethargic, improved mentation from admission. Generalized weakness without focal deficit. Changes related to prior recent CVA. Skin:  No petechia. No hemorrhage. No wounds. DISPOSITION:  The patient's condition is stable.   At this time the patient is without objective evidence of an acute process requiring continuing hospitalization or inpatient management. They are stable for discharge with outpatient follow-up. I have spoken with the patient and discussed the results of the current hospitalization, in addition to providing specific details for the plan of care and counseling regarding the diagnosis and prognosis. The plan has been discussed in detail and they are aware of the specific conditions for emergent return, as well as the importance of follow-up. Their questions are answered at this time and they are agreeable with the plan for discharge to home with Hospice    DISCHARGE MEDICATIONS:   Current Discharge Medication List             Details   benzonatate (TESSALON) 200 MG capsule Take 1 capsule by mouth 3 times daily as needed for Cough  Qty: 30 capsule, Refills: 0                Details   menthol-zinc oxide (CALMOSEPTINE) 0.44-20.6 % OINT ointment Apply topically 2 times daily as needed (irritation) Max 30 ml per day. Apply to buttocks sacral area  . Refills: 4      polyethylene glycol (GLYCOLAX) 17 g packet Take 17 g by mouth daily as needed for Constipation  Qty: 527 g, Refills: 1      memantine (NAMENDA) 5 MG tablet Take 1 tablet by mouth 2 times daily  Qty: 60 tablet, Refills: 3      donepezil (ARICEPT) 10 MG tablet Take 1 tablet by mouth nightly  Qty: 30 tablet, Refills: 3      diclofenac sodium (VOLTAREN) 1 % GEL Apply 4 g topically 4 times daily  Qty: 100 g, Refills: 3             FOLLOW UP/INSTRUCTIONS:  This patient is instructed to follow-up with her primary care physician. Patient is instructed to follow-up with the consults listed above as directed by them. she is instructed to resume home medications and take new medications as indicated in the list above. If the patient has a recurrence of symptoms, she is instructed to go to the ED.     Preparing for this patient's discharge, including paperwork, orders, instructions, and meeting with patient did require > 40 minutes.     Delia Cool, ELIO - [facility]     11/18/2022  1:07 PM

## 2022-11-18 NOTE — PROGRESS NOTES
Pharmacy Consultation Note  (Antibiotic Dosing and Monitoring)    Vancomycin has been discontinued; pharmacy will sign-off. Please reconsult if needed.     Thank you,  Christina Hernandez PharmD, BCPS 11/18/2022 2:05 PM

## 2022-11-18 NOTE — CARE COORDINATION
Tried to contact son Renita Chemonyjames to explain IMM letter, no answer no voicemail.  Electronically signed by Divya Bustamante on 11/18/2022 at 8:29 AM

## 2022-11-18 NOTE — PROGRESS NOTES
Pulmonary/Critical Care Progress Note    We are following patient for hypoxemia (improved), possible aspiration following a stroke, COVID-19 (positive test without evidence of pneumonia), urinary tract infection growing Enterobacter, E. coli, Enterococcus, with sputum positive for staph    SUBJECTIVE:  Patient's mental status is roughly the same although she is slightly less alert today than she was yesterday. Antibiotics are being supervised by the infectious disease service to treat staff aureus, Enterococcus, E. coli, and Enterobacter. The patient is taking her aerosol beta agonist chest x-ray does not show any infiltrates. There is no fever present treatments and inhaled steroids without difficulty.     MEDICATIONS:   amoxicillin  500 mg Oral 2 times per day    sulfamethoxazole-trimethoprim  1 tablet Oral Daily    lidocaine  5 mL IntraDERmal Once    sodium chloride flush  5-40 mL IntraVENous 2 times per day    heparin flush  1 mL IntraVENous 2 times per day    ipratropium-albuterol  1 ampule Inhalation Q4H    acetylcysteine  4 mL Inhalation BID    budesonide  500 mcg Nebulization BID    insulin lispro  0-4 Units SubCUTAneous TID WC    insulin lispro  0-4 Units SubCUTAneous Nightly    vitamin C  1,000 mg Oral Daily    Vitamin D  2,000 Units Oral Daily    zinc sulfate  50 mg Oral Daily    metoprolol succinate  25 mg Oral Daily    atorvastatin  80 mg Oral Nightly    sodium chloride flush  5-40 mL IntraVENous 2 times per day    aspirin  81 mg Oral Daily    clopidogrel  75 mg Oral Daily    donepezil  10 mg Oral Nightly    memantine  5 mg Oral BID    enoxaparin  30 mg SubCUTAneous BID      sodium chloride      IV infusion builder 75 mL/hr at 11/18/22 1218    dextrose      sodium chloride       sodium chloride flush, sodium chloride, heparin flush, magnesium sulfate, sodium phosphate IVPB **OR** sodium phosphate IVPB, potassium chloride **OR** potassium alternative oral replacement **OR** potassium chloride, benzonatate, glucose, dextrose bolus **OR** dextrose bolus, glucagon (rDNA), dextrose, sodium chloride flush, sodium chloride, polyethylene glycol, acetaminophen **OR** acetaminophen      REVIEW OF SYSTEMS:    Patient is aphasic and cannot answer questions regarding review of systems  OBJECTIVE:  Vitals:    11/18/22 0845   BP: (!) 121/56   Pulse: 90   Resp: 20   Temp: (!) 96.7 °F (35.9 °C)   SpO2: 95%        O2 Flow Rate (L/min): 2 L/min  O2 Device: None (Room air)    PHYSICAL EXAM:  Constitutional: No fever, chills, diaphoresis  Skin: Skin rash, no skin breakdown  HEENT: Unremarkable  Neck: No JVD, lymphadenopathy, thyromegaly  Cardiovascular: S1, S2 regular.   No S3 or rubs present  Respiratory: Clear to auscultation bilaterally  Gastrointestinal: Soft, flat, nontender  Genitourinary: No CVA tenderness  Extremities: No clubbing, cyanosis, or edema  Neurological: Patient is aphasic with right hemiparesis  Psychological: Difficult to evaluate    LABS:  WBC   Date Value Ref Range Status   11/17/2022 12.6 (H) 4.5 - 11.5 E9/L Final   11/16/2022 9.2 4.5 - 11.5 E9/L Final   11/15/2022 15.4 (H) 4.5 - 11.5 E9/L Final     Hemoglobin   Date Value Ref Range Status   11/17/2022 10.0 (L) 11.5 - 15.5 g/dL Final   11/16/2022 10.9 (L) 11.5 - 15.5 g/dL Final   11/15/2022 12.5 11.5 - 15.5 g/dL Final     Hematocrit   Date Value Ref Range Status   11/17/2022 32.5 (L) 34.0 - 48.0 % Final   11/16/2022 35.9 34.0 - 48.0 % Final   11/15/2022 41.2 34.0 - 48.0 % Final     MCV   Date Value Ref Range Status   11/17/2022 90.5 80.0 - 99.9 fL Final   11/16/2022 91.3 80.0 - 99.9 fL Final   11/15/2022 92.4 80.0 - 99.9 fL Final     Platelets   Date Value Ref Range Status   11/17/2022 179 130 - 450 E9/L Final   11/16/2022 225 130 - 450 E9/L Final   11/15/2022 339 130 - 450 E9/L Final     Sodium   Date Value Ref Range Status   11/17/2022 149 (H) 132 - 146 mmol/L Final   11/16/2022 152 (H) 132 - 146 mmol/L Final   11/15/2022 146 132 - 146 mmol/L Final Potassium   Date Value Ref Range Status   11/17/2022 4.0 3.5 - 5.0 mmol/L Final   11/16/2022 3.9 3.5 - 5.0 mmol/L Final   11/15/2022 5.2 (H) 3.5 - 5.0 mmol/L Final     Potassium reflex Magnesium   Date Value Ref Range Status   10/16/2022 4.6 3.5 - 5.0 mmol/L Final   08/02/2021 4.9 3.5 - 5.0 mmol/L Final     Chloride   Date Value Ref Range Status   11/17/2022 115 (H) 98 - 107 mmol/L Final   11/16/2022 116 (H) 98 - 107 mmol/L Final   11/15/2022 112 (H) 98 - 107 mmol/L Final     CO2   Date Value Ref Range Status   11/17/2022 25 22 - 29 mmol/L Final   11/16/2022 24 22 - 29 mmol/L Final   11/15/2022 17 (L) 22 - 29 mmol/L Final     BUN   Date Value Ref Range Status   11/17/2022 31 (H) 6 - 23 mg/dL Final   11/16/2022 39 (H) 6 - 23 mg/dL Final   11/15/2022 41 (H) 6 - 23 mg/dL Final     Creatinine   Date Value Ref Range Status   11/17/2022 0.7 0.5 - 1.0 mg/dL Final   11/16/2022 0.8 0.5 - 1.0 mg/dL Final   11/15/2022 0.7 0.5 - 1.0 mg/dL Final     Glucose   Date Value Ref Range Status   11/17/2022 189 (H) 74 - 99 mg/dL Final   11/16/2022 150 (H) 74 - 99 mg/dL Final   11/15/2022 189 (H) 74 - 99 mg/dL Final     Calcium   Date Value Ref Range Status   11/17/2022 8.6 8.6 - 10.2 mg/dL Final   11/16/2022 9.1 8.6 - 10.2 mg/dL Final   11/15/2022 9.4 8.6 - 10.2 mg/dL Final     Total Protein   Date Value Ref Range Status   11/17/2022 5.4 (L) 6.4 - 8.3 g/dL Final   11/16/2022 6.3 (L) 6.4 - 8.3 g/dL Final   11/15/2022 7.0 6.4 - 8.3 g/dL Final     Albumin   Date Value Ref Range Status   11/17/2022 2.6 (L) 3.5 - 5.2 g/dL Final   11/16/2022 3.2 (L) 3.5 - 5.2 g/dL Final   11/15/2022 3.3 (L) 3.5 - 5.2 g/dL Final     Total Bilirubin   Date Value Ref Range Status   11/17/2022 0.5 0.0 - 1.2 mg/dL Final   11/16/2022 0.5 0.0 - 1.2 mg/dL Final   11/15/2022 0.6 0.0 - 1.2 mg/dL Final     Alkaline Phosphatase   Date Value Ref Range Status   11/17/2022 114 (H) 35 - 104 U/L Final   11/16/2022 144 (H) 35 - 104 U/L Final   11/15/2022 156 (H) 35 - 104 U/L Final     AST   Date Value Ref Range Status   11/17/2022 27 0 - 31 U/L Final   11/16/2022 33 (H) 0 - 31 U/L Final   11/15/2022 48 (H) 0 - 31 U/L Final     ALT   Date Value Ref Range Status   11/17/2022 30 0 - 32 U/L Final   11/16/2022 38 (H) 0 - 32 U/L Final   11/15/2022 50 (H) 0 - 32 U/L Final     Est, Glom Filt Rate   Date Value Ref Range Status   11/17/2022 >60 >=60 mL/min/1.73 Final     Comment:     Pediatric calculator link  [email or web address]. [email or web address]_[email or web address]  Effective Oct 3, 2022  These results are not intended for use in patients  <25years of age. eGFR results are calculated without  a race factor using the 2021 CKD-EPI equation. Careful  clinical correlation is recommended, particularly when  comparing to results calculated using previous equations. The CKD-EPI equation is less accurate in patients with  extremes of muscle mass, extra-renal metabolism of  creatinine, excessive creatinine ingestion, or following  therapy that affects renal tubular secretion. 11/16/2022 >60 >=60 mL/min/1.73 Final     Comment:     Pediatric calculator link  [email or web address][email or web address]. [email or web address]/gfr_calculatorped  Effective Oct 3, 2022  These results are not intended for use in patients  <25years of age. eGFR results are calculated without  a race factor using the 2021 CKD-EPI equation. Careful  clinical correlation is recommended, particularly when  comparing to results calculated using previous equations. The CKD-EPI equation is less accurate in patients with  extremes of muscle mass, extra-renal metabolism of  creatinine, excessive creatinine ingestion, or following  therapy that affects renal tubular secretion. 11/15/2022 >60 >=60 mL/min/1.73 Final     Comment:     Pediatric calculator link  [email or web address][email or web address]. [email or web address]/gfr_calculatorped  Effective Oct 3, 2022  These results are not intended for use in patients  <25years of age.   eGFR results are calculated without  a race factor using the 2021 CKD-EPI equation. Careful  clinical correlation is recommended, particularly when  comparing to results calculated using previous equations. The CKD-EPI equation is less accurate in patients with  extremes of muscle mass, extra-renal metabolism of  creatinine, excessive creatinine ingestion, or following  therapy that affects renal tubular secretion. GFR    Date Value Ref Range Status   10/17/2022 >60  Final   10/16/2022 45  Final   08/05/2021 >60  Final     Magnesium   Date Value Ref Range Status   11/17/2022 1.7 1.6 - 2.6 mg/dL Final   11/16/2022 1.8 1.6 - 2.6 mg/dL Final   11/15/2022 2.0 1.6 - 2.6 mg/dL Final     Phosphorus   Date Value Ref Range Status   11/17/2022 1.9 (L) 2.5 - 4.5 mg/dL Final   11/16/2022 2.5 2.5 - 4.5 mg/dL Final   11/15/2022 2.7 2.5 - 4.5 mg/dL Final     No results for input(s): PH, PO2, PCO2, HCO3, BE, O2SAT in the last 72 hours. RADIOLOGY:  XR CHEST PORTABLE   Final Result   Normal chest         Fluoroscopy modified barium swallow with video   Final Result   Swallowing mechanism grossly within normal limits without evidence of   aspiration. Please see separate speech pathology report for full discussion of findings   and recommendations. XR ABDOMEN (KUB) (SINGLE AP VIEW)   Final Result   No active process. See above. XR CHEST PORTABLE   Final Result   No acute process. CT CHEST W CONTRAST   Final Result   Mild ground-glass opacities in the lungs favoring atelectasis. There is an   age-indeterminate 8 mm ground-glass pulmonary nodule in the left lower lobe. This could be related to bronchiolitis or atelectasis, however see below for   follow-up recommendations. Prominent secretions within the left mainstem bronchus extending towards the   lower lobe segments. Otherwise no acute findings. RECOMMENDATIONS:   Recommend a non-contrast Chest CT at 3-6 months to confirm persistence.  If unchanged and the solid component remains < 6 mm, an annual non-contrast   Chest CT should be performed for 5 years. For persistent suspicious   (lobulated, spiculated, or containing cystic areas or solid component) part   solid nodules: If the solid component is 6-8 mm on follow-up, recommend   biopsy or resection; if the solid component is > 8 mm on follow-up, recommend   PET/CT, biopsy or resection. These guidelines do not apply to immunocompromised patients and patients with   cancer. Follow up in patients with significant comorbidities as clinically   warranted. For lung cancer screening, adhere to Lung-RADS guidelines. Reference: Radiology. 2017; 284(1):228-43. Pathology: 1.3 cm incidental right thyroid nodule. No follow-up imaging is recommended. Reference: J Am Fatuma Radiol. 2015 Feb;12(2): 143-50         XR CHEST PORTABLE   Final Result   No acute process. CT HEAD WO CONTRAST   Final Result   1. No acute intracranial abnormality. 2. Chronic ischemic changes. 3. Suspicious for normal pressure hydrocephalus. PROBLEM LIST:  Principal Problem:    COVID-19  Active Problems:    2019 novel coronavirus disease (COVID-19)    Palliative care encounter    Palliative care by specialist  Resolved Problems:    * No resolved hospital problems. *      IMPRESSION:  Aspiration with hypoxemia, now resolved  History of stroke  Dysphagia  Positive test for COVID-19 without infiltrate    PLAN:  Continue aerosol treatments  Observe for any signs of aspiration  We will sign off at this point since the patient is on room air and has no significant pulmonary problem. However, do not hesitate to call or notify us should any respiratory or critical care problems arise.       Electronically signed by Carolynn Ricketts MD on 11/18/2022 at 4:50 PM

## 2022-11-18 NOTE — PROGRESS NOTES
Occupational Therapy  OCCUPATIONAL THERAPY Treatment note  [address] [facility]  [facility]    Date:2022                                                   Patient Name: Kaushik Hollingsworth     MRN: [id number]     : 1950     Room: [address] [place] [address] Bruna, OTR/L; [id number]        Referring Provider and Orders/Date:    OT eval and treat  Start:  22,   End:  22,   ONE TIME,   Standing Count:  1 Occurrences,   R         Etta Bence, DO         Diagnosis:   1. Sepsis without acute organ dysfunction, due to unspecified organism ([address]    2. COVID-19 virus infection    3.  Somnolence          Surgery: none      Pertinent Medical History: CVA       Past Medical History        Past Medical History:   Diagnosis Date    Arthritis       right hand    Cancer ([facility]      uterus and cervix had radiation and implant    Diabetes mellitus ([address]      Hyperlipidemia      Hypertension      Radiation cystitis      Urinary incontinence       complication due to uterine cancer    Urinary retention       caused by complications from uterine cancer             Past Surgical History         Past Surgical History:   Procedure Laterality Date    CYSTOSCOPY N/A 2019     CYSTOSCOPY BOTOX  INJECTION 200 UNITS performed by José Miguel Russell DO at [facility]    HYSTERECTOMY (CERVIX STATUS UNKNOWN)       GA CYSTOURETHROSCOPY INJ CHEMODENERVATION BLADDER N/A 2018     CYSTOSCOPY  BOTOX INJECTION (200 UNITS) performed by Joo Roldan MD at [facility] OR           Precautions: Fall Risk, covid+ with droplet iso    Recommended placement: subacute vs home with hospice services and family care    Assessment of current deficits     [x] Functional mobility           [x]ADLs           [x] Strength                  [x]Cognition     [x] Functional transfers         [x] IADLs         [x] Safety Awareness [x]Endurance     [x] Fine Coordination                        [x] Balance      [] Vision/perception   []Sensation       [x]Gross Motor Coordination            [] ROM           [] Delirium                   [] Motor Control      OT PLAN OF CARE   OT POC based on physician orders, patient diagnosis and results of clinical assessment     Frequency/Duration 1-3 days/wk for 2 weeks PRN   Specific OT Treatment Interventions to include:   * Instruction/training on adapted ADL techniques and AE recommendations to increase functional independence within precautions       * Training on energy conservation strategies, correct breathing pattern and techniques to improve independence/tolerance for self-care routine  * Functional transfer/mobility training/DME recommendations for increased independence, safety, and fall prevention  * Patient/Family education to increase follow through with safety techniques and functional independence  * Recommendation of environmental modifications for increased safety with functional transfers/mobility and ADLs  * Cognitive retraining/development of therapeutic activities to improve problem solving, judgement, memory, and attention for increased safety/participation in ADL/IADL tasks  * Therapeutic exercise to improve motor endurance, ROM, and functional strength for ADLs/functional transfers  * Therapeutic activities to facilitate/challenge dynamic balance, stand tolerance for increased safety and independence with ADLs  * Therapeutic activities to facilitate gross/fine motor skills for increased independence with ADLs  * Neuro-muscular re-education: facilitation of righting/equilibrium reactions, midline orientation, scapular stability/mobility, normalization of muscle tone, and facilitation of volitional active controled movement  * Positioning to improve skin integrity, interaction with environment and functional independence      Recommended Adaptive Equipment/DME:  TBD       Home Living: with family : spouse of 50+ years (Dementia); single family home, 2 story, bedroom and bathroom on 2nd floor, one flight to second floor, tub shower. Niece also assists often. Equipment owned: wheeled walker, wheelchair, shower chair     Prior Level of Function: needs assistance with ADLs , needs assistance with IADLs; ambulated wheeled walker or uses the wheelchair, pt states her spouse assists with everything including transfers. Driving: no   Occupation: retired   Enjoys: watching TV     Pain Level: no pain reported, but pt yelling out when fearful or saying \"ouch\" during times not associated with pain    Cognition: A&O: 0/4 with decline due to pt not knowing her own name and reporting [place]; Follows 1 step directions for decline; Very limited with vocalizations              Memory:  poor+              Sequencing:  poor+ with delayed processing and motor planning              Problem solving:  poor              Judgement/safety:  poor    AM-PAC Daily Activity Inpatient   How much help for putting on and taking off regular lower body clothing?: Total  How much help for Bathing?: Total  How much help for Toileting?: Total  How much help for putting on and taking off regular upper body clothing?: Total  How much help for taking care of personal grooming?: Total  How much help for eating meals?: Total  AM-PAC Inpatient Daily Activity Raw Score: 6  AM-PAC Inpatient ADL T-Scale Score : 17.07  ADL Inpatient CMS 0-100% Score: 100  ADL Inpatient CMS G-Code Modifier : CN    Functional Assessment:      Initial Eval Status  Date: 11/16/2022   Treatment Status  Date:11/18/22 STGs = LTGs  Time frame: 10-14 days   Feeding Dependent with NPO on eval  Dep with modified diet and confusion. Pt not attempting to chew, drink or swallow when assisted to mouth. Max-dep assist with positioning.   Mod A if appropriate/safe   Grooming Maximal Assist with oral care, hand wash, face wash and hair comb  Dep with oral care and poor understanding of task. Used toothette for safety. Dep with face wash and hair brush with poor tolerance to hair being brushed Moderate Assist    UB Dressing Dependent with gown management from supine Dep from supine level. Poor tolerance of arms being moved  Maximal Assist    LB Dressing Dependent with socks from supine level  Dep with socks from supine Maximal Assist    Bathing Dependent from supine level with pt reporting pain with tactile sensation of wash cloth. NT-completed prior to arrive with A x 2 of nursing  Maximal Assist    Toileting Dependent with pure wick from supine A x 2 following incontinence from supine. Pt with poor awareness Maximal Assist    Bed Mobility  Rolling in bed with maxA-dep and poor tolerance to positioning interventions with wedges and sitting upright in bed  Max A x 2 for rolling during ADLs and max A-dep with positioning for ADLs and use of wedges for pressure relief Supine to sit: Moderate Assist   Sit to supine: Moderate Assist    Functional Transfers  NT due to pt overall debility, decreased activity tolerance, balance deficits, safety and fall risk. NT due to pt overall debility, decreased activity tolerance, balance deficits, safety and fall risk. Maximal Assist    Functional Mobility  Not appropriate at time of eval. To re-assess at at later time if appropriate. Not appropriate at time of treatment. Not appropriate at time of eval. To re-assess at at later time if appropriate. Balance Sitting:     Static:  NT    Dynamic:NT  Standing: NT Sitting:     Static:  NT    Dynamic:NT  Standing: NT  Sitting:     Static:  poor+    Dynamic:poor  Standing: poor   Activity Tolerance Vitals with activity:5L with 92-97% dependent on positioning from supine to upright sitting. Poor+ tolerance to session overall Room air 95% and HR 92; poor tolerance to therapy and ADLs.    Increase sitting tolerance for >15min with stable vital signs for carry over into toileting, functional tranfers and indep in ADLs   Visual/  Perceptual Glasses: Present; WFL     Reports change in vision since admission: No      NA      Hand Dominance  [x] Right   [] Left     AROM (PROM) Strength Additional Info:  Goal:   RUE  Pt resistive overall with PROM, but demonstrated AROM around 45% function. Limited with shoulder planes 3-/5 Fair-  and fair- FMC/dexterity noted during ADL tasks  Opposition [] Intact [x] Impaired  Finger to nose [] Intact [x] Impaired 4-/5MMT generally for carry over into self care, functional transfers and functional mobility with AD. LUE Pt resistive overall with PROM, but demonstrated AROM around 45% function. Limited with shoulder planes 3-/5 Fair-  and poor+ FMC/dexterity noted during ADL tasks  Opposition [] Intact [x] Impaired  Finger to nose [] Intact [x] Impaired 4-/5MMT generally for carry over into self care, functional transfers and functional mobility with AD. Hearing: WFL   Sensation:  No c/o numbness or tingling   Tone: WFL   Edema: none                  Comments: Upon arrival patient supine and working with aides. Pt required dep A for most UB ADLs and dep A LB ADLs tasks. The biggest barriers reflect that of functional transfers, functional mobility, UB/LB ADLs, cognition, activity tolerance, balance, safety and strengthening. At end of session, patient side lying with call light and phone within reach, all lines and tubes intact. Overall patient demonstrated decreased independence and safety during completion of ADL/functional transfer/mobility tasks. Nursing updated on pt position and status following OT treatment. Pt would benefit from continued skilled OT to increase safety and independence with completion of ADL/IADL tasks for functional independence and quality of life. Treatment: OT treatment provided this date includes:  Instruction, education and training on safe facilitation and adapted techniques for completion of ADLs.  These include proper positioning/alignment to improve interaction with environment and overall function and on adapted techniques/work simplification for completion of ADLs. Education provided on hand/feet placement with bed rails and body mechanics for pressure relief and use of pillows between legs to prevent further contracture. Cues for energy conservation and safety for [facility], including modifications and DME. Extended time to complete all tasks, including skilled monitoring of patient's response during treatment session and vital signs. Prior to and at the end of session, environmental modifications / line management completed for patients safety and efficiency of treatment session. See above for further details. Pt has made fair progress towards set goals    OT 1-3x/week for 5-7 days during hospitalization      Treatment Time also includes thorough review of current medical information, gathering information on past medical history/social history and prior level of function, informal observation of tasks, assessment of data and education on plan of care and goals.      Treatment Time In: 1263    Treatment Time Out: 1050     Treatment Charges: Mins Units   ADL/Home Mgt     77532 14 1   Thera Activities     03264 11 1   Ther Ex                 [id number]       Manual Therapy    97642       Neuro Re-ed         92006       Orthotic manage/training                               [id number]       Non Billable Time       Total Timed Treatment 25 2     Calderon Tian OTR/L; [id number]

## 2022-11-18 NOTE — PROGRESS NOTES
NAME: Prasanna Rodriguez  MR:  [id number]  :   1950  Admit Date:  2022      This is a face to face encounter with Prasanna Rodriguez 67 y.o. female on 22  Elements of this note, including Diagnosis,  Interval History, Past Medical/Surgical/Family/Social Histories, ROS, physical exam, and Assessment and Plan were copied and pasted from Previous. Updates have been made where noted and reflect current exam and medical decision making from the DOS of this encounter. CHIEF COMPLAINT     ID following for   Chief Complaint   Patient presents with    Altered Mental Status     Pt arrives via EMT with c/o worsening AMS. Pt has baseline of A &O x's 1 and per nursing home report she is now not responding to herself. Pt's LSN was at 1630 per report. HISTORY OF PRESENT ILLNESS     Prasanna Rodriguez is a 67 y.o. female who presents with   Chief Complaint   Patient presents with    Altered Mental Status     Pt arrives via EMT with c/o worsening AMS. Pt has baseline of A &O x's 1 and per nursing home report she is now not responding to herself. Pt's LSN was at 1630 per report. and has  has a past medical history of Arthritis, Cancer ([facility] [address] Diabetes mellitus ([address] Hyperlipidemia, Hypertension, Radiation cystitis, Urinary incontinence, and Urinary retention. Assessment & Plan   Somnolence [R40.0]  Sepsis without acute organ dysfunction, due to unspecified organism ([facility] [address] [A41.9]  2019 novel coronavirus disease (COVID-19) [U07.1]  COVID-19 virus infection [U07.1]  COVID-19 [U07.1] s/p TOCI now on RA  Uti   Cons bacteremia contaminant    MSSA colonized    Adjust atbx for d/c  swicth to po bactrim  and amoxicillin    Procal 0.36      Pt seen and examined  Working with pt  No family present    Patient is tolerating medications. No reported adverse drug reactions.     Microbiology:        Lab Results   Component Value Date/Time    [facility]  2022 07:46 PM     Gram stain performed from blood culture bottle media  Gram positive cocci in [facility]  11/13/2022 07:46 PM     This organism was isolated in one set. Susceptibility testing is not routinely done as this  organism frequently represents skin contamination. Additional testing can be ordered by calling the  Microbiology Department. BC 24 Hours no growth 11/13/2022 07:30 PM    BLOODCULT2 5 Days no growth 10/17/2022 09:24 AM    BLOODCULT2 5 Days no growth 08/02/2021 10:00 PM    BLOODCULT2 5 Days- no growth 05/04/2018 03:30 PM    ORG Escherichia coli 11/13/2022 11:26 PM    ORG Enterobacter cloacae complex 11/13/2022 11:26 PM    ORG Enterococcus faecium 11/13/2022 11:26 PM     WOUND/ABSCESS   Date Value Ref Range Status   08/02/2021   Final    Mixed liv isolated. Further workup and sensitivity testing  is not routinely indicated and will not be performed.   Mixed liv isolated includes:  Mixed Gram negative rods  Beta hemolytic Strep species (Group \"B\")       Smear, Respiratory   Date Value Ref Range Status   11/13/2022   Final    Rare Polymorphonuclear leukocytes  Epithelial cells not seen  Moderate Mixed Bacterial Morphotypes          CULTURE, RESPIRATORY   Date Value Ref Range Status   11/13/2022 Oral Pharyngeal Liv present (A)  Final   11/13/2022 Moderate growth  Final         Recent Labs     11/16/22  0525 11/17/22  0640   WBC 9.2 12.6*   RBC 3.93 3.59   HGB 10.9* 10.0*   HCT 35.9 32.5*   MCV 91.3 90.5   MCH 27.7 27.9   MCHC 30.4* 30.8*   RDW 14.3 14.1    179   MPV 10.2 10.4       Recent Labs     11/16/22  0525 11/17/22  0640   * 149*   K 3.9 4.0   * 115*   CO2 24 25   BUN 39* 31*   CREATININE 0.8 0.7   GLUCOSE 150* 189*   PROT 6.3* 5.4*   LABALBU 3.2* 2.6*   CALCIUM 9.1 8.6   BILITOT 0.5 0.5   ALKPHOS 144* 114*   AST 33* 27   ALT 38* 30       Lab Results   Component Value Date    CRP 6.7 (H) 11/16/2022    CRP 3.5 (H) 11/15/2022    CRP 6.2 (H) 11/14/2022     Lab Results   Component Value Date    SEDRATE 85 (H) 11/16/2022    SEDRATE 46 (H) 08/03/2021     Recent Labs     11/16/22  0525 11/17/22  0640   CRP 6.7*  --    PROCAL 0.36*  0.36*  --    FERRITIN 490 549   *  --    DDIMER 962 846   FIBRINOGEN 471*  --    INR 1.3  --    PROTIME 14.4*  --    AST 33* 27   ALT 38* 30       Lab Results   Component Value Date/Time    CHOL 199 10/17/2022 05:17 AM    TRIG 144 10/17/2022 05:17 AM    HDL 31 10/17/2022 05:17 AM    LDLCALC 139 10/17/2022 05:17 AM    LABVLDL 29 10/17/2022 05:17 AM     Lab Results   Component Value Date/Time    VITD25 48 11/14/2022 06:05 AM       REVIEW OF SYSTEMS     As stated above in the chief complaint, otherwise negative.   CURRENT MEDICATIONS     Current Facility-Administered Medications:     lidocaine 1 % injection 5 mL, 5 mL, IntraDERmal, Once, Kaur Fields, APRN - CNP    sodium chloride flush 0.9 % injection 5-40 mL, 5-40 mL, IntraVENous, 2 times per day, Ardean Fields, APRN - CNP, 10 mL at 11/18/22 0839    sodium chloride flush 0.9 % injection 5-40 mL, 5-40 mL, IntraVENous, PRN, Kuar Fields, APRN - CNP    0.9 % sodium chloride infusion, , IntraVENous, PRN, Ardean Fields, APRN - CNP    heparin flush 100 UNIT/ML injection 100 Units, 1 mL, IntraVENous, 2 times per day, Kaur Fields, APRN - CNP, 100 Units at 11/18/22 0839    heparin flush 100 UNIT/ML injection 100 Units, 1 mL, IntraCATHeter, PRN, Ardean Fields, APRN - CNP    vancomycin (VANCOCIN) 1,250 mg in dextrose 5 % 250 mL IVPB, 1,250 mg, IntraVENous, Q24H, Latosha LaiDO    ipratropium-albuterol (DUONEB) nebulizer solution 1 ampule, 1 ampule, Inhalation, Q4H, Jessica Leon MD    meropenem (MERREM) 1,000 mg in sodium chloride 0.9 % 100 mL IVPB ([id number]), 1,000 mg, IntraVENous, Q8H, Estrada Mims MD, Stopped at 11/18/22 1212    metronidazole (FLAGYL) 500 mg in 0.9% NaCl 100 mL IVPB premix, 500 mg, IntraVENous, Q8H, Jermaine Victor MD, Stopped at 11/18/22 0707    acetylcysteine (MUCOMYST) 10 % solution 400 mg, 4 mL, Inhalation, BID, Latosha Lai, DO    budesonide (PULMICORT) nebulizer suspension 500 mcg, 500 mcg, Nebulization, BID, Fernando Theresa, DO    insulin lispro (HUMALOG) injection vial 0-4 Units, 0-4 Units, SubCUTAneous, TID WC, Ismail U Berlin, DO, 2 Units at 11/17/22 1200    insulin lispro (HUMALOG) injection vial 0-4 Units, 0-4 Units, SubCUTAneous, Nightly, Ismail U [place], DO    magnesium sulfate 1000 mg in dextrose 5% 100 mL IVPB, 1,000 mg, IntraVENous, PRN, Xenia Jacobo, APRN - CNP    sodium phosphate 9 mmol in sodium chloride 0.9 % 250 mL IVPB, 9 mmol, IntraVENous, PRN **OR** sodium phosphate 15 mmol in sodium chloride 0.9 % 250 mL IVPB, 15 mmol, IntraVENous, PRN, Xenia Jacobo, APRN - CNP    potassium chloride (KLOR-CON M) extended release tablet 40 mEq, 40 mEq, Oral, PRN **OR** potassium bicarb-citric acid (EFFER-K) effervescent tablet 40 mEq, 40 mEq, Oral, PRN **OR** potassium chloride 10 mEq/100 mL IVPB (Peripheral Line), 10 mEq, IntraVENous, PRN, Xenia aJcobo, APRN - CNP    benzonatate (TESSALON) capsule 200 mg, 200 mg, Oral, TID PRN, Xenia Jacobo APRN - CNP    ascorbic acid (VITAMIN C) tablet 1,000 mg, 1,000 mg, Oral, Daily, Xenia Jacobo APRN - CNP, 1,000 mg at 11/18/22 1125    Vitamin D (CHOLECALCIFEROL) tablet 2,000 Units, 2,000 Units, Oral, Daily, Xenia Jacobo APRN - CNP, 2,000 Units at 11/18/22 1124    zinc sulfate (ZINCATE) capsule 50 mg, 50 mg, Oral, Daily, Xenia Jacobo APRISAIAH - CNP, 50 mg at 11/18/22 1125    metoprolol succinate (TOPROL XL) extended release tablet 25 mg, 25 mg, Oral, Daily, Xenia Jacobo APRN - CNP, 25 mg at 11/18/22 1135    potassium chloride 40 mEq, sodium bicarbonate 50 mEq in sodium chloride 0.45 % 1,000 mL infusion, , IntraVENous, Continuous, Lorry Graces, APRN - CNP, Last Rate: 75 mL/hr at 11/18/22 1218, New Bag at 11/18/22 1218    atorvastatin (LIPITOR) tablet 80 mg, 80 mg, Oral, Nightly, Sarath Sarina, DO, 80 mg at 11/16/22 2016    glucose chewable tablet 16 g, 4 tablet, Oral, PRN, Ismail U Berlin, DO    dextrose bolus 10% 125 mL, 125 mL, IntraVENous, PRN **OR** dextrose bolus 10% 250 mL, 250 mL, IntraVENous, PRN, Ismail U Berlin, DO    glucagon (rDNA) injection 1 mg, 1 mg, SubCUTAneous, PRN, Ismail U Berlin, DO    dextrose 10 % infusion, , IntraVENous, Continuous PRN, Ismail U Berlin, DO    sodium chloride flush 0.9 % injection 5-40 mL, 5-40 mL, IntraVENous, 2 times per day, [employer], DO, 10 mL at 11/18/22 0854    sodium chloride flush 0.9 % injection 5-40 mL, 5-40 mL, IntraVENous, PRN, [employer], DO    0.9 % sodium chloride infusion, , IntraVENous, PRN, Ismail U Belrin, DO    polyethylene glycol (GLYCOLAX) packet 17 g, 17 g, Oral, Daily PRN, [employer], DO    acetaminophen (TYLENOL) tablet 650 mg, 650 mg, Oral, Q6H PRN **OR** acetaminophen (TYLENOL) suppository 650 mg, 650 mg, Rectal, Q6H PRN, [employer], DO    aspirin chewable tablet 81 mg, 81 mg, Oral, Daily, [employer], DO, 81 mg at 11/18/22 1125    clopidogrel (PLAVIX) tablet 75 mg, 75 mg, Oral, Daily, Ismail U Berlin, DO, 75 mg at 11/18/22 1124    donepezil (ARICEPT) tablet 10 mg, 10 mg, Oral, Nightly, Ismail U Berlin, DO, 10 mg at 11/16/22 2016    memantine (NAMENDA) tablet 5 mg, 5 mg, Oral, BID, Ismail U Berlin, DO, 5 mg at 11/18/22 1124    enoxaparin Sodium (LOVENOX) injection 30 mg, 30 mg, SubCUTAneous, BID, Ismail U Berlin, DO, 30 mg at 11/18/22 0849  DIAGNOSTIC RESULTS   Radiology:  XR ABDOMEN (KUB) (SINGLE AP VIEW)    Result Date: 11/15/2022  EXAMINATION: ONE SUPINE XRAY VIEW(S) OF THE ABDOMEN 11/15/2022 12:09 pm COMPARISON: None. HISTORY: ORDERING SYSTEM PROVIDED HISTORY: Abdominal bloating and distention with concern for bowel obstruction PORTABLE TECHNOLOGIST PROVIDED HISTORY: Reason for exam:->Abdominal bloating and distention with concern for bowel obstruction PORTABLE FINDINGS: No free air, bowel wall pneumatosis or dilated bowel. No abnormal abdominopelvic calcifications aside from scant phleboliths.   No abnormal accumulation of fecal material within the lower GI tract. A bladder catheter is present. Incidental note is made of a mild lumbar levoscoliosis. No active process. See above. CT HEAD WO CONTRAST    Result Date: 11/13/2022  EXAMINATION: CT OF THE HEAD WITHOUT CONTRAST  11/13/2022 7:21 pm TECHNIQUE: CT of the head was performed without the administration of intravenous contrast. Automated exposure control, iterative reconstruction, and/or weight based adjustment of the mA/kV was utilized to reduce the radiation dose to as low as reasonably achievable. COMPARISON: 10-22 HISTORY: ORDERING SYSTEM PROVIDED HISTORY: Evaluate intracranial abnormality TECHNOLOGIST PROVIDED HISTORY: Has a \"code stroke\" or \"stroke alert\" been called? ->No Reason for exam:->Evaluate intracranial abnormality Decision Support Exception - unselect if not a suspected or confirmed emergency medical condition->Emergency Medical Condition (MA) FINDINGS: BRAIN/VENTRICLES: There is no acute intracranial hemorrhage, mass effect or midline shift. No abnormal extra-axial fluid collection. The gray-white differentiation is maintained without evidence of an acute infarct. Ventricular system is distended. There is moderate to severe chronic small vessel ischemic white matter disease. There are remote basal ganglia lacunar infarcts, recent at the thalamus is smaller size. Ventricular system distension again noted suggesting normal pressure hydrocephalus ORBITS: The visualized portion of the orbits demonstrate no acute abnormality. SINUSES: This be noise sinus leftward there is mild dependent opacification may represent small amount of fluid on image 19 SOFT TISSUES/SKULL:  No acute abnormality of the visualized skull or soft tissues. 1. No acute intracranial abnormality. 2. Chronic ischemic changes. 3. Suspicious for normal pressure hydrocephalus.      CT CHEST W CONTRAST    Result Date: 11/14/2022  EXAMINATION: CT OF THE CHEST WITH CONTRAST 11/14/2022 11:28 am TECHNIQUE: CT of the chest was performed with the administration of intravenous contrast. Multiplanar reformatted images are provided for review. Automated exposure control, iterative reconstruction, and/or weight based adjustment of the mA/kV was utilized to reduce the radiation dose to as low as reasonably achievable. COMPARISON: Chest x-ray from November 13, 2022 HISTORY: ORDERING SYSTEM PROVIDED HISTORY: Recent mass identified with need for follow-up imaging, further characterization. Current COVID-19 infection with need to evaluate for underlying occult pneumonia TECHNOLOGIST PROVIDED HISTORY: Reason for exam:->Recent mass identified with need for follow-up imaging, further characterization. Current COVID-19 infection with need to evaluate for underlying occult pneumonia FINDINGS: Mediastinum: The heart is not enlarged. No pericardial effusion. Normal caliber thoracic aorta. 13 mm inferior right thyroid nodule. Additional smaller nodules or cysts in the right thyroid. No lymphadenopathy identified. Lungs/pleura: Mild right basilar atelectasis. Subtle interstitial lung markings. Pulmonary nodule which is sub solid in the left lower lobe measures 8 mm. No pleural effusion, pneumothorax or focal consolidation otherwise. Trachea is clear. Secretions in the left mainstem bronchus extending towards the left lower lobe segments. Upper Abdomen: No acute findings within the visualized upper abdomen. Soft Tissues/Bones: No acute findings within the soft tissues or osseous structures. Mild ground-glass opacities in the lungs favoring atelectasis. There is an age-indeterminate 8 mm ground-glass pulmonary nodule in the left lower lobe. This could be related to bronchiolitis or atelectasis, however see below for follow-up recommendations. Prominent secretions within the left mainstem bronchus extending towards the lower lobe segments. Otherwise no acute findings.  RECOMMENDATIONS: Recommend a non-contrast Chest CT at 3-6 months to confirm persistence. If unchanged and the solid component remains < 6 mm, an annual non-contrast Chest CT should be performed for 5 years. For persistent suspicious (lobulated, spiculated, or containing cystic areas or solid component) part solid nodules: If the solid component is 6-8 mm on follow-up, recommend biopsy or resection; if the solid component is > 8 mm on follow-up, recommend PET/CT, biopsy or resection. These guidelines do not apply to immunocompromised patients and patients with cancer. Follow up in patients with significant comorbidities as clinically warranted. For lung cancer screening, adhere to Lung-RADS guidelines. Reference: Radiology. 2017; 284(1):228-43. Pathology: 1.3 cm incidental right thyroid nodule. No follow-up imaging is recommended. Reference: J Am Fatuma Radiol. 2015 Feb;12(2): 143-50     CT ABDOMEN PELVIS W IV CONTRAST Additional Contrast? Oral    Result Date: 10/18/2022  EXAMINATION: CT OF THE ABDOMEN AND PELVIS WITH CONTRAST 10/18/2022 12:46 pm TECHNIQUE: CT of the abdomen and pelvis was performed with the administration of intravenous contrast. Multiplanar reformatted images are provided for review. Automated exposure control, iterative reconstruction, and/or weight based adjustment of the mA/kV was utilized to reduce the radiation dose to as low as reasonably achievable. COMPARISON: None. HISTORY: ORDERING SYSTEM PROVIDED HISTORY: Known OBGYN cancer - assess progression TECHNOLOGIST PROVIDED HISTORY: Reason for exam:->Known OBGYN cancer - assess progression Additional Contrast?->Oral FINDINGS: Lower Chest: A rounded opacity which is pleural based is noted in the right lung base measuring 1.5 by 2.9 cm. No pneumothorax. No pleural effusion. No pericardial effusion. Organs: No abnormal enhancement of the liver and spleen. Portal vein is patent. Pancreas enhances as expected. No peripancreatic fluid or fat stranding is noted.   The gallbladder is normal size. The adrenal glands normal in appearance. The kidneys enhance as expected. There is been interval decrease in the degree of calyceal distension within the right kidney. The left-sided hydronephrosis has resolved. Atheromatous plaque is noted which has progressed within the aorta compared to the prior study. Reference image 48 of the axial images. The origin of the superior mesenteric artery, celiac artery and splenic artery are patent. GI/Bowel: The large and small bowel caliber is within normal limits. No abscess or free air is noted. Subcutaneous air within the left lower abdominal wall noted compatible with injection. Pelvis: Hyperdensities layering within the gallbladder compatible with multiple bladder stones, reference image 154. Stool is noted in the rectum. Slight thickening of the distal rectosigmoid colon is noted. No evidence for abscess or free air. No pathologically enlarged retroperitoneal lymphadenopathy. No inguinal lymphadenopathy noted. Curvature of the lumbar spine with concavity to the right centered at the L2 level is noted. Straightening of the natural lumbar lordosis on the lateral view is noted. No evidence for facet dislocation. The bones of the sacrum are in anatomic alignment. 1.  Rounded opacity in the right lung base measuring 1.5 x 2.9 cm. Follow-up to resolution is recommended. 2.  Progression of aortic atheromatous disease, as referenced above. The aorta remains patent. 3.  No evidence for intra-abdominal ascites. No abnormal enhancing masses are identified. 4.  There has been improvement of the left-sided hydronephrosis a partial improvement of the right-sided hydronephrosis. 5.  Bladder calculi.      XR CHEST PORTABLE    Result Date: 11/15/2022  EXAMINATION: ONE XRAY VIEW OF THE CHEST 11/15/2022 6:10 am COMPARISON: 13 November 2022 HISTORY: ORDERING SYSTEM PROVIDED HISTORY: aspiration TECHNOLOGIST PROVIDED HISTORY: Reason for exam:->aspiration FINDINGS: The lungs are without acute focal process. There is no effusion or pneumothorax. The cardiomediastinal silhouette is without acute process. The osseous structures are without acute process. No acute process. XR CHEST PORTABLE    Result Date: 11/13/2022  EXAMINATION: ONE XRAY VIEW OF THE CHEST 11/13/2022 7:32 pm COMPARISON: 10/16/2022 HISTORY: ORDERING SYSTEM PROVIDED HISTORY: AMS TECHNOLOGIST PROVIDED HISTORY: Reason for exam:->AMS FINDINGS: The lungs are adequately inflated. There is no focal consolidation, pleural effusion or pneumothorax. The heart and mediastinal contours are within normal limits. No acute bony findings are identified. No acute process. MRI BRAIN W WO CONTRAST    Result Date: 10/17/2022  EXAMINATION: MRI OF THE BRAIN WITHOUT AND WITH CONTRAST  10/17/2022 5:22 pm TECHNIQUE: Multiplanar multisequence MRI of the head/brain was performed without and with the administration of intravenous contrast. COMPARISON: CT head without contrast, CTA of the head and CT perfusion, 10/16/2022 HISTORY: ORDERING SYSTEM PROVIDED HISTORY: cva vs tia with AMS, history of gyn malignancy, rule out mass/mets TECHNOLOGIST PROVIDED HISTORY: Reason for exam:->cva vs tia with AMS, history of gyn malignancy, rule out mass/mets What is the sedation requirement?->None FINDINGS: INTRACRANIAL STRUCTURES/VENTRICLES:  1.5 x 0.9 cm focus of acute or early subacute infarction is seen in the right thalamus. No mass, mass effect, hemorrhage or midline shift is seen in the brain. No abnormal enhancement. Moderate cerebral volume loss and moderate chronic microvascular ischemic changes are noted. Ventricular dilatation appears somewhat out of proportion to the extent of cerebral atrophy. Clinical correlation for normal pressure hydrocephalus is recommended. ORBITS: The visualized portion of the orbits demonstrate no acute abnormality.  SINUSES: The visualized paranasal sinuses and mastoid air cells demonstrate no acute abnormality. BONES/SOFT TISSUES: The bone marrow signal intensity appears normal. The soft tissues demonstrate no acute abnormality. 1. 1.5 x 0.9 cm focus of acute or early subacute infarction in the right thalamus. 2. No mass, mass effect, hemorrhage or midline shift is seen in the brain. 3. Ventricular dilation concerning for normal pressure hydrocephalus. Clinical correlation is needed. RECOMMENDATIONS: Unavailable     Fluoroscopy modified barium swallow with video    Result Date: 11/16/2022  EXAMINATION: MODIFIED BARIUM SWALLOW WAS PERFORMED IN CONJUNCTION WITH SPEECH PATHOLOGY SERVICES TECHNIQUE: Under fluoroscopic evaluation cineradiography/videoradiography recordings were performed in conjunction with the speech-language pathologist (SLP). Various liquid, solid and/or semi-solid barium preparations were used to assess swallowing function. FLUOROSCOPY DOSE AND TYPE OR TIME AND EXPOSURES: 1.5 minutes of fluoro time with exposure of 2.28 mGy. Greater than 100 images obtain. COMPARISON: None HISTORY: ORDERING SYSTEM PROVIDED HISTORY: dysphagia/aspiration TECHNOLOGIST PROVIDED HISTORY: Reason for exam:->dysphagia/aspiration FINDINGS: Oral phase of swallowing was grossly within normal limits. There is minimal penetration without evidence of aspiration. Swallowing mechanism grossly within normal limits without evidence of aspiration. Please see separate speech pathology report for full discussion of findings and recommendations. US CAROTID ARTERY BILATERAL    Result Date: 10/19/2022  EXAMINATION: ULTRASOUND EVALUATION OF THE CAROTID ARTERIES 10/19/2022 TECHNIQUE: Duplex ultrasound using B-mode/gray scaled imaging, Doppler spectral analysis and color flow Doppler was obtained of the carotid arteries. COMPARISON: None.  HISTORY: ORDERING SYSTEM PROVIDED HISTORY: Stroke TECHNOLOGIST PROVIDED HISTORY: Reason for exam:->Stroke What reading provider will be dictating this exam?->CRC FINDINGS: RIGHT: The right common carotid artery demonstrates peak systolic velocities of  257 and 58.1 cm/sec in the proximal and distal segments respectively. The right internal carotid artery demonstrates the systolic velocities of 54.3, 65.3 and 61.8  cm/sec in the proximal, mid and distal segments respectively. The external carotid artery is patent. The vertebral artery demonstrates normal antegrade flow. Mild to moderate calcified carotid bulb atherosclerotic plaque. ICA/CCA ratio of  1.1 LEFT: The left common carotid artery demonstrates peak systolic velocities of 83.2 and 61.3 cm/sec in the proximal and distal segments respectively. The left internal carotid artery demonstrates the systolic velocities of 36.8, 104.2 and 118.6 cm/sec in the proximal, mid and distal segments respectively. The external carotid artery is patent. The vertebral artery demonstrates normal antegrade flow. Mild to moderate calcified carotid bulb atherosclerotic plaque. ICA/CCA ratio of 1.9     The right internal carotid artery demonstrates 0-50% stenosis. The left internal carotid artery demonstrates 0-50% stenosis. Bilateral vertebral arteries are patent with flow in the normal direction. Mild-to-moderate bilateral calcified carotid bulb atherosclerotic plaque. PHYSICAL EXAM     BP (!) 121/56   Pulse 90   Temp (!) 96.7 °F (35.9 °C) (Axillary)   Resp 20   Ht 5' 3\" (1.6 m)   Wt 115 lb (52.2 kg)   LMP  (LMP Unknown)   SpO2 95%   BMI 20.37 kg/m²   Temp  Av °F (36.7 °C)  Min: 96.7 °F (35.9 °C)  Max: 99.3 °F (37.4 °C)  CONSTITUTIONAL:  no apparent distress, and appears stated age  ENT:  Normocephalic, atraumatic,    LUNGS: dec  to auscultation bilaterally, no crackles or wheezing on RA  CARDIOVASCULAR:  Normal apical impulse, regular rate and rhythm, normal S1 and S2,  no murmur noted  ABDOMEN:  normal bowel sounds, soft, non-distended, non-tender  MUSCULOSKELETAL:  There is no redness, warmth, or swelling  BLE.   Atrophy   NEUROLOGIC:  Awake, SKIN:   no rashes       Peripheral Intravenous Line:  Peripheral IV 11/15/22 Left; Anterior Basilic (Active)   Site Assessment Clean, dry & intact 11/16/22 0800   Line Status Infusing;Blood return noted;Brisk blood return;Flushed;Normal saline locked 11/16/22 0800   Line Care Connections checked and tightened 11/16/22 0800   Phlebitis Assessment No symptoms 11/16/22 0800   Infiltration Assessment 0 11/16/22 0800   Alcohol Cap Used Yes 11/16/22 0800   Dressing Status New dressing applied 11/16/22 0800   Dressing Type Transparent 11/16/22 0800   Dressing Intervention Other (Comment) 11/16/22 0800     Imaging and labs were reviewed per medical records. POC was discussed with Juan Stephens. An opportunity to ask questions was given to the patient/FAMILY. Thank you for involving me in the care of Juan Stephens I will continue to follow. Please do not hesitate to call [phone] for any questions or concerns.     Electronically signed by Lexi Carrera MD on 11/18/2022 at 1:53 PM

## 2022-11-18 NOTE — PROGRESS NOTES
Pharmacy Consultation Note  (Antibiotic Dosing and Monitoring)    Initial consult date: 11/15/22  Consulting physician/provider: Eulogio  Drug: Vancomycin  Indication: aspiration pneumonia    Age/  Gender Height Weight IBW  Allergy Information   72 y.o./female 5' 3\" (160 cm) 115 lb (52.2 kg)     Ideal body weight: 52.4 kg (115 lb 8.3 oz)   Tetracyclines & related      Renal Function:  Recent Labs     11/16/22  0525 11/17/22  0640   BUN 39* 31*   CREATININE 0.8 0.7         Intake/Output Summary (Last 24 hours) at 11/18/2022 1356  Last data filed at 11/18/2022 1236  Gross per 24 hour   Intake 1963.05 ml   Output --   Net 1963.05 ml         Vancomycin Monitoring:  Trough:    Recent Labs     11/17/22  1050   VANCOTROUGH 18.1*       Random:    Recent Labs     11/16/22  0525   VANCORANDOM 11.6         No results for input(s): Jarrell Molina in the last 72 hours. Historical Cultures:  Organism   Date Value Ref Range Status   11/13/2022 Escherichia coli (A)  Final   11/13/2022 Enterobacter cloacae complex (A)  Final   11/13/2022 Enterococcus faecium (A)  Final     No results for input(s): BC in the last 72 hours. Recent vancomycin administrations                     vancomycin (VANCOCIN) 1,250 mg in dextrose 5 % 250 mL IVPB (mg) 1,250 mg New Bag 11/15/22 0945                          Assessment:  Patient is a 67 y.o. female who has been initiated on vancomycin  Estimated Creatinine Clearance: 59 mL/min (based on SCr of 0.7 mg/dL). Baseline SCr~0.9 mg/dL. To dose vancomycin, pharmacy will be utilizing [employer] calculation software for goal AUC/CESAR 400-600 mg/L-hr  11/15: SCr 0.7, COVID+, received tocilizumab  11/16: SCr 0.8, random level 11.6 @ 0525  11/17: SCr 0.7, random level 18.1 @ 1050, meropenem 1000mg Q8h(urine E.Coli)  UOP not measured. 11/18: no new lab/UOP data, discharge to home planned this afternoon per [facility] note.       Plan:   Vancomycin 1250 mg Q24h, AUC/CESAR~488 mg/L.h  Will check vancomycin levels when appropriate  Will continue to monitor renal function   Pharmacy to follow      Berkeley Kussmaul, [facility] 11/18/2022 1:56 PM

## 2022-11-18 NOTE — CARE COORDINATION
SOCIAL WORK / DISCHARGE PLANNING:   Sw spoke with Emmons du sac, [facility] rep multiple times today. She spoke with Caesar Rosales, niece who will be primary caregiver. Family is prepared for pt to return home today, dme will be delivered between 2-3pm. They are all prepared for pt to be enrolled on [facility]osp[facility]. This Sw spoke with Caesar Rosales as well via phone, notified of transport via [address] Prosper Mackey RN aware. Ambulance form completed. Donna received permission to outsource transport from [employer].                  Electronically signed by DALI Jeter on 11/18/2022 at 12:57 PM

## 2022-11-19 LAB — BLOOD CULTURE, ROUTINE: NORMAL

## 2022-11-19 NOTE — PROGRESS NOTES
Pt remains in facility at this time. Spoke with family member, Edwin Holland. Oxygen and other supplies/equipment set up at home; family is ready to receive pt. Called [facility] ambulance and was told there was a delay at a previous transport and that ambulance was en route to transport pt to home. Spoke again with Edwin Holland to relay this information. Tele monitor was D/C'd as well as peripheral IV in LAC and NADIA midline. No complications with removal. Incontinence care was provided, clean gown and linens applied. Awaiting  at this time. 2100:    [facility] arrived and transported pt off floor in stable condition via stretcher. Paperwork given to EMS. Family notified of departure.

## 2022-11-20 LAB
BLOOD CULTURE, ROUTINE: ABNORMAL
ORGANISM: ABNORMAL

## 2022-11-21 ENCOUNTER — CARE COORDINATION (OUTPATIENT)
Dept: CARE COORDINATION | Age: 72
End: 2022-11-21

## 2022-11-21 NOTE — CARE COORDINATION
Call placed to Pt and Pt's niece, [place] (verified on HIPAA). Spoke with Benjamin who confirmed Pt is active with [facility]. CTN will sign off.

## 2022-11-25 ENCOUNTER — TELEPHONE (OUTPATIENT)
Dept: FAMILY MEDICINE CLINIC | Age: 72
End: 2022-11-25

## 2022-11-25 NOTE — TELEPHONE ENCOUNTER
----- Message from Estrella Ashby sent at 11/25/2022  1:54 PM EST -----  Subject: Message to Provider    QUESTIONS  Information for Provider? Floyd Beckwith with [facility] called to ensure the pt had a   f/u with Dr. Jose Wilkerson after she was discharged from the hospital. Please   contact pt to schedule.  ---------------------------------------------------------------------------  --------------  Breanna Freitas INFO  [phone]; Do not leave any message, patient will call back for answer  ---------------------------------------------------------------------------  --------------  SCRIPT ANSWERS  Relationship to Patient? Third Party  Third Party Type? Insurance? Representative Name?  Arian

## 2022-11-25 NOTE — TELEPHONE ENCOUNTER
Attempted to contact pt, rang busy. Called alternative contact and it went straight to vm.    Electronically signed by Stan Cox on 11/25/22 at 2:02 PM EST
